# Patient Record
Sex: FEMALE | Race: BLACK OR AFRICAN AMERICAN | Employment: OTHER | URBAN - METROPOLITAN AREA
[De-identification: names, ages, dates, MRNs, and addresses within clinical notes are randomized per-mention and may not be internally consistent; named-entity substitution may affect disease eponyms.]

---

## 2018-09-05 ENCOUNTER — HOSPITAL ENCOUNTER (OUTPATIENT)
Dept: MAMMOGRAPHY | Age: 73
Discharge: HOME | End: 2018-09-05
Attending: FAMILY MEDICINE
Payer: MEDICARE

## 2018-09-05 DIAGNOSIS — Z12.31 VISIT FOR SCREENING MAMMOGRAM: ICD-10-CM

## 2018-09-05 PROCEDURE — 77063 BREAST TOMOSYNTHESIS BI: CPT

## 2019-09-12 ENCOUNTER — HOSPITAL ENCOUNTER (OUTPATIENT)
Dept: MAMMOGRAPHY | Age: 74
Discharge: HOME | End: 2019-09-12
Attending: NURSE PRACTITIONER
Payer: MEDICARE

## 2019-09-12 DIAGNOSIS — Z12.39 BREAST SCREENING: ICD-10-CM

## 2019-09-12 PROCEDURE — 77063 BREAST TOMOSYNTHESIS BI: CPT

## 2020-09-14 ENCOUNTER — HOSPITAL ENCOUNTER (OUTPATIENT)
Dept: MAMMOGRAPHY | Age: 75
Discharge: HOME | End: 2020-09-14
Attending: NURSE PRACTITIONER
Payer: MEDICARE

## 2020-09-14 DIAGNOSIS — Z12.31 VISIT FOR SCREENING MAMMOGRAM: ICD-10-CM

## 2020-09-14 PROCEDURE — 77063 BREAST TOMOSYNTHESIS BI: CPT

## 2021-07-02 ENCOUNTER — HOSPITAL ENCOUNTER (EMERGENCY)
Age: 76
Discharge: NURSING, REHAB OR LONG-TERM CARE | End: 2021-07-02
Attending: EMERGENCY MEDICINE
Payer: MEDICARE

## 2021-07-02 ENCOUNTER — APPOINTMENT (OUTPATIENT)
Dept: VASCULAR SURGERY | Age: 76
End: 2021-07-02
Attending: EMERGENCY MEDICINE
Payer: MEDICARE

## 2021-07-02 VITALS
OXYGEN SATURATION: 99 % | TEMPERATURE: 97 F | HEART RATE: 84 BPM | RESPIRATION RATE: 16 BRPM | DIASTOLIC BLOOD PRESSURE: 74 MMHG | SYSTOLIC BLOOD PRESSURE: 158 MMHG

## 2021-07-02 DIAGNOSIS — L03.116 CELLULITIS OF LEFT LOWER LEG: Primary | ICD-10-CM

## 2021-07-02 LAB
ALBUMIN SERPL-MCNC: 4 G/DL (ref 3.5–5)
ALBUMIN/GLOB SERPL: 1.1 {RATIO} (ref 1.1–2.2)
ALP SERPL-CCNC: 117 U/L (ref 45–117)
ALT SERPL-CCNC: 19 U/L (ref 12–78)
ANION GAP SERPL CALC-SCNC: 4 MMOL/L (ref 5–15)
AST SERPL-CCNC: 28 U/L (ref 15–37)
BASOPHILS # BLD: 2 K/UL (ref 0–0.1)
BASOPHILS NFR BLD: 7 % (ref 0–1)
BILIRUB SERPL-MCNC: 0.2 MG/DL (ref 0.2–1)
BNP SERPL-MCNC: 1027 PG/ML
BUN SERPL-MCNC: 24 MG/DL (ref 6–20)
BUN/CREAT SERPL: 17 (ref 12–20)
CALCIUM SERPL-MCNC: 9.5 MG/DL (ref 8.5–10.1)
CHLORIDE SERPL-SCNC: 109 MMOL/L (ref 97–108)
CO2 SERPL-SCNC: 27 MMOL/L (ref 21–32)
CREAT SERPL-MCNC: 1.39 MG/DL (ref 0.55–1.02)
DIFFERENTIAL METHOD BLD: ABNORMAL
EOSINOPHIL # BLD: 0.6 K/UL (ref 0–0.4)
EOSINOPHIL NFR BLD: 2 % (ref 0–7)
ERYTHROCYTE [DISTWIDTH] IN BLOOD BY AUTOMATED COUNT: 17 % (ref 11.5–14.5)
GLOBULIN SER CALC-MCNC: 3.6 G/DL (ref 2–4)
GLUCOSE SERPL-MCNC: 89 MG/DL (ref 65–100)
HCT VFR BLD AUTO: 29.8 % (ref 35–47)
HGB BLD-MCNC: 8.8 G/DL (ref 11.5–16)
IMM GRANULOCYTES # BLD AUTO: 0 K/UL
IMM GRANULOCYTES NFR BLD AUTO: 0 %
LYMPHOCYTES # BLD: 4.9 K/UL (ref 0.8–3.5)
LYMPHOCYTES NFR BLD: 17 % (ref 12–49)
MCH RBC QN AUTO: 27.2 PG (ref 26–34)
MCHC RBC AUTO-ENTMCNC: 29.5 G/DL (ref 30–36.5)
MCV RBC AUTO: 92 FL (ref 80–99)
METAMYELOCYTES NFR BLD MANUAL: 6 %
MONOCYTES # BLD: 2.9 K/UL (ref 0–1)
MONOCYTES NFR BLD: 10 % (ref 5–13)
MYELOCYTES NFR BLD MANUAL: 8 %
NEUTS BAND NFR BLD MANUAL: 18 % (ref 0–6)
NEUTS SEG # BLD: 14.6 K/UL (ref 1.8–8)
NEUTS SEG NFR BLD: 32 % (ref 32–75)
NRBC # BLD: 0.8 K/UL (ref 0–0.01)
NRBC BLD-RTO: 2.7 PER 100 WBC
OTHER CELLS NFR BLD MANUAL: 0 %
PLATELET # BLD AUTO: 492 K/UL (ref 150–400)
PMV BLD AUTO: 11 FL (ref 8.9–12.9)
POTASSIUM SERPL-SCNC: 4.6 MMOL/L (ref 3.5–5.1)
PROT SERPL-MCNC: 7.6 G/DL (ref 6.4–8.2)
RBC # BLD AUTO: 3.24 M/UL (ref 3.8–5.2)
RBC MORPH BLD: ABNORMAL
SODIUM SERPL-SCNC: 140 MMOL/L (ref 136–145)
WBC # BLD AUTO: 29.1 K/UL (ref 3.6–11)

## 2021-07-02 PROCEDURE — 83880 ASSAY OF NATRIURETIC PEPTIDE: CPT

## 2021-07-02 PROCEDURE — 36415 COLL VENOUS BLD VENIPUNCTURE: CPT

## 2021-07-02 PROCEDURE — 80053 COMPREHEN METABOLIC PANEL: CPT

## 2021-07-02 PROCEDURE — 93971 EXTREMITY STUDY: CPT

## 2021-07-02 PROCEDURE — 99283 EMERGENCY DEPT VISIT LOW MDM: CPT

## 2021-07-02 PROCEDURE — 85025 COMPLETE CBC W/AUTO DIFF WBC: CPT

## 2021-07-02 RX ORDER — DOXYCYCLINE HYCLATE 100 MG
100 TABLET ORAL 2 TIMES DAILY
Qty: 20 TABLET | Refills: 0 | Status: SHIPPED | OUTPATIENT
Start: 2021-07-02 | End: 2021-07-12

## 2021-07-02 NOTE — ED PROVIDER NOTES
35-year-old female with a history of CML presents to the emergency department by EMS for evaluation of left lower leg swelling and redness is been going on since May. She reportedly saw her primary care provider who told her that they would order an outpatient ultrasound of her leg but she never heard back from them and never got this study done. She describes the pain around the area as burning and constant. She denies any trauma to the area. She has some mild right lower leg swelling as well but states that is normal for her. She denies any history of prior CHF, or prior DVT. She is on no blood thinners. She denies any fever, chills, chest pain, shortness of breath, nausea, vomiting, or any other acute medical concerns. She also notes chronic low back pain but states this is no different than it always is. No past medical history on file. No past surgical history on file. Family History:   Problem Relation Age of Onset    Breast Cancer Sister         63's       Social History     Socioeconomic History    Marital status: SINGLE     Spouse name: Not on file    Number of children: Not on file    Years of education: Not on file    Highest education level: Not on file   Occupational History    Not on file   Tobacco Use    Smoking status: Not on file   Substance and Sexual Activity    Alcohol use: Not on file    Drug use: Not on file    Sexual activity: Not on file   Other Topics Concern    Not on file   Social History Narrative    Not on file     Social Determinants of Health     Financial Resource Strain:     Difficulty of Paying Living Expenses:    Food Insecurity:     Worried About Running Out of Food in the Last Year:     [address] in the Last Year:    Transportation Needs:     Lack of Transportation (Medical):      Lack of Transportation (Non-Medical):    Physical Activity:     Days of Exercise per Week:     Minutes of Exercise per Session:    Stress:     Feeling of Stress :    Social Connections:     Frequency of Communication with Friends and Family:     Frequency of Social Gatherings with Friends and Family:     Attends Restoration Services:     Active Member of Clubs or Organizations:     Attends Club or Organization Meetings:     Marital Status:    Intimate Partner Violence:     Fear of Current or Ex-Partner:     Emotionally Abused:     Physically Abused:     Sexually Abused: ALLERGIES: Patient has no known allergies. Review of Systems   Constitutional: Negative for activity change, appetite change, chills and fever. HENT: Negative for congestion, rhinorrhea, sinus pressure, sneezing and sore throat. Eyes: Negative for photophobia and visual disturbance. Respiratory: Negative for cough and shortness of breath. Cardiovascular: Positive for leg swelling. Negative for chest pain. Gastrointestinal: Negative for abdominal pain, blood in stool, constipation, diarrhea, nausea and vomiting. Genitourinary: Negative for difficulty urinating, dysuria, flank pain, frequency, hematuria, menstrual problem, urgency, vaginal bleeding and vaginal discharge. Musculoskeletal: Positive for back pain. Negative for arthralgias, myalgias and neck pain. Skin: Positive for rash. Negative for wound. Neurological: Negative for syncope, weakness, numbness and headaches. Psychiatric/Behavioral: Negative for self-injury and suicidal ideas. All other systems reviewed and are negative. Vitals:    07/02/21 1023   BP: (!) 158/74   Pulse: 84   Resp: 16   Temp: 97 °F (36.1 °C)   SpO2: 99%            Physical Exam  Vitals and nursing note reviewed. Constitutional:       General: She is not in acute distress. Appearance: Normal appearance. She is well-developed. She is not diaphoretic. Comments: Pleasant, no acute distress. HENT:      Head: Normocephalic and atraumatic.       Nose: Nose normal.   Eyes:      Extraocular Movements: Extraocular movements intact. Conjunctiva/sclera: Conjunctivae normal.      Pupils: Pupils are equal, round, and reactive to light. Cardiovascular:      Rate and Rhythm: Normal rate and regular rhythm. Heart sounds: Normal heart sounds. Pulmonary:      Effort: Pulmonary effort is normal.      Breath sounds: Normal breath sounds. Abdominal:      General: There is no distension. Palpations: Abdomen is soft. Tenderness: There is no abdominal tenderness. Musculoskeletal:         General: Swelling present. No tenderness or deformity. Cervical back: Neck supple. Right lower leg: No edema. Left lower leg: Edema present. Skin:     General: Skin is warm and dry. Findings: Erythema (Redness and warmth over the left lower extremity with associated swelling but nothing) present. Neurological:      General: No focal deficit present. Mental Status: She is alert and oriented to person, place, and time. Cranial Nerves: No cranial nerve deficit. Sensory: No sensory deficit. Motor: No weakness. Coordination: Coordination normal.          MDM   68-year-old female with a history of CML presents with left lower extremity swelling and tenderness. No trauma or bony tenderness to suggest acute fracture. She is afebrile with vital signs stable in no acute distress. Lower extremity ultrasound showed no evidence of acute DVT. Labs returned showing leukocytosis of 29.1, Hg 8.8, unfortunately we do not have prior lab values to compare to given known history of CML. Does also have slight elevation of BNP at 1027 but no shortness of breath or symptoms suggestive of CHF exacerbation. Given unilateral nature of her symptoms with associated pain suspect this is more cellulitis in nature. Will Rx p.o. ABX for further treatment and recommended elevation and close PCP follow-up for further evaluation. Return precautions were given for worsening or concerns.   This plan was discussed with the patient at bedside she stated both understanding and agreement.         Procedures

## 2021-07-02 NOTE — PROGRESS NOTES
Call received from [facility] informed patient request assistance getting home. No updates from [place] at this time . CM sent new request via roundtrip for 1425 pickup [employer]. Updates provided to Registrar. Faina Thomas [phone]  to arrive in 4 minutes. Updates provided to Registrar.

## 2021-07-02 NOTE — ED TRIAGE NOTES
DIAN giron manorBilateral ongoing leg edema redness and pain since may. Severe burning pain. Staff noticed swelling today. Lower back pain. Denies CP/SOB.  NSR.

## 2021-07-02 NOTE — ED NOTES
Pt walked to [facility] set up by care management, pt condition stable. Discharge paperwork discussed with pt by provider.

## 2021-07-02 NOTE — PROGRESS NOTES
SSED/CM consult received for transport back to facility. Patient is discharged. Call placed to [facility] [phone]  to confirm location. Spoke w/ Mary Lopez, Caretaker - informed of discharged asked if patient had been bitten by a spider? This writer unable to answer. Caretaker to receive. No steps leading into facility. Informed will contact medicaid for ride home - [employer] insurance may take \" a while. \"    1300 Call placed to [address].     [phone] Confirmation number [id number]  provided by Janny/ [employer] will attempt to locate next available ambulatory ride (outcome pending). Updates provided to [facility].

## 2021-08-18 ENCOUNTER — TRANSCRIBE ORDER (OUTPATIENT)
Dept: SCHEDULING | Age: 76
End: 2021-08-18

## 2021-08-18 DIAGNOSIS — Z12.31 SCREENING MAMMOGRAM FOR HIGH-RISK PATIENT: Primary | ICD-10-CM

## 2021-09-30 ENCOUNTER — HOSPITAL ENCOUNTER (OUTPATIENT)
Dept: MAMMOGRAPHY | Age: 76
Discharge: HOME | End: 2021-09-30
Attending: NURSE PRACTITIONER
Payer: MEDICARE

## 2021-09-30 DIAGNOSIS — Z12.31 SCREENING MAMMOGRAM FOR HIGH-RISK PATIENT: ICD-10-CM

## 2021-09-30 PROCEDURE — 77063 BREAST TOMOSYNTHESIS BI: CPT

## 2022-01-28 ENCOUNTER — HOSPITAL ENCOUNTER (INPATIENT)
Age: 77
LOS: 2 days | Discharge: HOME | DRG: 194 | End: 2022-01-30
Attending: EMERGENCY MEDICINE | Admitting: INTERNAL MEDICINE
Payer: MEDICARE

## 2022-01-28 ENCOUNTER — APPOINTMENT (OUTPATIENT)
Dept: GENERAL RADIOLOGY | Age: 77
DRG: 194 | End: 2022-01-28
Attending: EMERGENCY MEDICINE
Payer: MEDICARE

## 2022-01-28 ENCOUNTER — APPOINTMENT (OUTPATIENT)
Dept: CT IMAGING | Age: 77
DRG: 194 | End: 2022-01-28
Attending: EMERGENCY MEDICINE
Payer: MEDICARE

## 2022-01-28 ENCOUNTER — APPOINTMENT (OUTPATIENT)
Dept: NON INVASIVE DIAGNOSTICS | Age: 77
DRG: 194 | End: 2022-01-28
Attending: INTERNAL MEDICINE
Payer: MEDICARE

## 2022-01-28 DIAGNOSIS — I50.9 ACUTE CONGESTIVE HEART FAILURE, UNSPECIFIED HEART FAILURE TYPE (HCC): Primary | ICD-10-CM

## 2022-01-28 PROBLEM — J96.00 ACUTE RESPIRATORY FAILURE (HCC): Status: ACTIVE | Noted: 2022-01-28

## 2022-01-28 LAB
ALBUMIN SERPL-MCNC: 3.6 G/DL (ref 3.5–5)
ALBUMIN/GLOB SERPL: 1 {RATIO} (ref 1.1–2.2)
ALP SERPL-CCNC: 152 U/L (ref 45–117)
ALT SERPL-CCNC: 31 U/L (ref 12–78)
ANION GAP SERPL CALC-SCNC: 7 MMOL/L (ref 5–15)
AST SERPL-CCNC: 37 U/L (ref 15–37)
ATRIAL RATE: 101 BPM
B PERT DNA SPEC QL NAA+PROBE: NOT DETECTED
BASOPHILS # BLD: 3.2 K/UL (ref 0–0.1)
BASOPHILS NFR BLD: 6 % (ref 0–1)
BILIRUB SERPL-MCNC: 0.3 MG/DL (ref 0.2–1)
BNP SERPL-MCNC: 2381 PG/ML
BORDETELLA PARAPERTUSSIS PCR, BORPAR: NOT DETECTED
BUN SERPL-MCNC: 24 MG/DL (ref 6–20)
BUN/CREAT SERPL: 18 (ref 12–20)
C PNEUM DNA SPEC QL NAA+PROBE: NOT DETECTED
CALCIUM SERPL-MCNC: 9.2 MG/DL (ref 8.5–10.1)
CALCULATED P AXIS, ECG09: 87 DEGREES
CALCULATED R AXIS, ECG10: 44 DEGREES
CALCULATED T AXIS, ECG11: 90 DEGREES
CHLORIDE SERPL-SCNC: 109 MMOL/L (ref 97–108)
CO2 SERPL-SCNC: 23 MMOL/L (ref 21–32)
COMMENT, HOLDF: NORMAL
CREAT SERPL-MCNC: 1.36 MG/DL (ref 0.55–1.02)
D DIMER PPP FEU-MCNC: 3.02 MG/L FEU (ref 0–0.65)
DIAGNOSIS, 93000: NORMAL
DIFFERENTIAL METHOD BLD: ABNORMAL
ECHO AV AREA PEAK VELOCITY: 1.9 CM2
ECHO AV AREA PEAK VELOCITY: 1.9 CM2
ECHO AV PEAK GRADIENT: 12 MMHG
ECHO AV PEAK VELOCITY: 1.7 M/S
ECHO AV VELOCITY RATIO: 0.59
ECHO EST RA PRESSURE: 3 MMHG
ECHO LA VOL 4C: 72 ML (ref 22–52)
ECHO LV E' LATERAL VELOCITY: 6 CM/S
ECHO LV E' SEPTAL VELOCITY: 7 CM/S
ECHO LV FRACTIONAL SHORTENING: 13 % (ref 28–44)
ECHO LV GLOBAL LONGITUDINAL STRAIN (GLS): -17 %
ECHO LV INTERNAL DIMENSION DIASTOLIC: 4.6 CM (ref 3.9–5.3)
ECHO LV INTERNAL DIMENSION SYSTOLIC: 4 CM
ECHO LV IVSD: 0.9 CM (ref 0.6–0.9)
ECHO LV MASS 2D: 181.2 G (ref 67–162)
ECHO LV POSTERIOR WALL DIASTOLIC: 1.3 CM (ref 0.6–0.9)
ECHO LV RELATIVE WALL THICKNESS RATIO: 0.57
ECHO LVOT AREA: 3.1 CM2
ECHO LVOT DIAM: 2 CM
ECHO LVOT PEAK GRADIENT: 4 MMHG
ECHO LVOT PEAK VELOCITY: 1 M/S
ECHO MV A VELOCITY: 1.24 M/S
ECHO MV E VELOCITY: 1.01 M/S
ECHO MV E/A RATIO: 0.81
ECHO MV E/E' LATERAL: 16.83
ECHO MV E/E' RATIO (AVERAGED): 15.63
ECHO MV E/E' SEPTAL: 14.43
ECHO MV REGURGITANT VELOCITY PISA: 5.5 M/S
ECHO MV REGURGITANT VTIA: 187.3 CM
ECHO RIGHT VENTRICULAR SYSTOLIC PRESSURE (RVSP): 48 MMHG
ECHO RV FREE WALL PEAK S': 10 CM/S
ECHO RV INTERNAL DIMENSION: 4.9 CM
ECHO RV TAPSE: 3.7 CM (ref 1.5–2)
ECHO TV REGURGITANT MAX VELOCITY: 3.35 M/S
ECHO TV REGURGITANT PEAK GRADIENT: 45 MMHG
EOSINOPHIL # BLD: 1.1 K/UL (ref 0–0.4)
EOSINOPHIL NFR BLD: 2 % (ref 0–7)
ERYTHROCYTE [DISTWIDTH] IN BLOOD BY AUTOMATED COUNT: 17.4 % (ref 11.5–14.5)
FLUAV H1 2009 PAND RNA SPEC QL NAA+PROBE: NOT DETECTED
FLUAV H1 RNA SPEC QL NAA+PROBE: NOT DETECTED
FLUAV H3 RNA SPEC QL NAA+PROBE: NOT DETECTED
FLUAV SUBTYP SPEC NAA+PROBE: NOT DETECTED
FLUBV RNA SPEC QL NAA+PROBE: NOT DETECTED
GLOBULIN SER CALC-MCNC: 3.6 G/DL (ref 2–4)
GLUCOSE SERPL-MCNC: 91 MG/DL (ref 65–100)
HADV DNA SPEC QL NAA+PROBE: NOT DETECTED
HCOV 229E RNA SPEC QL NAA+PROBE: NOT DETECTED
HCOV HKU1 RNA SPEC QL NAA+PROBE: NOT DETECTED
HCOV NL63 RNA SPEC QL NAA+PROBE: NOT DETECTED
HCOV OC43 RNA SPEC QL NAA+PROBE: NOT DETECTED
HCT VFR BLD AUTO: 27.7 % (ref 35–47)
HGB BLD-MCNC: 7.9 G/DL (ref 11.5–16)
HMPV RNA SPEC QL NAA+PROBE: NOT DETECTED
HPIV1 RNA SPEC QL NAA+PROBE: NOT DETECTED
HPIV2 RNA SPEC QL NAA+PROBE: NOT DETECTED
HPIV3 RNA SPEC QL NAA+PROBE: NOT DETECTED
HPIV4 RNA SPEC QL NAA+PROBE: NOT DETECTED
IMM GRANULOCYTES # BLD AUTO: 0 K/UL
IMM GRANULOCYTES NFR BLD AUTO: 0 %
LYMPHOCYTES # BLD: 15.9 K/UL (ref 0.8–3.5)
LYMPHOCYTES NFR BLD: 30 % (ref 12–49)
M PNEUMO DNA SPEC QL NAA+PROBE: NOT DETECTED
MAGNESIUM SERPL-MCNC: 2.8 MG/DL (ref 1.6–2.4)
MCH RBC QN AUTO: 23.1 PG (ref 26–34)
MCHC RBC AUTO-ENTMCNC: 28.5 G/DL (ref 30–36.5)
MCV RBC AUTO: 81 FL (ref 80–99)
METAMYELOCYTES NFR BLD MANUAL: 3 %
MONOCYTES # BLD: 3.7 K/UL (ref 0–1)
MONOCYTES NFR BLD: 7 % (ref 5–13)
MYELOCYTES NFR BLD MANUAL: 5 %
NEUTS BAND NFR BLD MANUAL: 6 % (ref 0–6)
NEUTS SEG # BLD: 25 K/UL (ref 1.8–8)
NEUTS SEG NFR BLD: 41 % (ref 32–75)
NRBC # BLD: 1.75 K/UL (ref 0–0.01)
NRBC BLD-RTO: 3.3 PER 100 WBC
P-R INTERVAL, ECG05: 124 MS
PLATELET # BLD AUTO: 541 K/UL (ref 150–400)
PLATELET COMMENTS,PCOM: ABNORMAL
PMV BLD AUTO: 11.8 FL (ref 8.9–12.9)
POTASSIUM SERPL-SCNC: 4.2 MMOL/L (ref 3.5–5.1)
PROT SERPL-MCNC: 7.2 G/DL (ref 6.4–8.2)
Q-T INTERVAL, ECG07: 366 MS
QRS DURATION, ECG06: 72 MS
QTC CALCULATION (BEZET), ECG08: 474 MS
RBC # BLD AUTO: 3.42 M/UL (ref 3.8–5.2)
RBC MORPH BLD: ABNORMAL
RSV RNA SPEC QL NAA+PROBE: NOT DETECTED
RV+EV RNA SPEC QL NAA+PROBE: NOT DETECTED
SAMPLES BEING HELD,HOLD: NORMAL
SARS-COV-2 PCR, COVPCR: NOT DETECTED
SODIUM SERPL-SCNC: 139 MMOL/L (ref 136–145)
TROPONIN-HIGH SENSITIVITY: 11 NG/L (ref 0–51)
VENTRICULAR RATE, ECG03: 101 BPM
WBC # BLD AUTO: 53.1 K/UL (ref 3.6–11)
WBC MORPH BLD: ABNORMAL

## 2022-01-28 PROCEDURE — 80053 COMPREHEN METABOLIC PANEL: CPT

## 2022-01-28 PROCEDURE — 74011000636 HC RX REV CODE- 636: Performed by: RADIOLOGY

## 2022-01-28 PROCEDURE — 71046 X-RAY EXAM CHEST 2 VIEWS: CPT

## 2022-01-28 PROCEDURE — 71275 CT ANGIOGRAPHY CHEST: CPT

## 2022-01-28 PROCEDURE — APPSS45 APP SPLIT SHARED TIME 31-45 MINUTES: Performed by: NURSE PRACTITIONER

## 2022-01-28 PROCEDURE — 83880 ASSAY OF NATRIURETIC PEPTIDE: CPT

## 2022-01-28 PROCEDURE — 74011250636 HC RX REV CODE- 250/636: Performed by: EMERGENCY MEDICINE

## 2022-01-28 PROCEDURE — 99285 EMERGENCY DEPT VISIT HI MDM: CPT

## 2022-01-28 PROCEDURE — 74011000250 HC RX REV CODE- 250: Performed by: INTERNAL MEDICINE

## 2022-01-28 PROCEDURE — 96374 THER/PROPH/DIAG INJ IV PUSH: CPT

## 2022-01-28 PROCEDURE — 65660000001 HC RM ICU INTERMED STEPDOWN

## 2022-01-28 PROCEDURE — 36415 COLL VENOUS BLD VENIPUNCTURE: CPT

## 2022-01-28 PROCEDURE — 85379 FIBRIN DEGRADATION QUANT: CPT

## 2022-01-28 PROCEDURE — 84484 ASSAY OF TROPONIN QUANT: CPT

## 2022-01-28 PROCEDURE — 85025 COMPLETE CBC W/AUTO DIFF WBC: CPT

## 2022-01-28 PROCEDURE — 93306 TTE W/DOPPLER COMPLETE: CPT

## 2022-01-28 PROCEDURE — 0202U NFCT DS 22 TRGT SARS-COV-2: CPT

## 2022-01-28 PROCEDURE — 93005 ELECTROCARDIOGRAM TRACING: CPT

## 2022-01-28 PROCEDURE — 74011250637 HC RX REV CODE- 250/637: Performed by: INTERNAL MEDICINE

## 2022-01-28 PROCEDURE — 74011250636 HC RX REV CODE- 250/636: Performed by: INTERNAL MEDICINE

## 2022-01-28 PROCEDURE — 83735 ASSAY OF MAGNESIUM: CPT

## 2022-01-28 PROCEDURE — 96375 TX/PRO/DX INJ NEW DRUG ADDON: CPT

## 2022-01-28 RX ORDER — FUROSEMIDE 10 MG/ML
20 INJECTION INTRAMUSCULAR; INTRAVENOUS 2 TIMES DAILY
Status: DISCONTINUED | OUTPATIENT
Start: 2022-01-28 | End: 2022-01-29

## 2022-01-28 RX ORDER — POLYETHYLENE GLYCOL 3350 17 G/17G
17 POWDER, FOR SOLUTION ORAL DAILY PRN
Status: DISCONTINUED | OUTPATIENT
Start: 2022-01-28 | End: 2022-01-30 | Stop reason: HOSPADM

## 2022-01-28 RX ORDER — ACETAMINOPHEN 325 MG/1
650 TABLET ORAL
Status: DISCONTINUED | OUTPATIENT
Start: 2022-01-28 | End: 2022-01-30 | Stop reason: HOSPADM

## 2022-01-28 RX ORDER — ONDANSETRON 2 MG/ML
4 INJECTION INTRAMUSCULAR; INTRAVENOUS
Status: DISCONTINUED | OUTPATIENT
Start: 2022-01-28 | End: 2022-01-30 | Stop reason: HOSPADM

## 2022-01-28 RX ORDER — ENOXAPARIN SODIUM 100 MG/ML
40 INJECTION SUBCUTANEOUS DAILY
Status: DISCONTINUED | OUTPATIENT
Start: 2022-01-29 | End: 2022-01-30 | Stop reason: HOSPADM

## 2022-01-28 RX ORDER — ONDANSETRON 4 MG/1
4 TABLET, ORALLY DISINTEGRATING ORAL
Status: DISCONTINUED | OUTPATIENT
Start: 2022-01-28 | End: 2022-01-30 | Stop reason: HOSPADM

## 2022-01-28 RX ORDER — FUROSEMIDE 10 MG/ML
40 INJECTION INTRAMUSCULAR; INTRAVENOUS ONCE
Status: COMPLETED | OUTPATIENT
Start: 2022-01-28 | End: 2022-01-28

## 2022-01-28 RX ORDER — ACETAMINOPHEN 650 MG/1
650 SUPPOSITORY RECTAL
Status: DISCONTINUED | OUTPATIENT
Start: 2022-01-28 | End: 2022-01-30 | Stop reason: HOSPADM

## 2022-01-28 RX ORDER — SODIUM CHLORIDE 0.9 % (FLUSH) 0.9 %
5-40 SYRINGE (ML) INJECTION EVERY 8 HOURS
Status: DISCONTINUED | OUTPATIENT
Start: 2022-01-28 | End: 2022-01-30 | Stop reason: HOSPADM

## 2022-01-28 RX ORDER — SODIUM CHLORIDE 0.9 % (FLUSH) 0.9 %
5-40 SYRINGE (ML) INJECTION AS NEEDED
Status: DISCONTINUED | OUTPATIENT
Start: 2022-01-28 | End: 2022-01-30 | Stop reason: HOSPADM

## 2022-01-28 RX ADMIN — FUROSEMIDE 20 MG: 10 INJECTION, SOLUTION INTRAVENOUS at 16:31

## 2022-01-28 RX ADMIN — ACETAMINOPHEN 650 MG: 325 TABLET ORAL at 22:05

## 2022-01-28 RX ADMIN — IOPAMIDOL 80 ML: 755 INJECTION, SOLUTION INTRAVENOUS at 10:52

## 2022-01-28 RX ADMIN — CEFTRIAXONE SODIUM 1 G: 1 INJECTION, POWDER, FOR SOLUTION INTRAMUSCULAR; INTRAVENOUS at 16:31

## 2022-01-28 RX ADMIN — SODIUM CHLORIDE, PRESERVATIVE FREE 10 ML: 5 INJECTION INTRAVENOUS at 16:31

## 2022-01-28 RX ADMIN — FUROSEMIDE 40 MG: 10 INJECTION, SOLUTION INTRAVENOUS at 11:19

## 2022-01-28 NOTE — PROGRESS NOTES
[facility] in [place], [place]  Inpatient Progress Note      Patient name: Bimal Butler  Patient : 1945  Patient MRN: [id number]  Consulting MD: Leslie Wahl MD  Date of service: 22    REASON FOR REFERRAL:  Management of acute systolic heart failure    PLAN OF CARE:  68 y.o. female with no known cardiac history who presented for SOB x 1 week. PBNP elevated and TTE pending. Will complete initial evaluation. RECOMMENDATIONS:  Continue lasix BID for now, monitor I/O  Check initial HF labs   TTE pending  Trend PBNP  Strict I/O  Daily weights    Nutritionist consult  Heart failure education    All other care per primary team     IMPRESSION:  Shortness of breath  Possible acute systolic heart failure  H/o myeloproliferative disorder  H/o Thrombocytosis  H/o Cellulitis   HTN  HLD    LIFE GOALS:  Patient's personal goals include: TBD  Important upcoming milestones or family events: TBD  The patient identifies the following as important for living well: TBD    INTERVAL HISTORY:    HPI  Ms. Lonnie Wood is a 68 y.o. female with a pmhx of myeloproliferative disorder and thrombocytosis who presented to the ED for SOB for 1 week. She does note that she had some burning in her chest last week that resolved that day. She was given some diuretics in the ED and states she feels better. She does have an elevated PBNP and trace B effusions. [facility] was consulted for possible acute HFrEF. CARDIAC IMAGING:  Echo 22 pending   EKG 22- ST, rate 101  LHC-NA  NST- NA  ICD interrogation- NA    HEMODYNAMICS:  RHC not done-NA  CPEST not done- NA  6MW not done    OTHER IMAGING:  CXR Results  (Last 48 hours)                 22 0949  XR CHEST PA LAT Final result    Impression:  Cardiomegaly with trace bilateral pleural effusions.        Narrative:  INDICATION:   shortness of breath        EXAM:  PA and Lateral Chest Radiographs       COMPARISON: None FINDINGS:       PA and lateral views of the chest demonstrate an enlarged cardiac silhouette. Patient is rotated leftward. The lungs are adequately expanded. There are trace   effusions. There is no focal airspace disease. The osseous structures are   unremarkable. CT Results (most recent):  Results from Hospital Encounter encounter on 01/28/22    CTA CHEST W OR W WO CONT    Narrative  INDICATION:   sob, elevated dimer    COMPARISON: None. TECHNIQUE:  Precontrast  images were obtained to localize the volume for acquisition. Multislice helical CT arteriography was performed from the diaphragm to the  thoracic inlet during uneventful rapid bolus intravenous administration of 80 mL  of Isovue-370. Lung and soft tissue windows were generated. Post processing was  performed and coronal reformatted images were also generated. 3 D imaging was  performed. CT dose reduction was achieved through use of a standardized  protocol tailored for this examination and automatic exposure control for dose  modulation. FINDINGS: There is cardiomegaly. There is a small pericardial effusion. There is  an aberrant right subclavian artery. Main pulmonary artery slightly prominent and measures 3 cm. There is slight bibasilar atelectasis. There is a tiny right effusion. No definite pulmonary emboli identified. There is no mediastinal or hilar adenopathy or mass. The aorta enhances normally  without evidence of aneurysm or dissection. There is splenomegaly in the upper abdomen with the spleen measuring at least  15.1 cm. There is slight dextroconvex scoliosis. There is sclerosis of the visualized  bony structures and are multiple small lucencies in the thoracic spine. Impression  1. No definite pulmonary emboli identified. 2. There is slight bibasilar atelectasis. There is a tiny right pleural  effusion. 3. Splenomegaly. 4. Cardiomegaly. There is an aberrant right subclavian artery  5. There is increased density throughout the visualized bony structures may  represent changes of metabolic bone disease. However there are also several  small lucent areas scattered in the thoracic spine which are nonspecific and  could represent focal areas of osteoporosis however metastatic bone  disease/myeloma is not excluded and correlation with prior studies would be  helpful. Rajendra Alicea PHYSICAL EXAM:  Visit Vitals  /64   Pulse 93   Temp 98.3 °F (36.8 °C)   Resp 21   SpO2 99%     Physical Exam  Vitals and nursing note reviewed. Constitutional:       General: She is not in acute distress. Appearance: Normal appearance. She is normal weight. Cardiovascular:      Rate and Rhythm: Normal rate and regular rhythm. Pulses: Normal pulses. Heart sounds: Normal heart sounds. Pulmonary:      Effort: Pulmonary effort is normal. No respiratory distress. Breath sounds: Normal breath sounds. Abdominal:      General: Abdomen is flat. There is no distension. Musculoskeletal:         General: No swelling. Normal range of motion. Skin:     General: Skin is warm and dry. Neurological:      General: No focal deficit present. Mental Status: She is alert and oriented to person, place, and time. Psychiatric:         Mood and Affect: Mood normal.         REVIEW OF SYSTEMS:  Review of Systems   Constitutional: Positive for malaise/fatigue. Negative for chills and fever. Respiratory: Positive for cough and shortness of breath. Cardiovascular: Negative for chest pain, palpitations and leg swelling. Gastrointestinal: Negative for heartburn and nausea. Musculoskeletal: Negative for falls and myalgias. Neurological: Negative for dizziness, weakness and headaches. Psychiatric/Behavioral: Negative. PAST MEDICAL HISTORY:  Past Medical History:   Diagnosis Date    Menopause        PAST SURGICAL HISTORY:  No past surgical history on file.     FAMILY HISTORY:  Family History Problem Relation Age of Onset    Breast Cancer Sister         63's       SOCIAL HISTORY:  Social History     Socioeconomic History    Marital status: SINGLE       LABORATORY RESULTS:     Labs Latest Ref Rng & Units 1/28/2022   WBC 3.6 - 11.0 K/uL 53. 1(HH)   RBC 3.80 - 5.20 M/uL 3.42(L)   Hemoglobin 11.5 - 16.0 g/dL 7. 9(L)   Hematocrit 35.0 - 47.0 % 27. 7(L)   MCV 80.0 - 99.0 FL 81.0   Platelets 659 - 021 K/uL 541(H)   Lymphocytes 12 - 49 % 30   Monocytes 5 - 13 % 7   Eosinophils 0 - 7 % 2   Basophils 0 - 1 % 6(H)   Bands 0 - 6 % 6   Albumin 3.5 - 5.0 g/dL 3.6   Calcium 8.5 - 10.1 MG/DL 9.2   Glucose 65 - 100 mg/dL 91   BUN 6 - 20 MG/DL 24(H)   Creatinine 0.55 - 1.02 MG/DL 1.36(H)   Sodium 136 - 145 mmol/L 139   Potassium 3.5 - 5.1 mmol/L 4.2   Some recent data might be hidden     No results found for: TSH, TSH2, TSH3, TSHP, TSHELE, TSHEXT    ALLERGY:  No Known Allergies     CURRENT MEDICATIONS:    Current Facility-Administered Medications:     cefTRIAXone (ROCEPHIN) 1 g in sterile water (preservative free) 10 mL IV syringe, 1 g, IntraVENous, Q24H, Raymond Llanes MD    sodium chloride (NS) flush 5-40 mL, 5-40 mL, IntraVENous, Q8H, Raymond Llanes MD    sodium chloride (NS) flush 5-40 mL, 5-40 mL, IntraVENous, PRN, Raymond Llanes MD    acetaminophen (TYLENOL) tablet 650 mg, 650 mg, Oral, Q6H PRN **OR** acetaminophen (TYLENOL) suppository 650 mg, 650 mg, Rectal, Q6H PRN, Raymond Llanes MD    polyethylene glycol (MIRALAX) packet 17 g, 17 g, Oral, DAILY PRN, Raymond Llanes MD    ondansetron (ZOFRAN ODT) tablet 4 mg, 4 mg, Oral, Q8H PRN **OR** ondansetron (ZOFRAN) injection 4 mg, 4 mg, IntraVENous, Q6H PRN, Raymond Llanes MD    [START ON 1/29/2022] enoxaparin (LOVENOX) injection 40 mg, 40 mg, SubCUTAneous, DAILY, Raymond Llanes MD    furosemide (LASIX) injection 20 mg, 20 mg, IntraVENous, BID, Raymond Llanes MD  No current outpatient medications on file.     PATIENT CARE TEAM:  Patient Care Team:  Gunner Chacon NP as PCP - General (Nurse Practitioner)     Thank you for allowing me to participate in this patient's care. Jossue Penny, NP  [facility] [address], [address]  Phone: [phone]    [facility] ATTENDING ADDENDUM    Patient was seen and examined in person. Data and notes were reviewed. I have discussed and agree with the plan as noted in the NP note above without further additions.     Bea Porter MD PhD  Sudheer Bhatia

## 2022-01-28 NOTE — ED NOTES
9:24 AM    75-year-old female presents via EMS for exertional shortness of breath for 1 week. I do notice some labored breathing when talking during triage. Does have some left-sided calf swelling which she states has been present since she was treated for cellulitis several months ago. Had some chest \"burning\" several days ago which has resolved. Also notes she was sneezing with nasal congestion earlier in the week which is also resolved, she did have close Covid exposure, has been vaccinated for Covid. Hx of CML    I have evaluated the patient as the Provider in Triage. I have reviewed Her vital signs and the triage nurse assessment. I have talked with the patient and any available family and advised that I am the provider in triage and have ordered the appropriate study to initiate their work up based on the clinical presentation during my assessment. I have advised that the patient will be accommodated in the Main ED as soon as possible. I have also requested to contact the triage nurse or myself immediately if the patient experiences any changes in their condition during this brief waiting period.   LEANDER Joe

## 2022-01-28 NOTE — ED PROVIDER NOTES
51-year-old female with a history of a blood disorder presents with a chief complaint of shortness of breath. Patient reports being short of breath since last Thursday. Her shortness of breath is present with activity and at rest.  She has had a cough but denies fever. She was backseat against Covid and boosted. She states that on Wednesday she had some burning central chest pain which has resolved. She denies abdominal pain, GI or urinary symptoms. She is not a smoker. Past Medical History:   Diagnosis Date    Menopause        No past surgical history on file. Family History:   Problem Relation Age of Onset    Breast Cancer Sister         63's       Social History     Socioeconomic History    Marital status: SINGLE     Spouse name: Not on file    Number of children: Not on file    Years of education: Not on file    Highest education level: Not on file   Occupational History    Not on file   Tobacco Use    Smoking status: Not on file    Smokeless tobacco: Not on file   Substance and Sexual Activity    Alcohol use: Not on file    Drug use: Not on file    Sexual activity: Not on file   Other Topics Concern    Not on file   Social History Narrative    Not on file     Social Determinants of Health     Financial Resource Strain:     Difficulty of Paying Living Expenses: Not on file   Food Insecurity:     Worried About Running Out of Food in the Last Year: Not on file    [place] of Food in the Last Year: Not on file   Transportation Needs:     Lack of Transportation (Medical): Not on file    Lack of Transportation (Non-Medical):  Not on file   Physical Activity:     Days of Exercise per Week: Not on file    Minutes of Exercise per Session: Not on file   Stress:     Feeling of Stress : Not on file   Social Connections:     Frequency of Communication with Friends and Family: Not on file    Frequency of Social Gatherings with Friends and Family: Not on file    Attends Latter day Services: Not on file    Active Member of Clubs or Organizations: Not on file    Attends Club or Organization Meetings: Not on file    Marital Status: Not on file   Intimate Partner Violence:     Fear of Current or Ex-Partner: Not on file    Emotionally Abused: Not on file    Physically Abused: Not on file    Sexually Abused: Not on file   Housing Stability:     Unable to Pay for Housing in the Last Year: Not on file    Number of [place] in the Last Year: Not on file    Unstable Housing in the Last Year: Not on file         ALLERGIES: Patient has no known allergies. Review of Systems   Constitutional: Negative for fever. HENT: Negative for rhinorrhea. Respiratory: Positive for cough and shortness of breath. Cardiovascular: Positive for chest pain. Gastrointestinal: Negative for abdominal pain. Genitourinary: Negative for dysuria. Musculoskeletal: Negative for back pain. Skin: Negative for wound. Neurological: Negative for headaches. Psychiatric/Behavioral: Negative for confusion. Vitals:    01/28/22 0910   BP: 135/67   Pulse: 95   Resp: 16   Temp: 98.3 °F (36.8 °C)   SpO2: 100%            Physical Exam  Vitals and nursing note reviewed. Constitutional:       General: She is not in acute distress. Appearance: Normal appearance. She is well-developed. She is not ill-appearing, toxic-appearing or diaphoretic. HENT:      Head: Normocephalic and atraumatic. Eyes:      Extraocular Movements: Extraocular movements intact. Cardiovascular:      Rate and Rhythm: Normal rate. Pulses: Normal pulses. Pulmonary:      Effort: Pulmonary effort is normal. No respiratory distress. Breath sounds: Rales present. No decreased breath sounds, wheezing or rhonchi. Abdominal:      General: There is no distension. Palpations: Abdomen is soft. Tenderness: There is no abdominal tenderness. Musculoskeletal:         General: Normal range of motion.       Cervical back: Normal range of motion. Skin:     General: Skin is warm and dry. Neurological:      General: No focal deficit present. Mental Status: She is alert and oriented to person, place, and time. Psychiatric:         Mood and Affect: Mood normal.          MDM  Number of Diagnoses or Management Options  Acute congestive heart failure, unspecified heart failure type [facility])  Diagnosis management comments: 59-year-old female presents with shortness of breath. Work-up is consistent with acute CHF.   Patient was given Lasix here in the ED and will be admitted to hospital medicine for further management    Perfect [facility] Consult for Admission  10:48 AM    ED Room Number: ER06/06  Patient Name and age:  Uche Barrera 68 y.o.  female  Working Diagnosis: Acute congestive heart failure, unspecified heart failure type ([address]  (primary encounter diagnosis)    COVID-19 Suspicion:  no  Sepsis present:  no  Reassessment needed: no  Code Status:  Full Code  Readmission: no  Isolation Requirements:  no  Recommended Level of Care:  telemetry  Department:[facility] Adult ED - 21            Amount and/or Complexity of Data Reviewed  Decide to obtain previous medical records or to obtain history from someone other than the patient: yes           Procedures

## 2022-01-28 NOTE — ED TRIAGE NOTES
Triage: Pt arrives ambulatory from [facility] with CC of SOB since Wednesday. Pt denies hx of CHF or COPD. Denies known covid exposures. Breathing even and non labored in triage. Denies CP.

## 2022-01-28 NOTE — H&P
[facility] Adult  Hospitalist Group  History and Physical    Primary Care Provider: Cathie Reynolds NP  Date of Service:  1/28/2022    Chief Complaint: Shortness of breath    Subjective:     Silvestre Presley is a 68 y.o. female who is a resident of [facility] living with past medical history of myeloproliferative disorder, thrombocytosis, hypertension, hyperlipidemia presenting to ED complaining of worsening shortness of breath, nonproductive cough, burning chest pain. Patient stated that she has received all 3 doses of her Covid vaccination and has not been exposed to anybody with COVID-19, denies any recent hospitalization, stating that last week he had a narrowing at discharge and states that she has been progressively getting short of breath. Patient denies any fever, chills, nausea, vomiting, diarrhea, constipation or any urinary symptoms. Patient denies any orthopnea, paroxysmal nocturnal dyspnea, abdominal distention or lower extremity edema. In ER patient was noted to have significant leukocytosis, thrombocytosis, mildly elevated creatinine, elevated proBNP, CTA chest was negative for PE but negative for cardiomegaly and increased densities in the visualized bony structures likely representing metabolic bone disease. Hospitalist was consulted for admission. Patient denies any headache, blurry vision, sore throat, trouble swallowing, trouble with speech, hemoptysis, hematemesis, skin rash, fever, chills, shortness of breath, chest pain, dysphagia, odynophagia, nausea, vomiting, diarrhea, abdominal pain, urgency, hesitancy, frequency, dysuria, constipation, melena, hematochezia. Review of Systems:    A comprehensive review of systems was negative except for that written in the History of Present Illness. Past Medical History:   Diagnosis Date    Menopause       No past surgical history on file.   Prior to Admission medications    Not on File     No Known Allergies   Family History   Problem Relation Age of Onset    Breast Cancer Sister         63's        SOCIAL HISTORY:  Patient resides at Home. Patient ambulates with self. Smoking history: never  Alcohol history: 0 Beers        Objective:       Physical Exam:   Visit Vitals  /64   Pulse 93   Temp 98.3 °F (36.8 °C)   Resp 21   SpO2 99%     General appearance: alert, cooperative, no distress, appears stated age  Lungs: clear to auscultation bilaterally  Chest wall: no tenderness  Heart: regular rate and rhythm, S1, S2 normal, no murmur, click, rub or gallop  Abdomen: soft, non-tender. Bowel sounds normal. No masses,  no organomegaly  Extremities: extremities normal, atraumatic, no cyanosis or edema  Skin: Skin color, texture, turgor normal. No rashes or lesions  Neurologic: Grossly normal  Cap refill: Brisk, less than 3 seconds  Pulses: 2+, symmetric in all extremities  Skin: Warm, dry, without rashes or lesions    ECG:  normal EKG, normal sinus rhythm, unchanged from previous tracings     Data Review: All diagnostic labs and studies have been reviewed. Chest x-ray was negative for infiltrate, effusion, pneumothorax, or wide mediastinum. Assessment/Plan     · Acute respiratory failure  · Acute systolic heart failure  · Hx myeloproliferative disorder  · Hx thrombocytosis. · Hypertension. Romain Lozano is a 68 y.o. female who is a resident of [facility] living with past medical history of myeloproliferative disorder, thrombocytosis, hypertension, hyperlipidemia presenting to ED complaining of worsening shortness of breath, nonproductive cough, burning chest pain. Patient presenting with worsening shortness of breath in the setting of significantly elevated proBNP and negative history of CAD, patient has been vaccinated for COVID-19 however had jabbering at her [employer] and since then has been having worsening shortness of breath.     Patient could be developing acute systolic heart failure given elevated proBNP and cardiomegaly on CT or having COVID-19 infection or superimposed bacterial community-acquired pneumonia. We will admit patient to IMCU, start on cardiac diet and diuretics, will obtain stat echo and respiratory panel to rule out COVID-19, heart failure team will also be consulted. Patient also be started on antihypertensive medication and antihypertensive medication as needed.       FUNCTIONAL STATUS PRIOR TO HOSPITALIZATION Ambulatory with Use of Assistive Devices (including history of recent falls):0       Signed By: Elzbieta Pino MD     January 28, 2022

## 2022-01-29 LAB
25(OH)D3 SERPL-MCNC: 27.4 NG/ML (ref 30–100)
ALBUMIN SERPL-MCNC: 3.1 G/DL (ref 3.5–5)
ALBUMIN/GLOB SERPL: 0.9 {RATIO} (ref 1.1–2.2)
ALP SERPL-CCNC: 139 U/L (ref 45–117)
ALT SERPL-CCNC: 25 U/L (ref 12–78)
ANION GAP SERPL CALC-SCNC: 4 MMOL/L (ref 5–15)
AST SERPL-CCNC: 34 U/L (ref 15–37)
BASOPHILS # BLD: 2.4 K/UL (ref 0–0.1)
BASOPHILS NFR BLD: 5 % (ref 0–1)
BILIRUB SERPL-MCNC: 0.2 MG/DL (ref 0.2–1)
BNP SERPL-MCNC: 2978 PG/ML
BUN SERPL-MCNC: 23 MG/DL (ref 6–20)
BUN/CREAT SERPL: 16 (ref 12–20)
CALCIUM SERPL-MCNC: 8.9 MG/DL (ref 8.5–10.1)
CHLORIDE SERPL-SCNC: 110 MMOL/L (ref 97–108)
CO2 SERPL-SCNC: 26 MMOL/L (ref 21–32)
CREAT SERPL-MCNC: 1.4 MG/DL (ref 0.55–1.02)
DIFFERENTIAL METHOD BLD: ABNORMAL
EOSINOPHIL # BLD: 2.4 K/UL (ref 0–0.4)
EOSINOPHIL NFR BLD: 5 % (ref 0–7)
ERYTHROCYTE [DISTWIDTH] IN BLOOD BY AUTOMATED COUNT: 16.8 % (ref 11.5–14.5)
FERRITIN SERPL-MCNC: 195 NG/ML (ref 26–388)
GLOBULIN SER CALC-MCNC: 3.3 G/DL (ref 2–4)
GLUCOSE SERPL-MCNC: 92 MG/DL (ref 65–100)
HCT VFR BLD AUTO: 25.9 % (ref 35–47)
HGB BLD-MCNC: 7.5 G/DL (ref 11.5–16)
IMM GRANULOCYTES # BLD AUTO: 0 K/UL
IMM GRANULOCYTES NFR BLD AUTO: 0 %
IRON SATN MFR SERPL: 11 % (ref 20–50)
IRON SERPL-MCNC: 24 UG/DL (ref 35–150)
LYMPHOCYTES # BLD: 7.3 K/UL (ref 0.8–3.5)
LYMPHOCYTES NFR BLD: 15 % (ref 12–49)
MAGNESIUM SERPL-MCNC: 2.5 MG/DL (ref 1.6–2.4)
MCH RBC QN AUTO: 23.1 PG (ref 26–34)
MCHC RBC AUTO-ENTMCNC: 29 G/DL (ref 30–36.5)
MCV RBC AUTO: 79.7 FL (ref 80–99)
METAMYELOCYTES NFR BLD MANUAL: 11 %
MONOCYTES # BLD: 1.9 K/UL (ref 0–1)
MONOCYTES NFR BLD: 4 % (ref 5–13)
MYELOCYTES NFR BLD MANUAL: 6 %
NEUTS BAND NFR BLD MANUAL: 7 % (ref 0–6)
NEUTS SEG # BLD: 26.3 K/UL (ref 1.8–8)
NEUTS SEG NFR BLD: 47 % (ref 32–75)
NRBC # BLD: 1.59 K/UL (ref 0–0.01)
NRBC BLD-RTO: 3.3 PER 100 WBC
PLATELET # BLD AUTO: 497 K/UL (ref 150–400)
PLATELET COMMENTS,PCOM: ABNORMAL
PMV BLD AUTO: 11.5 FL (ref 8.9–12.9)
POTASSIUM SERPL-SCNC: 4.2 MMOL/L (ref 3.5–5.1)
PROT SERPL-MCNC: 6.4 G/DL (ref 6.4–8.2)
RBC # BLD AUTO: 3.25 M/UL (ref 3.8–5.2)
RBC MORPH BLD: ABNORMAL
SODIUM SERPL-SCNC: 140 MMOL/L (ref 136–145)
T4 FREE SERPL-MCNC: 1.4 NG/DL (ref 0.8–1.5)
TIBC SERPL-MCNC: 213 UG/DL (ref 250–450)
TSH SERPL DL<=0.05 MIU/L-ACNC: 2.67 UIU/ML (ref 0.36–3.74)
WBC # BLD AUTO: 48.7 K/UL (ref 3.6–11)

## 2022-01-29 PROCEDURE — 84439 ASSAY OF FREE THYROXINE: CPT

## 2022-01-29 PROCEDURE — 74011250636 HC RX REV CODE- 250/636: Performed by: INTERNAL MEDICINE

## 2022-01-29 PROCEDURE — 82306 VITAMIN D 25 HYDROXY: CPT

## 2022-01-29 PROCEDURE — 74011250637 HC RX REV CODE- 250/637: Performed by: NURSE PRACTITIONER

## 2022-01-29 PROCEDURE — 74011000250 HC RX REV CODE- 250: Performed by: INTERNAL MEDICINE

## 2022-01-29 PROCEDURE — 36415 COLL VENOUS BLD VENIPUNCTURE: CPT

## 2022-01-29 PROCEDURE — 85025 COMPLETE CBC W/AUTO DIFF WBC: CPT

## 2022-01-29 PROCEDURE — 83735 ASSAY OF MAGNESIUM: CPT

## 2022-01-29 PROCEDURE — 82728 ASSAY OF FERRITIN: CPT

## 2022-01-29 PROCEDURE — 65660000001 HC RM ICU INTERMED STEPDOWN

## 2022-01-29 PROCEDURE — 84443 ASSAY THYROID STIM HORMONE: CPT

## 2022-01-29 PROCEDURE — 83540 ASSAY OF IRON: CPT

## 2022-01-29 PROCEDURE — 82784 ASSAY IGA/IGD/IGG/IGM EACH: CPT

## 2022-01-29 PROCEDURE — 83880 ASSAY OF NATRIURETIC PEPTIDE: CPT

## 2022-01-29 PROCEDURE — 80053 COMPREHEN METABOLIC PANEL: CPT

## 2022-01-29 RX ORDER — ERGOCALCIFEROL 1.25 MG/1
50000 CAPSULE ORAL
Status: DISCONTINUED | OUTPATIENT
Start: 2022-01-29 | End: 2022-01-30 | Stop reason: HOSPADM

## 2022-01-29 RX ADMIN — SODIUM CHLORIDE, PRESERVATIVE FREE 10 ML: 5 INJECTION INTRAVENOUS at 13:47

## 2022-01-29 RX ADMIN — ENOXAPARIN SODIUM 40 MG: 100 INJECTION SUBCUTANEOUS at 08:28

## 2022-01-29 RX ADMIN — ERGOCALCIFEROL 50000 UNITS: 1.25 CAPSULE ORAL at 07:38

## 2022-01-29 RX ADMIN — SODIUM CHLORIDE, PRESERVATIVE FREE 10 ML: 5 INJECTION INTRAVENOUS at 22:00

## 2022-01-29 RX ADMIN — CEFTRIAXONE SODIUM 1 G: 1 INJECTION, POWDER, FOR SOLUTION INTRAMUSCULAR; INTRAVENOUS at 13:44

## 2022-01-29 NOTE — PROGRESS NOTES
[facility] Adult  Hospitalist Group                                                                                          Hospitalist Progress Note  Shady Araiza MD  Answering service: [phone] from in house phone        Date of Service:  2022  NAME:  Jocelin Rodrigues  :  1945  MRN:  [id number]      Admission Summary:   Jocelin Rodrigues is a 68 y.o. female who is a resident of assisted living with past medical history of myeloproliferative disorder, thrombocytosis, hypertension, hyperlipidemia presenting to ED complaining of worsening shortness of breath, nonproductive cough, burning chest pain.     Patient stated that she has received all 3 doses of her Covid vaccination and has not been exposed to anybody with COVID-19, denies any recent hospitalization, stating that last week he had a narrowing at discharge and states that she has been progressively getting short of breath.     Patient denies any fever, chills, nausea, vomiting, diarrhea, constipation or any urinary symptoms. Patient denies any orthopnea, paroxysmal nocturnal dyspnea, abdominal distention or lower extremity edema.     In ER patient was noted to have significant leukocytosis, thrombocytosis, mildly elevated creatinine, elevated proBNP, CTA chest was negative for PE but negative for cardiomegaly and increased densities in the visualized bony structures likely representing metabolic bone disease. Hospitalist was consulted for admission.     Patient denies any headache, blurry vision, sore throat, trouble swallowing, trouble with speech, hemoptysis, hematemesis, skin rash, fever, chills, shortness of breath, chest pain, dysphagia, odynophagia, nausea, vomiting, diarrhea, abdominal pain, urgency, hesitancy, frequency, dysuria, constipation, melena, hematochezia.       Interval history / Subjective:      2022. No acute events overnight.   Saw patient this morning, awake alert and oriented no apparent distress, denies any fever, chills, nausea, vomiting. Shortness of breath has improved moderately, denies any chest pain or palpitation. Assessment & Plan:     Acute respiratory failure  Moderate improvement. Likely community-acquired pneumonia. WBC trending down, afebrile, shortness of breath improving. Respiratory panel negative including COVID-19. Vaccinated for COVID-19. Pneumonia   As above. Acute systolic heart failure  Ruled out. Presented with dyspnea on exertion and elevated proBNP. 2D echo ejection fraction WNL. Ruled out as per cardiology note. Please refer to cardiology note. Hx myeloproliferative disorder  Outpatient PCP and hematology follow-up. Hx thrombocytosis. Outpatient PCP and hematology follow-up. Hypertension. Euvolemic. Normotensive. Continue current meds. Vitamin D deficiency. Vitamin D 27.4. We will continue high-dose vitamin D for 6 weeks. Code status: Full code  DVT prophylaxis: Lovenox    Care Plan discussed with: Patient/Family  Anticipated Disposition: Home w/Family  Anticipated Discharge: Less than 24 hours     Hospital Problems  Never Reviewed          Codes Class Noted POA    [facility] ICD-10-CM: J96.00  ICD-9-CM: 518.81  1/28/2022 Unknown                Review of Systems:   A comprehensive review of systems was negative except for that written in the HPI. Vital Signs:    Last 24hrs VS reviewed since prior progress note.  Most recent are:  Visit Vitals  /64   Pulse 97   Temp 98.3 °F (36.8 °C)   Resp 20   SpO2 100%         Intake/Output Summary (Last 24 hours) at 1/29/2022 1450  Last data filed at 1/29/2022 0000  Gross per 24 hour   Intake 240 ml   Output --   Net 240 ml        Physical Examination:     I had a face to face encounter with this patient and independently examined them on 1/29/2022 as outlined below:          Constitutional:  No acute distress, cooperative, pleasant    ENT:  Oral mucosa moist, oropharynx benign. Resp:  CTA bilaterally. No wheezing/rhonchi/rales. No accessory muscle use   CV:  Regular rhythm, normal rate, no murmurs, gallops, rubs    GI:  Soft, non distended, non tender. normoactive bowel sounds, no hepatosplenomegaly     Musculoskeletal:  No edema, warm, 2+ pulses throughout    Neurologic:  Moves all extremities. AAOx3, CN II-XII reviewed            Data Review:    Review and/or order of clinical lab test      Labs:     Recent Labs     01/29/22 0342 01/28/22  0935   WBC 48.7* 53.1*   HGB 7.5* 7.9*   HCT 25.9* 27.7*   * 541*     Recent Labs     01/29/22 0342 01/28/22  0935    139   K 4.2 4.2   * 109*   CO2 26 23   BUN 23* 24*   CREA 1.40* 1.36*   GLU 92 91   CA 8.9 9.2   MG 2.5* 2.8*     Recent Labs     01/29/22 0342 01/28/22  0935   ALT 25 31   * 152*   TBILI 0.2 0.3   TP 6.4 7.2   ALB 3.1* 3.6   GLOB 3.3 3.6     No results for input(s): INR, PTP, APTT, INREXT in the last 72 hours. Recent Labs     01/29/22 0342   TIBC 213*   PSAT 11*   FERR 195      No results found for: FOL, RBCF   No results for input(s): PH, PCO2, PO2 in the last 72 hours. No results for input(s): CPK, CKNDX, TROIQ in the last 72 hours.     No lab exists for component: CPKMB  No results found for: CHOL, CHOLX, CHLST, CHOLV, HDL, HDLP, LDL, LDLC, DLDLP, TGLX, TRIGL, TRIGP, CHHD, CHHDX  No results found for: GLUCPOC  No results found for: COLOR, APPRN, SPGRU, REFSG, DOMINGO, PROTU, GLUCU, KETU, BILU, UROU, SANDY, LEUKU, GLUKE, EPSU, BACTU, WBCU, RBCU, CASTS, UCRY      Medications Reviewed:     Current Facility-Administered Medications   Medication Dose Route Frequency    ergocalciferol capsule 50,000 Units  50,000 Units Oral Q7D    cefTRIAXone (ROCEPHIN) 1 g in sterile water (preservative free) 10 mL IV syringe  1 g IntraVENous Q24H    sodium chloride (NS) flush 5-40 mL  5-40 mL IntraVENous Q8H    sodium chloride (NS) flush 5-40 mL  5-40 mL IntraVENous PRN    acetaminophen (TYLENOL) tablet 650 mg  650 mg Oral Q6H PRN    Or    acetaminophen (TYLENOL) suppository 650 mg  650 mg Rectal Q6H PRN    polyethylene glycol (MIRALAX) packet 17 g  17 g Oral DAILY PRN    ondansetron (ZOFRAN ODT) tablet 4 mg  4 mg Oral Q8H PRN    Or    ondansetron (ZOFRAN) injection 4 mg  4 mg IntraVENous Q6H PRN    enoxaparin (LOVENOX) injection 40 mg  40 mg SubCUTAneous DAILY     No current outpatient medications on file.     ______________________________________________________________________  EXPECTED LENGTH OF STAY: - - -  ACTUAL LENGTH OF STAY:          1                 Shady Araiza MD

## 2022-01-29 NOTE — ROUTINE PROCESS
TRANSFER - OUT REPORT:    Verbal report given to [facility] RN on Rhina Starch  being transferred to [facility]) for routine progression of care       Report consisted of patients Situation, Background, Assessment and   Recommendations(SBAR). Information from the following report(s) SBAR, Kardex, ED Summary, [facility] and Recent Results was reviewed with the receiving nurse. Lines:   Peripheral IV 01/28/22 Left Antecubital (Active)   Site Assessment Clean, dry, & intact 01/28/22 0933   Phlebitis Assessment 0 01/28/22 0933   Infiltration Assessment 0 01/28/22 0933   Dressing Status Clean, dry, & intact 01/28/22 0933   Dressing Type Transparent 01/28/22 0933   Hub Color/Line Status Pink 01/28/22 0933   Action Taken Blood drawn 01/28/22 0933        Opportunity for questions and clarification was provided.       Patient transported with:   RN monitor

## 2022-01-29 NOTE — PROGRESS NOTES
Transition of Care Plan  1. RUR- 13%  2. DISPOSITION: TBD/subject to change pending recommendations; pending medical progression   -Anticipate for patient to return [facility] once medically stable for discharge. 3. F/U with PCP/Specialist    4. Transport: Family    CM will continue to follow and assist with HEAVEN needs as they arise. Reason for Admission:  Acute respiratory failure ([address]                     RUR Score:       13%              Plan for utilizing home health:  Not at this time. TBD/subject to change pending recommendations. PCP: YES First and Last name:  Krystle Charles NP     Name of Practice: Dr Burns/ Claudia Thompson NP Office   Are you a current patient: Yes/No: YES   Approximate date of last visit: Last Week   Can you participate in a virtual visit with your PCP: YES                    Current Advanced Directive/Advance Care Plan: Full Code      Healthcare Decision Maker:   Addison Duggan: Daughter, Pamela Mi [phone]    Patient also reports that she has guardians over financial matters: Michelle Estrella          68year old female, AOx3. Independent with ADL's and IADL's. No DME utilized. Resides at [facility]. Patient is a retired  and receives [employer] as source of income. Insurance verified: Medicare A&B and [address] Medications are administered to patient at the facility. Care Management Interventions  PCP Verified by CM: Yes  Last Visit to PCP: 01/24/22  Palliative Care Criteria Met (RRAT>21 & CHF Dx)?: No  Mode of Transport at Discharge:  Other (see comment) (Family)  Transition of Care Consult (CM Consult):  (No current CM consults)  Discharge Durable Medical Equipment: No  Physical Therapy Consult: No  Occupational Therapy Consult: No  Speech Therapy Consult: No  Support Systems: Child(saurabh),Other Family Member(s),Assisted Living  Confirm Follow Up Transport: Family  Discharge Location  Patient Expects to be Discharged to[de-identified] Assisted Living (TBD/subject to change pending recommendations)    JES José/ROBERT  Care Management  1:09 PM

## 2022-01-29 NOTE — PROGRESS NOTES
Thank you for this consult, at this time patient has normal ejection fraction at 55-60% with normal LV size, RV with normal systolic function. Would check PBNP, gammopathy pending and we will follow up on result. Hold diuretics for now due to up trending creatinine- could have PRN diuretic at home. Would add high dose Vit D for 12 weeks, patient may need iron supplementation as OP and have patient follow up with cardiology for repeat TTE in 3 months unless symptoms re occur. [facility] will sign off at this time but will be available for questions.

## 2022-01-30 VITALS
OXYGEN SATURATION: 97 % | HEART RATE: 98 BPM | SYSTOLIC BLOOD PRESSURE: 110 MMHG | RESPIRATION RATE: 20 BRPM | DIASTOLIC BLOOD PRESSURE: 65 MMHG | WEIGHT: 142.1 LBS | TEMPERATURE: 97.4 F

## 2022-01-30 LAB
BNP SERPL-MCNC: 1870 PG/ML
ERYTHROCYTE [DISTWIDTH] IN BLOOD BY AUTOMATED COUNT: 17 % (ref 11.5–14.5)
HCT VFR BLD AUTO: 27.5 % (ref 35–47)
HGB BLD-MCNC: 7.8 G/DL (ref 11.5–16)
MCH RBC QN AUTO: 22.8 PG (ref 26–34)
MCHC RBC AUTO-ENTMCNC: 28.4 G/DL (ref 30–36.5)
MCV RBC AUTO: 80.4 FL (ref 80–99)
NRBC # BLD: 1.59 K/UL (ref 0–0.01)
NRBC BLD-RTO: 2.9 PER 100 WBC
PLATELET # BLD AUTO: 556 K/UL (ref 150–400)
PMV BLD AUTO: 12.1 FL (ref 8.9–12.9)
RBC # BLD AUTO: 3.42 M/UL (ref 3.8–5.2)
WBC # BLD AUTO: 54.1 K/UL (ref 3.6–11)

## 2022-01-30 PROCEDURE — 83880 ASSAY OF NATRIURETIC PEPTIDE: CPT

## 2022-01-30 PROCEDURE — 74011250636 HC RX REV CODE- 250/636: Performed by: INTERNAL MEDICINE

## 2022-01-30 PROCEDURE — 85027 COMPLETE CBC AUTOMATED: CPT

## 2022-01-30 PROCEDURE — 36415 COLL VENOUS BLD VENIPUNCTURE: CPT

## 2022-01-30 RX ORDER — ERGOCALCIFEROL 1.25 MG/1
50000 CAPSULE ORAL
Qty: 10 CAPSULE | Refills: 0 | Status: ON HOLD | OUTPATIENT
Start: 2022-02-05 | End: 2022-02-21

## 2022-01-30 RX ORDER — LEVOFLOXACIN 750 MG/1
750 TABLET ORAL DAILY
Qty: 3 TABLET | Refills: 0 | Status: SHIPPED | OUTPATIENT
Start: 2022-01-30 | End: 2022-02-02

## 2022-01-30 RX ORDER — LEVOFLOXACIN 750 MG/1
750 TABLET ORAL DAILY
Qty: 3 TABLET | Refills: 0 | Status: SHIPPED | OUTPATIENT
Start: 2022-01-30 | End: 2022-01-30 | Stop reason: SDUPTHER

## 2022-01-30 RX ORDER — ERGOCALCIFEROL 1.25 MG/1
50000 CAPSULE ORAL
Qty: 4 CAPSULE | Refills: 2 | Status: SHIPPED | OUTPATIENT
Start: 2022-02-05 | End: 2022-01-30

## 2022-01-30 RX ORDER — ERGOCALCIFEROL 1.25 MG/1
50000 CAPSULE ORAL
Qty: 10 CAPSULE | Refills: 0 | Status: SHIPPED | OUTPATIENT
Start: 2022-02-05 | End: 2022-01-30

## 2022-01-30 RX ADMIN — ENOXAPARIN SODIUM 40 MG: 100 INJECTION SUBCUTANEOUS at 10:42

## 2022-01-30 NOTE — DISCHARGE SUMMARY
.     Discharge Summary       PATIENT ID: Marilu Raines  MRN: [id number]   YOB: 1945    DATE OF ADMISSION: 1/28/2022  9:25 AM    DATE OF DISCHARGE: 1/30/2022  PRIMARY CARE PROVIDER: Soha Jenkins NP     ATTENDING PHYSICIAN: Junior Gregg MD  DISCHARGING PROVIDER: Junior Gregg MD    To contact this individual call [phone] and ask the  to page. If unavailable ask to be transferred the Adult Hospitalist Department. CONSULTATIONS: IP CONSULT TO HOSPITALIST  IP CONSULT TO ADVANCED HEART FAILURE    PROCEDURES/SURGERIES: * No surgery found *    ADMITTING [facility] COURSE:   Anna Bring a 68 y. o. female who is a resident of [facility] living with past medical history of myeloproliferative disorder, thrombocytosis, hypertension, hyperlipidemia presenting to ED complaining of worsening shortness of breath, nonproductive cough, burning chest pain.     Patient stated that she has received all 3 doses of her Covid vaccination and has not been exposed to anybody with COVID-19, denies any recent hospitalization, stating that last week he had a narrowing at discharge and states that she has been progressively getting short of breath.     Patient denies any fever, chills, nausea, vomiting, diarrhea, constipation or any urinary symptoms.  Patient denies any orthopnea, paroxysmal nocturnal dyspnea, abdominal distention or lower extremity edema.     In ER patient was noted to have significant leukocytosis, thrombocytosis, mildly elevated creatinine, elevated proBNP, CTA chest was negative for PE but negative for cardiomegaly and increased densities in the visualized bony structures likely representing metabolic bone disease.  Hospitalist was consulted for admission.     Patient denies any headache, blurry vision, sore throat, trouble swallowing, trouble with speech, hemoptysis, hematemesis, skin rash, fever, chills, shortness of breath, chest pain, dysphagia, odynophagia, nausea, vomiting, diarrhea, abdominal pain, urgency, hesitancy, frequency, dysuria, constipation, melena, hematochezia.           DISCHARGE DIAGNOSES / PLAN:      Acute respiratory failure  Moderate improvement. Likely community-acquired pneumonia. WBC trending down, afebrile, shortness of breath improving. SPO2 WNL. Respiratory panel negative including COVID-19. Vaccinated for COVID-19.       Pneumonia   Acute community-acquired pneumonia. Plan as above.     Acute systolic heart failure  Ruled out. Presented with dyspnea on exertion and elevated proBNP. 2D echo ejection fraction WNL. Ruled out as per cardiology note. Please refer to cardiology note.     Hx myeloproliferative disorder  Outpatient PCP and hematology follow-up.       Hx thrombocytosis. Outpatient PCP and hematology follow-up.     Hypertension. Euvolemic. Normotensive. Continue current meds.     Vitamin D deficiency. Likely due to low p.o. intake. Vitamin D 27.4. Discharged on 1 weeks of high-dose ergocalciferol. PENDING TEST RESULTS:   At the time of discharge the following test results are still pending: None    FOLLOW UP APPOINTMENTS:    Follow-up Information     Follow up With Specialties Details Why Contact Info    Arya Adams NP Nurse Practitioner In 1 week  [address] [place] [address] [phone]      Jeff Chisholm, ALEN Nurse Practitioner   [address]  [phone]             ADDITIONAL CARE RECOMMENDATIONS: Potentially consider doing cardiology follow-up. DIET: Regular Diet  Oral Nutritional Supplements: Boost Glucose ControlOnce daily    ACTIVITY: Activity as tolerated        DISCHARGE MEDICATIONS:  Current Discharge Medication List      START taking these medications    Details   ergocalciferol (ERGOCALCIFEROL) 1,250 mcg (50,000 unit) capsule Take 1 Capsule by mouth every seven (7) days for 10 doses.   Qty: 10 Capsule, Refills: 0  Start date: 2/5/2022, End date: 4/10/2022      levoFLOXacin (LEVAQUIN) 750 mg tablet Take 1 Tablet by mouth daily for 3 days. Qty: 3 Tablet, Refills: 0  Start date: 1/30/2022, End date: 2/2/2022               NOTIFY YOUR PHYSICIAN FOR ANY OF THE FOLLOWING:   Fever over 101 degrees for 24 hours. Chest pain, shortness of breath, fever, chills, nausea, vomiting, diarrhea, change in mentation, falling, weakness, bleeding. Severe pain or pain not relieved by medications. Or, any other signs or symptoms that you may have questions about.     DISPOSITION:    Home With:   OT  PT  HH  RN       Long term SNF/Inpatient Rehab    Independent/assisted living    Hospice    Other:       PATIENT CONDITION AT DISCHARGE:     Functional status    Poor     Deconditioned     Independent      Cognition     Lucid     Forgetful     Dementia      Catheters/lines (plus indication)    Liao     PICC     PEG     None      Code status     Full code     DNR      PHYSICAL EXAMINATION AT DISCHARGE:   Refer to Progress Note while inpatient      CHRONIC MEDICAL DIAGNOSES:  Problem List as of 1/30/2022 Never Reviewed          Codes Class Noted - Resolved    [facility] ICD-10-CM: J96.00  ICD-9-CM: 518.81  1/28/2022 - Present              Greater than 35 minutes were spent with the patient on counseling and coordination of care    Signed:   Tiesha Fierro MD  1/30/2022  12:10 PM

## 2022-01-30 NOTE — PROGRESS NOTES
0730: Bedside shift change report given to Sara Wei (oncoming nurse) by Cat (offgoing nurse). Report included the following information SBAR, Kardex, ED Summary, Intake/Output, MAR and Recent Results. 1200: Pt off tele and IV removed for discharge. I have reviewed discharge instructions with the patient. The patient verbalized understanding. Discharge medications reviewed with patient and appropriate educational materials and side effects teaching were provided. Current Discharge Medication List      START taking these medications    Details   levoFLOXacin (LEVAQUIN) 750 mg tablet Take 1 Tablet by mouth daily for 3 days. Indications: bacterial pneumonia caused by Streptococcus pneumoniae  Qty: 3 Tablet, Refills: 0  Start date: 1/30/2022, End date: 2/2/2022      ergocalciferol (ERGOCALCIFEROL) 1,250 mcg (50,000 unit) capsule Take 1 Capsule by mouth every seven (7) days for 10 doses.  Indications: vitamin D deficiency (high dose therapy)  Qty: 10 Capsule, Refills: 0  Start date: 2/5/2022, End date: 4/10/2022

## 2022-01-31 ENCOUNTER — PATIENT OUTREACH (OUTPATIENT)
Dept: CASE MANAGEMENT | Age: 77
End: 2022-01-31

## 2022-01-31 ENCOUNTER — TELEPHONE (OUTPATIENT)
Dept: CARDIOLOGY CLINIC | Age: 77
End: 2022-01-31

## 2022-01-31 NOTE — PROGRESS NOTES
22 at 2:55pm:  Care Transitions Outreach Attempt    Call within 2 business days of discharge: Yes   Attempted to reach patient for transitions of care follow up. Unable to reach patient. Patient: True Else Patient : 1945 MRN: [id number]    Last Discharge [facility] Facility       Complaint Diagnosis Description Type Department Provider    22 Shortness of Breath Acute congestive heart failure, unspecified heart failure type [facility]) ED to Hosp-Admission (Discharged) (ADMIT) Rico Wood MD; Luis Felipe Bal MD        Was this an external facility discharge? No     Noted following upcoming appointments from discharge chart review:   [facility] follow up appointment(s):   Future Appointments   Date Time Provider Sara Wei   2022  1:00 PM Essie Rodrigues MD [facility]     Non-BS follow up appointment(s):  None noted at this time. 22 at 4:19pm:  Transitions of Care Call  Call within 2 business days of discharge: Yes     Patient: True Else Patient : 1945 MRN: [id number]    Last Discharge [address]       Complaint Diagnosis Description Type Department Provider    22 Shortness of Breath Acute congestive heart failure, unspecified heart failure type [facility]) ED to Hosp-Admission (Discharged) (ADMIT) Rico Wood MD; Luis Felipe Bal MD        Was this an external facility discharge? No     Challenges to be reviewed by the provider   Additional needs identified to be addressed with provider:  no  None - patient has no red flags to report at this time. Encounter was not routed to provider for escalation. Method of communication with provider:  None, patient has no red flags to report at this time and Tiffanie Tinoco, ALEN/PCP is not a [facility] provider. Discussed COVID-19 related testing which was available at this time. Test results were negative. Patient informed of results, if available?  yes. Current Symptoms:  None per patient. Reviewed New or Changed Meds:  Yes. Patient states she is not taking Levaquin and is not taking ergocalciferol as these medications are not available at her pharmacy at this time. Patient states once these two medications become available, [facility] staff will have the two medications picked up from her pharmacy, and she will begin taking these two medications as ordered. Do you have what you need at home?  Durable Medical Equipment ordered at discharge: None   Home Health/Outpatient orders at discharge: none    Pulse oximeter? no    Patient education provided: Reviewed appropriate site of care based on symptoms and resources available to patient including:  PCP, Specialist and When to call 911. Follow up appointment scheduled within 7 days of discharge: Unknown at this time, patient states Osmany Almonte NP/PCP will see her when he visits [facility]. Patient is unable to give a specific date of when Osmany Almonte NP/PCP will see her. If no appointment scheduled, scheduling offered:  Yes, patient states [facility] staff will schedule her [facility] appointment with Osmany Almonte NP/PCP. Future Appointments   Date Time Provider Sara Wei   2/16/2022  1:00 PM Ty Ayala MD [facility] BS AMB     Interventions: Obtained and reviewed discharge summary and/or continuity of care documents and Education of patient/family/caregiver/guardian to support self-management-Education provided regarding signs/symptoms of Covid-19 and pneumonia, patient verbalized an understanding. CTN reviewed discharge instructions, medical action plan and red flags with patient who verbalized understanding. Provided contact information for future needs. Plan for follow-up call in 10-14 days based on severity of symptoms and risk factors.   Plan for next call: self management-Review red flags of Covid-19 virus and pneumonia, and follow up appointment-Evalute if patient is attending follow-up appointments as scheduled, offer assistance with scheduling as needed.

## 2022-01-31 NOTE — TELEPHONE ENCOUNTER
Called patient to scheduled new patient appointment.  Scheduled for 02/16/22 @ 1pm  Instructed patient to bring ID, Insurance Card and to arrive 15min earlier to fill out any paperwork

## 2022-02-02 LAB
ALBUMIN SERPL ELPH-MCNC: 3.8 G/DL (ref 2.9–4.4)
ALBUMIN/GLOB SERPL: 1.5 {RATIO} (ref 0.7–1.7)
ALPHA1 GLOB SERPL ELPH-MCNC: 0.3 G/DL (ref 0–0.4)
ALPHA2 GLOB SERPL ELPH-MCNC: 0.7 G/DL (ref 0.4–1)
B-GLOBULIN SERPL ELPH-MCNC: 0.7 G/DL (ref 0.7–1.3)
GAMMA GLOB SERPL ELPH-MCNC: 0.9 G/DL (ref 0.4–1.8)
GLOBULIN SER-MCNC: 2.6 G/DL (ref 2.2–3.9)
IGA SERPL-MCNC: 94 MG/DL (ref 64–422)
IGG SERPL-MCNC: 813 MG/DL (ref 586–1602)
IGM SERPL-MCNC: 113 MG/DL (ref 26–217)
INTERPRETATION SERPL IEP-IMP: ABNORMAL
KAPPA LC FREE SER-MCNC: 20.2 MG/L (ref 3.3–19.4)
KAPPA LC FREE/LAMBDA FREE SER: 1.26 {RATIO} (ref 0.26–1.65)
LAMBDA LC FREE SERPL-MCNC: 16 MG/L (ref 5.7–26.3)
M PROTEIN SERPL ELPH-MCNC: 0.3 G/DL
PROT SERPL-MCNC: 6.4 G/DL (ref 6–8.5)

## 2022-02-03 NOTE — PROGRESS NOTES
Physician Progress Note      PATIENT:               Melissa Gonzalez  [facility] #:                  [id number]  :                       1945  ADMIT DATE:       2022 9:25 AM  DISCH DATE:        2022 1:00 PM  RESPONDING  PROVIDER #:        Adri Jimenez MD          QUERY TEXT:    Patient admitted with SOB. Noted documentation of acute respiratory failure in multiple notes throughout the patient's stay. Patient was never placed on supplemental O2. There is conflicting documentation in Hospitalists' notes, H&P says the patient presents with no dyspnea and later it is written in  note and discharge summary patient presented with dyspnea. In order to support the diagnosis of acute respiratory failure, please include additional clinical indicators in your documentation. Or please document if the diagnosis of acute respiratory failure has been ruled out after further study. The medical record reflects the following:  Risk Factors: 77yo with PNA    Clinical Indicators:  O2 sats on RA: 91% to 100%    ED  Respiratory: Positive for cough and shortness of breath. Pulmonary:  Effort: Pulmonary effort is normal. No respiratory distress. Breath sounds: Rales present. No decreased breath sounds, wheezing or rhonchi. H&P  Admission summary: Patient denies any orthopnea, paroxysmal nocturnal dyspnea, abdominal distention or lower extremity edema. General appearance: alert, cooperative, no distress, appears stated age  Lungs: clear to auscultation bilaterally     and Discharge summary  Admision summary: Patient denies any orthopnea, paroxysmal nocturnal dyspnea, abdominal distention or lower extremity edema. Assessment & Plan: Presented with dyspnea on exertion and elevated proBNP.     Nursing assessment on discharge: dyspnea with exertion    Treatment: hospital admission, VS per unit routine, PRN O2 ordered (never used)    Acute Respiratory Failure Clinical Indicators per [facility] Training Guide and Quick Reference Guide:  pO2 < 60 mmHg or SpO2 (pulse oximetry) < 91% breathing room air  pCO2 > 50 and pH < 7.35  P/F ratio (pO2 / FIO2) < 300  pO2 decrease or pCO2 increase by 10 mmHg from baseline (if known)  Supplemental oxygen of 40% or more  Presence of respiratory distress, tachypnea, dyspnea, shortness of breath, wheezing  Unable to speak in complete sentences  Use of accessory muscles to breathe  Extreme anxiety and feeling of impending doom  Tripod position  Confusion/altered mental status/obtunded    Thank you,  Shantelle Esqueda  [phone]  Options provided:  -- Acute Respiratory Failure as evidenced by, Please document evidence. -- Acute Respiratory Failure ruled out after study  -- Other - I will add my own diagnosis  -- Disagree - Not applicable / Not valid  -- Disagree - Clinically unable to determine / Unknown  -- Refer to Clinical Documentation Reviewer    PROVIDER RESPONSE TEXT:    Provider is clinically unable to determine a response to this query.     Query created by: Blanca Gonzalez on 2/3/2022 11:54 AM      Electronically signed by:  Amber Yadav MD 2/3/2022 6:23 PM

## 2022-02-14 ENCOUNTER — TELEPHONE (OUTPATIENT)
Dept: CARDIOLOGY CLINIC | Age: 77
End: 2022-02-14

## 2022-02-16 ENCOUNTER — APPOINTMENT (OUTPATIENT)
Dept: GENERAL RADIOLOGY | Age: 77
DRG: 291 | End: 2022-02-16
Attending: HOSPITALIST
Payer: MEDICARE

## 2022-02-16 ENCOUNTER — HOSPITAL ENCOUNTER (INPATIENT)
Age: 77
LOS: 3 days | Discharge: HOME | DRG: 291 | End: 2022-02-21
Attending: EMERGENCY MEDICINE | Admitting: HOSPITALIST
Payer: MEDICARE

## 2022-02-16 ENCOUNTER — APPOINTMENT (OUTPATIENT)
Dept: VASCULAR SURGERY | Age: 77
DRG: 291 | End: 2022-02-16
Attending: HOSPITALIST
Payer: MEDICARE

## 2022-02-16 ENCOUNTER — OFFICE VISIT (OUTPATIENT)
Dept: CARDIOLOGY CLINIC | Age: 77
End: 2022-02-16
Payer: MEDICARE

## 2022-02-16 VITALS
HEART RATE: 80 BPM | DIASTOLIC BLOOD PRESSURE: 82 MMHG | TEMPERATURE: 96.2 F | SYSTOLIC BLOOD PRESSURE: 150 MMHG | BODY MASS INDEX: 24.46 KG/M2 | WEIGHT: 146.8 LBS | HEIGHT: 65 IN | RESPIRATION RATE: 20 BRPM | OXYGEN SATURATION: 100 %

## 2022-02-16 DIAGNOSIS — R06.02 SHORTNESS OF BREATH: Primary | ICD-10-CM

## 2022-02-16 DIAGNOSIS — J90 PLEURAL EFFUSION: ICD-10-CM

## 2022-02-16 DIAGNOSIS — R06.02 SOB (SHORTNESS OF BREATH): Primary | ICD-10-CM

## 2022-02-16 LAB
ALBUMIN SERPL-MCNC: 3.2 G/DL (ref 3.5–5)
ALBUMIN/GLOB SERPL: 0.8 {RATIO} (ref 1.1–2.2)
ALP SERPL-CCNC: 127 U/L (ref 45–117)
ALT SERPL-CCNC: 21 U/L (ref 12–78)
ANION GAP SERPL CALC-SCNC: 8 MMOL/L (ref 5–15)
AST SERPL-CCNC: 40 U/L (ref 15–37)
BASOPHILS # BLD: 6 K/UL (ref 0–0.1)
BASOPHILS NFR BLD: 7 % (ref 0–1)
BILIRUB SERPL-MCNC: 0.3 MG/DL (ref 0.2–1)
BNP SERPL-MCNC: 3148 PG/ML
BUN SERPL-MCNC: 26 MG/DL (ref 6–20)
BUN/CREAT SERPL: 16 (ref 12–20)
CALCIUM SERPL-MCNC: 9.2 MG/DL (ref 8.5–10.1)
CHLORIDE SERPL-SCNC: 108 MMOL/L (ref 97–108)
CO2 SERPL-SCNC: 22 MMOL/L (ref 21–32)
COMMENT, HOLDF: NORMAL
CREAT SERPL-MCNC: 1.58 MG/DL (ref 0.55–1.02)
DIFFERENTIAL METHOD BLD: ABNORMAL
EOSINOPHIL # BLD: 1.7 K/UL (ref 0–0.4)
EOSINOPHIL NFR BLD: 2 % (ref 0–7)
ERYTHROCYTE [DISTWIDTH] IN BLOOD BY AUTOMATED COUNT: 17.2 % (ref 11.5–14.5)
GLOBULIN SER CALC-MCNC: 3.8 G/DL (ref 2–4)
GLUCOSE SERPL-MCNC: 99 MG/DL (ref 65–100)
HCT VFR BLD AUTO: 29.4 % (ref 35–47)
HGB BLD-MCNC: 8.3 G/DL (ref 11.5–16)
IMM GRANULOCYTES # BLD AUTO: 0 K/UL
IMM GRANULOCYTES NFR BLD AUTO: 0 %
LYMPHOCYTES # BLD: 11.1 K/UL (ref 0.8–3.5)
LYMPHOCYTES NFR BLD: 13 % (ref 12–49)
MCH RBC QN AUTO: 22.6 PG (ref 26–34)
MCHC RBC AUTO-ENTMCNC: 28.2 G/DL (ref 30–36.5)
MCV RBC AUTO: 79.9 FL (ref 80–99)
METAMYELOCYTES NFR BLD MANUAL: 5 %
MONOCYTES # BLD: 8.5 K/UL (ref 0–1)
MONOCYTES NFR BLD: 10 % (ref 5–13)
MYELOCYTES NFR BLD MANUAL: 4 %
NEUTS BAND NFR BLD MANUAL: 13 % (ref 0–6)
NEUTS SEG # BLD: 50.3 K/UL (ref 1.8–8)
NEUTS SEG NFR BLD: 46 % (ref 32–75)
NRBC # BLD: 8.42 K/UL (ref 0–0.01)
NRBC BLD-RTO: 9.9 PER 100 WBC
PLATELET # BLD AUTO: 666 K/UL (ref 150–400)
PLATELET COMMENTS,PCOM: ABNORMAL
PMV BLD AUTO: 11.8 FL (ref 8.9–12.9)
POTASSIUM SERPL-SCNC: 4.5 MMOL/L (ref 3.5–5.1)
PROT SERPL-MCNC: 7 G/DL (ref 6.4–8.2)
RBC # BLD AUTO: 3.68 M/UL (ref 3.8–5.2)
RBC MORPH BLD: ABNORMAL
SAMPLES BEING HELD,HOLD: NORMAL
SODIUM SERPL-SCNC: 138 MMOL/L (ref 136–145)
WBC # BLD AUTO: 85.3 K/UL (ref 3.6–11)

## 2022-02-16 PROCEDURE — 93005 ELECTROCARDIOGRAM TRACING: CPT

## 2022-02-16 PROCEDURE — G8427 DOCREV CUR MEDS BY ELIG CLIN: HCPCS | Performed by: INTERNAL MEDICINE

## 2022-02-16 PROCEDURE — G8510 SCR DEP NEG, NO PLAN REQD: HCPCS | Performed by: INTERNAL MEDICINE

## 2022-02-16 PROCEDURE — G0378 HOSPITAL OBSERVATION PER HR: HCPCS

## 2022-02-16 PROCEDURE — 1111F DSCHRG MED/CURRENT MED MERGE: CPT | Performed by: INTERNAL MEDICINE

## 2022-02-16 PROCEDURE — 71045 X-RAY EXAM CHEST 1 VIEW: CPT

## 2022-02-16 PROCEDURE — 99215 OFFICE O/P EST HI 40 MIN: CPT | Performed by: INTERNAL MEDICINE

## 2022-02-16 PROCEDURE — G8536 NO DOC ELDER MAL SCRN: HCPCS | Performed by: INTERNAL MEDICINE

## 2022-02-16 PROCEDURE — 1101F PT FALLS ASSESS-DOCD LE1/YR: CPT | Performed by: INTERNAL MEDICINE

## 2022-02-16 PROCEDURE — 1090F PRES/ABSN URINE INCON ASSESS: CPT | Performed by: INTERNAL MEDICINE

## 2022-02-16 PROCEDURE — 74011000250 HC RX REV CODE- 250: Performed by: HOSPITALIST

## 2022-02-16 PROCEDURE — G8400 PT W/DXA NO RESULTS DOC: HCPCS | Performed by: INTERNAL MEDICINE

## 2022-02-16 PROCEDURE — 85025 COMPLETE CBC W/AUTO DIFF WBC: CPT

## 2022-02-16 PROCEDURE — G8420 CALC BMI NORM PARAMETERS: HCPCS | Performed by: INTERNAL MEDICINE

## 2022-02-16 PROCEDURE — 80053 COMPREHEN METABOLIC PANEL: CPT

## 2022-02-16 PROCEDURE — 99284 EMERGENCY DEPT VISIT MOD MDM: CPT

## 2022-02-16 PROCEDURE — 93970 EXTREMITY STUDY: CPT

## 2022-02-16 PROCEDURE — 83880 ASSAY OF NATRIURETIC PEPTIDE: CPT

## 2022-02-16 RX ORDER — SODIUM CHLORIDE 0.9 % (FLUSH) 0.9 %
5-40 SYRINGE (ML) INJECTION AS NEEDED
Status: DISCONTINUED | OUTPATIENT
Start: 2022-02-16 | End: 2022-02-21 | Stop reason: HOSPADM

## 2022-02-16 RX ORDER — SODIUM CHLORIDE 0.9 % (FLUSH) 0.9 %
5-40 SYRINGE (ML) INJECTION EVERY 8 HOURS
Status: DISCONTINUED | OUTPATIENT
Start: 2022-02-16 | End: 2022-02-21 | Stop reason: HOSPADM

## 2022-02-16 RX ORDER — ARIPIPRAZOLE 30 MG/1
30 TABLET ORAL DAILY
Status: DISCONTINUED | OUTPATIENT
Start: 2022-02-17 | End: 2022-02-21 | Stop reason: HOSPADM

## 2022-02-16 RX ORDER — CHOLECALCIFEROL (VITAMIN D3) 125 MCG
2000 CAPSULE ORAL DAILY
COMMUNITY

## 2022-02-16 RX ORDER — ENOXAPARIN SODIUM 100 MG/ML
40 INJECTION SUBCUTANEOUS DAILY
Status: DISCONTINUED | OUTPATIENT
Start: 2022-02-17 | End: 2022-02-21 | Stop reason: HOSPADM

## 2022-02-16 RX ORDER — ARIPIPRAZOLE 30 MG/1
30 TABLET ORAL DAILY
COMMUNITY

## 2022-02-16 RX ORDER — LANOLIN ALCOHOL/MO/W.PET/CERES
400 CREAM (GRAM) TOPICAL DAILY
Status: DISCONTINUED | OUTPATIENT
Start: 2022-02-17 | End: 2022-02-21 | Stop reason: HOSPADM

## 2022-02-16 RX ORDER — ACETAMINOPHEN 650 MG/1
650 SUPPOSITORY RECTAL
Status: DISCONTINUED | OUTPATIENT
Start: 2022-02-16 | End: 2022-02-21 | Stop reason: HOSPADM

## 2022-02-16 RX ORDER — DOCUSATE SODIUM 100 MG/1
100 CAPSULE, LIQUID FILLED ORAL 2 TIMES DAILY
COMMUNITY

## 2022-02-16 RX ORDER — ONDANSETRON 2 MG/ML
4 INJECTION INTRAMUSCULAR; INTRAVENOUS
Status: DISCONTINUED | OUTPATIENT
Start: 2022-02-16 | End: 2022-02-21 | Stop reason: HOSPADM

## 2022-02-16 RX ORDER — FUROSEMIDE 20 MG/1
20 TABLET ORAL DAILY
COMMUNITY
End: 2022-03-25 | Stop reason: ALTCHOICE

## 2022-02-16 RX ORDER — PROMETHAZINE HYDROCHLORIDE 25 MG/1
12.5 TABLET ORAL
Status: DISCONTINUED | OUTPATIENT
Start: 2022-02-16 | End: 2022-02-21 | Stop reason: HOSPADM

## 2022-02-16 RX ORDER — LANOLIN ALCOHOL/MO/W.PET/CERES
1 CREAM (GRAM) TOPICAL
Status: DISCONTINUED | OUTPATIENT
Start: 2022-02-17 | End: 2022-02-21 | Stop reason: HOSPADM

## 2022-02-16 RX ORDER — LOSARTAN POTASSIUM 25 MG/1
25 TABLET ORAL DAILY
Status: DISCONTINUED | OUTPATIENT
Start: 2022-02-17 | End: 2022-02-21 | Stop reason: HOSPADM

## 2022-02-16 RX ORDER — GUAIFENESIN 100 MG/5ML
81 LIQUID (ML) ORAL DAILY
Status: DISCONTINUED | OUTPATIENT
Start: 2022-02-17 | End: 2022-02-21 | Stop reason: HOSPADM

## 2022-02-16 RX ORDER — LANOLIN ALCOHOL/MO/W.PET/CERES
CREAM (GRAM) TOPICAL
COMMUNITY

## 2022-02-16 RX ORDER — MULTIVIT-MIN/FA/LYCOPEN/LUTEIN .4-300-25
TABLET ORAL DAILY
COMMUNITY
End: 2022-09-13 | Stop reason: SDUPTHER

## 2022-02-16 RX ORDER — IPRATROPIUM BROMIDE 21 UG/1
1 SPRAY, METERED NASAL
COMMUNITY

## 2022-02-16 RX ORDER — LANOLIN ALCOHOL/MO/W.PET/CERES
400 CREAM (GRAM) TOPICAL
COMMUNITY

## 2022-02-16 RX ORDER — ERGOCALCIFEROL 1.25 MG/1
50000 CAPSULE ORAL
Status: DISCONTINUED | OUTPATIENT
Start: 2022-02-17 | End: 2022-02-17

## 2022-02-16 RX ORDER — DOCUSATE SODIUM 100 MG/1
100 CAPSULE, LIQUID FILLED ORAL 2 TIMES DAILY
Status: DISCONTINUED | OUTPATIENT
Start: 2022-02-16 | End: 2022-02-21 | Stop reason: HOSPADM

## 2022-02-16 RX ORDER — LOSARTAN POTASSIUM 25 MG/1
25 TABLET ORAL DAILY
COMMUNITY
End: 2022-02-21

## 2022-02-16 RX ORDER — MELATONIN
2000 DAILY
Status: DISCONTINUED | OUTPATIENT
Start: 2022-02-17 | End: 2022-02-21 | Stop reason: HOSPADM

## 2022-02-16 RX ORDER — ACETAMINOPHEN 325 MG/1
650 TABLET ORAL
Status: DISCONTINUED | OUTPATIENT
Start: 2022-02-16 | End: 2022-02-21 | Stop reason: HOSPADM

## 2022-02-16 RX ORDER — IPRATROPIUM BROMIDE 21 UG/1
2 SPRAY, METERED NASAL AS NEEDED
Status: DISCONTINUED | OUTPATIENT
Start: 2022-02-16 | End: 2022-02-21 | Stop reason: HOSPADM

## 2022-02-16 RX ORDER — POLYETHYLENE GLYCOL 3350 17 G/17G
17 POWDER, FOR SOLUTION ORAL DAILY PRN
Status: DISCONTINUED | OUTPATIENT
Start: 2022-02-16 | End: 2022-02-21 | Stop reason: HOSPADM

## 2022-02-16 RX ADMIN — Medication 10 ML: at 19:26

## 2022-02-16 NOTE — ED NOTES
Pt reports SOB x1 month. Pt was seen here on 1/28 for CP and SOB and was discharged on 1/30 and told to follow up with the HF clinic. Pt went to he HF clinic today and was sent back for a hematology consult. Denies CP.

## 2022-02-16 NOTE — ED PROVIDER NOTES
The history is provided by the patient. Shortness of Breath  This is a recurrent problem. The problem occurs continuously. The problem has been gradually worsening. Pertinent negatives include no fever, no headaches, no coryza, no rhinorrhea, no sore throat, no swollen glands, no ear pain, no neck pain, no cough, no sputum production, no hemoptysis, no wheezing, no PND, no orthopnea, no chest pain, no syncope, no vomiting, no abdominal pain, no rash, no leg pain, no leg swelling and no claudication. She has tried nothing for the symptoms. The treatment provided no relief. She has had prior hospitalizations. She has had prior ED visits. Associated medical issues do not include asthma, COPD, pneumonia, chronic lung disease, PE, CAD, heart failure, past MI, DVT or recent surgery. Past Medical History:   Diagnosis Date    Congestive heart failure ([facility] [address]     Hypertension     Menopause        History reviewed. No pertinent surgical history. Family History:   Problem Relation Age of Onset    Breast Cancer Sister         63's       Social History     Socioeconomic History    Marital status: SINGLE     Spouse name: Not on file    Number of children: Not on file    Years of education: Not on file    Highest education level: Not on file   Occupational History    Not on file   Tobacco Use    Smoking status: Never Smoker    Smokeless tobacco: Never Used   Substance and Sexual Activity    Alcohol use: Never    Drug use: Not Currently    Sexual activity: Not on file   Other Topics Concern    Not on file   Social History Narrative    Not on file     Social Determinants of Health     Financial Resource Strain:     Difficulty of Paying Living Expenses: Not on file   Food Insecurity:     Worried About Running Out of Food in the Last Year: Not on file    [place] of Food in the Last Year: Not on file   Transportation Needs:     Lack of Transportation (Medical):  Not on file    Lack of Transportation (Non-Medical): Not on file   Physical Activity:     Days of Exercise per Week: Not on file    Minutes of Exercise per Session: Not on file   Stress:     Feeling of Stress : Not on file   Social Connections:     Frequency of Communication with Friends and Family: Not on file    Frequency of Social Gatherings with Friends and Family: Not on file    Attends Ep[employer] Services: Not on file    Active Member of [address] [place] [address] or Organizations: Not on file    Attends Club or Organization Meetings: Not on file    Marital Status: Not on file   Intimate Partner Violence:     Fear of Current or Ex-Partner: Not on file    Emotionally Abused: Not on file    Physically Abused: Not on file    Sexually Abused: Not on file   Housing Stability:     Unable to Pay for Housing in the Last Year: Not on file    Number of [place] in the Last Year: Not on file    Unstable Housing in the Last Year: Not on file         ALLERGIES: Patient has no known allergies. Review of Systems   Constitutional: Negative for activity change, chills and fever. HENT: Negative for ear pain, nosebleeds, rhinorrhea, sore throat, trouble swallowing and voice change. Eyes: Negative for visual disturbance. Respiratory: Positive for shortness of breath. Negative for cough, hemoptysis, sputum production and wheezing. Cardiovascular: Negative for chest pain, palpitations, orthopnea, claudication, leg swelling, syncope and PND. Gastrointestinal: Negative for abdominal pain, constipation, diarrhea, nausea and vomiting. Genitourinary: Negative for difficulty urinating, dysuria, hematuria and urgency. Musculoskeletal: Negative for back pain, neck pain and neck stiffness. Skin: Negative for color change and rash. Allergic/Immunologic: Negative for immunocompromised state. Neurological: Negative for dizziness, seizures, syncope, weakness, light-headedness, numbness and headaches.    Psychiatric/Behavioral: Negative for behavioral problems, confusion, hallucinations, self-injury and suicidal ideas. Vitals:    02/16/22 1447   BP: (!) 153/79   Pulse: (!) 103   Resp: 18   Temp: 98 °F (36.7 °C)   SpO2: 97%            Physical Exam  Vitals and nursing note reviewed. Constitutional:       General: She is not in acute distress. Appearance: She is well-developed. She is not diaphoretic. HENT:      Head: Atraumatic. Neck:      Trachea: No tracheal deviation. Cardiovascular:      Comments: Warm and well perfused  Pulmonary:      Effort: Pulmonary effort is normal. Tachypnea present. No respiratory distress. Musculoskeletal:         General: Normal range of motion. Skin:     General: Skin is warm and dry. Neurological:      Mental Status: She is alert. Coordination: Coordination normal.   Psychiatric:         Behavior: Behavior normal.         Thought Content: Thought content normal.         Judgment: Judgment normal.          MDM     This is a 15-year-old female with past medical history, review of systems, physical exam as above, presenting with complaints of ongoing shortness of breath. Patient had an admission to the hospital approximately 2 weeks ago, underwent significant work-up for presumed congestive heart failure, noted to have elevated white cell count in the setting of a history of myeloproliferative disorder. She was ultimately discharged home. She followed up in clinic today with the heart failure service, and was noted to have worsening shortness of breath. Work-up on her previous admission, without findings of congestive heart failure, PE. She was referred back to the emergency department for admission by Dr. Chrystal Rodriguez, as she is suspicious the patient's elevated white cell count in the setting of myeloproliferative disorder represents increased viscosity, leading to her shortness of breath.   She states she spoke with the hospitalist service, who is planning for admission, hematology consult and likely restarting chemotherapeutics. Procedures    Perfect Serve Consult for Admission  3:41 PM    ED Room Number: ERWT/WT  Patient Name and age:  Nasim Brood 68 y.o.  female  Working Diagnosis:   1.  SOB (shortness of breath)        COVID-19 Suspicion:  no  Sepsis present:  no  Reassessment needed: no  Code Status:  Full Code  Readmission: yes  Isolation Requirements:  no  Recommended Level of Care:  telemetry  Department:[facility] Adult ED - 21   Other:  D/w Dr. Zhanna Rodriguez

## 2022-02-16 NOTE — PROGRESS NOTES
[facility] in [place], [facility] Note    Patient name: Nasim Waldron  Patient : 1945  Patient MRN: [id number]  Date of service: 22    Primary care physician: Zoë Benjamin NP  Primary general cardiologist:  Not established     Primary [facility] cardiologist: Saurabh Pereira MD    CHIEF COMPLAINT:  Shortness of breath    PLAN OF CARE:  · 69 y/o presenting to clinic with severe resting dyspnea with accessory muscle use; patient was recently discharged from hospital after she was treated for presumed pneumonia; though there was no evidence; her symptoms continued to progress despite antibiotics. · Patient cardiac and pulmonary evaluation for cause of dyspnea was negative  · Patient's symptoms coincide with stopping leukemia medications by her oncologist to keep WBC down; her leukocytosis is worsening now WBC 54 and worsening thrombocytosis in 500s; and dyspnea could be related to high blood viscosity +/- thrombotic process? · Recommended admission to hospitalist service with hematology consult; referred to ER  · F service will follow along, d/w Dr. Lowe Ran:  Start lasix 20mg IV twice daily  Continue remainder of medications     IMPRESSION:  Shortness of breath at rest  Stage D, NYHA class IV  Normal heart function  Myeloproliferative disorder  Severe leukocytosis; WBC 54  Thrombocytosis; PLT 500s  HTN  HLD     LIFE GOALS:  Patient's personal goals include: TBD  Important upcoming milestones or family events: TBD  The patient identifies the following as important for living well: TBD     CARDIAC IMAGING:  Echo (22)   · Left Ventricle: Left ventricle size is normal. Normal wall thickness. Normal wall motion. Normal left ventricular systolic function with a visually estimated EF of 55 - 60%. Global longitudinal strain is normal with a value of -17.0%.  Diastolic dysfunction present with normal LV EF.  · Mitral Valve: Mild transvalvular regurgitation. · Left Atrium: Left atrium is mildly dilated. · Right Atrium: Right atrium is mildly dilated. · Pericardium: Trivial localized pericardial effusion present around the posterior left ventricle. No indication of cardiac tamponade. EKG (1/28/22) ST, rate 101  LHC not done  NST not done     HEMODYNAMICS:  RHC not done  CPEST too ill  6MW too ill    HISTORY OF PRESENT ILLNESS:  I had the pleasure of seeing Deysi Jeffery in [facility] at [facility] in [facility]. Ms. Jaya Hartman is a 67 y/o female with h/o myeloproliferative disorder whose leukemia medication has been recently stopped with rise of WBC and PLT. She was admitted 2 weeks ago with dyspnea and initial cardiac and pulmonary workup was negative. She was supposed to see hematologist, but her visit is not scheduled until late March. INTERVAL HISTORY:  Today, patient presents for routine clinic visit accompanied by her daughter. Patient is doing very poorly. She had dyspnea at rest using accessory muscles, difficulty conversing. Patient cannot walk more than 10 meters without stopping. Patient has some lower extremity edema 2+ bilaterally. Patient denies abdominal bloating or change of appetite. Patient's weight remained stable. Patient denies orthopnea, PND or nocturia. Patient denies irregular heart rate or palpitations. No presyncope or syncope. Patient denies other cardiac symptoms such as chest pain or leg pain with walking. Patient is compliant with fluid restriction and taking medications as prescribed. Patient manages his own medications. REVIEW OF SYSTEMS:  General: Denies fever, night sweats.   Ear, nose and throat: Denies difficulty hearing, sinus problems, runny nose, post-nasal drip, ringing in ears, mouth sores, loose teeth, ear pain, nosebleeds, sore throate, facial pain or numbess  Cardiovascular: see above in the interval history  Respiratory: Denies cough, wheezing, sputum production, hemoptysis. Gastrointestinal: Denies heartburn, constipation, diarrhea, abdominal pain, nausea, vomiting, difficulty swallowing, blood in stool  Kidney and bladder: Denies painful urination, frequent urination, urgency  Musculoskeletal: Denies joint pain, muscle weakness  Skin and hair: Denies change in existing skin lesions, hair loss or increase, breast changes    PHYSICAL EXAM:  Visit Vitals  BP (!) 150/82 (BP 1 Location: Right arm, BP Patient Position: Sitting, BP Cuff Size: Adult)   Pulse 80   Temp (!) 96.2 °F (35.7 °C) (Oral)   Resp 20   Ht 5' 5\" (1.651 m)   Wt 146 lb 12.8 oz (66.6 kg)   SpO2 100%   BMI 24.43 kg/m²     General: Patient is well developed, well-nourished in no acute distress  HEENT: Normocephalic and atraumatic. No scleral icterus. Pupils are equal, round and reactive to light and accomodation. No conjunctival injection. Oropharynx is clear. Neck: Supple. No evidence of thyroid enlargements or lymphadenopathy. JVD: JVD 15 cm  Lungs: Breath sounds are equal and clear bilaterally. No wheezes, rhonchi, or rales. Heart: Regular rate and rhythm with normal S1 and S2. No murmurs, gallops or rubs. Abdomen: Soft, no mass or tenderness. No organomegaly or hernia. Bowel sounds present. Genitourinary and rectal: deferred  Extremities: No cyanosis, clubbing, or 2+ bilateral leg edema  Neurologic: No focal sensory or motor deficits are noted. Grossly intact. Psychiatric: Awake, alert an doriented x 3. Appropriate mood and affect. Skin: Warm, dry and well perfused. No lesions, nodules or rashes are noted. PAST MEDICAL HISTORY:  Past Medical History:   Diagnosis Date    Congestive heart failure ([address]     Hypertension     Menopause        PAST SURGICAL HISTORY:  No past surgical history on file.     FAMILY HISTORY:  Family History   Problem Relation Age of Onset    Breast Cancer Sister         63's       SOCIAL HISTORY:  Social History     Socioeconomic History    Marital status: SINGLE   Tobacco Use    Smoking status: Never Smoker    Smokeless tobacco: Never Used   Substance and Sexual Activity    Alcohol use: Never    Drug use: Not Currently       LABORATORY RESULTS:  Labs Latest Ref Rng & Units 1/30/2022 1/29/2022 1/28/2022   WBC 3.6 - 11.0 K/uL 54. 1(HH) 48. 7(H) 53. 1(HH)   RBC 3.80 - 5.20 M/uL 3.42(L) 3.25(L) 3.42(L)   Hemoglobin 11.5 - 16.0 g/dL 7. 8(L) 7. 5(L) 7. 9(L)   Hematocrit 35.0 - 47.0 % 27. 5(L) 25. 9(L) 27. 7(L)   MCV 80.0 - 99.0 FL 80.4 79. 7(L) 81.0   Platelets 432 - 132 K/uL 556(H) 497(H) 541(H)   Lymphocytes 12 - 49 % - 15 30   Monocytes 5 - 13 % - 4(L) 7   Eosinophils 0 - 7 % - 5 2   Basophils 0 - 1 % - 5(H) 6(H)   Bands 0 - 6 % - 7(H) 6   Albumin 3.5 - 5.0 g/dL - 3. 1(L) 3.6   Calcium 8.5 - 10.1 MG/DL - 8.9 9.2   Glucose 65 - 100 mg/dL - 92 91   BUN 6 - 20 MG/DL - 23(H) 24(H)   Creatinine 0.55 - 1.02 MG/DL - 1.40(H) 1.36(H)   Sodium 136 - 145 mmol/L - 140 139   Potassium 3.5 - 5.1 mmol/L - 4.2 4.2   TSH 0.36 - 3.74 uIU/mL - 2.67 -   Some recent data might be hidden       ALLERGY:  No Known Allergies     CURRENT MEDICATIONS:    Current Outpatient Medications:     ARIPiprazole (ABILIFY) 30 mg tablet, Take 30 mg by mouth daily. , Disp: , Rfl:     aspirin 81 mg cap, Take  by mouth daily. , Disp: , Rfl:     multivit-min-FA-lycopen-lutein (Centrum Silver) 0.4-300-250 mg-mcg-mcg tab, Take  by mouth daily. , Disp: , Rfl:     docusate sodium (COLACE) 100 mg capsule, Take 100 mg by mouth two (2) times a day., Disp: , Rfl:     ferrous sulfate 325 mg (65 mg iron) tablet, Take  by mouth Daily (before breakfast). , Disp: , Rfl:     furosemide (Lasix) 20 mg tablet, Take  by mouth daily. , Disp: , Rfl:     losartan (COZAAR) 25 mg tablet, Take 25 mg by mouth daily. , Disp: , Rfl:     magnesium oxide (MAG-OX) 400 mg tablet, Take 400 mg by mouth daily. , Disp: , Rfl:     cholecalciferol, vitamin D3, (Vitamin D3) 50 mcg (2,000 unit) tab, Take 2,000 Units by mouth daily. , Disp: , Rfl:     DM/pseudoephed/acetaminoph/cpm (GRACY-SELTZER PLUS COLD/COUGH PO), Take  by mouth as needed. , Disp: , Rfl:     ipratropium (ATROVENT) 21 mcg (0.03 %) nasal spray, 2 Sprays by Both Nostrils route as needed for Rhinitis., Disp: , Rfl:     ergocalciferol (ERGOCALCIFEROL) 1,250 mcg (50,000 unit) capsule, Take 1 Capsule by mouth every seven (7) days for 10 doses. Indications: vitamin D deficiency (high dose therapy) (Patient not taking: Reported on 1/31/2022), Disp: 10 Capsule, Rfl: 0    Thank you for your referral and allowing me to participate in this patient's care.     Wilbert Jones MD PhD  [address] [place] [address], [address]  Phone: [phone]  Fax: [phone]    PATIENT CARE TEAM:  Patient Care Team:  Mayo Dean NP as PCP - General (Nurse Practitioner)  Petra Bucio RN as Care Transitions Nurse     Total visit time: 40 minutes (> 50% spent face-to-face counseling)

## 2022-02-17 ENCOUNTER — APPOINTMENT (OUTPATIENT)
Dept: ULTRASOUND IMAGING | Age: 77
DRG: 291 | End: 2022-02-17
Attending: NURSE PRACTITIONER
Payer: MEDICARE

## 2022-02-17 LAB
ALBUMIN SERPL-MCNC: 2.6 G/DL (ref 3.5–5)
ALBUMIN/GLOB SERPL: 0.8 {RATIO} (ref 1.1–2.2)
ALP SERPL-CCNC: 112 U/L (ref 45–117)
ALT SERPL-CCNC: 17 U/L (ref 12–78)
ANION GAP SERPL CALC-SCNC: 6 MMOL/L (ref 5–15)
AST SERPL-CCNC: 32 U/L (ref 15–37)
BASOPHILS # BLD: 3.9 K/UL (ref 0–0.1)
BASOPHILS NFR BLD: 6 % (ref 0–1)
BILIRUB SERPL-MCNC: 0.3 MG/DL (ref 0.2–1)
BLASTS NFR BLD MANUAL: 3 %
BNP SERPL-MCNC: 3631 PG/ML
BUN SERPL-MCNC: 21 MG/DL (ref 6–20)
BUN/CREAT SERPL: 16 (ref 12–20)
CALCIUM SERPL-MCNC: 8.8 MG/DL (ref 8.5–10.1)
CHLORIDE SERPL-SCNC: 110 MMOL/L (ref 97–108)
CO2 SERPL-SCNC: 22 MMOL/L (ref 21–32)
COVID-19 RAPID TEST, COVR: ABNORMAL
COVID-19 RAPID TEST, COVR: NOT DETECTED
CREAT SERPL-MCNC: 1.33 MG/DL (ref 0.55–1.02)
DIFFERENTIAL METHOD BLD: ABNORMAL
EOSINOPHIL # BLD: 3.9 K/UL (ref 0–0.4)
EOSINOPHIL NFR BLD: 6 % (ref 0–7)
ERYTHROCYTE [DISTWIDTH] IN BLOOD BY AUTOMATED COUNT: 17.8 % (ref 11.5–14.5)
FOLATE SERPL-MCNC: 32.7 NG/ML (ref 5–21)
FOLATE SERPL-MCNC: 40.6 NG/ML (ref 5–21)
GLOBULIN SER CALC-MCNC: 3.4 G/DL (ref 2–4)
GLUCOSE SERPL-MCNC: 96 MG/DL (ref 65–100)
HCT VFR BLD AUTO: 25.8 % (ref 35–47)
HGB BLD-MCNC: 7.3 G/DL (ref 11.5–16)
IMM GRANULOCYTES # BLD AUTO: 0 K/UL
IMM GRANULOCYTES NFR BLD AUTO: 0 %
LACTATE SERPL-SCNC: 1 MMOL/L (ref 0.4–2)
LYMPHOCYTES # BLD: 11 K/UL (ref 0.8–3.5)
LYMPHOCYTES NFR BLD: 17 % (ref 12–49)
MCH RBC QN AUTO: 22.5 PG (ref 26–34)
MCHC RBC AUTO-ENTMCNC: 28.3 G/DL (ref 30–36.5)
MCV RBC AUTO: 79.4 FL (ref 80–99)
METAMYELOCYTES NFR BLD MANUAL: 4 %
MONOCYTES # BLD: 5.2 K/UL (ref 0–1)
MONOCYTES NFR BLD: 8 % (ref 5–13)
MYELOCYTES NFR BLD MANUAL: 3 %
NEUTS BAND NFR BLD MANUAL: 16 % (ref 0–6)
NEUTS SEG # BLD: 34.2 K/UL (ref 1.8–8)
NEUTS SEG NFR BLD: 37 % (ref 32–75)
NRBC # BLD: 4.74 K/UL (ref 0–0.01)
NRBC BLD-RTO: 7.3 PER 100 WBC
PLATELET # BLD AUTO: 644 K/UL (ref 150–400)
PLATELET COMMENTS,PCOM: ABNORMAL
POTASSIUM SERPL-SCNC: 4.7 MMOL/L (ref 3.5–5.1)
PROT SERPL-MCNC: 6 G/DL (ref 6.4–8.2)
RBC # BLD AUTO: 3.25 M/UL (ref 3.8–5.2)
RBC MORPH BLD: ABNORMAL
SODIUM SERPL-SCNC: 138 MMOL/L (ref 136–145)
SOURCE, COVRS: ABNORMAL
SOURCE, COVRS: NORMAL
VIT B12 SERPL-MCNC: 1232 PG/ML (ref 193–986)
VIT B12 SERPL-MCNC: 1281 PG/ML (ref 193–986)
WBC # BLD AUTO: 64.6 K/UL (ref 3.6–11)
WBC MORPH BLD: ABNORMAL

## 2022-02-17 PROCEDURE — 87635 SARS-COV-2 COVID-19 AMP PRB: CPT

## 2022-02-17 PROCEDURE — 97116 GAIT TRAINING THERAPY: CPT

## 2022-02-17 PROCEDURE — 97161 PT EVAL LOW COMPLEX 20 MIN: CPT

## 2022-02-17 PROCEDURE — 99233 SBSQ HOSP IP/OBS HIGH 50: CPT | Performed by: INTERNAL MEDICINE

## 2022-02-17 PROCEDURE — 85025 COMPLETE CBC W/AUTO DIFF WBC: CPT

## 2022-02-17 PROCEDURE — 82746 ASSAY OF FOLIC ACID SERUM: CPT

## 2022-02-17 PROCEDURE — 74011250637 HC RX REV CODE- 250/637: Performed by: HOSPITALIST

## 2022-02-17 PROCEDURE — 80053 COMPREHEN METABOLIC PANEL: CPT

## 2022-02-17 PROCEDURE — 82607 VITAMIN B-12: CPT

## 2022-02-17 PROCEDURE — 83880 ASSAY OF NATRIURETIC PEPTIDE: CPT

## 2022-02-17 PROCEDURE — 36415 COLL VENOUS BLD VENIPUNCTURE: CPT

## 2022-02-17 PROCEDURE — 97535 SELF CARE MNGMENT TRAINING: CPT

## 2022-02-17 PROCEDURE — 83540 ASSAY OF IRON: CPT

## 2022-02-17 PROCEDURE — 74011250636 HC RX REV CODE- 250/636: Performed by: HOSPITALIST

## 2022-02-17 PROCEDURE — 83605 ASSAY OF LACTIC ACID: CPT

## 2022-02-17 PROCEDURE — G0378 HOSPITAL OBSERVATION PER HR: HCPCS

## 2022-02-17 PROCEDURE — 97165 OT EVAL LOW COMPLEX 30 MIN: CPT

## 2022-02-17 PROCEDURE — APPSS45 APP SPLIT SHARED TIME 31-45 MINUTES: Performed by: NURSE PRACTITIONER

## 2022-02-17 PROCEDURE — 74011000250 HC RX REV CODE- 250: Performed by: HOSPITALIST

## 2022-02-17 PROCEDURE — 74011250636 HC RX REV CODE- 250/636: Performed by: INTERNAL MEDICINE

## 2022-02-17 PROCEDURE — 74011250636 HC RX REV CODE- 250/636: Performed by: NURSE PRACTITIONER

## 2022-02-17 RX ORDER — HYDROXYUREA 500 MG/1
500 CAPSULE ORAL DAILY
Status: DISCONTINUED | OUTPATIENT
Start: 2022-02-17 | End: 2022-02-21 | Stop reason: HOSPADM

## 2022-02-17 RX ORDER — PANTOPRAZOLE SODIUM 40 MG/1
40 TABLET, DELAYED RELEASE ORAL
Status: DISCONTINUED | OUTPATIENT
Start: 2022-02-18 | End: 2022-02-21 | Stop reason: HOSPADM

## 2022-02-17 RX ORDER — FUROSEMIDE 10 MG/ML
20 INJECTION INTRAMUSCULAR; INTRAVENOUS EVERY 12 HOURS
Status: DISCONTINUED | OUTPATIENT
Start: 2022-02-17 | End: 2022-02-19

## 2022-02-17 RX ORDER — ERGOCALCIFEROL 1.25 MG/1
50000 CAPSULE ORAL
Status: DISCONTINUED | OUTPATIENT
Start: 2022-02-22 | End: 2022-02-21 | Stop reason: HOSPADM

## 2022-02-17 RX ADMIN — FERROUS SULFATE TAB 325 MG (65 MG ELEMENTAL FE) 325 MG: 325 (65 FE) TAB at 07:30

## 2022-02-17 RX ADMIN — MULTIPLE VITAMINS W/ MINERALS TAB 1 TABLET: TAB at 10:29

## 2022-02-17 RX ADMIN — Medication 10 ML: at 04:30

## 2022-02-17 RX ADMIN — Medication 400 MG: at 10:29

## 2022-02-17 RX ADMIN — ACETAMINOPHEN 650 MG: 325 TABLET ORAL at 14:25

## 2022-02-17 RX ADMIN — DOCUSATE SODIUM 100 MG: 100 CAPSULE, LIQUID FILLED ORAL at 18:00

## 2022-02-17 RX ADMIN — Medication 10 ML: at 22:09

## 2022-02-17 RX ADMIN — LOSARTAN POTASSIUM 25 MG: 25 TABLET, FILM COATED ORAL at 10:30

## 2022-02-17 RX ADMIN — FUROSEMIDE 20 MG: 10 INJECTION, SOLUTION INTRAMUSCULAR; INTRAVENOUS at 12:00

## 2022-02-17 RX ADMIN — DOCUSATE SODIUM 100 MG: 100 CAPSULE, LIQUID FILLED ORAL at 10:29

## 2022-02-17 RX ADMIN — Medication 2000 UNITS: at 10:29

## 2022-02-17 RX ADMIN — ASPIRIN 81 MG CHEWABLE TABLET 81 MG: 81 TABLET CHEWABLE at 10:29

## 2022-02-17 RX ADMIN — ARIPIPRAZOLE 30 MG: 30 TABLET ORAL at 10:32

## 2022-02-17 RX ADMIN — HYDROXYUREA 500 MG: 500 CAPSULE ORAL at 14:26

## 2022-02-17 RX ADMIN — FUROSEMIDE 20 MG: 10 INJECTION, SOLUTION INTRAMUSCULAR; INTRAVENOUS at 22:09

## 2022-02-17 NOTE — PROGRESS NOTES
Problem: Self Care Deficits Care Plan (Adult)  Goal: *Acute Goals and Plan of Care (Insert Text)  Description:   FUNCTIONAL STATUS PRIOR TO ADMISSION: Patient was independent and active without use of DME.     HOME SUPPORT: The patient lived alone with [facility] staff to provide assistance if needed. Occupational Therapy Goals  Initiated 2/17/2022  1. Patient will perform grooming with supervision/set-up within 7 day(s). 2.  Patient will perform upper body dressing with supervision/set-up within 7 day(s). 3.  Patient will perform lower body dressing with supervision/set-up within 7 day(s). 4.  Patient will perform all aspects of toileting with supervision/set-up within 7 day(s). 5.  Patient will utilize energy conservation techniques during functional activities with verbal cues within 7 day(s). Outcome: Not Met   OCCUPATIONAL THERAPY EVALUATION  Patient: Juliana Gastelum (89 y.o. female)  Date: 2/17/2022  Primary Diagnosis: SOB (shortness of breath) [R06.02]        Precautions:  Fall    ASSESSMENT  Based on the objective data described below, the patient presents with impaired activity tolerance and endurance impacting ability to complete ADL at baseline. RN cleared patient for session, received reclined in bed with daughter present. Per patient, was able to walk up 3 flights of stairs before recent decline. Currently on 3L O2 via NC (baseline RA), SpO2 maintained >93% throughout session, Max  with activity. When transferred to EOB reported dizziness, /89 and symptoms resolved with rest break. Completed EOB grooming and dressing with up to Min A overall. Patient with thoracentesis planned today, will likely improve performance post procedure. Patient would likely benefit from [facility] upon discharge to maximize ADL independence and safety upon discharge. Current Level of Function Impacting Discharge (ADLs/self-care): below baseline,     Functional Outcome Measure:   The patient scored Total: 50/100 on the Barthel Index outcome measure which is indicative of 50% impaired ability to care for basic self needs/dependency on others; inferred 50% dependency on others for instrumental ADLs. Other factors to consider for discharge: below baseline, was very independent prior to admission     Patient will benefit from skilled therapy intervention to address the above noted impairments. PLAN :  Recommendations and Planned Interventions: self care training, functional mobility training, therapeutic exercise, balance training, therapeutic activities, endurance activities, patient education, home safety training, and family training/education    Frequency/Duration: Patient will be followed by occupational therapy 4 times a week to address goals. Recommendation for discharge: (in order for the patient to meet his/her long term goals)  To be determined: likely [facility]     This discharge recommendation:  Has not yet been discussed the attending provider and/or case management    IF patient discharges home will need the following DME: TBD pending progress       SUBJECTIVE:   Patient stated I was walking up 3 flights of stairs before this.     OBJECTIVE DATA SUMMARY:   HISTORY:   Past Medical History:   Diagnosis Date    Congestive heart failure ([address]     Hypertension     Menopause    History reviewed. No pertinent surgical history.     Expanded or extensive additional review of patient history:     Home Situation  Home Environment: [address] Name: [facility]  # Steps to Enter: 0  Wheelchair Ramp: Yes  One/Two Story Residence: One story  Living Alone: Yes  Support Systems: Child(saurabh),Other Family Member(s),Assisted Living  Current DME Used/Available at Home: Grab bars  Tub or Shower Type: Shower    Hand dominance: Right    EXAMINATION OF PERFORMANCE DEFICITS:  Cognitive/Behavioral Status:  Neurologic State: Alert  Orientation Level: Oriented X4  Cognition: Appropriate decision making; Follows commands  Perception: Appears intact  Perseveration: No perseveration noted  Safety/Judgement: Awareness of environment; Insight into deficits;Home safety    Skin: intact    Edema: none noted    Hearing: Auditory  Auditory Impairment: None    Vision/Perceptual:    Tracking: Able to track stimulus in all quadrants w/o difficulty                      Acuity: Within Defined Limits    Corrective Lenses: Reading glasses    Range of Motion:  AROM: Within functional limits                         Strength:  Strength: Generally decreased, functional                Coordination:  Coordination: Within functional limits  Fine Motor Skills-Upper: Left Intact; Right Intact    Gross Motor Skills-Upper: Left Intact; Right Intact    Tone & Sensation:  Tone: Normal  Sensation: Impaired (BLE neuropathy occasionally )                      Balance:  Sitting: Impaired; Without support  Sitting - Static: Good (unsupported)  Sitting - Dynamic: Fair (occasional)    Functional Mobility and Transfers for ADLs:  Bed Mobility:  Rolling: Supervision  Supine to Sit: Supervision  Sit to Supine: Minimum assistance  Scooting: Minimum assistance;Bed Modified    Transfers:       ADL Assessment:  Feeding: Supervision    Oral Facial Hygiene/Grooming: Minimum assistance    Bathing: Minimum assistance    Upper Body Dressing: Contact guard assistance    Lower Body Dressing: Minimum assistance    Toileting: Minimum assistance                ADL Intervention and task modifications:  Feeding  Drink to Mouth: Supervision    Grooming  Grooming Assistance: Minimum assistance  Position Performed: Seated edge of bed  Washing Face: Minimum assistance  Cues: Verbal cues provided              Upper Body [address]: Minimum  assistance    Lower Body Dressing Assistance  Socks: Minimum assistance  Leg Crossed Method Used: No  Position Performed: Seated edge of bed         Cognitive Retraining  Safety/Judgement: Awareness of environment; Insight into deficits;Home safety    Functional Measure:  Barthel Index:    Bathin  Bladder: 5  Bowels: 10  Groomin  Dressin  Feeding: 10  Mobility: 0  Stairs: 0  Toilet Use: 5  Transfer (Bed to Chair and Back): 10  Total: 50/100        The Barthel ADL Index: Guidelines  1. The index should be used as a record of what a patient does, not as a record of what a patient could do. 2. The main aim is to establish degree of independence from any help, physical or verbal, however minor and for whatever reason. 3. The need for supervision renders the patient not independent. 4. A patient's performance should be established using the best available evidence. Asking the patient, friends/relatives and nurses are the usual sources, but direct observation and common sense are also important. However direct testing is not needed. 5. Usually the patient's performance over the preceding 24-48 hours is important, but occasionally longer periods will be relevant. 6. Middle categories imply that the patient supplies over 50 per cent of the effort. 7. Use of aids to be independent is allowed. Teodora Perdomo., Barthel, D.W. (0603). Functional evaluation: the Barthel Index. [address] [facility] (14)2. DIVYA Rodriguez, Ernesto Espinoza., Brianda Smith., [facility], [address] [place] [address] (). Measuring the change indisability after inpatient rehabilitation; comparison of the responsiveness of the Barthel Index and Functional [place] Measure. Journal of Neurology, Neurosurgery, and Psychiatry, 66(4), 256-235. Breonna Dubose, [place].NOHEMI, SHELDON SantosJ.AJ, & Lionel Leonard MGLADYS. (2004.) Assessment of post-stroke quality of life in cost-effectiveness studies: The usefulness of the Barthel Index and the EuroQoL-5D.  Quality of Life Research, 15, 773-24         Occupational Therapy Evaluation Charge Determination   History Examination Decision-Making   LOW Complexity : Brief history review  LOW Complexity : 1-3 performance deficits relating to physical, cognitive , or psychosocial skils that result in activity limitations and / or participation restrictions  LOW Complexity : No comorbidities that affect functional and no verbal or physical assistance needed to complete eval tasks       Based on the above components, the patient evaluation is determined to be of the following complexity level: LOW   Pain Rating:  None reported    Activity Tolerance:   Poor, requires frequent rest breaks, and observed SOB with activity    After treatment patient left in no apparent distress:    Supine in bed, Heels elevated for pressure relief, Call bell within reach, Caregiver / family present, and Side rails x 3    COMMUNICATION/EDUCATION:   The patients plan of care was discussed with: Registered nurse. Home safety education was provided and the patient/caregiver indicated understanding., Patient/family have participated as able in goal setting and plan of care. , and Patient/family agree to work toward stated goals and plan of care. This patients plan of care is appropriate for delegation to [facility].     Thank you for this referral.  Rochelle Funez OT  Time Calculation: 43 mins

## 2022-02-17 NOTE — H&P
[address]  HISTORY AND PHYSICAL    Name:  Valeri Muñoz  MR#:  [id number]  :  1945  ACCOUNT #:  [de-identified]  ADMIT DATE:  2022      ADMITTING ATTENDING:  Mikey Smart MD    CHIEF COMPLAINT:  Shortness of breath. HISTORY OF PRESENT ILLNESS:  This is a 59-year-old female with a past medical history of hypertension, chronic diastolic CHF, recent admission with discharge on 2022 with discharge diagnosis of probable community-acquired pneumonia. The patient stated that when she was discharged, she was fine for about a week but the following week she started having shortness of breath which progressively got worse to the point that today when she had gone to see   in a clinic, she was noted to be quite short of breath and was recommended to go to the ER. The patient says that her shortness of breath is getting worse and she does not have any significant cough. No fever, nausea, or vomiting. No chest pain. She did have mild nasal congestion but that has resolved now. She has been activated against COVID. She also mentions that she has been having bilateral leg swelling which is also getting worse. She does have some pain in both the calves. She has not been on any blood thinners that she remembers. REVIEW OF SYSTEMS:  Pertinent positive as above. Rest of review of systems were done. There were all negative except for above. PAST MEDICAL HISTORY:  1. Chronic diastolic CHF. 2.  Hypertension. 3.  Recent admission with community-acquired pneumonia. PAST SURGICAL HISTORY:  None. FAMILY HISTORY:  Significant for breast cancer. Not significant for coronary artery disease. SOCIAL HISTORY:  Nonsmoker, nonalcoholic, non-IV drug abuser. HOME MEDICATIONS:  The patient is on following medications:  1. Furosemide 20 mg p.o. daily. 2.  Abilify 30 mg p.o. daily. 3.  Aspirin 81 mg p.o. daily. 4.  Multivitamin 1 p.o. daily. 5.  Colace 100 mg p.o. b.i.d.  6.  Iron sulfate 325 mg p.o. daily. 7.  Cozaar 25 mg p.o. daily. 8.  Magnesium oxide 400 mg p.o. daily. 9.  Atrovent 21 mcg nasal sprays both nostrils as needed. 10.  Ergocalciferol 50,000 units 1 capsule every 7 days. PHYSICAL EXAMINATION  VITAL SIGNS:  At the time the patient was seen, the patient's vitals were as follows:  Blood pressure 161/91, pulse 104, afebrile, respiratory rate 35, saturating 91% on 2 liters nasal cannula. GENERAL:  Not in acute distress, comfortably lying in bed. HEAD:  Normocephalic, atraumatic. EYES:  PERRLA. EOMI. EARS:  Bilateral hearing normal.  No growth. NOSE:  No polyp. No bleeding. MOUTH:  Moist mucous membrane. Decent oral hygiene. NECK:  Supple. No JVD. No thyromegaly. RESPIRATORY:  Bilateral diminished breath sounds. No adventitious breath sounds. CARDIOVASCULAR:  S1 and S2 normal.  No murmurs, rubs, or gallops. GASTROINTESTINAL:  Bowel sounds present. Soft, nontender, and nondistended. NEUROLOGICAL:  Cranial nerves II through XII intact. Motor 4/4 bilaterally in upper limbs and lower limbs. Sensory normal.  PSYCHIATRIC:  Mood and affect appropriate. Alert, awake, oriented x3. LABORATORY AND RADIOLOGICAL RESULTS:  WBC 5.3, hemoglobin 8.3, hematocrit 29, and platelet count 170. Sodium 138, potassium 4.5, chloride 100, bicarbonate 22, BUN 26, and creatinine 1.58. Albumin 3.2. ProBNP of 3148. X-ray of the chest was done, which showed an interval development with moderate-to-large right-sided pleural effusion. Lower extremity Doppler does not show any vein thrombosis. EKG shows normal sinus rhythm, no ST-T wave changes suggestive of ischemia. ASSESSMENT AND PLAN:  This is a 14-year-old female with a past medical history of chronic diastolic CHF, hypertension, myelofibrosis, thrombocytosis. She comes over here because of worsening shortness of breath with leukocytosis and pedal edema.     1.  Shortness of breath, likely multifactorial with elevated white cell count and a chest x-ray suggestive of pleural effusion, doubt that this is secondary to pneumonia. Etiology could be acute-on-chronic diastolic congestive heart failure, New York Heart Association class IV versus . I will admit the patient under observation. We will put the patient on IV diuresis. We will consult the patient's cardiologist.  We will restart her home medications and we will also consult the patient's oncologist.  Please note that recently the patient had seen her oncologist and she was taken off medication. The patient does not remember the name of the medication, but it seems like the patient was on hydroxyurea. We will anyway consult Oncology and seek further recommendations. 2.  Thrombocytosis, stable. 3.  Bilateral pedal edema. Dopplers negative for deep vein thrombosis. We will put the patient on sequential compression devices. 4.  Anemia. The patient is iron deficient. We would likely have to put the patient on iron infusion. We will leave it up to the hematologist.  5.  Hypertension. We will continue the patient's home medications. She is full code. I spent about 50 minutes in taking care of the patient.         Hattie Hernandez MD      [id number]  D:  02/16/2022 21:44  T:  02/16/2022 23:16  JOB #:  [id number]

## 2022-02-17 NOTE — PROGRESS NOTES
[address] [place] [address] in [facility]  Heart Failure Inpatient Progress Note    Patient name: Juarez Urena  Patient : 1945  Patient MRN: [id number]  Date of service: 22    Primary care physician: Yaakov Musa NP  Primary general cardiologist:  Not established     Primary AHF cardiologist: Germania Richmond MD    CHIEF COMPLAINT:  Shortness of breath    PLAN OF CARE:  · 69 y/o presented to [facility] with severe resting dyspnea with accessory muscle use; found to have large right pleural effusion, awaiting thoracenthesis  · Hematology consult appreciated for severe leukocytosis  · AHF service will follow along     RECOMMENDATIONS:  Start lasix 20mg IV twice daily  NPO for thoracentesis   Continue remainder of medications  Hematology consult today      Remainder of care per primary.       IMPRESSION:  Shortness of breath at rest  Stage D, NYHA class IV  Normal heart function  Myeloproliferative disorder  Severe leukocytosis; WBC 64.6k  Thrombocytosis; PLT 600s  HTN  HLD  Right pleural effusion      LIFE GOALS:  Patient's personal goals include: TBD  Important upcoming milestones or family events: TBD  The patient identifies the following as important for living well: TBD     CARDIAC IMAGING:  Echo (22)   · Left Ventricle: Left ventricle size is normal. Normal wall thickness. Normal wall motion. Normal left ventricular systolic function with a visually estimated EF of 55 - 60%. Global longitudinal strain is normal with a value of -17.0%. Diastolic dysfunction present with normal LV EF. · Mitral Valve: Mild transvalvular regurgitation. · Left Atrium: Left atrium is mildly dilated. · Right Atrium: Right atrium is mildly dilated. · Pericardium: Trivial localized pericardial effusion present around the posterior left ventricle. No indication of cardiac tamponade.   EKG (22) ST, rate 101  LHC not done  NST not done     HEMODYNAMICS:  RHC not done  CPEST too ill  6MW too ill    HISTORY OF PRESENT ILLNESS:  I had the pleasure of seeing Juarez Urena in [address] at [facility] in [place]. Ms. Marianela Martinez is a 69 y/o female with h/o myeloproliferative disorder whose leukemia medication has been recently stopped with rise of WBC and PLT. She was admitted 2 weeks ago with dyspnea and initial cardiac and pulmonary workup was negative. She was supposed to see hematologist, but her visit is not scheduled until late March. She was admitted for further evaluation. INTERVAL HISTORY:  Admitted to [facility]   CXR shows large right effusion   HR 90s-100s     REVIEW OF SYSTEMS:  General: Denies fever, night sweats. Ear, nose and throat: Denies difficulty hearing, sinus problems, runny nose, post-nasal drip, ringing in ears, mouth sores, loose teeth, ear pain, nosebleeds, sore throat, facial pain or numbess  Cardiovascular: see above in the interval history  Respiratory: Denies cough, wheezing, sputum production, hemoptysis. Gastrointestinal: Denies heartburn, constipation, diarrhea, abdominal pain, nausea, vomiting, difficulty swallowing, blood in stool  Kidney and bladder: Denies painful urination, frequent urination, urgency  Musculoskeletal: Denies joint pain, muscle weakness  Skin and hair: Denies change in existing skin lesions, hair loss or increase, breast changes    PHYSICAL EXAM:  Visit Vitals  /63 (BP 1 Location: Left upper arm, BP Patient Position: At rest)   Pulse 93   Temp 98.9 °F (37.2 °C)   Resp 29   Wt 151 lb (68.5 kg)   SpO2 94%   BMI 25.13 kg/m²     General: Patient is well developed, well-nourished in no acute distress  HEENT: Normocephalic and atraumatic. No scleral icterus. Pupils are equal, round and reactive to light and accomodation. No conjunctival injection. Oropharynx is clear. Neck: Supple. No evidence of thyroid enlargements or lymphadenopathy.   JVD: JVD 15 cm  Lungs: Breath sounds are equal and clear bilaterally. No wheezes, rhonchi, or rales. Heart: Regular rate and rhythm with normal S1 and S2. No murmurs, gallops or rubs. Abdomen: Soft, no mass or tenderness. No organomegaly or hernia. Bowel sounds present. Genitourinary and rectal: deferred  Extremities: No cyanosis, clubbing, or 2+ bilateral leg edema  Neurologic: No focal sensory or motor deficits are noted. Grossly intact. Psychiatric: Awake, alert an doriented x 3. Appropriate mood and affect. Skin: Warm, dry and well perfused. No lesions, nodules or rashes are noted. PAST MEDICAL HISTORY:  Past Medical History:   Diagnosis Date    Congestive heart failure ([address]     Hypertension     Menopause        PAST SURGICAL HISTORY:  History reviewed. No pertinent surgical history. FAMILY HISTORY:  Family History   Problem Relation Age of Onset    Breast Cancer Sister         63's       SOCIAL HISTORY:  Social History     Socioeconomic History    Marital status: SINGLE   Tobacco Use    Smoking status: Never Smoker    Smokeless tobacco: Never Used   Substance and Sexual Activity    Alcohol use: Never    Drug use: Not Currently       LABORATORY RESULTS:  Labs Latest Ref Rng & Units 2/17/2022 2/16/2022 1/30/2022 1/29/2022 1/28/2022   WBC 3.6 - 11.0 K/uL 64. 6(HH) 85. 3(HH) 54. 1(HH) 48. 7(H) 53. 1(HH)   RBC 3.80 - 5.20 M/uL 3.25(L) 3.68(L) 3.42(L) 3.25(L) 3.42(L)   Hemoglobin 11.5 - 16.0 g/dL 7. 3(L) 8. 3(L) 7. 8(L) 7. 5(L) 7. 9(L)   Hematocrit 35.0 - 47.0 % 25. 8(L) 29. 4(L) 27. 5(L) 25. 9(L) 27. 7(L)   MCV 80.0 - 99.0 FL 79. 4(L) 79. 9(L) 80.4 79. 7(L) 81.0   Platelets 851 - 330 K/uL 644(H) 666(H) 556(H) 497(H) 541(H)   Lymphocytes 12 - 49 % 17 13 - 15 30   Monocytes 5 - 13 % 8 10 - 4(L) 7   Eosinophils 0 - 7 % 6 2 - 5 2   Basophils 0 - 1 % 6(H) 7(H) - 5(H) 6(H)   Bands 0 - 6 % 16(H) 13(H) - 7(H) 6   Blasts 0 % 3(H) - - - -   Albumin 3.5 - 5.0 g/dL 2. 6(L) 3. 2(L) - 3. 1(L) 3.6   Calcium 8.5 - 10.1 MG/DL 8.8 9.2 - 8.9 9.2   Glucose 65 - 100 mg/dL 96 99 - 92 91   BUN 6 - 20 MG/DL 21(H) 26(H) - 23(H) 24(H)   Creatinine 0.55 - 1.02 MG/DL 1.33(H) 1.58(H) - 1.40(H) 1.36(H)   Sodium 136 - 145 mmol/L 138 138 - 140 139   Potassium 3.5 - 5.1 mmol/L 4.7 4.5 - 4.2 4.2   TSH 0.36 - 3.74 uIU/mL - - - 2.67 -   Some recent data might be hidden       ALLERGY:  No Known Allergies     CURRENT MEDICATIONS:    Current Facility-Administered Medications:     hydroxyurea (HYDREA) chemo cap 500 mg, 500 mg, Oral, DAILY, Reina Estevez MD  Rada Rook ON 2/18/2022] pantoprazole (PROTONIX) tablet 40 mg, 40 mg, Oral, ACB, Reina Khan MD  Rada Rook ON 2/22/2022] ergocalciferol capsule 50,000 Units, 50,000 Units, Oral, Q7D, Mekhi Vila MD    ARIPiprazole (ABILIFY) tablet 30 mg, 30 mg, Oral, DAILY, Shree Lawler MD, 30 mg at 02/17/22 1032    aspirin chewable tablet 81 mg, 81 mg, Oral, DAILY, Shree Lawler MD, 81 mg at 02/17/22 1029    multivitamin, tx-iron-ca-min (THERA-M w/ IRON) tablet 1 Tablet, 1 Tablet, Oral, DAILY, Mekhi Vila MD, 1 Tablet at 02/17/22 1029    docusate sodium (COLACE) capsule 100 mg, 100 mg, Oral, BID, Shree Lawler MD, 100 mg at 02/17/22 1029    ferrous sulfate tablet 325 mg, 1 Tablet, Oral, Christopher MALAGON Punit, MD, 325 mg at 02/17/22 0730    losartan (COZAAR) tablet 25 mg, 25 mg, Oral, DAILY, Shree White MD, 25 mg at 02/17/22 1030    magnesium oxide (MAG-OX) tablet 400 mg, 400 mg, Oral, DAILY, Shree Lawler MD, 400 mg at 02/17/22 1029    cholecalciferol (VITAMIN D3) (1000 Units /25 mcg) tablet 2,000 Units, 2,000 Units, Oral, DAILY, Shree White MD, 2,000 Units at 02/17/22 1029    ipratropium (ATROVENT) 21 mcg (0.03 %) nasal spray 2 Spray, 2 Spray, Both Nostrils, PRN, Shree Lawler MD    sodium chloride (NS) flush 5-40 mL, 5-40 mL, IntraVENous, Q8H, Shree White MD, 10 mL at 02/17/22 0430    sodium chloride (NS) flush 5-40 mL, 5-40 mL, IntraVENous, PRN, Mekhi Vila MD    acetaminophen (TYLENOL) tablet 650 mg, 650 mg, Oral, Q6H PRN **OR** acetaminophen (TYLENOL) suppository 650 mg, 650 mg, Rectal, Q6H PRN, Brii Farley MD    polyethylene glycol (MIRALAX) packet 17 g, 17 g, Oral, DAILY PRN, Brii Farley MD    promethazine (PHENERGAN) tablet 12.5 mg, 12.5 mg, Oral, Q6H PRN **OR** ondansetron (ZOFRAN) injection 4 mg, 4 mg, IntraVENous, Q6H PRN, Shree Romero MD    enoxaparin (LOVENOX) injection 40 mg, 40 mg, SubCUTAneous, DAILY, Shree Romero MD, 40 mg at 02/17/22 1030    Thank you for your referral and allowing me to participate in this patient's care. Rylee Berrios NP  [address] [place] [address], [address]  Phone: [phone]:  Patient Care Team:  Josefa Swain NP as PCP - General (Nurse Practitioner)  Melissa Fuentes RN as Care Transitions Nurse     [facility] ATTENDING ADDENDUM    Patient was seen and examined in person. Data and notes were reviewed. I have discussed and agree with the plan as noted in the NP note above without further additions.     Shaun Otero MD PhD  Roseann Kat

## 2022-02-17 NOTE — ED NOTES
Verbal shift change report given to BENITO SEGURA  (oncoming nurse) by Isabell Martinez  (offgoing nurse). Report included the following information SBAR, Kardex and ED Summary.

## 2022-02-17 NOTE — ED NOTES
Pt and family made aware of POC. Pt currently waiting for Inpt room assignment. Hospital bed ordered for pt. Pt family member sent to go get food from outside location as hospital cafeteria is closed. Pt resting comfortably. Pt clean and dry. Pt on external female catheter for ease as she gets too SOB with ambulation.

## 2022-02-17 NOTE — PROGRESS NOTES
Medicare Outpatient Observation Notice (MOON)/ [place] Outpatient Observation Notice ([place]) provided to patient/representative with verbal explanation of the notice. Time allotted for questions regarding the notice. Patient /representative provided a completed copy of the MOON/VOON notice. Copy placed on bedside chart.     Mayur Cheung RN/CRM  [phone]

## 2022-02-17 NOTE — PROGRESS NOTES
Transition of Care Plan   RUR- N/A    DISPOSITION: The disposition plan is home with family assistance.  Home with home health - HHPT/OT - [facility]  [phone] - patient accepted.  F/U with PCP/Specialist     Transport: [facility]/family     At 6:00pm - CM met with patient and patients daughter at bedside to introduce self and role. Patient has been recommended for HHPT/OT. Patient resides at [address] [place] [address] [phone]. Pending discharge patients daughter will provide transportation or [facility]. HH order was created and submitted for review. [facility]  [phone] - accepted patient. CM provided this update to patient and patients daughter at bedside. AVS has been updated.     Magdi Leo, MSW, CRM, LMHP-e

## 2022-02-17 NOTE — ED NOTES
Pt placed on hospital bed. Pt and family updated on delay of bed assignment and advised it is most likely she will get a bed more near day shift as we are currently \"full\" upstairs. Pt daughter concerned hospitalist will not see her in ED. Advised pt daughter that all admitted pts are rounded on regardless of location. Once admitted they are cared for in the same manner.

## 2022-02-17 NOTE — PROGRESS NOTES
Problem: Mobility Impaired (Adult and Pediatric)  Goal: *Acute Goals and Plan of Care (Insert Text)  Description: FUNCTIONAL STATUS PRIOR TO ADMISSION: Patient was independent and active without use of DME.    HOME SUPPORT PRIOR TO ADMISSION: The patient lived alone with [facility] to provide assistance with meals and meds. Physical Therapy Goals  Initiated 2/17/2022  1. Patient will move from supine to sit and sit to supine , scoot up and down, and roll side to side in bed with independence within 7 day(s). 2.  Patient will transfer from bed to chair and chair to bed with modified independence using the least restrictive device within 7 day(s). 3.  Patient will perform sit to stand with modified independence within 7 day(s). 4.  Patient will ambulate with modified independence for 150 feet with the least restrictive device within 7 day(s). Outcome: Progressing Towards Goal     PHYSICAL THERAPY EVALUATION  Patient: Silvestre Presley (55 y.o. female)  Date: 2/17/2022  Primary Diagnosis: SOB (shortness of breath) [R06.02]        Precautions:  Fall      ASSESSMENT  Based on the objective data described below, the patient presents with impaired balance, cardiovascular endurance, weakness and gait dysfunction/intolerance causing limited independence with mobility s/p recent admission with SOB. Pt PLOF: IND with ADLs, but required assist with meals and medicine. Patient lives in [address], one floor and 0 steps to enter, pt reports having no trouble with elevator. Pt received in supine on 3L O2, stand-by assist supine to sit EOB. Pt's SpO2 dropped to 88%, increased to 4L (95%), lowered back to 3L SpO2 remains stable at 95%. Pt performed sit to stand CGA, VSS, purewick leaked when voiding, therefore laid back down for brief change and clean up. Pt performed gait training x120ft CGA - SUP, pt's SpO2 dropped to 90% at retirement point, no c/o of SOB, fatigue or dizziness, increased to 4L for remainder of gait training.  Pt performed sit to supine, stand-by assist, modA for scoot toward HOB. Left in supine, 3L O2, with daughter present, nursing notified of desat with exertion. Reviewed how to take SpO2 and at what % it is important to sit and take deep breaths. Pt is not cleared for discharge from Physical Therapy standpoint at this time, recommend HHPT in long term. Will benefit from therapy in acute setting, anticipate will improve tolerance quickly with improved pain control. Current Level of Function Impacting Discharge (mobility/balance): CGA - SUP mobility, desat with exertion and required increased O2 (3L -> 4L)    Functional Outcome Measure: The patient scored Total: 60/100 on the Barthel Index outcome measure which is indicative of being minimally independent in basic self-care. Other factors to consider for discharge: Lives at [facility], but does everything herself except meals and meds. Patient will benefit from skilled therapy intervention to address the above noted impairments. PLAN :  Recommendations and Planned Interventions: bed mobility training, transfer training, gait training, therapeutic exercises, neuromuscular re-education, edema management/control, patient and family training/education, and therapeutic activities      Frequency/Duration: Patient will be followed by physical therapy:  5 times a week to address goals. Recommendation for discharge: (in order for the patient to meet his/her long term goals)  Physical therapy at least 2 days/week in the home     This discharge recommendation:  Has been made in collaboration with the attending provider and/or case management    IF patient discharges home will need the following DME: to be determined (TBD)         SUBJECTIVE:   Patient stated yes, I want to get up and walk.     OBJECTIVE DATA SUMMARY:   HISTORY:    Past Medical History:   Diagnosis Date    Congestive heart failure ([address]     Hypertension     Menopause    History reviewed.  No pertinent surgical history. Personal factors and/or comorbidities impacting plan of care: Chronic systolic CHF, BLE swelling, acute respiratory failure    Home Situation  Home Environment: [address] Name: [facility]  # Steps to Enter: 0  Wheelchair Ramp: Yes  One/Two Story Residence: One story  Living Alone: Yes  Support Systems: Child(saurabh),Other Family Member(s),Assisted Living  Current DME Used/Available at Home: Grab bars  Tub or Shower Type: Shower    EXAMINATION/PRESENTATION/DECISION MAKING:   Critical Behavior:  Neurologic State: Alert  Orientation Level: Oriented X4  Cognition: Appropriate decision making,Follows commands  Safety/Judgement: Awareness of environment,Insight into deficits,Home safety  Hearing: Auditory  Auditory Impairment: None  Skin:  intact  Edema: pitting edema noted in B ankles, R>L  Range Of Motion:  AROM: Within functional limits  Strength:    Strength: Generally decreased, functional  Tone & Sensation:   Tone: Normal  Sensation: Impaired (BLE neuropathy occasionally )  Coordination:  Coordination: Within functional limits  Vision:   Tracking: Able to track stimulus in all quadrants w/o difficulty  Acuity: Within Defined Limits  Corrective Lenses: Reading glasses  Functional Mobility:  Bed Mobility:  Rolling: Stand-by assistance  Supine to Sit: Stand-by assistance  Sit to Supine: Stand-by assistance  Scooting: Stand-by assistance  Transfers:  Sit to Stand: Contact guard assistance  Stand to Sit: Contact guard assistance  Balance:   Sitting: Impaired; Without support  Sitting - Static: Good (unsupported)  Sitting - Dynamic: Fair (occasional); Good (unsupported)  Standing: Impaired; With support  Standing - Static: Good;Constant support  Standing - Dynamic : Good;Constant support  Ambulation/Gait Training:  Distance (ft): 120 Feet (ft)  Assistive Device: Gait belt  Ambulation - Level of Assistance: Contact guard assistance;Stand-by assistance  Gait Abnormalities: Shuffling gait  Base of Support: Widened  Speed/Marielena: Shuffled; Slow  Step Length: Right shortened;Left shortened  Functional Measure:  Barthel Index:    Bathin  Bladder: 5  Bowels: 10  Groomin  Dressin  Feeding: 10  Mobility: 10  Stairs: 0  Toilet Use: 5  Transfer (Bed to Chair and Back): 10  Total: 60/100       The Barthel ADL Index: Guidelines  1. The index should be used as a record of what a patient does, not as a record of what a patient could do. 2. The main aim is to establish degree of independence from any help, physical or verbal, however minor and for whatever reason. 3. The need for supervision renders the patient not independent. 4. A patient's performance should be established using the best available evidence. Asking the patient, friends/relatives and nurses are the usual sources, but direct observation and common sense are also important. However direct testing is not needed. 5. Usually the patient's performance over the preceding 24-48 hours is important, but occasionally longer periods will be relevant. 6. Middle categories imply that the patient supplies over 50 per cent of the effort. 7. Use of aids to be independent is allowed. Score Interpretation (from [address] [address])    Independent   60-79 Minimally independent   40-59 Partially dependent   20-39 Very dependent   <20 Totally dependent     -Jasbir Lacey., Barthel, D.W. (1965). Functional evaluation: the Barthel Index. [address] [facility] ([address] (1997). The Barthel activities of daily living index: self-reporting versus actual performance in the old (> or = 75 years). Journal of [address] [place] [address] 45(7), [facility], MejiaMURIEL, Cristiano Stinson., [facility]. (). Measuring the change in disability after inpatient rehabilitation; comparison of the responsiveness of the Barthel Index and Functional [place] Measure.  Journal of Neurology, Neurosurgery, and Psychiatry, 66(2), 139-447. CT Dior, ELVIRA Santos, & Ambreen Macias M.A. (2004) Assessment of post-stroke quality of life in cost-effectiveness studies: The usefulness of the Barthel Index and the EuroQoL-5D. Quality of Life Research, 15, 271-30        Physical Therapy Evaluation Charge Determination   History Examination Presentation Decision-Making   HIGH Complexity :3+ comorbidities / personal factors will impact the outcome/ POC  MEDIUM Complexity : 3 Standardized tests and measures addressing body structure, function, activity limitation and / or participation in recreation  LOW Complexity : Stable, uncomplicated  Other outcome measures barthel  MEDIUM      Based on the above components, the patient evaluation is determined to be of the following complexity level: LOW     Pain Rating:  None     Activity Tolerance:   Good and desaturates with exertion and requires oxygen    After treatment patient left in no apparent distress:   Supine in bed, Call bell within reach, and Caregiver / family present    COMMUNICATION/EDUCATION:   The patients plan of care was discussed with: Registered nurse and Case management. Fall prevention education was provided and the patient/caregiver indicated understanding., Patient/family have participated as able in goal setting and plan of care. , and Patient/family agree to work toward stated goals and plan of care.     Thank you for this referral.  Pauly Ramires, SPT   Time Calculation: 38 mins

## 2022-02-17 NOTE — ED NOTES
Pt daughter inquired about breakfast. Charge nurse spoke with pt in regards to process for ordering food through the cafeteria.  Advised pt daughter food is delivered approx 8am based on the diet order placed by MD.

## 2022-02-17 NOTE — CONSULTS
[address]    Name:  Kolton Kwon  MR#:  [id number]  :  1945  ACCOUNT #:  [de-identified]  DATE OF SERVICE:  2022    HISTORY OF PRESENT ILLNESS:  The patient is a 72-year-old woman with myelofibrosis/MDS, who is seen after admission to [facility] on  with shortness of breath. This patient is a current patient of Dr. Sonido Diallo, who is off all therapy for her myeloproliferative disorder at present. Over the past month, she has been battling shortness of breath on and off and actually has been admitted to the hospital recently for a possible community-acquired pneumonia. This was complicated by congestive heart failure. She states that she has a nonproductive cough. No fever or chills. She has noted some increasing swelling in both lower extremities. PAST MEDICAL HISTORY:  Includes,  1. Myeloproliferative/myelodysplastic disorder originally treated by Dr. Day Thomas and then more recently with Dr. Lucia Magallanes. She has been treated following her diagnosis in  initially with iron, then a course of prednisone and a course of Agrylin and then a course of hydroxyurea between  and 10/2021 when it was discontinued. 2.  Depression. 3.  Congestive heart failure. 4.  Hypertension. 5.  Recent community-acquired pneumonia. ALLERGIES:  NO KNOWN DRUG ALLERGIES. SOCIAL HISTORY:  She is a nonsmoker, nondrinker, non IV drug abuser. FAMILY HISTORY:  Negative for hematologic dyscrasias. REVIEW OF SYSTEMS:  As noted above, otherwise noncontributory. PHYSICAL EXAMINATION:  GENERAL:  She is a pleasant elderly woman in no apparent distress. VITAL SIGNS:  Her temp is 98, pulse 101, /63, respirations 29, O2 sat 94% on 3 liters. HEENT:  EOMI. Nonicteric sclerae. NECK:  Supple. LUNGS:  Revealed crackles in the bases. CARDIAC:  Regular rate and rhythm, I/VI systolic murmur. ABDOMEN:  Soft, nontender.   No hepatomegaly. EXTREMITIES:  Trace pedal edema. NEUROPSYCHIATRIC:  Grossly intact. LABORATORY DATA:  Today white count 64.6, hemoglobin 7.3, platelet count 248 with a left shift and 3% blasts. Chemistries reveal a BUN of 21, creatinine of 1.33. Her BNP is 3600. She had duplex ultrasound of the lower extremities on 02/16 which showed no evidence of DVT. Chest x-ray 02/16 showed moderate to large right pleural effusion. IMPRESSION:  1. Myeloproliferative/myelodysplastic syndrome. The patient has significant leukocytosis secondary to her underlying hematologic condition plus or minus reactive component due to infection. She has been on hydroxyurea until 10/2021 when it was discontinued for anemia. It would be reasonable to resume that medication now at a low dose to try to control the white count. Patients with hyperleukocytosis due to chronic myeloproliferative disorders can experience shortness of breath due to leukoagglutination within the pulmonary vasculature. In addition to the above, this patient has multiple other causes for shortness of breath including chronic congestive heart failure and a pleural effusion identified on chest x-ray yesterday plus or minus infection. Her anemia may also be playing a part in the shortness of breath at least when she exerts herself. We will perform an evaluation and look at her iron stores, B12, folate and hemolysis studies. I will resume the hydroxyurea at this point at 500 mg daily and would recommend that she be transfused p.r.n. hemoglobin under 7. We will follow with you on behalf of Watkins Hire.       Joseline Mark MD      [id number]  D:  02/17/2022 9:52  T:  02/17/2022 12:15  JOB #:  [id number]

## 2022-02-17 NOTE — PROGRESS NOTES
HEAVEN:    RUR N/A    Disposition: Anticipate return to [facility] [phone]. CM spoke to Shelby(nurse) to confirm that patient can return. She requested to be called when patient ready for discharge. Transportation: Medicaid transport    Follow up: PCP/Specialist    Primary contact: Cielo Pope(Daughter) [phone]    Care Management Interventions  PCP Verified by CM: Yes  Palliative Care Criteria Met (RRAT>21 & CHF Dx)?: No  Mode of Transport at Discharge: Other (see comment) (Medicaid transport)  Transition of Care Consult (CM Consult): Assisted Living  MyChart Signup: No  Discharge Durable Medical Equipment: No  Physical Therapy Consult: Yes  Occupational Therapy Consult: Yes  Speech Therapy Consult: No  Support Systems: Child(saurabh),Assisted Living  Confirm Follow Up Transport: Other (see comment) (Medicaid transport)  The Patient and/or Patient Representative was Provided with a Choice of Provider and Agrees with the Discharge Plan?: Yes  Discharge Location  Patient Expects to be Discharged to[de-identified] Assisted Living    Reason for Admission:  Shortness of breath                     RUR Score: N/A                    Plan for utilizing home health: No orders         PCP: First and Last name:  Mayo Dean NP     [facility] of [facility]: [facility]   Are you a current patient: Yes/No: Yes   Approximate date of last visit:    Can you participate in a virtual visit with your PCP:N/A                     Current Advanced Directive/Advance Care Plan: Full Code      Healthcare Decision Maker:   Click here to complete [address] including selection of the Healthcare Decision Maker Relationship (ie \"Primary\")                             Transition of Care Plan:      CM met with patient to introduce CM role, verify demographics and begin discharge planning. Patient lives at [address] [facility] and can return there at discharge. She has used HH there in the past.    No history of rehab.     DME: None    Insurance: [place] Medicare A&B primary and [facility] Medicaid secondary.     Lincoln Weaver, RN/CRM  [phone]

## 2022-02-18 ENCOUNTER — APPOINTMENT (OUTPATIENT)
Dept: GENERAL RADIOLOGY | Age: 77
DRG: 291 | End: 2022-02-18
Attending: STUDENT IN AN ORGANIZED HEALTH CARE EDUCATION/TRAINING PROGRAM
Payer: MEDICARE

## 2022-02-18 ENCOUNTER — APPOINTMENT (OUTPATIENT)
Dept: ULTRASOUND IMAGING | Age: 77
DRG: 291 | End: 2022-02-18
Attending: NURSE PRACTITIONER
Payer: MEDICARE

## 2022-02-18 PROBLEM — J90 PLEURAL EFFUSION ON RIGHT: Status: ACTIVE | Noted: 2022-02-18

## 2022-02-18 LAB
ALBUMIN SERPL-MCNC: 2.7 G/DL (ref 3.5–5)
ALBUMIN/GLOB SERPL: 0.7 {RATIO} (ref 1.1–2.2)
ALP SERPL-CCNC: 111 U/L (ref 45–117)
ALT SERPL-CCNC: 17 U/L (ref 12–78)
ANION GAP SERPL CALC-SCNC: 5 MMOL/L (ref 5–15)
APPEARANCE FLD: ABNORMAL
AST SERPL-CCNC: 25 U/L (ref 15–37)
ATRIAL RATE: 99 BPM
BILIRUB SERPL-MCNC: 0.3 MG/DL (ref 0.2–1)
BNP SERPL-MCNC: 2466 PG/ML
BODY FLD TYPE: NORMAL
BUN SERPL-MCNC: 23 MG/DL (ref 6–20)
BUN/CREAT SERPL: 14 (ref 12–20)
CALCIUM SERPL-MCNC: 8.8 MG/DL (ref 8.5–10.1)
CALCULATED P AXIS, ECG09: 79 DEGREES
CALCULATED R AXIS, ECG10: 65 DEGREES
CALCULATED T AXIS, ECG11: 81 DEGREES
CHLORIDE SERPL-SCNC: 108 MMOL/L (ref 97–108)
CO2 SERPL-SCNC: 24 MMOL/L (ref 21–32)
COLOR FLD: ABNORMAL
CREAT SERPL-MCNC: 1.62 MG/DL (ref 0.55–1.02)
DIAGNOSIS, 93000: NORMAL
EOSINOPHIL NFR FLD MANUAL: 11 %
ERYTHROCYTE [DISTWIDTH] IN BLOOD BY AUTOMATED COUNT: 17.4 % (ref 11.5–14.5)
GLOBULIN SER CALC-MCNC: 3.7 G/DL (ref 2–4)
GLUCOSE FLD-MCNC: 94 MG/DL
GLUCOSE SERPL-MCNC: 100 MG/DL (ref 65–100)
HAPTOGLOB SERPL-MCNC: 122 MG/DL (ref 30–200)
HCT VFR BLD AUTO: 26.7 % (ref 35–47)
HGB BLD-MCNC: 7.6 G/DL (ref 11.5–16)
IRON SATN MFR SERPL: 6 % (ref 20–50)
IRON SERPL-MCNC: 13 UG/DL (ref 35–150)
LDH FLD L TO P-CCNC: 909 U/L
LYMPHOCYTES NFR FLD: 14 %
MCH RBC QN AUTO: 22.4 PG (ref 26–34)
MCHC RBC AUTO-ENTMCNC: 28.5 G/DL (ref 30–36.5)
MCV RBC AUTO: 78.8 FL (ref 80–99)
MONOS+MACROS NFR FLD: 29 %
NEUTROPHILS NFR FLD: 42 %
NRBC # BLD: 3.1 K/UL (ref 0–0.01)
NRBC BLD-RTO: 4.8 PER 100 WBC
NUC CELL # FLD: 2395 /CU MM
OTHER CELL,FOTHC: 4 %
P-R INTERVAL, ECG05: 128 MS
PH FLD: 7 [PH]
PLATELET # BLD AUTO: 596 K/UL (ref 150–400)
PMV BLD AUTO: 11.8 FL (ref 8.9–12.9)
POTASSIUM SERPL-SCNC: 4.9 MMOL/L (ref 3.5–5.1)
PROT FLD-MCNC: 4.3 G/DL
PROT SERPL-MCNC: 6.4 G/DL (ref 6.4–8.2)
Q-T INTERVAL, ECG07: 384 MS
QRS DURATION, ECG06: 72 MS
QTC CALCULATION (BEZET), ECG08: 492 MS
RBC # BLD AUTO: 3.39 M/UL (ref 3.8–5.2)
RBC # FLD: >100 /CU MM
SODIUM SERPL-SCNC: 137 MMOL/L (ref 136–145)
SPECIMEN SOURCE FLD: ABNORMAL
SPECIMEN SOURCE FLD: NORMAL
TIBC SERPL-MCNC: 206 UG/DL (ref 250–450)
VENTRICULAR RATE, ECG03: 99 BPM
WBC # BLD AUTO: 64.4 K/UL (ref 3.6–11)

## 2022-02-18 PROCEDURE — 82945 GLUCOSE OTHER FLUID: CPT

## 2022-02-18 PROCEDURE — 84157 ASSAY OF PROTEIN OTHER: CPT

## 2022-02-18 PROCEDURE — 80053 COMPREHEN METABOLIC PANEL: CPT

## 2022-02-18 PROCEDURE — 88305 TISSUE EXAM BY PATHOLOGIST: CPT

## 2022-02-18 PROCEDURE — 97535 SELF CARE MNGMENT TRAINING: CPT

## 2022-02-18 PROCEDURE — 74011000250 HC RX REV CODE- 250: Performed by: STUDENT IN AN ORGANIZED HEALTH CARE EDUCATION/TRAINING PROGRAM

## 2022-02-18 PROCEDURE — 74011000250 HC RX REV CODE- 250: Performed by: HOSPITALIST

## 2022-02-18 PROCEDURE — 74011250636 HC RX REV CODE- 250/636: Performed by: INTERNAL MEDICINE

## 2022-02-18 PROCEDURE — 83615 LACTATE (LD) (LDH) ENZYME: CPT

## 2022-02-18 PROCEDURE — G0378 HOSPITAL OBSERVATION PER HR: HCPCS

## 2022-02-18 PROCEDURE — 83986 ASSAY PH BODY FLUID NOS: CPT

## 2022-02-18 PROCEDURE — 74011250636 HC RX REV CODE- 250/636: Performed by: HOSPITALIST

## 2022-02-18 PROCEDURE — 0W993ZZ DRAINAGE OF RIGHT PLEURAL CAVITY, PERCUTANEOUS APPROACH: ICD-10-PCS | Performed by: STUDENT IN AN ORGANIZED HEALTH CARE EDUCATION/TRAINING PROGRAM

## 2022-02-18 PROCEDURE — 87205 SMEAR GRAM STAIN: CPT

## 2022-02-18 PROCEDURE — 96372 THER/PROPH/DIAG INJ SC/IM: CPT

## 2022-02-18 PROCEDURE — 65660000000 HC RM CCU STEPDOWN

## 2022-02-18 PROCEDURE — 89050 BODY FLUID CELL COUNT: CPT

## 2022-02-18 PROCEDURE — 88112 CYTOPATH CELL ENHANCE TECH: CPT

## 2022-02-18 PROCEDURE — 32555 ASPIRATE PLEURA W/ IMAGING: CPT

## 2022-02-18 PROCEDURE — 87015 SPECIMEN INFECT AGNT CONCNTJ: CPT

## 2022-02-18 PROCEDURE — 77030040212 HC SYS EVAC ASEPT DISP BBMI -A

## 2022-02-18 PROCEDURE — 83880 ASSAY OF NATRIURETIC PEPTIDE: CPT

## 2022-02-18 PROCEDURE — 2709999900 HC NON-CHARGEABLE SUPPLY

## 2022-02-18 PROCEDURE — 83010 ASSAY OF HAPTOGLOBIN QUANT: CPT

## 2022-02-18 PROCEDURE — 36415 COLL VENOUS BLD VENIPUNCTURE: CPT

## 2022-02-18 PROCEDURE — 85027 COMPLETE CBC AUTOMATED: CPT

## 2022-02-18 PROCEDURE — 71045 X-RAY EXAM CHEST 1 VIEW: CPT

## 2022-02-18 PROCEDURE — 74011250637 HC RX REV CODE- 250/637: Performed by: HOSPITALIST

## 2022-02-18 RX ORDER — LIDOCAINE HYDROCHLORIDE 10 MG/ML
10 INJECTION, SOLUTION EPIDURAL; INFILTRATION; INTRACAUDAL; PERINEURAL
Status: COMPLETED | OUTPATIENT
Start: 2022-02-18 | End: 2022-02-18

## 2022-02-18 RX ADMIN — ENOXAPARIN SODIUM 40 MG: 100 INJECTION SUBCUTANEOUS at 10:21

## 2022-02-18 RX ADMIN — DOCUSATE SODIUM 100 MG: 100 CAPSULE, LIQUID FILLED ORAL at 10:21

## 2022-02-18 RX ADMIN — LIDOCAINE HYDROCHLORIDE 5 ML: 10 INJECTION, SOLUTION EPIDURAL; INFILTRATION; INTRACAUDAL; PERINEURAL at 10:00

## 2022-02-18 RX ADMIN — MULTIPLE VITAMINS W/ MINERALS TAB 1 TABLET: TAB at 10:21

## 2022-02-18 RX ADMIN — Medication 10 ML: at 17:54

## 2022-02-18 RX ADMIN — DOCUSATE SODIUM 100 MG: 100 CAPSULE, LIQUID FILLED ORAL at 17:54

## 2022-02-18 RX ADMIN — LOSARTAN POTASSIUM 25 MG: 25 TABLET, FILM COATED ORAL at 10:21

## 2022-02-18 RX ADMIN — Medication 10 ML: at 06:12

## 2022-02-18 RX ADMIN — Medication 2000 UNITS: at 10:21

## 2022-02-18 RX ADMIN — ARIPIPRAZOLE 30 MG: 30 TABLET ORAL at 10:26

## 2022-02-18 RX ADMIN — Medication 10 ML: at 22:53

## 2022-02-18 RX ADMIN — ASPIRIN 81 MG CHEWABLE TABLET 81 MG: 81 TABLET CHEWABLE at 10:21

## 2022-02-18 RX ADMIN — HYDROXYUREA 500 MG: 500 CAPSULE ORAL at 10:26

## 2022-02-18 RX ADMIN — Medication 400 MG: at 10:21

## 2022-02-18 NOTE — PROGRESS NOTES
Problem: Self Care Deficits Care Plan (Adult)  Goal: *Acute Goals and Plan of Care (Insert Text)  Description:   FUNCTIONAL STATUS PRIOR TO ADMISSION: Patient was independent and active without use of DME.     HOME SUPPORT: The patient lived alone with [facility] staff to provide assistance if needed. Occupational Therapy Goals  Initiated 2/17/2022  1. Patient will perform grooming with supervision/set-up within 7 day(s). 2.  Patient will perform upper body dressing with supervision/set-up within 7 day(s). 3.  Patient will perform lower body dressing with supervision/set-up within 7 day(s). 4.  Patient will perform all aspects of toileting with supervision/set-up within 7 day(s). 5.  Patient will utilize energy conservation techniques during functional activities with verbal cues within 7 day(s). Outcome: Progressing Towards Goal   OCCUPATIONAL THERAPY TREATMENT  Patient: Gerry Pool (9 y.o. female)  Date: 2/18/2022  Diagnosis: SOB (shortness of breath) [R06.02]  Pleural effusion on right [J90] <principal problem not specified>       Precautions: Fall  Chart, occupational therapy assessment, plan of care, and goals were reviewed. ASSESSMENT  Patient continues with skilled OT services and is progressing towards goals. Patient seen s/p thoracentesis and received on 3L via nasal cannula-  activity tolerance for ADL and functional mobility assessed on room air. SPO2 90% or greater during seated ADL requiring bending forward, and during ADL related mobility. Patient required hand held assistance for sit to stand and tolerated in room ambulation only- moving slowly and cautiously, and fatiguing quickly. Patient offered RW for support and then chose to ambulate into jackson >57' with general supervision and cues for safe positioning within walker. Patient's SPO2 90% .  Feel patient will benefit from increased support of walker during ADL and ambulation in order to improve activity tolerance and ambulation distance for recovery toward PLOF, with guidance of [facility] therapy services. Current Level of Function Impacting Discharge (ADLs): supervision to minimal assistance with no assistive device    Other factors to consider for discharge: PLOF- active and independent with no assistive devices         PLAN :  Patient continues to benefit from skilled intervention to address the above impairments. Continue treatment per established plan of care to address goals. Recommend with staff: ambulate to bathroom, ambulate in jackson if able - RW level     Recommend next OT session: progress POC- standing ADL, standing balance for ADL without UE support     Recommendation for discharge: (in order for the patient to meet his/her long term goals)  Occupational therapy at least 2 days/week in the home     This discharge recommendation:  Has been made in collaboration with the attending provider and/or case management    IF patient discharges home will need the following DME: walker: rolling       SUBJECTIVE:   Patient pleasant and cooperative     OBJECTIVE DATA SUMMARY:   Cognitive/Behavioral Status:  Neurologic State: Alert  Orientation Level: Oriented X4  Cognition: Appropriate decision making; Follows commands             Functional Mobility and Transfers for ADLs:  Bed Mobility:  Supine to Sit: Supervision  Sit to Supine: Supervision    Transfers:  Sit to Stand: Contact guard assistance  Functional Transfers  Bathroom Mobility: Contact guard assistance  Bed to Chair: Minimum assistance    Balance:       ADL Intervention:   ADL related mobility:                         Lower Body Dressing Assistance  Socks: Supervision  Position Performed: Bending forward method;Seated edge of bed            Pain:  Mild chest/lung pain when breathing more heavily with ambulatory activity     Activity Tolerance:   Fair and SpO2 stable on RA    After treatment patient left in no apparent distress:   Call bell within reach, Caregiver / family present, and sitting upright in bed    COMMUNICATION/COLLABORATION:   The patients plan of care was discussed with: Registered nurse.      Terrie Sanchez OT  Time Calculation: 29 mins

## 2022-02-18 NOTE — PROGRESS NOTES
Physical Therapy  2/18/2022    New PT order received. Patient seen and POC established on 2/17/22. Recommendation made for HHPT at [facility]. Thank you.     Bia Dodd, PT, DPT

## 2022-02-18 NOTE — PROGRESS NOTES
Transition of Care Plan  · RUR- N/A   · DISPOSITION: The disposition plan is home with family assistance. · Home with home health - HHPT/OT - [facility] [phone] - patient accepted. · F/U with PCP/Specialist    · Transport: [facility]family      At 10:14am - CM spoke with attending who reports patient is awaiting a procedure and may likely discharge home tomorrow with hhpt/ot. At 12:30pm - CM received call from patients place of residence [facility] [phone] who reports that they are unable to accept patient over the weekend, although patient lives at this facility. CM provided this information to attending who reports, pending progress patient likely to transition back home tomorrow. CM provided this update to patient and patients daughter at bedside who requested to speak with own of facility to retrieve confirmed update. CM awaiting update regarding patient being able to return back to facility tomorrow or over the weekend. If patient to discharge over the weekend, daughter to provide transportation. At 1:30pm - CM spoke with patients daughter who spoke with owner of facility, reports that facility does not accept patients over the weekend. Per earlier conversation with attending, CM provided this information to her via phone call.     JES Roca, CRM, LMHP-e

## 2022-02-18 NOTE — ROUTINE PROCESS
Pt arrives via stretcher to angio department accompanied by transport for US guided thoracentesis procedure. All assessments completed and consent was reviewed. Education given was regarding procedure, no sedation, post-procedure care and  management/follow-up. Opportunity for questions was provided and all questions and concerns were addressed.

## 2022-02-18 NOTE — PROGRESS NOTES
Problem: Self Care Deficits Care Plan (Adult)  Goal: *Acute Goals and Plan of Care (Insert Text)  Description:   FUNCTIONAL STATUS PRIOR TO ADMISSION: Patient was independent and active without use of DME.     HOME SUPPORT: The patient lived alone with [facility] staff to provide assistance if needed. Occupational Therapy Goals  Initiated 2/17/2022  1. Patient will perform grooming with supervision/set-up within 7 day(s). 2.  Patient will perform upper body dressing with supervision/set-up within 7 day(s). 3.  Patient will perform lower body dressing with supervision/set-up within 7 day(s). 4.  Patient will perform all aspects of toileting with supervision/set-up within 7 day(s). 5.  Patient will utilize energy conservation techniques during functional activities with verbal cues within 7 day(s). Outcome: Progressing Towards Goal   OCCUPATIONAL THERAPY TREATMENT  Patient: Michelle Hewitt (89 y.o. female)  Date: 2/18/2022  Diagnosis: SOB (shortness of breath) [R06.02]  Pleural effusion on right [J90] <principal problem not specified>      Precautions: Fall  Chart, occupational therapy assessment, plan of care, and goals were reviewed. ASSESSMENT  Patient continues with skilled OT services and is progressing towards goals. Patient seen s/p thoracentesis and received on 3L via nasal cannula-  activity tolerance for ADL and functional mobility assessed on room air. SPO2 90% or greater during seated ADL requiring bending forward, and during ADL related mobility. Patient required hand held assistance for sit to stand and tolerated in room ambulation only- moving slowly and cautiously, and fatiguing quickly. Patient offered RW for support and then chose to ambulate into jackson >57' with general supervision and cues for safe positioning within walker. Patient's SPO2 90% .  Feel patient will benefit from increased support of walker during ADL and ambulation in order to improve activity tolerance and ambulation distance for recovery toward PLOF, with guidance of [facility] therapy services. Current Level of Function Impacting Discharge (ADLs): supervision to minimal assistance with no assistive device    Other factors to consider for discharge: PLOF- active and independent with no assistive devices         PLAN :  Patient continues to benefit from skilled intervention to address the above impairments. Continue treatment per established plan of care to address goals. Recommend with staff: ambulate to bathroom, ambulate in jackson if able - RW level     Recommend next OT session: progress POC- standing ADL, standing balance for ADL without UE support     Recommendation for discharge: (in order for the patient to meet his/her long term goals)  Occupational therapy at least 2 days/week in the home     This discharge recommendation:  Has been made in collaboration with the attending provider and/or case management    IF patient discharges home will need the following DME: walker: rolling       SUBJECTIVE:   Patient pleasant and cooperative     OBJECTIVE DATA SUMMARY:   Cognitive/Behavioral Status:  Neurologic State: Alert  Orientation Level: Oriented X4  Cognition: Appropriate decision making; Follows commands             Functional Mobility and Transfers for ADLs:  Bed Mobility:  Supine to Sit: Supervision  Sit to Supine: Supervision    Transfers:  Sit to Stand: Contact guard assistance  Functional Transfers  Bathroom Mobility: Contact guard assistance  Bed to Chair: Minimum assistance    Balance:  Standing: Impaired; Without support  Standing - Static: Fair;Constant support  Standing - Dynamic : Fair;Constant support    ADL Intervention:   ADL related mobility: bed mobility completed with HOB elevated- supervision level. Sit to stand and short distance in room ambulation without assistive device- CGA/hand held assistance, cues for safety. Improved performance once provided RW for support, increased ambulation distance noted. Lower Body Dressing Assistance  Socks: Supervision  Position Performed: Bending forward method;Seated edge of bed            Pain:  Mild chest/lung pain when breathing more heavily with ambulatory activity     Activity Tolerance:   Fair and SpO2 stable on RA    After treatment patient left in no apparent distress:   Call bell within reach, Caregiver / family present, and sitting upright in bed    COMMUNICATION/COLLABORATION:   The patients plan of care was discussed with: Registered nurse.      Chepe Luciano OT  Time Calculation: 29 mins

## 2022-02-18 NOTE — PROGRESS NOTES
[facility] Adult  Hospitalist Group                                                                                          Hospitalist Progress Note  Sondra Allison MD  Answering service: [phone] from in house phone        Date of Service:  2022  NAME:  Bimal Pate:  1945  MRN:  [id number]      Admission Summary:   77-year-old female with a past medical history of hypertension, chronic diastolic CHF, recent admission with discharge on 2022 with discharge diagnosis of probable community-acquired pneumonia. Interval history / Subjective:   Patient seen and examined. She reported shortness of breath. Assessment & Plan:      #Right pleural effusion:   -planned for thoracentesis today    #Shortness of breath -multifactorial pleural effusion,anemia,diastolic heart failure,elevated white count. #Acute on chronic diastolic heart failure:  -On IV lasix BID      # HTN:  -losartan    # Myeloproliferative disorder/myelodysplastic syndrome:  #Chronic anemia,thrombocytosis  -hem/onc following-hydroxyurea started. Code status: full  Prophylaxis: heparin  Care Plan discussed with: patient,family  Anticipated Disposition: home     Hospital Problems  Never Reviewed          Codes Class Noted POA    SOB (shortness of breath) ICD-10-CM: R06.02  ICD-9-CM: 786.05  2022 Unknown                Review of Systems:   As per HPI      Vital Signs:    Last 24hrs VS reviewed since prior progress note.  Most recent are:  Visit Vitals  /63 (BP 1 Location: Right upper arm, BP Patient Position: At rest)   Pulse (!) 105   Temp 99 °F (37.2 °C)   Resp 24   Wt 68.5 kg (151 lb)   SpO2 94%   BMI 25.13 kg/m²         Intake/Output Summary (Last 24 hours) at 2022  Last data filed at 2022 1833  Gross per 24 hour   Intake 640 ml   Output 700 ml   Net -60 ml        Physical Examination:     I had a face to face encounter with this patient and independently examined them on 2/17/2022 as outlined below:          Constitutional:  No acute distress, cooperative, pleasant    ENT:  Oral mucosa moist, oropharynx benign. Resp:  decreased breath sounds at bases,no accessory muscle use. CV:  Regular rhythm, normal rate, S1,S2 wnl    GI:  Soft, non distended, non tender, normoactive bowel sounds. Musculoskeletal:  No LE edema    Neurologic:  Moves all extremities. AAOx3            Data Review:    Review and/or order of clinical lab test  Review and/or order of tests in the radiology section of CPT  Review and/or order of tests in the medicine section of [facility]      Labs:     Recent Labs     02/17/22 0445 02/16/22  1738   WBC 64.6* 85.3*   HGB 7.3* 8.3*   HCT 25.8* 29.4*   * 666*     Recent Labs     02/17/22 0445 02/16/22  1738    138   K 4.7 4.5   * 108   CO2 22 22   BUN 21* 26*   CREA 1.33* 1.58*   GLU 96 99   CA 8.8 9.2     Recent Labs     02/17/22 0445 02/16/22  1738   ALT 17 21    127*   TBILI 0.3 0.3   TP 6.0* 7.0   ALB 2.6* 3.2*   GLOB 3.4 3.8     No results for input(s): INR, PTP, APTT, INREXT in the last 72 hours. No results for input(s): FE, TIBC, PSAT, FERR in the last 72 hours. Lab Results   Component Value Date/Time    Folate 32.7 (H) 02/17/2022 10:18 AM      No results for input(s): PH, PCO2, PO2 in the last 72 hours. No results for input(s): CPK, CKNDX, TROIQ in the last 72 hours.     No lab exists for component: CPKMB  No results found for: CHOL, CHOLX, CHLST, CHOLV, HDL, HDLP, LDL, LDLC, DLDLP, TGLX, TRIGL, TRIGP, CHHD, CHHDX  No results found for: GLUCPOC  No results found for: COLOR, APPRN, SPGRU, REFSG, DOMINGO, PROTU, GLUCU, KETU, BILU, UROU, SANDY, LEUKU, GLUKE, EPSU, BACTU, WBCU, RBCU, CASTS, UCRY      Medications Reviewed:     Current Facility-Administered Medications   Medication Dose Route Frequency    hydroxyurea (HYDREA) chemo cap 500 mg  500 mg Oral DAILY    [START ON 2/18/2022] pantoprazole (PROTONIX) tablet 40 mg 40 mg Oral ACB    [START ON 2/22/2022] ergocalciferol capsule 50,000 Units  50,000 Units Oral Q7D    furosemide (LASIX) injection 20 mg  20 mg IntraVENous Q12H    ARIPiprazole (ABILIFY) tablet 30 mg  30 mg Oral DAILY    aspirin chewable tablet 81 mg  81 mg Oral DAILY    multivitamin, tx-iron-ca-min (THERA-M w/ IRON) tablet 1 Tablet  1 Tablet Oral DAILY    docusate sodium (COLACE) capsule 100 mg  100 mg Oral BID    ferrous sulfate tablet 325 mg  1 Tablet Oral ACB    losartan (COZAAR) tablet 25 mg  25 mg Oral DAILY    magnesium oxide (MAG-OX) tablet 400 mg  400 mg Oral DAILY    cholecalciferol (VITAMIN D3) (1000 Units /25 mcg) tablet 2,000 Units  2,000 Units Oral DAILY    ipratropium (ATROVENT) 21 mcg (0.03 %) nasal spray 2 Spray  2 Spray Both Nostrils PRN    sodium chloride (NS) flush 5-40 mL  5-40 mL IntraVENous Q8H    sodium chloride (NS) flush 5-40 mL  5-40 mL IntraVENous PRN    acetaminophen (TYLENOL) tablet 650 mg  650 mg Oral Q6H PRN    Or    acetaminophen (TYLENOL) suppository 650 mg  650 mg Rectal Q6H PRN    polyethylene glycol (MIRALAX) packet 17 g  17 g Oral DAILY PRN    promethazine (PHENERGAN) tablet 12.5 mg  12.5 mg Oral Q6H PRN    Or    ondansetron (ZOFRAN) injection 4 mg  4 mg IntraVENous Q6H PRN    enoxaparin (LOVENOX) injection 40 mg  40 mg SubCUTAneous DAILY     ______________________________________________________________________  EXPECTED LENGTH OF STAY: - - -  ACTUAL LENGTH OF STAY:          0                 Belén Kulkarni MD

## 2022-02-18 NOTE — PROGRESS NOTES
[facility] in [place], [place]  Heart Failure Inpatient Progress Note    Patient name: Silvestre Presley  Patient : 1945  Patient MRN: [id number]  Date of service: 22    Primary care physician: Cathie Reynolds NP  Primary general cardiologist:  Not established     Primary AHF cardiologist: Homa Velazquez MD    CHIEF COMPLAINT:  Shortness of breath    PLAN OF CARE:  · 67 y/o with diastolic dysfunction and normal LVEF, presented to [facility] with severe resting dyspnea with accessory muscle use; found to have large right pleural effusion, awaiting thoracenthesis  · Hematology following for MDS      RECOMMENDATIONS:  Pt off unit this morning for thoracentesis  Continue lasix 20mg IV twice daily  Remainder of care per primary  Please page/call over the weekend if any concerns or issues     IMPRESSION:  Shortness of breath at rest  diastolic dysfunction with normal LVEF  Myeloproliferative disorder  Severe leukocytosis; WBC 64.6k  Thrombocytosis; PLT 600s  HTN  HLD  Right pleural effusion         CARDIAC IMAGING:  Echo (22)   · Left Ventricle: Left ventricle size is normal. Normal wall thickness. Normal wall motion. Normal left ventricular systolic function with a visually estimated EF of 55 - 60%. Global longitudinal strain is normal with a value of -17.0%. Diastolic dysfunction present with normal LV EF. · Mitral Valve: Mild transvalvular regurgitation. · Left Atrium: Left atrium is mildly dilated. · Right Atrium: Right atrium is mildly dilated. · Pericardium: Trivial localized pericardial effusion present around the posterior left ventricle. No indication of cardiac tamponade. EKG (22) ST, rate 101  LHC not done  NST not done         HISTORY OF PRESENT ILLNESS:  Ms. Eleanor Javier is a 67 y/o female with h/o myeloproliferative disorder whose leukemia medication has been recently stopped d/t drop in Hgb, now with rise of WBC and PLT.  She was admitted 2 weeks ago with dyspnea and initial cardiac and pulmonary workup was negative. She was supposed to see hematologist, but her visit is not scheduled until late March. She was admitted for further evaluation. INTERVAL HISTORY:  No acute events overnight  Off unit for thoracentesis      REVIEW OF SYSTEMS:        PHYSICAL EXAM:  Visit Vitals  BP (!) 104/53 (BP 1 Location: Left upper arm, BP Patient Position: At rest)   Pulse 98   Temp 98.9 °F (37.2 °C)   Resp 29   Wt 149 lb 14.4 oz (68 kg)   SpO2 100%   BMI 24.94 kg/m²             PAST MEDICAL HISTORY:  Past Medical History:   Diagnosis Date    Congestive heart failure (HCC)     Hypertension     Menopause        PAST SURGICAL HISTORY:  History reviewed. No pertinent surgical history. FAMILY HISTORY:  Family History   Problem Relation Age of Onset    Breast Cancer Sister         63's       SOCIAL HISTORY:  Social History     Socioeconomic History    Marital status: SINGLE   Tobacco Use    Smoking status: Never Smoker    Smokeless tobacco: Never Used   Substance and Sexual Activity    Alcohol use: Never    Drug use: Not Currently       LABORATORY RESULTS:  Labs Latest Ref Rng & Units 2/18/2022 2/17/2022 2/16/2022 1/30/2022 1/29/2022 1/28/2022   WBC 3.6 - 11.0 K/uL 64. 4(HH) 64. 6(HH) 85. 3(HH) 54. 1(HH) 48. 7(H) 53. 1(HH)   RBC 3.80 - 5.20 M/uL 3.39(L) 3.25(L) 3.68(L) 3.42(L) 3.25(L) 3.42(L)   Hemoglobin 11.5 - 16.0 g/dL 7. 6(L) 7. 3(L) 8. 3(L) 7. 8(L) 7. 5(L) 7. 9(L)   Hematocrit 35.0 - 47.0 % 26. 7(L) 25. 8(L) 29. 4(L) 27. 5(L) 25. 9(L) 27. 7(L)   MCV 80.0 - 99.0 FL 78. 8(L) 79. 4(L) 79. 9(L) 80.4 79. 7(L) 81.0   Platelets 904 - 690 K/uL 596(H) 644(H) 666(H) 556(H) 497(H) 541(H)   Lymphocytes 12 - 49 % - 17 13 - 15 30   Monocytes 5 - 13 % - 8 10 - 4(L) 7   Eosinophils 0 - 7 % - 6 2 - 5 2   Basophils 0 - 1 % - 6(H) 7(H) - 5(H) 6(H)   Bands 0 - 6 % - 16(H) 13(H) - 7(H) 6   Blasts 0 % - 3(H) - - - -   Albumin 3.5 - 5.0 g/dL 2. 7(L) 2. 6(L) 3. 2(L) - 3. 1(L) 3.6   Calcium 8.5 - 10.1 MG/DL 8.8 8.8 9.2 - 8.9 9.2   Glucose 65 - 100 mg/dL 100 96 99 - 92 91   BUN 6 - 20 MG/DL 23(H) 21(H) 26(H) - 23(H) 24(H)   Creatinine 0.55 - 1.02 MG/DL 1.62(H) 1.33(H) 1.58(H) - 1.40(H) 1.36(H)   Sodium 136 - 145 mmol/L 137 138 138 - 140 139   Potassium 3.5 - 5.1 mmol/L 4.9 4.7 4.5 - 4.2 4.2   TSH 0.36 - 3.74 uIU/mL - - - - 2.67 -   Some recent data might be hidden       ALLERGY:  No Known Allergies     CURRENT MEDICATIONS:    Current Facility-Administered Medications:     hydroxyurea (HYDREA) chemo cap 500 mg, 500 mg, Oral, DAILY, Michael Luna MD, 500 mg at 02/17/22 1426    pantoprazole (PROTONIX) tablet 40 mg, 40 mg, Oral, ACB, Sharonda Khan MD    [START ON 2/22/2022] ergocalciferol capsule 50,000 Units, 50,000 Units, Oral, Q7D, Chica Botello MD    [Held by provider] furosemide (LASIX) injection 20 mg, 20 mg, IntraVENous, Q12H, Kurtis Bruce, ALEN, 20 mg at 02/17/22 2209    ARIPiprazole (ABILIFY) tablet 30 mg, 30 mg, Oral, DAILY, Shree White MD, 30 mg at 02/17/22 1032    aspirin chewable tablet 81 mg, 81 mg, Oral, DAILY, Shree White MD, 81 mg at 02/17/22 1029    multivitamin, tx-iron-ca-min (THERA-M w/ IRON) tablet 1 Tablet, 1 Tablet, Oral, DAILY, Shree White MD, 1 Tablet at 02/17/22 1029    docusate sodium (COLACE) capsule 100 mg, 100 mg, Oral, BID, Shree Ayers MD, 100 mg at 02/17/22 1800    ferrous sulfate tablet 325 mg, 1 Tablet, Oral, Christopher MALAGON Punit, MD, 325 mg at 02/17/22 0730    losartan (COZAAR) tablet 25 mg, 25 mg, Oral, DAILY, Shree White MD, 25 mg at 02/17/22 1030    magnesium oxide (MAG-OX) tablet 400 mg, 400 mg, Oral, DAILY, Shree White MD, 400 mg at 02/17/22 1029    cholecalciferol (VITAMIN D3) (1000 Units /25 mcg) tablet 2,000 Units, 2,000 Units, Oral, DAILY, Shree White MD, 2,000 Units at 02/17/22 1029    ipratropium (ATROVENT) 21 mcg (0.03 %) nasal spray 2 Spray, 2 Spray, Both Nostrils, PRN, Shree Ayers MD    sodium chloride (NS) flush 5-40 mL, 5-40 mL, IntraVENous, Q8H, Jt Duenas MD, 10 mL at 02/18/22 0612    sodium chloride (NS) flush 5-40 mL, 5-40 mL, IntraVENous, PRN, Jt Duenas MD    acetaminophen (TYLENOL) tablet 650 mg, 650 mg, Oral, Q6H PRN, 650 mg at 02/17/22 1425 **OR** acetaminophen (TYLENOL) suppository 650 mg, 650 mg, Rectal, Q6H PRN, Jt Duenas MD    polyethylene glycol (MIRALAX) packet 17 g, 17 g, Oral, DAILY PRN, Jt Duenas MD    promethazine (PHENERGAN) tablet 12.5 mg, 12.5 mg, Oral, Q6H PRN **OR** ondansetron (ZOFRAN) injection 4 mg, 4 mg, IntraVENous, Q6H PRN, Shree Fisher MD    enoxaparin (LOVENOX) injection 40 mg, 40 mg, SubCUTAneous, DAILY, Jt Duenas MD      PATIENT CARE TEAM:  Patient Care Team:  Chris Tiwari NP as PCP - General (Nurse Practitioner)  Trace Mercedes RN as Care Transitions Nurse         Don Osullivan NP  [facility]  [address] [facility], [address]  [address]  Office [phone]  Fax [phone]

## 2022-02-18 NOTE — PROGRESS NOTES
[facility] Adult  Hospitalist Group                                                                                          Hospitalist Progress Note  Marieta Goodpasture, MD  Answering service: [phone] OR [phone] from in house phone        Date of Service:  2022  NAME:  Severiano Olmstead  :  1945  MRN:  [id number]      Admission Summary:   27-year-old female with a past medical history of hypertension, chronic diastolic CHF, recent admission with discharge on 2022 with discharge diagnosis of probable community-acquired pneumonia. Interval history / Subjective:   Patient seen and examined. She states that she is feeling better after thoracentesis     Assessment & Plan:      #Right pleural effusion:   -s/p thoracentesis-1900 ml fluid drained,wean off oxygen    #Shortness of breath -multifactorial pleural effusion,anemia,diastolic heart failure,elevated white count. #Acute on chronic diastolic heart failure:  -creatinine slightly elevated 1.6- hold evening dose lasix  -daily labs     # HTN:  -losartan    # Myeloproliferative disorder/myelodysplastic syndrome:  #Chronic anemia,thrombocytosis  -hem/onc following-hydroxyurea started. Code status: full  Prophylaxis: heparin  Care Plan discussed with: patient,family  Anticipated Disposition: home     Hospital Problems  Never Reviewed          Codes Class Noted POA    Pleural effusion on right ICD-10-CM: J90  ICD-9-CM: 511.9  2022 Unknown        SOB (shortness of breath) ICD-10-CM: R06.02  ICD-9-CM: 786.05  2022 Unknown                Review of Systems:   As per HPI      Vital Signs:    Last 24hrs VS reviewed since prior progress note.  Most recent are:  Visit Vitals  BP (!) 103/57 (BP 1 Location: Right upper arm, BP Patient Position: At rest)   Pulse 100   Temp 98 °F (36.7 °C)   Resp 24   Wt 68 kg (149 lb 14.4 oz)   SpO2 94%   BMI 24.94 kg/m²         Intake/Output Summary (Last 24 hours) at 2022  Last data filed at 2/18/2022 1049  Gross per 24 hour   Intake 660 ml   Output 2100 ml   Net -1440 ml        Physical Examination:     I had a face to face encounter with this patient and independently examined them on 2/18/2022 as outlined below:          Constitutional:  No acute distress, cooperative, pleasant    ENT:  Oral mucosa moist, oropharynx benign. Resp:  decreased breath sounds at bases,no accessory muscle use. CV:  Regular rhythm, normal rate, S1,S2 wnl    GI:  Soft, non distended, non tender, normoactive bowel sounds. Musculoskeletal:  No LE edema    Neurologic:  Moves all extremities. AAOx3            Data Review:    Review and/or order of clinical lab test  Review and/or order of tests in the radiology section of CPT  Review and/or order of tests in the medicine section of CPT      Labs:     Recent Labs     02/18/22 0621 02/17/22 0445   WBC 64.4* 64.6*   HGB 7.6* 7.3*   HCT 26.7* 25.8*   * 644*     Recent Labs     02/18/22  0621 02/17/22 0445 02/16/22  1738    138 138   K 4.9 4.7 4.5    110* 108   CO2 24 22 22   BUN 23* 21* 26*   CREA 1.62* 1.33* 1.58*    96 99   CA 8.8 8.8 9.2     Recent Labs     02/18/22  0621 02/17/22 0445 02/16/22  1738   ALT 17 17 21    112 127*   TBILI 0.3 0.3 0.3   TP 6.4 6.0* 7.0   ALB 2.7* 2.6* 3.2*   GLOB 3.7 3.4 3.8     No results for input(s): INR, PTP, APTT, INREXT, INREXT in the last 72 hours. Recent Labs     02/17/22  1018   TIBC 206*   PSAT 6*      Lab Results   Component Value Date/Time    Folate 32.7 (H) 02/17/2022 10:18 AM      No results for input(s): PH, PCO2, PO2 in the last 72 hours. No results for input(s): CPK, CKNDX, TROIQ in the last 72 hours.     No lab exists for component: CPKMB  No results found for: CHOL, CHOLX, CHLST, CHOLV, HDL, HDLP, LDL, LDLC, DLDLP, TGLX, TRIGL, TRIGP, CHHD, CHHDX  No results found for: GLUCPOC  No results found for: COLOR, APPRN, SPGRU, REFSG, DOMINGO, PROTU, GLUCU, KETU, BILU, UROU, SANDY, LEUKU, GLUKE, EPSU, BACTU, WBCU, RBCU, CASTS, UCRY      Medications Reviewed:     Current Facility-Administered Medications   Medication Dose Route Frequency    hydroxyurea (HYDREA) chemo cap 500 mg  500 mg Oral DAILY    pantoprazole (PROTONIX) tablet 40 mg  40 mg Oral ACB    [START ON 2/22/2022] ergocalciferol capsule 50,000 Units  50,000 Units Oral Q7D    [Held by provider] furosemide (LASIX) injection 20 mg  20 mg IntraVENous Q12H    ARIPiprazole (ABILIFY) tablet 30 mg  30 mg Oral DAILY    aspirin chewable tablet 81 mg  81 mg Oral DAILY    multivitamin, tx-iron-ca-min (THERA-M w/ IRON) tablet 1 Tablet  1 Tablet Oral DAILY    docusate sodium (COLACE) capsule 100 mg  100 mg Oral BID    ferrous sulfate tablet 325 mg  1 Tablet Oral ACB    [Held by provider] losartan (COZAAR) tablet 25 mg  25 mg Oral DAILY    magnesium oxide (MAG-OX) tablet 400 mg  400 mg Oral DAILY    cholecalciferol (VITAMIN D3) (1000 Units /25 mcg) tablet 2,000 Units  2,000 Units Oral DAILY    ipratropium (ATROVENT) 21 mcg (0.03 %) nasal spray 2 Spray  2 Spray Both Nostrils PRN    sodium chloride (NS) flush 5-40 mL  5-40 mL IntraVENous Q8H    sodium chloride (NS) flush 5-40 mL  5-40 mL IntraVENous PRN    acetaminophen (TYLENOL) tablet 650 mg  650 mg Oral Q6H PRN    Or    acetaminophen (TYLENOL) suppository 650 mg  650 mg Rectal Q6H PRN    polyethylene glycol (MIRALAX) packet 17 g  17 g Oral DAILY PRN    promethazine (PHENERGAN) tablet 12.5 mg  12.5 mg Oral Q6H PRN    Or    ondansetron (ZOFRAN) injection 4 mg  4 mg IntraVENous Q6H PRN    enoxaparin (LOVENOX) injection 40 mg  40 mg SubCUTAneous DAILY     ______________________________________________________________________  EXPECTED LENGTH OF STAY: - - -  ACTUAL LENGTH OF STAY:          0                 Montserrat Wolf MD

## 2022-02-18 NOTE — PROGRESS NOTES
1900mL removed via thoracentesis. Pt tolerated procedure well and VSS. Report called to [facility] RN on 2N.

## 2022-02-19 LAB
ALBUMIN SERPL-MCNC: 2.8 G/DL (ref 3.5–5)
ANION GAP SERPL CALC-SCNC: 4 MMOL/L (ref 5–15)
BUN SERPL-MCNC: 27 MG/DL (ref 6–20)
BUN/CREAT SERPL: 19 (ref 12–20)
CALCIUM SERPL-MCNC: 9 MG/DL (ref 8.5–10.1)
CHLORIDE SERPL-SCNC: 108 MMOL/L (ref 97–108)
CO2 SERPL-SCNC: 25 MMOL/L (ref 21–32)
CREAT SERPL-MCNC: 1.45 MG/DL (ref 0.55–1.02)
GLUCOSE SERPL-MCNC: 108 MG/DL (ref 65–100)
PHOSPHATE SERPL-MCNC: 3.7 MG/DL (ref 2.6–4.7)
POTASSIUM SERPL-SCNC: 4.8 MMOL/L (ref 3.5–5.1)
SODIUM SERPL-SCNC: 137 MMOL/L (ref 136–145)

## 2022-02-19 PROCEDURE — 74011250636 HC RX REV CODE- 250/636: Performed by: INTERNAL MEDICINE

## 2022-02-19 PROCEDURE — 65270000032 HC RM SEMIPRIVATE

## 2022-02-19 PROCEDURE — 74011250637 HC RX REV CODE- 250/637: Performed by: HOSPITALIST

## 2022-02-19 PROCEDURE — 74011000250 HC RX REV CODE- 250: Performed by: HOSPITALIST

## 2022-02-19 PROCEDURE — 36415 COLL VENOUS BLD VENIPUNCTURE: CPT

## 2022-02-19 PROCEDURE — 80069 RENAL FUNCTION PANEL: CPT

## 2022-02-19 PROCEDURE — 74011250636 HC RX REV CODE- 250/636: Performed by: HOSPITALIST

## 2022-02-19 PROCEDURE — 74011250637 HC RX REV CODE- 250/637: Performed by: INTERNAL MEDICINE

## 2022-02-19 RX ORDER — FUROSEMIDE 40 MG/1
20 TABLET ORAL DAILY
Status: DISCONTINUED | OUTPATIENT
Start: 2022-02-19 | End: 2022-02-21 | Stop reason: HOSPADM

## 2022-02-19 RX ADMIN — MULTIPLE VITAMINS W/ MINERALS TAB 1 TABLET: TAB at 09:43

## 2022-02-19 RX ADMIN — PANTOPRAZOLE SODIUM 40 MG: 40 TABLET, DELAYED RELEASE ORAL at 07:28

## 2022-02-19 RX ADMIN — Medication 2000 UNITS: at 09:43

## 2022-02-19 RX ADMIN — DOCUSATE SODIUM 100 MG: 100 CAPSULE, LIQUID FILLED ORAL at 09:43

## 2022-02-19 RX ADMIN — DOCUSATE SODIUM 100 MG: 100 CAPSULE, LIQUID FILLED ORAL at 17:34

## 2022-02-19 RX ADMIN — ENOXAPARIN SODIUM 40 MG: 100 INJECTION SUBCUTANEOUS at 09:44

## 2022-02-19 RX ADMIN — ARIPIPRAZOLE 30 MG: 30 TABLET ORAL at 09:43

## 2022-02-19 RX ADMIN — HYDROXYUREA 500 MG: 500 CAPSULE ORAL at 09:44

## 2022-02-19 RX ADMIN — Medication 400 MG: at 09:43

## 2022-02-19 RX ADMIN — Medication 10 ML: at 07:28

## 2022-02-19 RX ADMIN — ASPIRIN 81 MG CHEWABLE TABLET 81 MG: 81 TABLET CHEWABLE at 09:43

## 2022-02-19 RX ADMIN — FUROSEMIDE 20 MG: 40 TABLET ORAL at 17:34

## 2022-02-19 RX ADMIN — FERROUS SULFATE TAB 325 MG (65 MG ELEMENTAL FE) 325 MG: 325 (65 FE) TAB at 07:28

## 2022-02-19 NOTE — PROGRESS NOTES
Bedside and Verbal shift change report given to Emilie (oncoming nurse) by Louis Najera (offgoing nurse). Report included the following information SBAR, Kardex, MAR and Recent Results.

## 2022-02-19 NOTE — PROGRESS NOTES
Transition of Care Plan  · RUR- N/A   · DISPOSITION: The disposition plan is to transition to residence at the [address] [place] [address] [phone] - patient accepted. · Home with home health - [facility] -[facility] [phone] - patient accepted. · F/U with PCP/Specialist    · Transport: Family - Daughter      Patient to transition back to [facility] on Monday 2/21/22, daughter to provide transport. Per facility, they are not able to admit over the weekend due to limited staffing. Per owner.     Ron Ellison, MSW, CRM, LMHP-e

## 2022-02-19 NOTE — PROGRESS NOTES
Patient resting in bed, facility unable to take patient until Monday. Family at bedside. Problem: Patient Education: Go to Patient Education Activity  Goal: Patient/Family Education  Outcome: Progressing Towards Goal     Problem: Patient Education: Go to Patient Education Activity  Goal: Patient/Family Education  Outcome: Progressing Towards Goal     Problem: Falls - Risk of  Goal: *Absence of Falls  Description: Document [place] Fall Risk and appropriate interventions in the flowsheet.   Outcome: Progressing Towards Goal  Note: Fall Risk Interventions:            Medication Interventions: Evaluate medications/consider consulting pharmacy,Patient to call before getting OOB,Teach patient to arise slowly                   Problem: Patient Education: Go to Patient Education Activity  Goal: Patient/Family Education  Outcome: Progressing Towards Goal

## 2022-02-19 NOTE — PROGRESS NOTES
[facility]ist [facility]                                                                                          Hospitalist Progress Note  Roel Ruano MD  Answering service: [phone] from in house phone        Date of Service:  2022  NAME:  Trip Vidal  :  1945  MRN:  [id number]      Admission Summary:   59-year-old female with a past medical history of hypertension, chronic diastolic CHF, recent admission with discharge on 2022 with discharge diagnosis of probable community-acquired pneumonia. Interval history / Subjective:   Patient seen and examined. She denied any SOB today     Assessment & Plan:      #Right pleural effusion:   -s/p thoracentesis-1900 ml fluid drained ,on room air now  Culture negative so far    #Shortness of breath -multifactorial pleural effusion,anemia,diastolic heart failure,elevated white count. #Acute on chronic diastolic heart failure:  Resume oral lasix 20 mg today       # HTN:  - BP wnl, today. Hold losartan    # Myeloproliferative disorder/myelodysplastic syndrome:  #Chronic anemia,thrombocytosis  -hem/onc following-hydroxyurea started. Code status: full  Prophylaxis: heparin  Care Plan discussed with: patient,family  Anticipated Disposition: per facility,they cannot accept her on week end     Hospital Problems  Never Reviewed          Codes Class Noted POA    Pleural effusion on right ICD-10-CM: J90  ICD-9-CM: 511.9  2022 Unknown        SOB (shortness of breath) ICD-10-CM: R06.02  ICD-9-CM: 786.05  2022 Unknown                Review of Systems:   As per HPI      Vital Signs:    Last 24hrs VS reviewed since prior progress note.  Most recent are:  Visit Vitals  /63 (BP 1 Location: Left upper arm, BP Patient Position: At rest)   Pulse (!) 106   Temp 99.2 °F (37.3 °C)   Resp 16   Wt 68 kg (149 lb 14.4 oz)   SpO2 92%   BMI 24.94 kg/m²         Intake/Output Summary (Last 24 hours) at 2/19/2022 1532  Last data filed at 2/19/2022 0948  Gross per 24 hour   Intake 120 ml   Output --   Net 120 ml        Physical Examination:     I had a face to face encounter with this patient and independently examined them on 2/19/2022 as outlined below:          Constitutional:  No acute distress, cooperative, pleasant    ENT:  Oral mucosa moist, oropharynx benign. Resp:  decreased breath sounds at bases,no accessory muscle use. CV:  Regular rhythm, normal rate, S1,S2 wnl    GI:  Soft, non distended, non tender, normoactive bowel sounds. Musculoskeletal:  No LE edema    Neurologic:  Moves all extremities. AAOx3            Data Review:    Review and/or order of clinical lab test  Review and/or order of tests in the radiology section of CPT  Review and/or order of tests in the medicine section of CPT      Labs:     Recent Labs     02/18/22 0621 02/17/22 0445   WBC 64.4* 64.6*   HGB 7.6* 7.3*   HCT 26.7* 25.8*   * 644*     Recent Labs     02/19/22 0135 02/18/22 0621 02/17/22 0445    137 138   K 4.8 4.9 4.7    108 110*   CO2 25 24 22   BUN 27* 23* 21*   CREA 1.45* 1.62* 1.33*   * 100 96   CA 9.0 8.8 8.8   PHOS 3.7  --   --      Recent Labs     02/19/22 0135 02/18/22 0621 02/17/22 0445 02/16/22 1738 02/16/22  1738   ALT  --  17 17  --  21   AP  --  111 112  --  127*   TBILI  --  0.3 0.3  --  0.3   TP  --  6.4 6.0*  --  7.0   ALB 2.8* 2.7* 2.6*   < > 3.2*   GLOB  --  3.7 3.4  --  3.8    < > = values in this interval not displayed. No results for input(s): INR, PTP, APTT, INREXT, INREXT in the last 72 hours. Recent Labs     02/17/22  1018   TIBC 206*   PSAT 6*      Lab Results   Component Value Date/Time    Folate 32.7 (H) 02/17/2022 10:18 AM      No results for input(s): PH, PCO2, PO2 in the last 72 hours. No results for input(s): CPK, CKNDX, TROIQ in the last 72 hours.     No lab exists for component: CPKMB  No results found for: CHOL, CHOLX, CHLST, CHOLV, HDL, HDLP, LDL, LDLC, DLDLP, TGLX, TRIGL, TRIGP, CHHD, CHHDX  No results found for: GLUCPOC  No results found for: COLOR, APPRN, SPGRU, REFSG, DOMINGO, PROTU, GLUCU, KETU, BILU, UROU, SANDY, LEUKU, GLUKE, EPSU, BACTU, WBCU, RBCU, CASTS, UCRY      Medications Reviewed:     Current Facility-Administered Medications   Medication Dose Route Frequency    furosemide (LASIX) tablet 20 mg  20 mg Oral DAILY    hydroxyurea (HYDREA) chemo cap 500 mg  500 mg Oral DAILY    pantoprazole (PROTONIX) tablet 40 mg  40 mg Oral ACB    [START ON 2/22/2022] ergocalciferol capsule 50,000 Units  50,000 Units Oral Q7D    ARIPiprazole (ABILIFY) tablet 30 mg  30 mg Oral DAILY    aspirin chewable tablet 81 mg  81 mg Oral DAILY    multivitamin, tx-iron-ca-min (THERA-M w/ IRON) tablet 1 Tablet  1 Tablet Oral DAILY    docusate sodium (COLACE) capsule 100 mg  100 mg Oral BID    ferrous sulfate tablet 325 mg  1 Tablet Oral ACB    [Held by provider] losartan (COZAAR) tablet 25 mg  25 mg Oral DAILY    magnesium oxide (MAG-OX) tablet 400 mg  400 mg Oral DAILY    cholecalciferol (VITAMIN D3) (1000 Units /25 mcg) tablet 2,000 Units  2,000 Units Oral DAILY    ipratropium (ATROVENT) 21 mcg (0.03 %) nasal spray 2 Spray  2 Spray Both Nostrils PRN    sodium chloride (NS) flush 5-40 mL  5-40 mL IntraVENous Q8H    sodium chloride (NS) flush 5-40 mL  5-40 mL IntraVENous PRN    acetaminophen (TYLENOL) tablet 650 mg  650 mg Oral Q6H PRN    Or    acetaminophen (TYLENOL) suppository 650 mg  650 mg Rectal Q6H PRN    polyethylene glycol (MIRALAX) packet 17 g  17 g Oral DAILY PRN    promethazine (PHENERGAN) tablet 12.5 mg  12.5 mg Oral Q6H PRN    Or    ondansetron (ZOFRAN) injection 4 mg  4 mg IntraVENous Q6H PRN    enoxaparin (LOVENOX) injection 40 mg  40 mg SubCUTAneous DAILY     ______________________________________________________________________  EXPECTED LENGTH OF STAY: - - -  ACTUAL LENGTH OF STAY:          1                 Rajan Rivera MD

## 2022-02-19 NOTE — PROGRESS NOTES
Hematology-Oncology Progress Note    Jocelin Rodrigues  1945  [id number]  2/19/2022    Follow-up for: myelofibrosis     [x]        Chart notes since last visit reviewed   [x]        Medications reviewed for allergies and interactions       Case discussed with the following:         []                            []        Nursing Staff                                                                         []        Pathologist                                                                        []        FAMILY      Subjective:     Spoke with patient who complains of: pt is comfortable, breathing is better    Objective:   Patient Vitals for the past 24 hrs:   BP Temp Pulse Resp SpO2   02/19/22 1200 -- -- 99 -- --   02/19/22 1000 -- -- (!) 106 -- --   02/19/22 0831 (!) 122/53 98.2 °F (36.8 °C) (!) 102 18 94 %   02/19/22 0600 -- -- 99 -- --   02/19/22 0400 -- -- 97 -- --   02/19/22 0200 -- -- 89 -- --   02/18/22 2348 122/72 100.1 °F (37.8 °C) 98 20 91 %   02/18/22 2200 -- -- 97 -- --   02/18/22 2028 (!) 100/57 99 °F (37.2 °C) 87 20 92 %   02/18/22 2000 -- -- (!) 104 -- --   02/18/22 1800 -- -- 100 -- --   02/18/22 1503 (!) 103/57 98 °F (36.7 °C) 93 24 94 %   02/18/22 1400 -- -- (!) 108 -- --   02/18/22 1328 -- -- (!) 112 -- 93 %   02/18/22 1327 -- -- (!) 117 -- 90 %       REVIEW OF SYSTEMS:    Constitutional: negative fever, negative chills, negative weight loss  Eyes:   negative visual changes  ENT:   negative sore throat, tongue or lip swelling  Respiratory:  negative cough, negative dyspnea  Cards:  negative for chest pain, palpitations, lower extremity edema  GI:   negative for nausea, vomiting, diarrhea, and abdominal pain  Neuro:  negative for headaches, dizziness, vertigo  []                        Full ROS o/w normal/non contributor    Constitutional:  Patient looks  []        Sick  []        Frail  [x]        Better                                                 []        Depressed    HEENT: [x]   NC                         []   AT               []    ALOPECIA           Eyes: [x]   Normal               []    Icteric  Oropharynx: []    Normal                  []  Thrush               []   Dry  Mucositis: []    None                 Grade: []        I  []        II  []        III  []        IV  Neck:   [x]   Supple                  []  Rigid               JVD:    []   ABSENT       []   PRESENT  Lymphadenopathy:   []   None Noted            []   PRESENT    Chest:  aerting well    CV:             []   Regular              []  Irregular               []   Tachy                []   Murmur  Abdominal:   [x]    Soft              []   NON-tender               []   Tender      BS:    []   ABSENT                   []   PRESENT  Liver:     []  NON-palp                  []   EDGE- palp  Spleen: []   NON-palp                   []  EDGE - palp  Mass:   [x]   ABSENT                          []  PRESENT  Extr:    [x]  Lymphedema             []   Cyanosis      []  Clubbing  Edema:     []   NONE       [x]   PRESENT  Skin:  Intact []           Purpura []        Rash: []   ABSENT       []  PRESENT  Neuro:  [x]        Normal  []        Confused      Available labs reviewed:  Labs:    Recent Results (from the past 24 hour(s))   RENAL FUNCTION PANEL    Collection Time: 02/19/22  1:35 AM   Result Value Ref Range    Sodium 137 136 - 145 mmol/L    Potassium 4.8 3.5 - 5.1 mmol/L    Chloride 108 97 - 108 mmol/L    CO2 25 21 - 32 mmol/L    Anion gap 4 (L) 5 - 15 mmol/L    Glucose 108 (H) 65 - 100 mg/dL    BUN 27 (H) 6 - 20 MG/DL    Creatinine 1.45 (H) 0.55 - 1.02 MG/DL    BUN/Creatinine ratio 19 12 - 20      GFR est AA 43 (L) >60 ml/min/1.73m2    GFR est non-AA 35 (L) >60 ml/min/1.73m2    Calcium 9.0 8.5 - 10.1 MG/DL    Phosphorus 3.7 2.6 - 4.7 MG/DL    Albumin 2.8 (L) 3.5 - 5.0 g/dL       Available Xrays reviewed:    Chemotherapy monitored and toxicities assessed:    Assessment and Plan   1. Myelofibrosis. Eben Guidry  pt had been on hydrea for several years until oct/21 when it was stopped for worsening anemia. Jennie Annita Her wbc was 85k upon admission here and I resumed hydrea at 500mg/d . Jennie Ariza she is tolerating it well , we will check daily cbc's  2. CHF. Jennie Annita on lasix bid  3. Pleural effusion . . s/p thoracentesis on Thursday      Dimitry Bhatia MD

## 2022-02-19 NOTE — PROGRESS NOTES
Bedside shift change report given to [address]  (oncoming nurse) by Emilie RN  (offgoing nurse). Report included the following information SBAR, Kardex, MAR and Recent Results.

## 2022-02-20 ENCOUNTER — APPOINTMENT (OUTPATIENT)
Dept: GENERAL RADIOLOGY | Age: 77
DRG: 291 | End: 2022-02-20
Attending: HOSPITALIST
Payer: MEDICARE

## 2022-02-20 LAB
ALBUMIN SERPL-MCNC: 2.6 G/DL (ref 3.5–5)
ANION GAP SERPL CALC-SCNC: 3 MMOL/L (ref 5–15)
BUN SERPL-MCNC: 25 MG/DL (ref 6–20)
BUN/CREAT SERPL: 17 (ref 12–20)
CALCIUM SERPL-MCNC: 8.6 MG/DL (ref 8.5–10.1)
CHLORIDE SERPL-SCNC: 108 MMOL/L (ref 97–108)
CO2 SERPL-SCNC: 26 MMOL/L (ref 21–32)
CREAT SERPL-MCNC: 1.51 MG/DL (ref 0.55–1.02)
ERYTHROCYTE [DISTWIDTH] IN BLOOD BY AUTOMATED COUNT: 17.2 % (ref 11.5–14.5)
GLUCOSE SERPL-MCNC: 104 MG/DL (ref 65–100)
HCT VFR BLD AUTO: 25.4 % (ref 35–47)
HGB BLD-MCNC: 7.3 G/DL (ref 11.5–16)
LDH SERPL L TO P-CCNC: 1313 U/L (ref 81–246)
MCH RBC QN AUTO: 22.9 PG (ref 26–34)
MCHC RBC AUTO-ENTMCNC: 28.7 G/DL (ref 30–36.5)
MCV RBC AUTO: 79.6 FL (ref 80–99)
NRBC # BLD: 2.86 K/UL (ref 0–0.01)
NRBC BLD-RTO: 5.1 PER 100 WBC
PHOSPHATE SERPL-MCNC: 3.9 MG/DL (ref 2.6–4.7)
PLATELET # BLD AUTO: 471 K/UL (ref 150–400)
PMV BLD AUTO: 12 FL (ref 8.9–12.9)
POTASSIUM SERPL-SCNC: 4.9 MMOL/L (ref 3.5–5.1)
RBC # BLD AUTO: 3.19 M/UL (ref 3.8–5.2)
SODIUM SERPL-SCNC: 137 MMOL/L (ref 136–145)
WBC # BLD AUTO: 56.3 K/UL (ref 3.6–11)

## 2022-02-20 PROCEDURE — 74011250636 HC RX REV CODE- 250/636: Performed by: HOSPITALIST

## 2022-02-20 PROCEDURE — 65410000002 HC RM PRIVATE OB

## 2022-02-20 PROCEDURE — 85027 COMPLETE CBC AUTOMATED: CPT

## 2022-02-20 PROCEDURE — 74011250637 HC RX REV CODE- 250/637: Performed by: HOSPITALIST

## 2022-02-20 PROCEDURE — 83615 LACTATE (LD) (LDH) ENZYME: CPT

## 2022-02-20 PROCEDURE — 71045 X-RAY EXAM CHEST 1 VIEW: CPT

## 2022-02-20 PROCEDURE — 74011250636 HC RX REV CODE- 250/636: Performed by: INTERNAL MEDICINE

## 2022-02-20 PROCEDURE — 36415 COLL VENOUS BLD VENIPUNCTURE: CPT

## 2022-02-20 PROCEDURE — 80069 RENAL FUNCTION PANEL: CPT

## 2022-02-20 PROCEDURE — 74011250637 HC RX REV CODE- 250/637: Performed by: INTERNAL MEDICINE

## 2022-02-20 PROCEDURE — 74011000250 HC RX REV CODE- 250: Performed by: HOSPITALIST

## 2022-02-20 RX ADMIN — MULTIPLE VITAMINS W/ MINERALS TAB 1 TABLET: TAB at 09:00

## 2022-02-20 RX ADMIN — ARIPIPRAZOLE 30 MG: 30 TABLET ORAL at 09:00

## 2022-02-20 RX ADMIN — PANTOPRAZOLE SODIUM 40 MG: 40 TABLET, DELAYED RELEASE ORAL at 07:10

## 2022-02-20 RX ADMIN — ASPIRIN 81 MG CHEWABLE TABLET 81 MG: 81 TABLET CHEWABLE at 09:04

## 2022-02-20 RX ADMIN — DOCUSATE SODIUM 100 MG: 100 CAPSULE, LIQUID FILLED ORAL at 18:17

## 2022-02-20 RX ADMIN — Medication 10 ML: at 21:05

## 2022-02-20 RX ADMIN — Medication 10 ML: at 14:00

## 2022-02-20 RX ADMIN — Medication 400 MG: at 09:03

## 2022-02-20 RX ADMIN — FERROUS SULFATE TAB 325 MG (65 MG ELEMENTAL FE) 325 MG: 325 (65 FE) TAB at 07:10

## 2022-02-20 RX ADMIN — FUROSEMIDE 20 MG: 40 TABLET ORAL at 09:02

## 2022-02-20 RX ADMIN — Medication 2000 UNITS: at 09:04

## 2022-02-20 RX ADMIN — DOCUSATE SODIUM 100 MG: 100 CAPSULE, LIQUID FILLED ORAL at 09:02

## 2022-02-20 RX ADMIN — HYDROXYUREA 500 MG: 500 CAPSULE ORAL at 09:00

## 2022-02-20 RX ADMIN — ENOXAPARIN SODIUM 40 MG: 100 INJECTION SUBCUTANEOUS at 09:04

## 2022-02-20 NOTE — PROGRESS NOTES
TRANSFER - IN REPORT:    Verbal report received from Jarek(name) on Ronelle Screen  being received from [facility](unit) for routine progression of care      Report consisted of patients Situation, Background, Assessment and   Recommendations(SBAR). Information from the following report(s) SBAR, Kardex, ED Summary, OR Summary, Procedure Summary, Intake/Output, MAR, Accordion, Recent Results and Med Rec Status was reviewed with the receiving nurse. Opportunity for questions and clarification was provided. Assessment completed upon patients arrival to unit and care assumed.

## 2022-02-20 NOTE — PROGRESS NOTES
TRANSFER - OUT REPORT:    Verbal report given to [facility]E (name) on Kristin Camacho  being transferred to [facility] (unit) for routine progression of care       Report consisted of patients Situation, Background, Assessment and   Recommendations(SBAR). Information from the following report(s) SBAR, Kardex, Intake/Output, MAR and Recent Results was reviewed with the receiving nurse. Lines:   Peripheral IV 02/16/22 Left Antecubital (Active)   Site Assessment Clean, dry, & intact 02/19/22 1956   Phlebitis Assessment 0 02/19/22 1956   Infiltration Assessment 0 02/19/22 1956   Dressing Status Clean, dry, & intact 02/19/22 1956   Dressing Type Transparent;Tape 02/19/22 1956   Hub Color/Line Status Capped 02/19/22 1956   Action Taken Open ports on tubing capped 02/19/22 1956   Alcohol Cap Used Yes 02/19/22 1956        Opportunity for questions and clarification was provided.

## 2022-02-20 NOTE — PROGRESS NOTES
Verbal shift change report given to Jason (oncoming nurse) by Zollie Mohs (offgoing nurse). Report included the following information SBAR, Kardex, MAR and Recent Results.

## 2022-02-20 NOTE — PROGRESS NOTES
Hematology-Oncology Progress Note    Karli Burns  1945  [id number]  2/20/2022    Follow-up for: myelofibrosis     [x]        Chart notes since last visit reviewed   [x]        Medications reviewed for allergies and interactions       Case discussed with the following:         []                            []        Nursing Staff                                                                         []        Pathologist                                                                        [x]        FAMILY      Subjective:     Spoke with patient who complains of: pt is comfortable, breathing is better no new/co    Objective:     Patient Vitals for the past 24 hrs:   BP Temp Pulse Resp SpO2 Weight   02/20/22 0815 (!) 112/50 98.7 °F (37.1 °C) 96 16 95 % --   02/20/22 0357 118/70 99.1 °F (37.3 °C) 94 16 94 % --   02/20/22 0200 -- -- 91 -- -- --   02/19/22 2351 114/69 99.7 °F (37.6 °C) 95 15 91 % --   02/19/22 2204 -- -- (!) 101 -- -- --   02/19/22 2008 -- -- (!) 107 -- -- --   02/19/22 1959 110/64 98.9 °F (37.2 °C) (!) 103 15 91 % --   02/19/22 1901 -- -- -- -- -- 68.6 kg (151 lb 3.2 oz)   02/19/22 1800 -- -- (!) 103 -- -- --   02/19/22 1718 (!) 104/56 97.3 °F (36.3 °C) 97 18 94 % --   02/19/22 1400 -- -- (!) 106 -- -- --   02/19/22 1221 116/63 99.2 °F (37.3 °C) 91 16 92 % --       REVIEW OF SYSTEMS:    Constitutional: negative fever, negative chills, negative weight loss  Eyes:   negative visual changes  ENT:   negative sore throat, tongue or lip swelling  Respiratory:  negative cough, negative dyspnea  Cards:  negative for chest pain, palpitations, lower extremity edema  GI:   negative for nausea, vomiting, diarrhea, and abdominal pain  Neuro:  negative for headaches, dizziness, vertigo  []                        Full ROS o/w normal/non contributor    Constitutional:  Patient looks  []        Sick  []        Frail  [x]        Better                                                 [] Depressed    HEENT:  [x]   NC                         []   AT               []    ALOPECIA           Eyes: [x]   Normal               []    Icteric  Oropharynx: []    Normal                  []  Thrush               []   Dry  Mucositis: []    None                 Grade: []        I  []        II  []        III  []        IV  Neck:   [x]   Supple                  []  Rigid               JVD:    []   ABSENT       []   PRESENT  Lymphadenopathy:   []   None Noted            []   PRESENT    Chest:  aerting well    CV:             []   Regular              []  Irregular               []   Tachy                []   Murmur  Abdominal:   [x]    Soft              []   NON-tender               []   Tender      BS:    []   ABSENT                   []   PRESENT  Liver:     []  NON-palp                  []   EDGE- palp  Spleen: []   NON-palp                   []  EDGE - palp  Mass:   [x]   ABSENT                          []  PRESENT  Extr:    [x]  Lymphedema             []   Cyanosis      []  Clubbing  Edema:     []   NONE       [x]   PRESENT  Skin:  Intact []           Purpura []        Rash: []   ABSENT       []  PRESENT  Neuro:  [x]        Normal  []        Confused      Available labs reviewed:  Labs:    Recent Results (from the past 24 hour(s))   RENAL FUNCTION PANEL    Collection Time: 02/20/22  3:54 AM   Result Value Ref Range    Sodium 137 136 - 145 mmol/L    Potassium 4.9 3.5 - 5.1 mmol/L    Chloride 108 97 - 108 mmol/L    CO2 26 21 - 32 mmol/L    Anion gap 3 (L) 5 - 15 mmol/L    Glucose 104 (H) 65 - 100 mg/dL    BUN 25 (H) 6 - 20 MG/DL    Creatinine 1.51 (H) 0.55 - 1.02 MG/DL    BUN/Creatinine ratio 17 12 - 20      GFR est AA 41 (L) >60 ml/min/1.73m2    GFR est non-AA 34 (L) >60 ml/min/1.73m2    Calcium 8.6 8.5 - 10.1 MG/DL    Phosphorus 3.9 2.6 - 4.7 MG/DL    Albumin 2.6 (L) 3.5 - 5.0 g/dL   CBC W/O DIFF    Collection Time: 02/20/22  3:54 AM   Result Value Ref Range    WBC 56.3 (HH) 3.6 - 11.0 K/uL    RBC 3.19 (L) 3.80 - 5.20 M/uL    HGB 7.3 (L) 11.5 - 16.0 g/dL    HCT 25.4 (L) 35.0 - 47.0 %    MCV 79.6 (L) 80.0 - 99.0 FL    MCH 22.9 (L) 26.0 - 34.0 PG    MCHC 28.7 (L) 30.0 - 36.5 g/dL    RDW 17.2 (H) 11.5 - 14.5 %    PLATELET 267 (H) 269 - 400 K/uL    MPV 12.0 8.9 - 12.9 FL    NRBC 5.1 (H) 0  WBC    ABSOLUTE NRBC 2.86 (H) 0.00 - 0.01 K/uL       Available Xrays reviewed:    Chemotherapy monitored and toxicities assessed:    Assessment and Plan   1. Myelofibrosis. Marielle Nair pt had been on hydrea for several years until oct/21 when it was stopped for worsening anemia. Marielle Nair Her wbc was 85k upon admission here and I resumed hydrea at 500mg/d . Marielle Nair she is tolerating it well , hgb lower at 7.3 today, wbc lower at 56k today  2. CHF. Marielle Nair on lasix bid  3. Pleural effusion . . s/p thoracentesis on Thursday      Moo Figueroa MD

## 2022-02-20 NOTE — PROGRESS NOTES
Problem: Falls - Risk of  Goal: *Absence of Falls  Description: Document Rush Salgado Fall Risk and appropriate interventions in the flowsheet. 2/20/2022 1110 by Pastor Conroy RN  Outcome: Progressing Towards Goal  Note: Fall Risk Interventions:            Medication Interventions: Teach patient to arise slowly                2/20/2022 1109 by Pastor Conroy RN  Note: Fall Risk Interventions:            Medication Interventions: Teach patient to arise slowly                   Problem: Patient Education: Go to Patient Education Activity  Goal: Patient/Family Education  Outcome: Progressing Towards Goal     Problem: Pressure Injury - Risk of  Goal: *Prevention of pressure injury  Description: Document Thierry Scale and appropriate interventions in the flowsheet. Outcome: Progressing Towards Goal  Note: Pressure Injury Interventions:             Activity Interventions: Increase time out of bed    Mobility Interventions: Float heels,HOB 30 degrees or less,Pressure redistribution bed/mattress (bed type)    Nutrition Interventions: Document food/fluid/supplement intake                     Problem: Patient Education: Go to Patient Education Activity  Goal: Patient/Family Education  Outcome: Progressing Towards Goal     Problem: Discharge Planning  Goal: *Discharge to safe environment  Outcome: Progressing Towards Goal  Goal: *Knowledge of medication management  Outcome: Progressing Towards Goal  Goal: *Knowledge of discharge instructions  Outcome: Progressing Towards Goal     Problem: Patient Education: Go to Patient Education Activity  Goal: Patient/Family Education  Outcome: Progressing Towards Goal

## 2022-02-20 NOTE — PROGRESS NOTES
[facility] Adult  Hospitalist Group                                                                                          Hospitalist Progress Note  Belén Kulkarni MD  Answering service: [phone] from in house phone        Date of Service:  2022  NAME:  Rhina Joshi  :  1945  MRN:  [id number]      Admission Summary:   77-year-old female with a past medical history of hypertension, chronic diastolic CHF, recent admission with discharge on 2022 with discharge diagnosis of probable community-acquired pneumonia. Interval history / Subjective:   Patient seen and examined. She was resting in bed, denied any chest pain, shortness of breath. Daughter at bedside-I have answered questions     Assessment & Plan:      #Right pleural effusion: Resolved   -s/p thoracentesis-1900 ml fluid drained ,on room air now  Culture negative so far,cytology pending  CXR today    #Shortness of breath -resolved  -multifactorial pleural effusion,anemia,diastolic heart failure,elevated white count. #Acute on chronic diastolic heart failure:  on oral lasix 20 mg today    # CKD 3 -renal function stable       # HTN:  - BP wnl, today. Hold losartan    # Myeloproliferative disorder/myelodysplastic syndrome:  #Chronic anemia,thrombocytosis  -hem/onc following-on hydroxyurea      Code status: full  Prophylaxis: heparin  Care Plan discussed with: patient,family  Anticipated Disposition: per facility,they cannot accept her on week end     Hospital Problems  Never Reviewed          Codes Class Noted POA    Pleural effusion on right ICD-10-CM: J90  ICD-9-CM: 511.9  2022 Unknown        SOB (shortness of breath) ICD-10-CM: R06.02  ICD-9-CM: 786.05  2022 Unknown                Review of Systems:   As per HPI      Vital Signs:    Last 24hrs VS reviewed since prior progress note. Most recent are:  Visit Vitals  BP (!) 112/50 (BP 1 Location: Right upper arm, BP Patient Position:  At rest)   Pulse 96   Temp 98.7 °F (37.1 °C)   Resp 16   Wt 68.6 kg (151 lb 3.2 oz)   SpO2 95%   BMI 25.16 kg/m²         Intake/Output Summary (Last 24 hours) at 2/20/2022 1114  Last data filed at 2/20/2022 8634  Gross per 24 hour   Intake --   Output 1450 ml   Net -1450 ml        Physical Examination:     I had a face to face encounter with this patient and independently examined them on 2/20/2022 as outlined below:          Constitutional:  No acute distress, cooperative, pleasant    ENT:  Oral mucosa moist,EOMI,anicteric sclera   Resp:  decreased breath sounds at bases,no accessory muscle use. CV:  Regular rhythm, normal rate, S1,S2 wnl    GI:  Soft, non distended, non tender, normoactive bowel sounds. Musculoskeletal:  No LE edema,chronic skin chnages    Neurologic:  Moves all extremities. AAOx3            Data Review:    Review and/or order of clinical lab test  Review and/or order of tests in the medicine section of [facility]      Labs:     Recent Labs     02/20/22 0354 02/18/22 0621   WBC 56.3* 64.4*   HGB 7.3* 7.6*   HCT 25.4* 26.7*   * 596*     Recent Labs     02/20/22 0354 02/19/22 0135 02/18/22  0621    137 137   K 4.9 4.8 4.9    108 108   CO2 26 25 24   BUN 25* 27* 23*   CREA 1.51* 1.45* 1.62*   * 108* 100   CA 8.6 9.0 8.8   PHOS 3.9 3.7  --      Recent Labs     02/20/22 0354 02/19/22 0135 02/18/22  0621   ALT  --   --  17   AP  --   --  111   TBILI  --   --  0.3   TP  --   --  6.4   ALB 2.6* 2.8* 2.7*   GLOB  --   --  3.7     No results for input(s): INR, PTP, APTT, INREXT, INREXT in the last 72 hours. No results for input(s): FE, TIBC, PSAT, FERR in the last 72 hours. Lab Results   Component Value Date/Time    Folate 32.7 (H) 02/17/2022 10:18 AM      No results for input(s): PH, PCO2, PO2 in the last 72 hours. No results for input(s): CPK, CKNDX, TROIQ in the last 72 hours.     No lab exists for component: CPKMB  No results found for: CHOL, CHOLX, CHLST, CHOLV, HDL, HDLP, LDL, LDLC, DLDLP, TGLX, TRIGL, TRIGP, CHHD, CHHDX  No results found for: GLUCPOC  No results found for: COLOR, APPRN, SPGRU, REFSG, DOMINGO, PROTU, GLUCU, KETU, BILU, UROU, SANDY, LEUKU, GLUKE, EPSU, BACTU, WBCU, RBCU, CASTS, UCRY      Medications Reviewed:     Current Facility-Administered Medications   Medication Dose Route Frequency    furosemide (LASIX) tablet 20 mg  20 mg Oral DAILY    hydroxyurea (HYDREA) chemo cap 500 mg  500 mg Oral DAILY    pantoprazole (PROTONIX) tablet 40 mg  40 mg Oral ACB    [START ON 2/22/2022] ergocalciferol capsule 50,000 Units  50,000 Units Oral Q7D    ARIPiprazole (ABILIFY) tablet 30 mg  30 mg Oral DAILY    aspirin chewable tablet 81 mg  81 mg Oral DAILY    multivitamin, tx-iron-ca-min (THERA-M w/ IRON) tablet 1 Tablet  1 Tablet Oral DAILY    docusate sodium (COLACE) capsule 100 mg  100 mg Oral BID    ferrous sulfate tablet 325 mg  1 Tablet Oral ACB    [Held by provider] losartan (COZAAR) tablet 25 mg  25 mg Oral DAILY    magnesium oxide (MAG-OX) tablet 400 mg  400 mg Oral DAILY    cholecalciferol (VITAMIN D3) (1000 Units /25 mcg) tablet 2,000 Units  2,000 Units Oral DAILY    ipratropium (ATROVENT) 21 mcg (0.03 %) nasal spray 2 Spray  2 Spray Both Nostrils PRN    sodium chloride (NS) flush 5-40 mL  5-40 mL IntraVENous Q8H    sodium chloride (NS) flush 5-40 mL  5-40 mL IntraVENous PRN    acetaminophen (TYLENOL) tablet 650 mg  650 mg Oral Q6H PRN    Or    acetaminophen (TYLENOL) suppository 650 mg  650 mg Rectal Q6H PRN    polyethylene glycol (MIRALAX) packet 17 g  17 g Oral DAILY PRN    promethazine (PHENERGAN) tablet 12.5 mg  12.5 mg Oral Q6H PRN    Or    ondansetron (ZOFRAN) injection 4 mg  4 mg IntraVENous Q6H PRN    enoxaparin (LOVENOX) injection 40 mg  40 mg SubCUTAneous DAILY     ______________________________________________________________________  EXPECTED LENGTH OF STAY: - - -  ACTUAL LENGTH OF STAY:          2                 Ez Wilkerson MD

## 2022-02-21 VITALS
RESPIRATION RATE: 16 BRPM | WEIGHT: 134.4 LBS | SYSTOLIC BLOOD PRESSURE: 115 MMHG | BODY MASS INDEX: 22.37 KG/M2 | OXYGEN SATURATION: 94 % | DIASTOLIC BLOOD PRESSURE: 65 MMHG | HEART RATE: 100 BPM | TEMPERATURE: 98.5 F

## 2022-02-21 LAB
ALBUMIN SERPL-MCNC: 2.5 G/DL (ref 3.5–5)
ANION GAP SERPL CALC-SCNC: 4 MMOL/L (ref 5–15)
BNP SERPL-MCNC: 1393 PG/ML
BUN SERPL-MCNC: 22 MG/DL (ref 6–20)
BUN/CREAT SERPL: 15 (ref 12–20)
CALCIUM SERPL-MCNC: 8.5 MG/DL (ref 8.5–10.1)
CHLORIDE SERPL-SCNC: 108 MMOL/L (ref 97–108)
CO2 SERPL-SCNC: 24 MMOL/L (ref 21–32)
CREAT SERPL-MCNC: 1.47 MG/DL (ref 0.55–1.02)
ERYTHROCYTE [DISTWIDTH] IN BLOOD BY AUTOMATED COUNT: 17.2 % (ref 11.5–14.5)
GLUCOSE SERPL-MCNC: 98 MG/DL (ref 65–100)
HCT VFR BLD AUTO: 26.2 % (ref 35–47)
HGB BLD-MCNC: 7.4 G/DL (ref 11.5–16)
MCH RBC QN AUTO: 22.8 PG (ref 26–34)
MCHC RBC AUTO-ENTMCNC: 28.2 G/DL (ref 30–36.5)
MCV RBC AUTO: 80.6 FL (ref 80–99)
NRBC # BLD: 3.2 K/UL (ref 0–0.01)
NRBC BLD-RTO: 5.7 PER 100 WBC
PHOSPHATE SERPL-MCNC: 3.5 MG/DL (ref 2.6–4.7)
PLATELET # BLD AUTO: 419 K/UL (ref 150–400)
PMV BLD AUTO: 11.8 FL (ref 8.9–12.9)
POTASSIUM SERPL-SCNC: 4.9 MMOL/L (ref 3.5–5.1)
RBC # BLD AUTO: 3.25 M/UL (ref 3.8–5.2)
SODIUM SERPL-SCNC: 136 MMOL/L (ref 136–145)
WBC # BLD AUTO: 56.3 K/UL (ref 3.6–11)

## 2022-02-21 PROCEDURE — 74011250636 HC RX REV CODE- 250/636: Performed by: INTERNAL MEDICINE

## 2022-02-21 PROCEDURE — 85027 COMPLETE CBC AUTOMATED: CPT

## 2022-02-21 PROCEDURE — 74011250636 HC RX REV CODE- 250/636: Performed by: HOSPITALIST

## 2022-02-21 PROCEDURE — 74011250637 HC RX REV CODE- 250/637: Performed by: INTERNAL MEDICINE

## 2022-02-21 PROCEDURE — 80069 RENAL FUNCTION PANEL: CPT

## 2022-02-21 PROCEDURE — 83880 ASSAY OF NATRIURETIC PEPTIDE: CPT

## 2022-02-21 PROCEDURE — 74011000250 HC RX REV CODE- 250: Performed by: HOSPITALIST

## 2022-02-21 PROCEDURE — 36415 COLL VENOUS BLD VENIPUNCTURE: CPT

## 2022-02-21 PROCEDURE — 74011250637 HC RX REV CODE- 250/637: Performed by: HOSPITALIST

## 2022-02-21 RX ADMIN — Medication 2000 UNITS: at 09:41

## 2022-02-21 RX ADMIN — HYDROXYUREA 500 MG: 500 CAPSULE ORAL at 09:50

## 2022-02-21 RX ADMIN — DOCUSATE SODIUM 100 MG: 100 CAPSULE, LIQUID FILLED ORAL at 09:41

## 2022-02-21 RX ADMIN — Medication 10 ML: at 06:21

## 2022-02-21 RX ADMIN — FUROSEMIDE 20 MG: 40 TABLET ORAL at 09:41

## 2022-02-21 RX ADMIN — ENOXAPARIN SODIUM 40 MG: 100 INJECTION SUBCUTANEOUS at 09:51

## 2022-02-21 RX ADMIN — MULTIPLE VITAMINS W/ MINERALS TAB 1 TABLET: TAB at 09:41

## 2022-02-21 RX ADMIN — ARIPIPRAZOLE 30 MG: 30 TABLET ORAL at 09:41

## 2022-02-21 RX ADMIN — PANTOPRAZOLE SODIUM 40 MG: 40 TABLET, DELAYED RELEASE ORAL at 06:23

## 2022-02-21 RX ADMIN — FERROUS SULFATE TAB 325 MG (65 MG ELEMENTAL FE) 325 MG: 325 (65 FE) TAB at 06:23

## 2022-02-21 RX ADMIN — ASPIRIN 81 MG CHEWABLE TABLET 81 MG: 81 TABLET CHEWABLE at 09:41

## 2022-02-21 RX ADMIN — Medication 400 MG: at 09:42

## 2022-02-21 NOTE — PROGRESS NOTES
I have reviewed discharge instructions and medication with the patient and daughter. The patient and daughter verbalized understanding. Pt sent with d/c instructions, mar, and kardex for [address] [employer]. Daughter is transporting pt to residence.

## 2022-02-21 NOTE — PROGRESS NOTES
Hematology-Oncology Progress Note    Juarez Urena  1945  [id number]  2/21/2022    Follow-up for: myelofibrosis     [x]        Chart notes since last visit reviewed   [x]        Medications reviewed for allergies and interactions       Case discussed with the following:         []                            []        Nursing Staff                                                                         []        Pathologist                                                                        [x]        FAMILY      Subjective:     Spoke with patient who complains of: pt is comfortable, breathing is better no new/co,, wants to go home    Objective:     Patient Vitals for the past 24 hrs:   BP Temp Pulse Resp SpO2 Weight   02/21/22 0901 115/65 -- 100 16 94 % --   02/21/22 0620 -- -- -- -- -- 61 kg (134 lb 6.4 oz)   02/21/22 0027 (!) 103/57 98.9 °F (37.2 °C) 94 16 94 % --   02/20/22 1951 (!) 104/56 97.5 °F (36.4 °C) (!) 101 16 93 % --   02/20/22 1546 (!) 101/54 98.4 °F (36.9 °C) (!) 102 18 96 % --   02/20/22 1347 -- -- -- -- -- 63.4 kg (139 lb 12.4 oz)       REVIEW OF SYSTEMS:    Constitutional: negative fever, negative chills, negative weight loss  Eyes:   negative visual changes  ENT:   negative sore throat, tongue or lip swelling  Respiratory:  negative cough, negative dyspnea  Cards:  negative for chest pain, palpitations, lower extremity edema  GI:   negative for nausea, vomiting, diarrhea, and abdominal pain  Neuro:  negative for headaches, dizziness, vertigo  []                        Full ROS o/w normal/non contributor    Constitutional:  Patient looks  []        Sick  []        Frail  [x]        Better                                                 []        Depressed    HEENT:  [x]   NC                         []   AT               []    ALOPECIA           Eyes: [x]   Normal               []    Icteric  Oropharynx: []    Normal                  []  Thrush               []   Dry  Mucositis: []    None                 Grade: []        I  []        II  []        III  []        IV  Neck:   [x]   Supple                  []  Rigid               JVD:    []   ABSENT       []   PRESENT  Lymphadenopathy:   []   None Noted            []   PRESENT    Chest:  aerting well    CV:             []   Regular              []  Irregular               []   Tachy                []   Murmur  Abdominal:   [x]    Soft              []   NON-tender               []   Tender      BS:    []   ABSENT                   []   PRESENT  Liver:     []  NON-palp                  []   EDGE- palp  Spleen: []   NON-palp                   []  EDGE - palp  Mass:   [x]   ABSENT                          []  PRESENT  Extr:    [x]  Lymphedema             []   Cyanosis      []  Clubbing  Edema:     []   NONE       [x]   PRESENT  Skin:  Intact []           Purpura []        Rash: []   ABSENT       []  PRESENT  Neuro:  [x]        Normal  []        Confused      Available labs reviewed:  Labs:    Recent Results (from the past 24 hour(s))   RENAL FUNCTION PANEL    Collection Time: 02/21/22 12:37 AM   Result Value Ref Range    Sodium 136 136 - 145 mmol/L    Potassium 4.9 3.5 - 5.1 mmol/L    Chloride 108 97 - 108 mmol/L    CO2 24 21 - 32 mmol/L    Anion gap 4 (L) 5 - 15 mmol/L    Glucose 98 65 - 100 mg/dL    BUN 22 (H) 6 - 20 MG/DL    Creatinine 1.47 (H) 0.55 - 1.02 MG/DL    BUN/Creatinine ratio 15 12 - 20      GFR est AA 42 (L) >60 ml/min/1.73m2    GFR est non-AA 35 (L) >60 ml/min/1.73m2    Calcium 8.5 8.5 - 10.1 MG/DL    Phosphorus 3.5 2.6 - 4.7 MG/DL    Albumin 2.5 (L) 3.5 - 5.0 g/dL   CBC W/O DIFF    Collection Time: 02/21/22 12:37 AM   Result Value Ref Range    WBC 56.3 (HH) 3.6 - 11.0 K/uL    RBC 3.25 (L) 3.80 - 5.20 M/uL    HGB 7.4 (L) 11.5 - 16.0 g/dL    HCT 26.2 (L) 35.0 - 47.0 %    MCV 80.6 80.0 - 99.0 FL    MCH 22.8 (L) 26.0 - 34.0 PG    MCHC 28.2 (L) 30.0 - 36.5 g/dL    RDW 17.2 (H) 11.5 - 14.5 %    PLATELET 276 (H) 056 - 400 K/uL    MPV 11.8 8.9 - 12.9 FL    NRBC 5.7 (H) 0  WBC    ABSOLUTE NRBC 3.20 (H) 0.00 - 0.01 K/uL   NT-PRO BNP    Collection Time: 02/21/22 12:37 AM   Result Value Ref Range    NT pro-BNP 1,393 (H) <450 PG/ML       Available Xrays reviewed:    Chemotherapy monitored and toxicities assessed:    Assessment and Plan   1. Myelofibrosis. Vearl Pippins pt had been on hydrea for several years until oct/21 when it was stopped for worsening anemia. Vearl Pippins Her wbc was 85k upon admission here and I resumed hydrea at 500mg/d . Vearl Pippins she is tolerating it well , hgb stable at 7.4 today, wbc stable at 56k today  2. CHF. Vearl Pippins on lasix bid  3. Pleural effusion . . s/p thoracentesis on Thursday  4. D/C plans. . I spoke with Dr. Jeimy Mcintosh and he will see the pt on Wednesday, pt does not need script for hydrea. . he will decide whether to continue this .     Mary Alice Ogden MD

## 2022-02-21 NOTE — PROGRESS NOTES
[facility] [place] to give report about pt coming back to the residence talked with Anonymous You . No report needed just to tell them patient was on her way and to send any paper work that had not been faxed.

## 2022-02-21 NOTE — PROGRESS NOTES
T.O.C Plan  ·  DISPOSITION: The disposition plan is to transition to residence at the [address] [place] [address] [phone] - patient accepted. · [facility] health [phone] patient [facility]. · F/U with PCP/Specialist    · Transport: Family - Daughter      Pt daughter is in room as transport to [address] [place] [address]. CM spoke to family, advised pt is cleared from a CM standpoint, awaiting final d/c paperwork. CM spoke with bedside RN, reviewed paperwork to send with pt; if needed CM can fax to [address] [place] [address] facility. Pt stated she need hard copy scripts to take to the [place] pharmacy.       Filemon Leiva, RN

## 2022-02-22 ENCOUNTER — PATIENT OUTREACH (OUTPATIENT)
Dept: CASE MANAGEMENT | Age: 77
End: 2022-02-22

## 2022-02-22 LAB
BACTERIA SPEC CULT: NORMAL
GRAM STN SPEC: NORMAL
GRAM STN SPEC: NORMAL
SERVICE CMNT-IMP: NORMAL

## 2022-02-23 ENCOUNTER — TELEPHONE (OUTPATIENT)
Dept: CARDIOLOGY CLINIC | Age: 77
End: 2022-02-23

## 2022-02-23 NOTE — PROGRESS NOTES
Care Transitions Initial Call    Call within 2 business days of discharge: Yes     Patient: Corie Cantu Patient : 1945 MRN: [id number]    Last Discharge [address]       Complaint Diagnosis Description Type Department Provider    22 Shortness of Breath SOB (shortness of breath) . .. ED to Hosp-Admission (Discharged) (ADMIT) [id number] Arabella Marcano MD; Brandie Brown.. Was this an external facility discharge? No     Challenges to be reviewed by the provider   Additional needs identified to be addressed with provider:  no  none       Method of communication with provider:  none, patient's daughter has no red flags to report at this time and patient's PCP, Cisco Snow NP, is a non-[facility] provider.      Component      Latest Ref Rng & Units 2022          12:37 AM  3:54 AM  6:21 AM   WBC      3.6 - 11.0 K/uL 56.3 (HH) 56.3 (HH) 64.4 (HH)   RBC      3.80 - 5.20 M/uL 3.25 (L) 3.19 (L) 3.39 (L)   HGB      11.5 - 16.0 g/dL 7.4 (L) 7.3 (L) 7.6 (L)   HCT      35.0 - 47.0 % 26.2 (L) 25.4 (L) 26.7 (L)     Component      Latest Ref Rng & Units 2022          12:37 AM  3:54 AM  6:21 AM   MCH      26.0 - 34.0 PG 22.8 (L) 22.9 (L) 22.4 (L)   MCHC      30.0 - 36.5 g/dL 28.2 (L) 28.7 (L) 28.5 (L)   RDW      11.5 - 14.5 % 17.2 (H) 17.2 (H) 17.4 (H)   PLATELET      138 - 306 K/uL 419 (H) 471 (H) 596 (H)     Component      Latest Ref Rng & Units 2022          12:37 AM  3:54 AM  6:21 AM   NRBC      0  WBC 5.7 (H) 5.1 (H) 4.8 (H)   ABSOLUTE NRBC      0.00 - 0.01 K/uL 3.20 (H) 2.86 (H) 3.10 (H)     Component      Latest Ref Rng & Units 2022          12:37 AM  6:21 AM 10:16 AM  4:45 AM   NT pro-BNP      <450 PG/ML 1,393 (H) 2,466 (H)  3,631 (H)     Component      Latest Ref Rng & Units 2022           5:38 PM   NT pro-BNP      <450 PG/ML 3,148 (H)     Component      Latest Ref Rng & Units 2/21/2022 2/20/2022 2/19/2022 2/18/2022          12:37 AM  3:54 AM  1:35 AM  6:21 AM   Anion gap      5 - 15 mmol/L 4 (L) 3 (L) 4 (L) 5     Component      Latest Ref Rng & Units 2/17/2022           4:45 AM   Anion gap      5 - 15 mmol/L 6     Component      Latest Ref Rng & Units 2/21/2022 2/20/2022 2/19/2022 2/18/2022          12:37 AM  3:54 AM  1:35 AM  6:21 AM   BUN      6 - 20 MG/DL 22 (H) 25 (H) 27 (H) 23 (H)   Creatinine      0.55 - 1.02 MG/DL 1.47 (H) 1.51 (H) 1.45 (H) 1.62 (H)   BUN/Creatinine ratio      12 - 20   15 17 19 14   GFR est AA      >60 ml/min/1.73m2 42 (L) 41 (L) 43 (L) 37 (L)   GFR est non-AA      >60 ml/min/1.73m2 35 (L) 34 (L) 35 (L) 31 (L)     Component      Latest Ref Rng & Units 2/17/2022           4:45 AM   BUN      6 - 20 MG/DL 21 (H)   Creatinine      0.55 - 1.02 MG/DL 1.33 (H)   BUN/Creatinine ratio      12 - 20   16   GFR est AA      >60 ml/min/1.73m2 47 (L)   GFR est non-AA      >60 ml/min/1.73m2 39 (L)     Component      Latest Ref Rng & Units 2/21/2022 2/20/2022 2/19/2022 2/18/2022          12:37 AM  3:54 AM  1:35 AM  6:21 AM   Albumin      3.5 - 5.0 g/dL 2.5 (L) 2.6 (L) 2.8 (L) 2.7 (L)     Component      Latest Ref Rng & Units 2/17/2022           4:45 AM   Albumin      3.5 - 5.0 g/dL 2.6 (L)     Component      Latest Ref Rng & Units 2/20/2022           3:54 AM   LD      81 - 246 U/L 1,313 (H)     Component      Latest Ref Rng & Units 2/17/2022 2/17/2022 2/17/2022 2/17/2022          10:18 AM 10:18 AM 10:18 AM  4:45 AM   Iron      35 - 150 ug/dL 13 (L)      TIBC      250 - 450 ug/dL 206 (L)      Iron % saturation      20 - 50 % 6 (L)      Folate      5.0 - 21.0 ng/mL  32.7 (H)  40.6 (H)   Vitamin B12      193 - 986 pg/mL   1,281 (H)      Component      Latest Ref Rng & Units 2/18/2022           9:40 AM   FLUID RBC CT.      0 /cu mm >100 (H)     Component      Latest Ref Rng & Units 2/18/2022           9:40 AM   FLD NEUTROPHILS      NRRE % 42 (A)   FLD LYMPHS      NRRE % 14 (A)   FLD MONO/MACROPHAGES      NRRE % 29 (A)   FLD EOSINS      NRRE % 11 (A)   FLD OTHER CELLS      0 % 4 (H)     Discussed COVID-19 related testing which was available at this time. Test results were negative result on 22 and indeterminate result on 22. Patient informed of results, if available? N/A, telephonic assessment completed with patient's daughter, Juan Carpenter (on [address] dated 2022). Advance Care Planning:   Does patient have an Advance Directive:  currently not on file; education provided to patient's daughter. Inpatient Readmission Risk score: Unplanned Readmit Risk Score: 17.8 ( )    Was this a readmission? yes   Patient stated reason for the admission: N/A, telephonic assessment completed with patient's daughter. Patients top risk factors for readmission: Medical condition - Recent pleural effusion on right, recent shortness of breath, and recent acute respiratory failure per chart. Interventions to address risk factors: Obtained and reviewed discharge summary and/or continuity of care documents and Education of patient/family/caregiver/guardian to support self-management-Education provided regarding signs/symptoms of pleural effusion, patient's daughter verbalized an understanding. Care Transition Nurse (CTN) contacted the patient's daughter, Juan Carpenter (on [address] dated 2022), by telephone to perform post hospital discharge assessment. Verified name and  with patient's daughter as identifiers. Provided introduction to self, and explanation of the CTN role. CTN reviewed discharge instructions, medical action plan and red flags with patient's daughter who verbalized understanding. Were discharge instructions available to patient? yes. Reviewed appropriate site of care based on symptoms and resources available to patient including: PCP, Specialist, [address] [place], [place] [address] and When to call 911.  The daughter was given an opportunity to ask questions and does not have any further questions or concerns at this time. The daughter agrees to contact the PCP office for questions related to patient's healthcare. Medication reconciliation was performed with patient's daughter, who verbalizes understanding of administration of home medications. Advised obtaining a 90-day supply of all daily and as-needed medications. Referral to Pharm D needed: no     Home Health/Outpatient orders at discharge: PT and [address]: [facility]  Date of initial visit:  Daughter states home health visit is not scheduled at this time. Durable Medical Equipment ordered at discharge: None  [address]: n/a  Durable Medical Equipment received: n/a    Covid Risk Education    Educated patient's daughter  about risk for severe COVID-19 due to risk factors according to CDC guidelines. CTN reviewed discharge instructions, medical action plan and red flag symptoms with the daughter who verbalized understanding. Discussed COVID vaccination status: yes. Education provided on COVID-19 vaccination as appropriate. Discussed exposure protocols and quarantine with CDC Guidelines. Daughter was given an opportunity to verbalize any questions and concerns and agrees to contact CTN or health care provider for questions related to patient's healthcare. Was patient discharged with a pulse oximeter? no.    Discussed follow-up appointments. If no appointment was previously scheduled, appointment scheduling offered: N/A. Is follow up appointment scheduled within 7 days of discharge? Yes, per patient's daughter, patient is scheduled to see Gemma Steiner/PCP on 2-23-22 for MONIQUE HAMMOND appointment. [facility] follow up appointment(s):   Future Appointments   Date Time Provider Sara Wei   3/4/2022 11:00 AM Galindo Richter MD [facility]     Non-[facility] follow up appointment(s): [facility] appointment with Usha Gonzalez NP/PCP on 2-23-22 per patient's daughter.  Daughter states patient has heme/oncology follow-up appointment scheduled with Dr. Mary Sands on 2-23-22. Plan for follow-up call in 10-14 days based on severity of symptoms and risk factors. Plan for next call: symptom management-Review red flags of pleural effusion, and follow up appointment-Evaluate if patient is attending follow-up appointments as scheduled, offer assistance with scheduling as needed. CTN provided contact information for future needs. Goals Addressed                 This Visit's Progress     Understands red flags post discharge. 2-23-22: Red flags of pleural effusion and shortness of breath reviewed with patient's daughter, Nicole Stover (on [address] [place] [address] dated 2-), and daughter verbalized an understanding. Daughter states patient has not had any falls in the last 12 months, continues to utilize a regular diet at home, appetite is good. Daughter has no red flags to report at this time and states, \"She seems to be doing ok. \"  Care Transitions Nurse will review red flags again on next phone conversation with patient/daughter.  Krystal Tan

## 2022-02-23 NOTE — TELEPHONE ENCOUNTER
Call received from patients daughter Radha Schneider. Radha Schneider states that patient was recently seen inpatient by Dr. Yossi Llamas. and was told to make a follow up appointment in office.      Appointment is scheduled on Friday March 4th at 11am w/Dr. Yossi Llamas

## 2022-02-24 NOTE — ACP (ADVANCE CARE PLANNING)
Patient's daughter, Danny Ledesma (on [facility] dated 2-), states patient does not have an ACP document, education provided to patient's daughter.

## 2022-02-25 NOTE — DISCHARGE INSTRUCTIONS
Discharge Instructions       PATIENT ID: Jose Ayala  MRN: [id number]   YOB: 1945    DATE OF ADMISSION: 2/16/2022  2:53 PM    DATE OF DISCHARGE: 2/21/2022    PRIMARY CARE PROVIDER: Maxi Layton NP     ATTENDING PHYSICIAN: Javier Linn MD  DISCHARGING PROVIDER: Sammie Duncan MD    To contact this individual call [phone] and ask the  to page. If unavailable ask to be transferred the Adult Hospitalist Department. DISCHARGE DIAGNOSES -Right  Pleural effusion,Myeloproliferative disorder,diastolic heart failure  CONSULTATIONS: IP CONSULT TO HOSPITALIST  IP CONSULT TO CARDIOLOGY  IP CONSULT TO HEMATOLOGY    PROCEDURES/SURGERIES: * No surgery found *    PENDING TEST RESULTS:   At the time of discharge the following test results are still pending: Cytology of pleural fluid    FOLLOW UP APPOINTMENTS:   Follow-up Information     Follow up With Specialties Details Why Contact Info    [facility]  HHPT/OT [address]  [phone]    Maxi Layton NP Nurse Practitioner On 2/23/2022 Your PCP will see you at the assisted living facility on Wednesday, 2/23/22. The time will be confirmed with you on that morning. [address] [place] [address] [address]      [place], [address] [facility] Vascular Surgery, Cardiology In 1 week  [address] [facility]  [address]      Niyah Parker MD Hematology and Oncology, Hematology, Oncology   [facility] [address]  [address] [address]  [phone]             ADDITIONAL CARE RECOMMENDATIONS:  -FOllow up with PCP,cardiologist and oncologist  -CBC,BMP in 5 days      DIET: Regular Diet      ACTIVITY: Activity as tolerated    WOUND CARE: na    EQUIPMENT needed: na      Radiology      DISCHARGE MEDICATIONS:   See Medication Reconciliation Form    · It is important that you take the medication exactly as they are prescribed.    · Keep your medication in the bottles provided by the pharmacist and keep a list of the medication names, dosages, and times to be taken in your wallet. · Do not take other medications without consulting your doctor. NOTIFY YOUR PHYSICIAN FOR ANY OF THE FOLLOWING:   Fever over 101 degrees for 24 hours. Chest pain, shortness of breath, fever, chills, nausea, vomiting, diarrhea, change in mentation, falling, weakness, bleeding. Severe pain or pain not relieved by medications. Or, any other signs or symptoms that you may have questions about.       DISPOSITION:   x Home With:   OT  PT  [place]  RN       SNF/Inpatient Rehab/LTAC    Independent/assisted living    Hospice    Other:         Signed:   Neil Gibson MD  2/21/2022  9:15 AM

## 2022-02-25 NOTE — DISCHARGE SUMMARY
Discharge Summary       PATIENT ID: Ratna Miranda  MRN: [id number]   YOB: 1945    DATE OF ADMISSION: 2/16/2022  2:53 PM    DATE OF DISCHARGE: 2/21/2022   PRIMARY CARE PROVIDER: Josefa Swain NP     ATTENDING PHYSICIAN: Louis Tolbert MD    DISCHARGING PROVIDER: Louis Tolbert MD    To contact this individual call [phone] and ask the  to page. If unavailable ask to be transferred the Adult Hospitalist Department. CONSULTATIONS: None    PROCEDURES/SURGERIES: * No surgery found *    DISCHARGE DIAGNOSES:   [address] [facility],[address] COURSE:     DISCHARGE DIAGNOSES / PLAN:    66-year-old female with a past medical history of hypertension, chronic diastolic CHF, recent admission with discharge on 01/30/2022 with discharge diagnosis of probable community-acquired pneumonia. #Right pleural effusion: Resolved   -s/p thoracentesis-1900 ml fluid drained 2/18,on room air now  Culture negative so far,cytology Few reactive-appearing mesothelial cells with background peripheral  blood elements     #Shortness of breath -resolved  -multifactorial pleural effusion,anemia,diastolic heart failure,elevated white count.     #Acute on chronic diastolic heart failure: NYHA class 4 at admission and 3 at discharge  on oral lasix 20 mg today     # CKD 3 -renal function stable        # HTN:  - BP wnl, today. Hold losartan     # Myeloproliferative disorder/myelodysplastic syndrome:  #Chronic anemia,thrombocytosis  -hem/onc following-received  hydroxyurea. Discussed with ,OP follow up with Wednesday with  for further treatment,discussed with daughter        XR CHEST PA LAT    Result Date: 1/28/2022  INDICATION:   shortness of breath EXAM:  PA and Lateral Chest Radiographs COMPARISON: None FINDINGS: PA and lateral views of the chest demonstrate an enlarged cardiac silhouette. Patient is rotated leftward. The lungs are adequately expanded. There are trace effusions.  There is no focal airspace disease. The osseous structures are unremarkable. Cardiomegaly with trace bilateral pleural effusions. CTA CHEST W OR W WO CONT    Result Date: 1/28/2022  INDICATION:   sob, elevated dimer COMPARISON: None. TECHNIQUE: Precontrast  images were obtained to localize the volume for acquisition. Multislice helical CT arteriography was performed from the diaphragm to the thoracic inlet during uneventful rapid bolus intravenous administration of 80 mL of Isovue-370. Lung and soft tissue windows were generated. Post processing was performed and coronal reformatted images were also generated. 3 D imaging was performed. CT dose reduction was achieved through use of a standardized protocol tailored for this examination and automatic exposure control for dose modulation. FINDINGS: There is cardiomegaly. There is a small pericardial effusion. There is an aberrant right subclavian artery. Main pulmonary artery slightly prominent and measures 3 cm. There is slight bibasilar atelectasis. There is a tiny right effusion. No definite pulmonary emboli identified. There is no mediastinal or hilar adenopathy or mass. The aorta enhances normally without evidence of aneurysm or dissection. There is splenomegaly in the upper abdomen with the spleen measuring at least 15.1 cm. There is slight dextroconvex scoliosis. There is sclerosis of the visualized bony structures and are multiple small lucencies in the thoracic spine. 1. No definite pulmonary emboli identified. 2. There is slight bibasilar atelectasis. There is a tiny right pleural effusion. 3. Splenomegaly. 4. Cardiomegaly. There is an aberrant right subclavian artery 5. There is increased density throughout the visualized bony structures may represent changes of metabolic bone disease.  However there are also several small lucent areas scattered in the thoracic spine which are nonspecific and could represent focal areas of osteoporosis however metastatic bone disease/myeloma is not excluded and correlation with prior studies would be helpful. .     XR CHEST PORT    Result Date: 2/20/2022  Clinical indication: CHF. Portable AP upright view of the chest obtained, comparison February 18. Interstitial prominence and pleural thickening and scarring of the right lung base stable. No change. XR CHEST PORT    Result Date: 2/18/2022  EXAM:  XR CHEST PORT INDICATION:  Post right thoracentesis COMPARISON: 2/16/2022 TECHNIQUE: AP portable upright chest view FINDINGS: Status post right thoracentesis. Curvilinear opacities at the right lung base likely reflect atelectatic lung. No appreciable pneumothorax. Left lung is clear. Heart size is enlarged but stable. No appreciable pneumothorax after right thoracentesis. Right basilar atelectasis. XR CHEST PORT    Result Date: 2/16/2022  INDICATION:  SOB EXAM: Portable chest 1741 hours. Comparison 1/28/2022 FINDINGS: Cardiac silhouette is not enlarged. Pulmonary vasculature is normal. Interval development of a moderate to large right pleural effusion with no pneumothorax. Collapse of the right base     1. Interval development of moderate to large right pleural effusion     US THORACENTESIS CATH W IMAGE    Result Date: 2/18/2022  PROCEDURE: Thoracentesis INDICATION: Pleural effusion OPERATING PHYSICIAN: Americo Elder M.D. ESTIMATED BLOOD LOSS: None SPECIMENS REMOVED: 1900 cc of pleural fluid FLOURO TIME: None COMPLICATIONS: None immediate. Procedure and findings: The risks and benefits of the procedure were discussed with the patient. Written consent was obtained. Preliminary ultrasound imaging of the right chest demonstrated a large pleural effusion. An appropriate site for thoracentesis was marked on the skin. The patient was prepped and draped in sterile fashion. 1% lidocaine was injected locally. A dermatotomy was made. A thoracentesis catheter was then inserted into the pleural space using trocar technique. Approximately 1900 ml of red fluid was obtained. The catheter was then removed. The patient tolerated the procedure well. There were no immediate complications. Successful ultrasound guided right thoracentesis yielding approximately 1900 ml of fluid. ECHO ADULT COMPLETE    Result Date: 1/28/2022    Left Ventricle: Left ventricle size is normal. Normal wall thickness. Normal wall motion. Normal left ventricular systolic function with a visually estimated EF of 55 - 60%. Global longitudinal strain is normal with a value of -17.0%. Diastolic dysfunction present with normal LV EF.   Mitral Valve: Mild transvalvular regurgitation.   Left Atrium: Left atrium is mildly dilated.   Right Atrium: Right atrium is mildly dilated.   Pericardium: Trivial localized pericardial effusion present around the posterior left ventricle. No indication of cardiac tamponade. DUPLEX LOWER EXT VENOUS BILAT    Result Date: 2/17/2022  · No evidence of right or left lower extremity vein thrombosis. NOTIFY YOUR PHYSICIAN FOR ANY OF THE FOLLOWING:   Fever over 101 degrees for 24 hours. Chest pain, shortness of breath, fever, chills, nausea, vomiting, diarrhea, change in mentation, falling, weakness, bleeding. Severe pain or pain not relieved by medications, as well as any other signs or symptoms that you may have questions about. FOLLOW UP APPOINTMENTS:    Follow-up Information     Follow up With Specialties Details Why Contact Info    [facility]  HHPT/OT [address]  [phone]    Angelica Ramon NP Nurse Practitioner On 2/23/2022 Your PCP will see you at the assisted living facility on Wednesday, 2/23/22. The time will be confirmed with you on that morning.  [address] [facility] [address] [place] [address], [address] [place] [address] Vascular Surgery, Cardiology In 1 week  [facility]  [address] [place] [address]  [phone] Lennox Tellez MD Hematology and Oncology, Hematology, Oncology   [phone] [facility] [address]            ADDITIONAL CARE RECOMMENDATIONS:  -FOllow up with PCP,cardiologist and oncologist  -CBC,BMP in 5 days      DIET: Regular Diet      ACTIVITY: Activity as tolerated    WOUND CARE: na    EQUIPMENT needed: na      DISCHARGE MEDICATIONS:  Discharge Medication List as of 2/21/2022  9:58 AM      CONTINUE these medications which have NOT CHANGED    Details   ARIPiprazole (ABILIFY) 30 mg tablet Take 30 mg by mouth daily. , Historical Med      aspirin 81 mg cap Take  by mouth daily. , Historical Med      multivit-min-FA-lycopen-lutein (Centrum Silver) 0.4-300-250 mg-mcg-mcg tab Take  by mouth daily. , Historical Med      docusate sodium (COLACE) 100 mg capsule Take 100 mg by mouth two (2) times a day., Historical Med      ferrous sulfate 325 mg (65 mg iron) tablet Take  by mouth Daily (before breakfast). , Historical Med      furosemide (Lasix) 20 mg tablet Take  by mouth daily. , Historical Med      magnesium oxide (MAG-OX) 400 mg tablet Take 400 mg by mouth daily. , Historical Med      cholecalciferol, vitamin D3, (Vitamin D3) 50 mcg (2,000 unit) tab Take 2,000 Units by mouth daily. , Historical Med      DM/pseudoephed/acetaminoph/cpm (GRACY-SELTZER PLUS COLD/COUGH PO) Take  by mouth as needed., Historical Med      ipratropium (ATROVENT) 21 mcg (0.03 %) nasal spray 2 Sprays by Both Nostrils route as needed for Rhinitis., Historical Med         STOP taking these medications       losartan (COZAAR) 25 mg tablet Comments:   Reason for Stopping:         ergocalciferol (ERGOCALCIFEROL) 1,250 mcg (50,000 unit) capsule Comments:   Reason for Stopping:               DISPOSITION:  x  Home With:   OT  PT  HH  RN       Long term SNF/Inpatient Rehab    Independent/assisted living    Hospice    Other:       PATIENT CONDITION AT DISCHARGE:     Functional status    Poor     Deconditioned    x Independent Cognition    x Lucid     Forgetful     Dementia      Catheters/lines (plus indication)    Liao     PICC     PEG    x None      Code status   x  Full code     DNR      PHYSICAL EXAMINATION AT DISCHARGE:    Constitutional:  No acute distress, cooperative, pleasant    ENT:  Oral mucosa moist,EOMI,anicteric sclera   Resp:  decreased breath sounds at bases,no accessory muscle use. CV:  Regular rhythm, normal rate, S1,S2 wnl    GI:  Soft, non distended, non tender, normoactive bowel sounds. Musculoskeletal:  No LE edema,chronic skin chnages    Neurologic:  Moves all extremities.   AAOx3         CHRONIC MEDICAL DIAGNOSES:  Problem List as of 2/21/2022 Never Reviewed          Codes Class Noted - Resolved    SOB (shortness of breath) ICD-10-CM: R06.02  ICD-9-CM: 786.05  2/16/2022 - Present        Acute respiratory failure ([facility] [address] ICD-10-CM: J96.00  ICD-9-CM: 518.81  1/28/2022 - Present        Pleural effusion on right ICD-10-CM: J90  ICD-9-CM: 511.9  2/18/2022 - Present              45 minutes were spent with the patient on counseling and coordination of care    Signed:   Bradley Acosta MD  2/25/2022  1:06 PM    Cc:Aanbell Steiner NP

## 2022-03-03 ENCOUNTER — TELEPHONE (OUTPATIENT)
Dept: CARDIOLOGY CLINIC | Age: 77
End: 2022-03-03

## 2022-03-03 NOTE — TELEPHONE ENCOUNTER
Telephone Call RE:  Appointment reminder     Outcome:     [x] Patient confirmed appointment   [] Patient rescheduled appointment for    [] Unable to reach  [] Left message             [] Other:     ---------------------             [x] Screened for [facility]

## 2022-03-04 ENCOUNTER — OFFICE VISIT (OUTPATIENT)
Dept: CARDIOLOGY CLINIC | Age: 77
End: 2022-03-04
Payer: MEDICARE

## 2022-03-04 VITALS
WEIGHT: 148.6 LBS | RESPIRATION RATE: 18 BRPM | BODY MASS INDEX: 24.76 KG/M2 | SYSTOLIC BLOOD PRESSURE: 138 MMHG | HEIGHT: 65 IN | TEMPERATURE: 98.2 F | HEART RATE: 96 BPM | DIASTOLIC BLOOD PRESSURE: 54 MMHG

## 2022-03-04 DIAGNOSIS — I50.22 CHRONIC SYSTOLIC HEART FAILURE (HCC): ICD-10-CM

## 2022-03-04 DIAGNOSIS — E55.9 VITAMIN D DEFICIENCY: ICD-10-CM

## 2022-03-04 DIAGNOSIS — R06.02 SHORTNESS OF BREATH: Primary | ICD-10-CM

## 2022-03-04 DIAGNOSIS — E61.1 IRON DEFICIENCY: ICD-10-CM

## 2022-03-04 PROCEDURE — G8420 CALC BMI NORM PARAMETERS: HCPCS | Performed by: INTERNAL MEDICINE

## 2022-03-04 PROCEDURE — 1090F PRES/ABSN URINE INCON ASSESS: CPT | Performed by: INTERNAL MEDICINE

## 2022-03-04 PROCEDURE — 1101F PT FALLS ASSESS-DOCD LE1/YR: CPT | Performed by: INTERNAL MEDICINE

## 2022-03-04 PROCEDURE — 1111F DSCHRG MED/CURRENT MED MERGE: CPT | Performed by: INTERNAL MEDICINE

## 2022-03-04 PROCEDURE — G8400 PT W/DXA NO RESULTS DOC: HCPCS | Performed by: INTERNAL MEDICINE

## 2022-03-04 PROCEDURE — G8510 SCR DEP NEG, NO PLAN REQD: HCPCS | Performed by: INTERNAL MEDICINE

## 2022-03-04 PROCEDURE — G8536 NO DOC ELDER MAL SCRN: HCPCS | Performed by: INTERNAL MEDICINE

## 2022-03-04 PROCEDURE — 99215 OFFICE O/P EST HI 40 MIN: CPT | Performed by: INTERNAL MEDICINE

## 2022-03-04 PROCEDURE — G8427 DOCREV CUR MEDS BY ELIG CLIN: HCPCS | Performed by: INTERNAL MEDICINE

## 2022-03-04 RX ORDER — HYDROXYUREA 500 MG/1
500 CAPSULE ORAL DAILY
COMMUNITY
Start: 2022-02-28 | End: 2022-09-13

## 2022-03-04 RX ORDER — BUMETANIDE 1 MG/1
1 TABLET ORAL 2 TIMES DAILY
Qty: 60 TABLET | Refills: 0 | Status: SHIPPED | OUTPATIENT
Start: 2022-03-04 | End: 2022-03-17 | Stop reason: SDUPTHER

## 2022-03-04 NOTE — PATIENT INSTRUCTIONS
Medication changes:    Start Bumex 1 mg 1 tablet two times a day    Please take this to your pharmacy to notify them of the change in medications. Testing Ordered:    Labs have been drawn today in clinic. We will call you with any abnormal results that require a change in medication regimen. Other Recommendations:      Weight loss goal is 1 pound a day. Which is 6-8 lbs by follow up in 7-10 days    Weigh yourself daily and write it down. Bring you log with you to clinic at your follow up. Call [facility] on Monday or Tuesday with your weights. BP cuff and scale given in clinic. Ensure your drinking an adequate amount of water with a goal of 6-8 eight ounce glasses (1.5-2 liters) of fluid daily. Your urine should be clear and light yellow straw colored. If your blood pressure begins to consistently run below 90/60 and/or you begin to experience dizziness or lightheadedness, please contact the [facility] at [phone]. Follow up 7-10 days w/ NP with [facility]      Please monitor your weights daily upon waking and after using the bathroom. Keep a written records of your weights and bring to your next appointment. If you have a weight gain of 3 or more pounds overnight OR 5 or more pounds in one week please contact our office. Thank you for allowing us the privilege of being a part of your healthcare team! Please do not hesitate to contact our office at [phone] with any questions or concerns. Virtual Heart Failure [address] invites you to learn more about heart failure and to share your questions, ideas, and experiences with others. Each month, the Heart Failure Support Group features a new educational topic and a guest speaker, followed by an interactive discussion. Our Heart Failure Nurse Navigator will moderate each session. You will be able to participate by phone, tablet or computer through [employer].  This support group takes place on the 3rd Thursday of each month from 6:00-7:30PM. All individuals living with heart failure and their caregivers are welcome to join. If you are interested in participating, please contact us at [email or web address] and you will be sent the link to join the [employer].

## 2022-03-05 LAB
25(OH)D3 SERPL-MCNC: 66.6 NG/ML (ref 30–100)
ALBUMIN SERPL-MCNC: 3 G/DL (ref 3.5–5)
ALBUMIN/GLOB SERPL: 1 {RATIO} (ref 1.1–2.2)
ALP SERPL-CCNC: 119 U/L (ref 45–117)
ALT SERPL-CCNC: 17 U/L (ref 12–78)
ANION GAP SERPL CALC-SCNC: 6 MMOL/L (ref 5–15)
AST SERPL-CCNC: 37 U/L (ref 15–37)
BILIRUB SERPL-MCNC: 0.2 MG/DL (ref 0.2–1)
BNP SERPL-MCNC: 3840 PG/ML
BUN SERPL-MCNC: 21 MG/DL (ref 6–20)
BUN/CREAT SERPL: 16 (ref 12–20)
CALCIUM SERPL-MCNC: 8.7 MG/DL (ref 8.5–10.1)
CHLORIDE SERPL-SCNC: 107 MMOL/L (ref 97–108)
CO2 SERPL-SCNC: 24 MMOL/L (ref 21–32)
CREAT SERPL-MCNC: 1.33 MG/DL (ref 0.55–1.02)
ERYTHROCYTE [DISTWIDTH] IN BLOOD BY AUTOMATED COUNT: 19.3 % (ref 11.5–14.5)
FERRITIN SERPL-MCNC: 743 NG/ML (ref 8–252)
GLOBULIN SER CALC-MCNC: 3 G/DL (ref 2–4)
GLUCOSE SERPL-MCNC: 91 MG/DL (ref 65–100)
HCT VFR BLD AUTO: 26.6 % (ref 35–47)
HGB BLD-MCNC: 7 G/DL (ref 11.5–16)
IRON SATN MFR SERPL: 37 % (ref 20–50)
IRON SERPL-MCNC: 102 UG/DL (ref 35–150)
MAGNESIUM SERPL-MCNC: 2.5 MG/DL (ref 1.6–2.4)
MCH RBC QN AUTO: 23 PG (ref 26–34)
MCHC RBC AUTO-ENTMCNC: 26.3 G/DL (ref 30–36.5)
MCV RBC AUTO: 87.2 FL (ref 80–99)
NRBC # BLD: 4.34 K/UL (ref 0–0.01)
NRBC BLD-RTO: 6.1 PER 100 WBC
PLATELET # BLD AUTO: 614 K/UL (ref 150–400)
PMV BLD AUTO: 12.5 FL (ref 8.9–12.9)
POTASSIUM SERPL-SCNC: 5.4 MMOL/L (ref 3.5–5.1)
PROT SERPL-MCNC: 6 G/DL (ref 6.4–8.2)
RBC # BLD AUTO: 3.05 M/UL (ref 3.8–5.2)
SODIUM SERPL-SCNC: 137 MMOL/L (ref 136–145)
TIBC SERPL-MCNC: 279 UG/DL (ref 250–450)
WBC # BLD AUTO: 71.1 K/UL (ref 3.6–11)

## 2022-03-07 NOTE — PROGRESS NOTES
[facility] in [place], 105 [facility] Note    Patient name: Ratna Miranda  Patient : 1945  Patient MRN: [id number]  Date of service: 22    Primary care physician: Josefa Swain NP  Primary general cardiologist:  Not established     Primary [facility] cardiologist: Shaun Otero MD     CHIEF COMPLAINT:  Shortness of breath     PLAN OF CARE:  · 69 y/o with leukemia (WBC71) and normal heart function presents with worsening dyspnea and 15lbs weight gain since hospital discharge on 22. RECOMMENDATIONS:  Start bumex 1mg twice daily; goal weight loss 6-8lbs by next week  Call [facility] on Monday with weights/progress  Consider CXR if SOB persists after diuresed next week to r/o effusion  Transfuse for hgb < 7  Consider NST when anemia corrected and euvolemic  Follow-up with PCP  Follow-up with hematology-oncology  Return to [facility] with NP in 7-10 days      IMPRESSION:  Shortness of breath at rest  Stage D, NYHA class IV symptoms  Normal heart function, possible HFpEF  Myeloproliferative disorder  Severe leukocytosis; WBC 71K  Thrombocytosis; PLT 600s  Anemia, hgb 7  HTN  HLD  Right pleural effusion s/p thoracenthesis     LIFE GOALS:  Patient's personal goals include: TBD  Important upcoming milestones or family events: TBD  The patient identifies the following as important for living well: TBD     CARDIAC IMAGING:  Echo (22)   · Left Ventricle: Left ventricle size is normal. Normal wall thickness. Normal wall motion. Normal left ventricular systolic function with a visually estimated EF of 55 - 60%. Global longitudinal strain is normal with a value of -17.0%. Diastolic dysfunction present with normal LV EF. · Mitral Valve: Mild transvalvular regurgitation. · Left Atrium: Left atrium is mildly dilated. · Right Atrium: Right atrium is mildly dilated.   · Pericardium: Trivial localized pericardial effusion present around the posterior left ventricle. No indication of cardiac tamponade. EKG (1/28/22) ST, rate 101  LHC not done  NST not done      HEMODYNAMICS:  RHC not done  CPEST too ill  6MW too ill     HISTORY OF PRESENT ILLNESS:  I had the pleasure of seeing José Massey in [address] at [address] in [place].     Ms. Nona Coleman is a 78 y/o female with h/o myeloproliferative disorder whose leukemia medication has been recently stopped with rise of WBC and PLT. She was admitted 2 weeks ago with dyspnea and initial cardiac and pulmonary workup was negative. She was supposed to see hematologist, but her visit is not scheduled until late March. Patient was readmitted with severe dyspnea in 2/2022 and found to have large right sided effusion. She underwent thoracenthesis, diuresis and hematology was consulted and resumed leukemia meds.       INTERVAL HISTORY:  Today, patient presents for post-discharge clinic visit accompanied by her daughter. Patient is doing \"okay\". Patient walked to our clinic from parking garage but had to stop. Patient can walk more half a block without symptoms of fatigue or shortness of breath. Patient can walk 4 steps of stairs without symptoms of fatigue or shortness of breath. Patient can perform home activities without problem. Patient reports re-accumulation of fluid and 2+ BLE. Patient reports abdominal bloating and somewhat worse appetite. Patient's weight is up at least 15 lbs. Patient reports one pillow orthopnea, but no PND or nocturia. Patient denies irregular heart rate or palpitations. No presyncope or syncope. Patient denies other cardiac symptoms such as chest pain or leg pain with walking. Patient is compliant with fluid restriction and taking medications as prescribed. Patient manages his own medications. REVIEW OF SYSTEMS:  General: Denies fever, night sweats.   Ear, nose and throat: Denies difficulty hearing, sinus problems, runny nose, post-nasal drip, ringing in ears, mouth sores, loose teeth, ear pain, nosebleeds, sore throate, facial pain or numbess  Cardiovascular: see above in the interval history  Respiratory: Denies cough, wheezing, sputum production, hemoptysis. Gastrointestinal: Denies heartburn, constipation, diarrhea, abdominal pain, nausea, vomiting, difficulty swallowing, blood in stool  Kidney and bladder: Denies painful urination, frequent urination, urgency  Musculoskeletal: Denies joint pain, muscle weakness  Skin and hair: Denies change in existing skin lesions, hair loss or increase, breast changes    PHYSICAL EXAM:  Visit Vitals  BP (!) 138/54 (BP 1 Location: Right arm, BP Patient Position: Sitting, BP Cuff Size: Adult)   Pulse 96 Comment: left radial pulse   Temp 98.2 °F (36.8 °C) (Oral)   Resp 18   Ht 5' 5\" (1.651 m)   Wt 148 lb 9.6 oz (67.4 kg)   BMI 24.73 kg/m²     General: Patient is well developed, well-nourished in no acute distress  HEENT: Normocephalic and atraumatic. No scleral icterus. Pupils are equal, round and reactive to light and accomodation. No conjunctival injection. Oropharynx is clear. Neck: Supple. No evidence of thyroid enlargements or lymphadenopathy. JVD: Cannot be appreciated   Lungs: Breath sounds are equal and clear bilaterally. No wheezes, rhonchi, or rales. Heart: Regular rate and rhythm with normal S1 and S2. No murmurs, gallops or rubs. Abdomen: Soft, no mass or tenderness. No organomegaly or hernia. Bowel sounds present. Genitourinary and rectal: deferred  Extremities: No cyanosis, clubbing, or edema. Neurologic: No focal sensory or motor deficits are noted. Grossly intact. Psychiatric: Awake, alert an doriented x 3. Appropriate mood and affect. Skin: Warm, dry and well perfused. No lesions, nodules or rashes are noted.     PAST MEDICAL HISTORY:  Past Medical History:   Diagnosis Date    Congestive heart failure ([facility] [address]     Hypertension     Menopause        PAST SURGICAL HISTORY:  History reviewed. No pertinent surgical history. FAMILY HISTORY:  Family History   Problem Relation Age of Onset    Breast Cancer Sister         63's       SOCIAL HISTORY:  Social History     Socioeconomic History    Marital status: SINGLE   Tobacco Use    Smoking status: Never Smoker    Smokeless tobacco: Never Used   Substance and Sexual Activity    Alcohol use: Never    Drug use: Not Currently       LABORATORY RESULTS:  Labs Latest Ref Rng & Units 3/4/2022 2/21/2022 2/20/2022 2/19/2022 2/18/2022 2/17/2022 2/16/2022   WBC 3.6 - 11.0 K/uL 71. 1(HH) 56. 3(HH) 56. 3(HH) - 64. 4(HH) 64. 6(HH) 85. 3(HH)   RBC 3.80 - 5.20 M/uL 3.05(L) 3.25(L) 3.19(L) - 3.39(L) 3.25(L) 3.68(L)   Hemoglobin 11.5 - 16.0 g/dL 7. 0(L) 7. 4(L) 7. 3(L) - 7. 6(L) 7. 3(L) 8. 3(L)   Hematocrit 35.0 - 47.0 % 26. 6(L) 26. 2(L) 25. 4(L) - 26. 7(L) 25. 8(L) 29. 4(L)   MCV 80.0 - 99.0 FL 87.2 80.6 79. 6(L) - 78. 8(L) 79. 4(L) 79. 9(L)   Platelets 051 - 640 K/uL 614(H) 419(H) 471(H) - 596(H) 644(H) 666(H)   Lymphocytes 12 - 49 % - - - - - 17 13   Monocytes 5 - 13 % - - - - - 8 10   Eosinophils 0 - 7 % - - - - - 6 2   Basophils 0 - 1 % - - - - - 6(H) 7(H)   Bands 0 - 6 % - - - - - 16(H) 13(H)   Blasts 0 % - - - - - 3(H) -   Albumin 3.5 - 5.0 g/dL 3. 0(L) 2. 5(L) 2. 6(L) 2. 8(L) 2. 7(L) 2. 6(L) 3. 2(L)   Calcium 8.5 - 10.1 MG/DL 8.7 8.5 8.6 9.0 8.8 8.8 9.2   Glucose 65 - 100 mg/dL 91 98 104(H) 108(H) 100 96 99   BUN 6 - 20 MG/DL 21(H) 22(H) 25(H) 27(H) 23(H) 21(H) 26(H)   Creatinine 0.55 - 1.02 MG/DL 1.33(H) 1.47(H) 1.51(H) 1.45(H) 1.62(H) 1.33(H) 1.58(H)   Sodium 136 - 145 mmol/L 137 136 137 137 137 138 138   Potassium 3.5 - 5.1 mmol/L 5.4(H) 4.9 4.9 4.8 4.9 4.7 4.5   TSH 0.36 - 3.74 uIU/mL - - - - - - -   LDH 81 - 246 U/L - - 1,313(H) - - - -   Some recent data might be hidden       ALLERGY:  No Known Allergies     CURRENT MEDICATIONS:    Current Outpatient Medications:     bumetanide (BUMEX) 1 mg tablet, Take 1 Tablet by mouth two (2) times a day., Disp: 60 Tablet, Rfl: 0    ARIPiprazole (ABILIFY) 30 mg tablet, Take 30 mg by mouth daily. , Disp: , Rfl:     aspirin 81 mg cap, Take  by mouth daily. , Disp: , Rfl:     multivit-min-FA-lycopen-lutein (Centrum Silver) 0.4-300-250 mg-mcg-mcg tab, Take  by mouth daily. , Disp: , Rfl:     docusate sodium (COLACE) 100 mg capsule, Take 100 mg by mouth two (2) times a day., Disp: , Rfl:     ferrous sulfate 325 mg (65 mg iron) tablet, Take  by mouth Daily (before breakfast). , Disp: , Rfl:     furosemide (Lasix) 20 mg tablet, Take 20 mg by mouth daily. , Disp: , Rfl:     magnesium oxide (MAG-OX) 400 mg tablet, Take 400 mg by mouth daily. , Disp: , Rfl:     cholecalciferol, vitamin D3, (Vitamin D3) 50 mcg (2,000 unit) tab, Take 2,000 Units by mouth daily. , Disp: , Rfl:     DM/pseudoephed/acetaminoph/cpm (GRACY-SELTZER PLUS COLD/COUGH PO), Take  by mouth as needed. , Disp: , Rfl:     ipratropium (ATROVENT) 21 mcg (0.03 %) nasal spray, 2 Sprays by Both Nostrils route as needed for Rhinitis., Disp: , Rfl:     hydroxyurea (HYDREA) 500 mg capsule, Take 500 mg by mouth daily. , Disp: , Rfl:     Thank you for your referral and allowing me to participate in this patient's care.     Faustino Thibodeaux MD PhD  [address] [place] [address], [address]  Phone: [phone]  Fax: [phone]    PATIENT CARE TEAM:  Patient Care Team:  Jesus Alberto Romano NP as PCP - General (Nurse Practitioner)  Ari Zhong RN as Care Transitions Nurse  Frank Mendes MD (Hematology and Oncology)     Total visit time: 40 minutes (> 50% spent face-to-face counseling)

## 2022-03-08 ENCOUNTER — DOCUMENTATION ONLY (OUTPATIENT)
Dept: CARDIOLOGY CLINIC | Age: 77
End: 2022-03-08

## 2022-03-08 NOTE — PROGRESS NOTES
At patient's request, ALBARO scheduled transportation through patient's insurance, [place] [employer] with [employer] . Patient is scheduled for  time between 1:43 and 2:15 pm on 3/17/22 for 2:30 pm appointment in the [facility] for physician services. Patient's reservation # [id number]. Patient notified, on this date of all previously documented, questions answered and patient verbalized understanding.

## 2022-03-16 ENCOUNTER — TELEPHONE (OUTPATIENT)
Dept: CARDIOLOGY CLINIC | Age: 77
End: 2022-03-16

## 2022-03-16 NOTE — TELEPHONE ENCOUNTER
Telephone Call RE:  Appointment reminder     Outcome:     [x] Patient confirmed appointment   [] Patient rescheduled appointment for    [] Unable to reach  [] Left message             [] Other:     ---------------------             [x] Screened for [facility]

## 2022-03-17 ENCOUNTER — OFFICE VISIT (OUTPATIENT)
Dept: CARDIOLOGY CLINIC | Age: 77
End: 2022-03-17
Payer: MEDICARE

## 2022-03-17 ENCOUNTER — DOCUMENTATION ONLY (OUTPATIENT)
Dept: CARDIOLOGY CLINIC | Age: 77
End: 2022-03-17

## 2022-03-17 ENCOUNTER — HOSPITAL ENCOUNTER (OUTPATIENT)
Dept: GENERAL RADIOLOGY | Age: 77
Discharge: HOME | End: 2022-03-17
Payer: MEDICARE

## 2022-03-17 VITALS
DIASTOLIC BLOOD PRESSURE: 62 MMHG | HEART RATE: 94 BPM | TEMPERATURE: 99 F | WEIGHT: 147 LBS | OXYGEN SATURATION: 95 % | RESPIRATION RATE: 20 BRPM | SYSTOLIC BLOOD PRESSURE: 124 MMHG | HEIGHT: 65 IN | BODY MASS INDEX: 24.49 KG/M2

## 2022-03-17 DIAGNOSIS — E61.1 IRON DEFICIENCY: ICD-10-CM

## 2022-03-17 DIAGNOSIS — R06.02 SHORTNESS OF BREATH: ICD-10-CM

## 2022-03-17 DIAGNOSIS — I50.22 CHRONIC SYSTOLIC HEART FAILURE (HCC): ICD-10-CM

## 2022-03-17 DIAGNOSIS — R06.02 SHORTNESS OF BREATH: Primary | ICD-10-CM

## 2022-03-17 PROCEDURE — G8536 NO DOC ELDER MAL SCRN: HCPCS | Performed by: INTERNAL MEDICINE

## 2022-03-17 PROCEDURE — 1111F DSCHRG MED/CURRENT MED MERGE: CPT | Performed by: INTERNAL MEDICINE

## 2022-03-17 PROCEDURE — G8400 PT W/DXA NO RESULTS DOC: HCPCS | Performed by: INTERNAL MEDICINE

## 2022-03-17 PROCEDURE — G8510 SCR DEP NEG, NO PLAN REQD: HCPCS | Performed by: INTERNAL MEDICINE

## 2022-03-17 PROCEDURE — 1101F PT FALLS ASSESS-DOCD LE1/YR: CPT | Performed by: INTERNAL MEDICINE

## 2022-03-17 PROCEDURE — 71046 X-RAY EXAM CHEST 2 VIEWS: CPT

## 2022-03-17 PROCEDURE — G8427 DOCREV CUR MEDS BY ELIG CLIN: HCPCS | Performed by: INTERNAL MEDICINE

## 2022-03-17 PROCEDURE — G8420 CALC BMI NORM PARAMETERS: HCPCS | Performed by: INTERNAL MEDICINE

## 2022-03-17 PROCEDURE — 99214 OFFICE O/P EST MOD 30 MIN: CPT | Performed by: INTERNAL MEDICINE

## 2022-03-17 PROCEDURE — 1090F PRES/ABSN URINE INCON ASSESS: CPT | Performed by: INTERNAL MEDICINE

## 2022-03-17 RX ORDER — BUMETANIDE 2 MG/1
2 TABLET ORAL 2 TIMES DAILY
Qty: 60 TABLET | Refills: 2 | Status: SHIPPED | OUTPATIENT
Start: 2022-03-17 | End: 2022-03-25

## 2022-03-17 NOTE — PROGRESS NOTES
3/16/22 - SW received a message from patient requesting confirmation for scheduled transportation to [facility] 3/17/22.    1:23 pm - SW contacted Medicaid transportation services- "[employer] ([phone]), patient's transportation reservation was confirmed active via automated service. Patient's reservation # [id number] scheduled for 3/17/22 appointment in [facility] at 2:30 for patient  time between 1:43 and 2:15 pm patient to call when ready for  from [facility]. SW returned call to patient confirming scheduled transportation, she verbalized understanding.

## 2022-03-17 NOTE — PROGRESS NOTES
[address] [employer] in [place], 105 [facility] Note    Patient name: Miguel Angel Metz  Patient : 1945  Patient MRN: [id number]  Date of service: 22    Primary care physician: Kt Steiner NP  Primary general cardiologist: Daniel Gonzalez established     Primary F Jennie Laureano MD     CHIEF COMPLAINT:  Shortness of breath     PLAN OF CARE:  · 69 y/o with leukemia (WBC71) and normal heart function presents with worsening dyspnea and 15lbs weight gain since hospital discharge on 22.     RECOMMENDATIONS:  Increase bumex to 2mg twice daily; goal weight loss 6-8lbs by next week  Call [facility] on Monday with weights/progress  Check CXR AP/LAT  Check labs, may need transfusion for hgb < 7  Consider NST when anemia corrected and euvolemic  Follow-up with PCP  Follow-up with hematology-oncology  Return to [facility] with NP in one week to check on diuresis      IMPRESSION:  Shortness of breath at rest  Stage D, NYHA class IV symptoms  Normal heart function, possible HFpEF  Myeloproliferative disorder  Severe leukocytosis; YZE 73B  Thrombocytosis; PLT 600s  Anemia, hgb 7  HTN  HLD  Right pleural effusion s/p thoracenthesis     LIFE GOALS:  Patient's personal goals include: TBD  Important upcoming milestones or family events: TBD  The patient identifies the following as important for living well: TBD     CARDIAC IMAGING:  Echo (22)   · Left Ventricle: Left ventricle size is normal. Normal wall thickness. Normal wall motion. Normal left ventricular systolic function with a visually estimated EF of 55 - 60%. Global longitudinal strain is normal with a value of -17.0%. Diastolic dysfunction present with normal LV EF. · Mitral Valve: Mild transvalvular regurgitation. · Left Atrium: Left atrium is mildly dilated. · Right Atrium: Right atrium is mildly dilated.   · Pericardium: Trivial localized pericardial effusion present around the posterior left ventricle. No indication of cardiac tamponade. EKG (1/28/22) ST, rate 101  LHC not done  NST not done      HEMODYNAMICS:  RHC not done  CPEST too ill  6MW too ill     HISTORY OF PRESENT ILLNESS:  I had the pleasure of seeing Miguel Angel Metz in [address] at [facility] in [place].     Ms. Sirena Al is a 78 y/o female with h/o myeloproliferative disorder whose leukemia medication has been recently stopped with rise of WBC and PLT. She was admitted 2 weeks ago with dyspnea and initial cardiac and pulmonary workup was negative. She was supposed to see hematologist, but her visit is not scheduled until late March.     Patient was readmitted with severe dyspnea in 2/2022 and found to have large right sided effusion. She underwent thoracenthesis, diuresis and hematology was consulted and resumed leukemia meds.       INTERVAL HISTORY:  Today, patient presents for post-discharge clinic visit.     Patient is doing \"okay\".     Patient walked to our clinic from parking garage but had to stop. Patient can walk more half a block without symptoms of fatigue or shortness of breath. Patient can walk 4 steps of stairs without symptoms of fatigue or shortness of breath. Patient can perform home activities without problem.      Patient reports re-accumulation of fluid and 2+ BLE. Patient reports abdominal bloating and somewhat worse appetite. Patient's weight is up at least 15 lbs.     Patient reports one pillow orthopnea, but no PND or nocturia.     Patient denies irregular heart rate or palpitations. No presyncope or syncope.     Patient denies other cardiac symptoms such as chest pain or leg pain with walking.      Patient is compliant with fluid restriction and taking medications as prescribed. Patient manages his own medications. REVIEW OF SYSTEMS:  General: Denies fever, night sweats.   Ear, nose and throat: Denies difficulty hearing, sinus problems, runny nose, post-nasal drip, ringing in ears, mouth sores, loose teeth, ear pain, nosebleeds, sore throate, facial pain or numbess  Cardiovascular: see above in the interval history  Respiratory: Denies cough, wheezing, sputum production, hemoptysis. Gastrointestinal: Denies heartburn, constipation, diarrhea, abdominal pain, nausea, vomiting, difficulty swallowing, blood in stool  Kidney and bladder: Denies painful urination, frequent urination, urgency  Musculoskeletal: Denies joint pain, muscle weakness  Skin and hair: Denies change in existing skin lesions, hair loss or increase, breast changes    PHYSICAL EXAM:  Visit Vitals  /62 (BP 1 Location: Left arm, BP Patient Position: Sitting, BP Cuff Size: Adult)   Pulse 94   Temp 99 °F (37.2 °C) (Oral)   Resp 20   Ht 5' 5\" (1.651 m)   Wt 147 lb (66.7 kg)   SpO2 95%   BMI 24.46 kg/m²     General: Patient is well developed, well-nourished in no acute distress  HEENT: Normocephalic and atraumatic. No scleral icterus. Pupils are equal, round and reactive to light and accomodation. No conjunctival injection. Oropharynx is clear. Neck: Supple. No evidence of thyroid enlargements or lymphadenopathy. JVD: Cannot be appreciated   Lungs: Breath sounds are equal and clear bilaterally. No wheezes, rhonchi, or rales. Heart: Regular rate and rhythm with normal S1 and S2. No murmurs, gallops or rubs. Abdomen: Soft, no mass or tenderness. No organomegaly or hernia. Bowel sounds present. Genitourinary and rectal: deferred  Extremities: No cyanosis, clubbing, or edema. Neurologic: No focal sensory or motor deficits are noted. Grossly intact. Psychiatric: Awake, alert an doriented x 3. Appropriate mood and affect. Skin: Warm, dry and well perfused. No lesions, nodules or rashes are noted. PAST MEDICAL HISTORY:  Past Medical History:   Diagnosis Date    Congestive heart failure ([address]     Hypertension     Menopause        PAST SURGICAL HISTORY:  History reviewed.  No pertinent surgical history. FAMILY HISTORY:  Family History   Problem Relation Age of Onset    Breast Cancer Sister         63's       SOCIAL HISTORY:  Social History     Socioeconomic History    Marital status: SINGLE   Tobacco Use    Smoking status: Never Smoker    Smokeless tobacco: Never Used   Substance and Sexual Activity    Alcohol use: Never    Drug use: Not Currently       LABORATORY RESULTS:  Labs Latest Ref Rng & Units 3/17/2022 3/4/2022 2/21/2022 2/20/2022 2/19/2022 2/18/2022 2/17/2022   WBC 3.6 - 11.0 K/uL 69. 3(HH) 71. 1(HH) 56. 3(HH) 56. 3(HH) - 64. 4(HH) 64. 6(HH)   RBC 3.80 - 5.20 M/uL 2.94(L) 3.05(L) 3.25(L) 3.19(L) - 3.39(L) 3.25(L)   Hemoglobin 11.5 - 16.0 g/dL 7. 1(L) 7. 0(L) 7. 4(L) 7. 3(L) - 7. 6(L) 7. 3(L)   Hematocrit 35.0 - 47.0 % 25. 1(L) 26. 6(L) 26. 2(L) 25. 4(L) - 26. 7(L) 25. 8(L)   MCV 80.0 - 99.0 FL 85.4 87.2 80.6 79. 6(L) - 78. 8(L) 79. 4(L)   Platelets 596 - 851 K/uL 384 614(H) 419(H) 471(H) - 596(H) 644(H)   Lymphocytes 12 - 49 % - - - - - - 17   Monocytes 5 - 13 % - - - - - - 8   Eosinophils 0 - 7 % - - - - - - 6   Basophils 0 - 1 % - - - - - - 6(H)   Bands 0 - 6 % - - - - - - 16(H)   Blasts 0 % - - - - - - 3(H)   Albumin 3.5 - 5.0 g/dL 3. 3(L) 3.0(L) 2. 5(L) 2. 6(L) 2. 8(L) 2. 7(L) 2. 6(L)   Calcium 8.5 - 10.1 MG/DL 8.7 8.7 8.5 8.6 9.0 8.8 8.8   Glucose 65 - 100 mg/dL 106(H) 91 98 104(H) 108(H) 100 96   BUN 6 - 20 MG/DL 26(H) 21(H) 22(H) 25(H) 27(H) 23(H) 21(H)   Creatinine 0.55 - 1.02 MG/DL 1.46(H) 1.33(H) 1.47(H) 1.51(H) 1.45(H) 1.62(H) 1.33(H)   Sodium 136 - 145 mmol/L 136 137 136 137 137 137 138   Potassium 3.5 - 5.1 mmol/L 4.7 5.4(H) 4.9 4.9 4.8 4.9 4.7   TSH 0.36 - 3.74 uIU/mL - - - - - - -   LDH 81 - 246 U/L - - - 1,313(H) - - -   Some recent data might be hidden       ALLERGY:  No Known Allergies     CURRENT MEDICATIONS:    Current Outpatient Medications:     bumetanide (BUMEX) 2 mg tablet, Take 1 Tablet by mouth two (2) times a day., Disp: 60 Tablet, Rfl: 2    hydroxyurea (HYDREA) 500 mg capsule, Take 500 mg by mouth daily. , Disp: , Rfl:     ARIPiprazole (ABILIFY) 30 mg tablet, Take 30 mg by mouth daily. , Disp: , Rfl:     aspirin 81 mg cap, Take  by mouth daily. , Disp: , Rfl:     multivit-min-FA-lycopen-lutein (Centrum Silver) 0.4-300-250 mg-mcg-mcg tab, Take  by mouth daily. , Disp: , Rfl:     docusate sodium (COLACE) 100 mg capsule, Take 100 mg by mouth two (2) times a day., Disp: , Rfl:     ferrous sulfate 325 mg (65 mg iron) tablet, Take  by mouth Daily (before breakfast). , Disp: , Rfl:     furosemide (Lasix) 20 mg tablet, Take 20 mg by mouth daily. , Disp: , Rfl:     magnesium oxide (MAG-OX) 400 mg tablet, Take 400 mg by mouth daily. , Disp: , Rfl:     cholecalciferol, vitamin D3, (Vitamin D3) 50 mcg (2,000 unit) tab, Take 2,000 Units by mouth daily. , Disp: , Rfl:     DM/pseudoephed/acetaminoph/cpm (GRACY-SELTZER PLUS COLD/COUGH PO), Take  by mouth as needed. , Disp: , Rfl:     ipratropium (ATROVENT) 21 mcg (0.03 %) nasal spray, 2 Sprays by Both Nostrils route as needed for Rhinitis., Disp: , Rfl:     Thank you for your referral and allowing me to participate in this patient's care.     Homa Velazquez MD PhD  [address] [place] [address], [address]  Phone: [phone]  Fax: [phone]    PATIENT CARE TEAM:  Patient Care Team:  Cathie Reynolds NP as PCP - General (Nurse Practitioner)  Michael Boyd RN as Care Transitions Nurse  Alexis Washburn MD (Hematology and Oncology)     Total visit time: 25 minutes (> 50% spent face-to-face counseling)

## 2022-03-17 NOTE — PATIENT INSTRUCTIONS
Medication changes:    Increase bumex to 2 mg 2 times a day    Please take this to your pharmacy to notify them of the change in medications. Testing Ordered:    A chest xray has been ordered to be done today. Present to outpatient registration. No appointment is require. Labs have been drawn today in clinic. We will call you with any abnormal results that require a change in medication regimen. Other Recommendations:      Restrict fluid intake to 48-64 ounces daily. Keep a log of your blood pressure, heart rates and weights daily. We will call you on Monday to check on your weights. Ensure your drinking an adequate amount of water with a goal of 6-8 eight ounce glasses (1.5-2 liters) of fluid daily. Your urine should be clear and light yellow straw colored. If your blood pressure begins to consistently run below 90/60 and/or you begin to experience dizziness or lightheadedness, please contact the [facility] Amy Forman at [phone]. Follow up 1 week w/ NP with [facility]      Please monitor your weights daily upon waking and after using the bathroom. Keep a written records of your weights and bring to your next appointment. If you have a weight gain of 3 or more pounds overnight OR 5 or more pounds in one week please contact our office. Thank you for allowing us the privilege of being a part of your healthcare team! Please do not hesitate to contact our office at [phone] with any questions or concerns. Virtual Heart Failure [address] invites you to learn more about heart failure and to share your questions, ideas, and experiences with others. Each month, the Heart Failure Support Group features a new educational topic and a guest speaker, followed by an interactive discussion. Our Heart Failure Nurse Navigator will moderate each session. You will be able to participate by phone, tablet or computer through [employer].  This support group takes place on the 3rd Thursday of each month from 6:00-7:30PM. All individuals living with heart failure and their caregivers are welcome to join. If you are interested in participating, please contact us at [email or web address] and you will be sent the link to join the [employer].

## 2022-03-18 PROBLEM — R06.02 SOB (SHORTNESS OF BREATH): Status: ACTIVE | Noted: 2022-02-16

## 2022-03-18 LAB
ALBUMIN SERPL-MCNC: 3.3 G/DL (ref 3.5–5)
ALBUMIN/GLOB SERPL: 1.1 {RATIO} (ref 1.1–2.2)
ALP SERPL-CCNC: 132 U/L (ref 45–117)
ALT SERPL-CCNC: 23 U/L (ref 12–78)
ANION GAP SERPL CALC-SCNC: 5 MMOL/L (ref 5–15)
AST SERPL-CCNC: 34 U/L (ref 15–37)
BILIRUB SERPL-MCNC: 0.3 MG/DL (ref 0.2–1)
BNP SERPL-MCNC: 2823 PG/ML
BUN SERPL-MCNC: 26 MG/DL (ref 6–20)
BUN/CREAT SERPL: 18 (ref 12–20)
CALCIUM SERPL-MCNC: 8.7 MG/DL (ref 8.5–10.1)
CHLORIDE SERPL-SCNC: 106 MMOL/L (ref 97–108)
CO2 SERPL-SCNC: 25 MMOL/L (ref 21–32)
CREAT SERPL-MCNC: 1.46 MG/DL (ref 0.55–1.02)
ERYTHROCYTE [DISTWIDTH] IN BLOOD BY AUTOMATED COUNT: 21.9 % (ref 11.5–14.5)
FERRITIN SERPL-MCNC: 689 NG/ML (ref 26–388)
GLOBULIN SER CALC-MCNC: 3 G/DL (ref 2–4)
GLUCOSE SERPL-MCNC: 106 MG/DL (ref 65–100)
HCT VFR BLD AUTO: 25.1 % (ref 35–47)
HGB BLD-MCNC: 7.1 G/DL (ref 11.5–16)
IRON SATN MFR SERPL: 8 % (ref 20–50)
IRON SERPL-MCNC: 18 UG/DL (ref 35–150)
MAGNESIUM SERPL-MCNC: 2.5 MG/DL (ref 1.6–2.4)
MCH RBC QN AUTO: 24.1 PG (ref 26–34)
MCHC RBC AUTO-ENTMCNC: 28.3 G/DL (ref 30–36.5)
MCV RBC AUTO: 85.4 FL (ref 80–99)
NRBC # BLD: 4.69 K/UL (ref 0–0.01)
NRBC BLD-RTO: 6.8 PER 100 WBC
PLATELET # BLD AUTO: 384 K/UL (ref 150–400)
PMV BLD AUTO: 12.3 FL (ref 8.9–12.9)
POTASSIUM SERPL-SCNC: 4.7 MMOL/L (ref 3.5–5.1)
PROT SERPL-MCNC: 6.3 G/DL (ref 6.4–8.2)
RBC # BLD AUTO: 2.94 M/UL (ref 3.8–5.2)
SODIUM SERPL-SCNC: 136 MMOL/L (ref 136–145)
TIBC SERPL-MCNC: 221 UG/DL (ref 250–450)
WBC # BLD AUTO: 69.3 K/UL (ref 3.6–11)

## 2022-03-19 PROBLEM — J96.00 ACUTE RESPIRATORY FAILURE (HCC): Status: ACTIVE | Noted: 2022-01-28

## 2022-03-20 PROBLEM — J90 PLEURAL EFFUSION ON RIGHT: Status: ACTIVE | Noted: 2022-02-18

## 2022-03-21 ENCOUNTER — DOCUMENTATION ONLY (OUTPATIENT)
Dept: CARDIOLOGY CLINIC | Age: 77
End: 2022-03-21

## 2022-03-21 ENCOUNTER — TELEPHONE (OUTPATIENT)
Dept: CARDIOLOGY CLINIC | Age: 77
End: 2022-03-21

## 2022-03-21 NOTE — TELEPHONE ENCOUNTER
Pt increased Bumex to 2 mg BID on 3/17/22.    1319 - called pt to check on weights, no answer, left voicemail requesting return call. Will await return call. Pt returned call and left voicemail requesting return call. 1356 - returned pt call using two pt identifiers. Pt reports her weight/BP as follows    3/18 - 141.6 lbs - did not take BP this day  3/19 - 139.6 lbs  After meds /60      3/20 - 138.6 lbs - did not take BP this day  3/21 - 138.6 lbs  After meds /67    HR 99  Confirmed dose of Bumex 2 mg BID, denies SOB, heart palpitations, chest pain, lightheaded/dizzines, but reports slight swelling in legs that is the same that pt has had for a year. Advised pt I would let provider know of her weights and BP/HR and call back with any recommendations. Pt states understanding and has no further questions or concerns at this time. Ajay Mckay NP  You 5 minutes ago (2:37 PM)     LP    Continue increased dosing and reassess at next visit. Message text      1450 - called pt re: CINDY Bruce NP above message. No answer, left voicemail requesting return call. Will await return call. 1505 - called pt using two pt identifiers. Advised pt of CINDY Bruce NP above message. Pt states understanding and has no further questions or concerns at this time.     Gerardo Hanna, RN

## 2022-03-21 NOTE — PROGRESS NOTES
ALBARO left message for patient to return call regarding transportation on 3/25/22 to [facility] appointment     Patient returned call, she has already set up transportation for her [facility], 3/25/22 appt.  Thankful for follow up

## 2022-03-24 ENCOUNTER — TELEPHONE (OUTPATIENT)
Dept: CARDIOLOGY CLINIC | Age: 77
End: 2022-03-24

## 2022-03-24 NOTE — TELEPHONE ENCOUNTER
Telephone Call RE:  Appointment reminder     Outcome:     [] Patient confirmed appointment   [] Patient rescheduled appointment for    [] Unable to reach  [x] Left message             [] Other:     ---------------------             [] Screened for [facility]

## 2022-03-24 NOTE — TELEPHONE ENCOUNTER
Telephone Call RE:  Appointment reminder     Outcome:     [x] Patient confirmed appointment   [] Patient rescheduled appointment for    [] Unable to reach  [] Left message             [] Other:     ---------------------             [x] Screened for [facility]

## 2022-03-25 ENCOUNTER — CLINICAL SUPPORT (OUTPATIENT)
Dept: CARDIOLOGY CLINIC | Age: 77
End: 2022-03-25

## 2022-03-25 VITALS
TEMPERATURE: 98 F | DIASTOLIC BLOOD PRESSURE: 60 MMHG | WEIGHT: 141.2 LBS | HEART RATE: 97 BPM | BODY MASS INDEX: 23.5 KG/M2 | OXYGEN SATURATION: 95 % | SYSTOLIC BLOOD PRESSURE: 132 MMHG

## 2022-03-25 DIAGNOSIS — I50.22 CHRONIC SYSTOLIC HEART FAILURE (HCC): Primary | ICD-10-CM

## 2022-03-25 RX ORDER — BUMETANIDE 2 MG/1
TABLET ORAL
Qty: 60 TABLET | Refills: 2
Start: 2022-03-25 | End: 2022-09-13

## 2022-03-25 NOTE — PATIENT INSTRUCTIONS
Medication changes:    STOP LASIX    Take bumex 2mg twice a day. May take one additional tablet daily as needed for increased shortness of breath, noticeable swelling, weight gain 3 lbs or more in one night or  5lbs or more in one week. Please take this to your pharmacy to notify them of the change in medications. Testing Ordered: Other Recommendations:     Continue to keep a daily weight log. Call in one week to report weight log [phone] option 2     Ensure your drinking an adequate amount of water with a goal of 6-8 eight ounce glasses (1.5-2 liters) of fluid daily. Your urine should be clear and light yellow straw colored. If your blood pressure begins to consistently run below 90/60 and/or you begin to experience dizziness or lightheadedness, please contact the [facility] at [phone]. Follow up 3 months with Dr. Marcelina Russo  with [facility]      Please monitor your weights daily upon waking and after using the bathroom. Keep a written records of your weights and bring to your next appointment. If you have a weight gain of 3 or more pounds overnight OR 5 or more pounds in one week please contact our office. Thank you for allowing us the privilege of being a part of your healthcare team! Please do not hesitate to contact our office at [phone] with any questions or concerns. Virtual Heart Failure [address] invites you to learn more about heart failure and to share your questions, ideas, and experiences with others. Each month, the Heart Failure Support Group features a new educational topic and a guest speaker, followed by an interactive discussion. Our Heart Failure Nurse Navigator will moderate each session. You will be able to participate by phone, tablet or computer through [employer].  This support group takes place on the 3rd Thursday of each month from 6:00-7:30PM. All individuals living with heart failure and their caregivers are welcome to join. If you are interested in participating, please contact us at [email or web address] and you will be sent the link to join the [employer].

## 2022-04-01 NOTE — PROGRESS NOTES
Saw patient in clinic this date to evaluate patient weights for diuresis. Weight log provided by patient and reviewed by NP. Assessed patents weight and vitals. Patient reported that she was still taking lasix and bumex during appt. Provided instructions per CINDY Bruce NP discontinue lasix, Take bumex 2mg twice a day May take one additional tablet daily as needed for increased shortness of breath, noticeable swelling, weight gain 3 lbs or more in one night or  5lbs or more in one week, continue weight log, call in one week with weights, and follow up 3 months with Dr. Anibal Forrester. Reviewed after visit summary with patient. Patient stated understanding of instructions and had no questions.  Melody Brady

## 2022-04-11 ENCOUNTER — TELEPHONE (OUTPATIENT)
Dept: CARDIOLOGY CLINIC | Age: 77
End: 2022-04-11

## 2022-04-11 NOTE — TELEPHONE ENCOUNTER
Patient called with weights:   4/11/22: 136.2 lb, she was 138.4 on 4/1/22. She is taking bumex 2 mg BID. She denies shortness of breath or dizziness, does report some swelling in right leg. BP today is 128/67, . Please advise if any changes.

## 2022-04-15 ENCOUNTER — TELEPHONE (OUTPATIENT)
Dept: CARDIOLOGY CLINIC | Age: 77
End: 2022-04-15

## 2022-04-18 ENCOUNTER — OFFICE VISIT (OUTPATIENT)
Dept: SURGERY | Age: 77
End: 2022-04-18
Payer: MEDICARE

## 2022-04-18 VITALS
WEIGHT: 138.9 LBS | SYSTOLIC BLOOD PRESSURE: 117 MMHG | HEIGHT: 65 IN | OXYGEN SATURATION: 95 % | RESPIRATION RATE: 18 BRPM | BODY MASS INDEX: 23.14 KG/M2 | DIASTOLIC BLOOD PRESSURE: 72 MMHG | TEMPERATURE: 98.3 F | HEART RATE: 97 BPM

## 2022-04-18 DIAGNOSIS — K43.9 VENTRAL HERNIA WITHOUT OBSTRUCTION OR GANGRENE: Primary | ICD-10-CM

## 2022-04-18 PROCEDURE — G8536 NO DOC ELDER MAL SCRN: HCPCS | Performed by: SURGERY

## 2022-04-18 PROCEDURE — G8400 PT W/DXA NO RESULTS DOC: HCPCS | Performed by: SURGERY

## 2022-04-18 PROCEDURE — 1090F PRES/ABSN URINE INCON ASSESS: CPT | Performed by: SURGERY

## 2022-04-18 PROCEDURE — G8420 CALC BMI NORM PARAMETERS: HCPCS | Performed by: SURGERY

## 2022-04-18 PROCEDURE — G8510 SCR DEP NEG, NO PLAN REQD: HCPCS | Performed by: SURGERY

## 2022-04-18 PROCEDURE — 1101F PT FALLS ASSESS-DOCD LE1/YR: CPT | Performed by: SURGERY

## 2022-04-18 PROCEDURE — 99202 OFFICE O/P NEW SF 15 MIN: CPT | Performed by: SURGERY

## 2022-04-18 PROCEDURE — G8427 DOCREV CUR MEDS BY ELIG CLIN: HCPCS | Performed by: SURGERY

## 2022-04-18 NOTE — PROGRESS NOTES
Rajan Whitmore is a 68 y.o. female who is referred by Socorro Izquierdo NP for further evaluation of a ventral hernia. Ms. Miranda Patel tells me that she has had an abdominal wall bulge, in the midline above the umbilicus, for several years now. The bulge has become progressively larger and somewhat more bothersome to her. No associated nausea or vomiting. No chronic cough or constipation. Found to have a ventral hernia. She has otherwise been in her usual state of health. Of note, Ms. Miranda Patel has a h/o myelofibrosis and is followed by the [facility] and recently underwent bone marrow biopsy. Furthermore, Ms. Miranda Patel is followed by Dr. Julito Collins at the [facility]. Past Medical History:   Diagnosis Date    Anemia     Congestive heart failure (HCC)     Hypertension     Menopause     Ventral hernia without obstruction or gangrene 4/18/2022     Past Surgical History:   Procedure Laterality Date    HX OTHER SURGICAL  04/13/2022     bone marrow       Family History   Problem Relation Age of Onset    Breast Cancer Sister         63's    Cancer Sister     Breast Problems Sister     Hypertension Mother      Social History     Socioeconomic History    Marital status: SINGLE   Tobacco Use    Smoking status: Never Smoker    Smokeless tobacco: Never Used   Substance and Sexual Activity    Alcohol use: Never    Drug use: Not Currently     Review of systems negative except as noted. Review of Systems   Respiratory: Negative for cough. Gastrointestinal: Negative for abdominal pain, constipation, nausea and vomiting. Physical Exam  Vitals reviewed. Constitutional:       General: She is not in acute distress. Appearance: Normal appearance. She is normal weight. HENT:      Head: Normocephalic and atraumatic. Eyes:      General: No scleral icterus. Cardiovascular:      Rate and Rhythm: Normal rate and regular rhythm.    Pulmonary:      Effort: Pulmonary effort is normal.      Breath sounds: Normal breath sounds. Abdominal:      General: There is no distension. Palpations: Abdomen is soft. Tenderness: There is no abdominal tenderness. There is no guarding or rebound. Hernia: A hernia is present. Hernia is present in the ventral area (Above umbilicus in midline. ). Musculoskeletal:         General: Normal range of motion. Cervical back: Neck supple. Lymphadenopathy:      Cervical: No cervical adenopathy. Neurological:      General: No focal deficit present. Mental Status: She is alert. ASSESSMENT and PLAN  Ms. Charmayne Martens is a 69 yo female with a ventral hernia. In view of the findings on H and P, do not believe that there is an urgent indication for ventral hernia repair as there is no evidence of intestinal obstruction or intestinal ischemia. Activity as tolerated for now. Follow up with Dr. Alexi Cage as scheduled. Will see Ms. Charmayne Martens after Hematology/Oncology follow up. Follow up with Mr. Steiner as scheduled. Discussed plan with Ms. Charmayne Martens and she is agreeable.        CC: Karla Garcia NP

## 2022-04-18 NOTE — PROGRESS NOTES
1. Have you been to the ER, urgent care clinic since your last visit? Hospitalized since your last visit? no    2. Have you seen or consulted any other health care providers outside of the [address] [place] [address] since your last visit? Include any pap smears or colon screening. 3 weeks ago PCP Dr. Luke Padgett.

## 2022-04-18 NOTE — PROGRESS NOTES
1. Have you been to the ER, urgent care clinic since your last visit? Hospitalized since your last visit? No    2. Have you seen or consulted any other health care providers outside of the [address] [place] [address] since your last visit? Include any pap smears or colon screening.  No

## 2022-04-26 ENCOUNTER — TELEPHONE (OUTPATIENT)
Dept: CARDIOLOGY CLINIC | Age: 77
End: 2022-04-26

## 2022-04-26 NOTE — TELEPHONE ENCOUNTER
Patient left voicemail regarding her weight and questions she has. I returned her call and left voicemail to return my call. Patient returned call, she states she weighed herself until 4/21/22, and then scale stopped working. .  BP cuff also not working-she will bring both to next patient appt. She denies any complaints, weight on 4/16: 136.8 lb, 4/17: 136.8 lb, 4/18: 135.4 lb, 4/19: 136 lb, 4/20: 134 lb, 4/21: 134 lb.

## 2022-05-09 ENCOUNTER — OFFICE VISIT (OUTPATIENT)
Dept: SURGERY | Age: 77
End: 2022-05-09
Payer: MEDICARE

## 2022-05-09 VITALS
BODY MASS INDEX: 22.57 KG/M2 | HEIGHT: 65 IN | SYSTOLIC BLOOD PRESSURE: 117 MMHG | TEMPERATURE: 98.1 F | HEART RATE: 99 BPM | RESPIRATION RATE: 20 BRPM | OXYGEN SATURATION: 95 % | DIASTOLIC BLOOD PRESSURE: 69 MMHG | WEIGHT: 135.5 LBS

## 2022-05-09 DIAGNOSIS — N18.31 STAGE 3A CHRONIC KIDNEY DISEASE (HCC): ICD-10-CM

## 2022-05-09 DIAGNOSIS — K43.9 VENTRAL HERNIA WITHOUT OBSTRUCTION OR GANGRENE: Primary | ICD-10-CM

## 2022-05-09 PROBLEM — N18.30 CHRONIC RENAL DISEASE, STAGE III (HCC): Status: ACTIVE | Noted: 2022-05-09

## 2022-05-09 PROCEDURE — G8400 PT W/DXA NO RESULTS DOC: HCPCS | Performed by: SURGERY

## 2022-05-09 PROCEDURE — 1101F PT FALLS ASSESS-DOCD LE1/YR: CPT | Performed by: SURGERY

## 2022-05-09 PROCEDURE — 1090F PRES/ABSN URINE INCON ASSESS: CPT | Performed by: SURGERY

## 2022-05-09 PROCEDURE — G8420 CALC BMI NORM PARAMETERS: HCPCS | Performed by: SURGERY

## 2022-05-09 PROCEDURE — G8427 DOCREV CUR MEDS BY ELIG CLIN: HCPCS | Performed by: SURGERY

## 2022-05-09 PROCEDURE — 99212 OFFICE O/P EST SF 10 MIN: CPT | Performed by: SURGERY

## 2022-05-09 PROCEDURE — G8536 NO DOC ELDER MAL SCRN: HCPCS | Performed by: SURGERY

## 2022-05-09 PROCEDURE — G8510 SCR DEP NEG, NO PLAN REQD: HCPCS | Performed by: SURGERY

## 2022-05-09 NOTE — PROGRESS NOTES
1. Have you been to the ER, urgent care clinic since your last visit? Hospitalized since your last visit? No    2. Have you seen or consulted any other health care providers outside of the [address] [place] [address] since your last visit? Include any pap smears or colon screening.  No

## 2022-05-09 NOTE — PROGRESS NOTES
Stan Mcnair is a 68 y.o. female who returns for follow up of a ventral hernia. Ms. Neftali Cheung was last seen on April 18, 2022 for evaluation of a ventral hernia. Doing fairly well since then. No significant change in ventral hernia or associated discomfort. No nausea or vomiting. No constipation. She recently saw Dr. Luisa Adamson for follow up of her myeloproliferative disorder and is scheduled to see Dr. Haleigh Yo in June, 2022. She has otherwise been in her usual state of health. Past Medical History:   Diagnosis Date    Anemia     Congestive heart failure (HCC)     Hypertension     Menopause     Ventral hernia without obstruction or gangrene 4/18/2022     Past Surgical History:   Procedure Laterality Date    HX OTHER SURGICAL  04/13/2022     bone marrow     Family History   Problem Relation Age of Onset    Breast Cancer Sister         63's    Cancer Sister     Breast Problems Sister     Hypertension Mother      Social History     Socioeconomic History    Marital status: SINGLE   Tobacco Use    Smoking status: Never Smoker    Smokeless tobacco: Never Used   Substance and Sexual Activity    Alcohol use: Never    Drug use: Not Currently     Review of systems negative except as noted. Review of Systems   Constitutional: Negative for chills and fever. Gastrointestinal: Positive for abdominal pain. Negative for nausea and vomiting. Physical Exam  Vitals reviewed. Constitutional:       General: She is not in acute distress. Appearance: Normal appearance. She is normal weight. HENT:      Head: Normocephalic and atraumatic. Cardiovascular:      Rate and Rhythm: Normal rate and regular rhythm. Pulmonary:      Effort: Pulmonary effort is normal.      Breath sounds: Normal breath sounds. Abdominal:      General: There is no distension. Palpations: Abdomen is soft. Tenderness: There is no abdominal tenderness. There is no guarding or rebound. Hernia: A hernia is present. Hernia is present in the ventral area (Midline above umbilicus. Unchanged. ). Musculoskeletal:         General: Normal range of motion. Right lower leg: Edema present. Neurological:      Mental Status: She is alert. ASSESSMENT and PLAN  Ms. Estevan Ly is a 69 yo female with a ventral hernia. In view of the findings on H and P, she should benefit from repair since the hernia is symptomatic. Discussed ventral hernia repair, possibly with mesh, with her including risks of infection, bleeding, hernia recurrence. She understands and wishes to proceed. I have tentatively scheduled Ms. Estevan Ly for surgery on June 28, 2022 at [facility] after she has seen Dr. Chintan Contreras. Will see her back in the office postoperatively. Activity as tolerated for now. Follow up with Dr. Mary Dennison and Mr. Steiner as scheduled. Discussed plan with Ms. Estevan Ly and she is agreeable. CC: ALEN Saucedo MD R. Ruthie Han, MD

## 2022-05-11 ENCOUNTER — TELEPHONE (OUTPATIENT)
Dept: SURGERY | Age: 77
End: 2022-05-11

## 2022-05-11 NOTE — TELEPHONE ENCOUNTER
PAT called patient to set up appointment before 06/28 surgery. Patient stated she needed to cancel surgery due to not seeing the cardiologist until 06/23. She will then give us a call back to reschedule her surgery because she is not sure if she will be cleared or not. Told patient I would cancel surgery and she would need to call back when she is ready to schedule and to send us the clearance.

## 2022-06-13 ENCOUNTER — HOSPITAL ENCOUNTER (OUTPATIENT)
Dept: INFUSION THERAPY | Age: 77
Discharge: HOME | End: 2022-06-13
Payer: MEDICARE

## 2022-06-13 PROCEDURE — 86920 COMPATIBILITY TEST SPIN: CPT

## 2022-06-13 PROCEDURE — 86900 BLOOD TYPING SEROLOGIC ABO: CPT

## 2022-06-14 RX ORDER — DIPHENHYDRAMINE HCL 25 MG
25 CAPSULE ORAL ONCE
Status: COMPLETED | OUTPATIENT
Start: 2022-06-16 | End: 2022-06-16

## 2022-06-14 RX ORDER — FUROSEMIDE 10 MG/ML
20 INJECTION INTRAMUSCULAR; INTRAVENOUS ONCE
Status: COMPLETED | OUTPATIENT
Start: 2022-06-16 | End: 2022-06-16

## 2022-06-14 RX ORDER — ACETAMINOPHEN 325 MG/1
650 TABLET ORAL ONCE
Status: COMPLETED | OUTPATIENT
Start: 2022-06-16 | End: 2022-06-16

## 2022-06-14 RX ORDER — SODIUM CHLORIDE 9 MG/ML
25 INJECTION, SOLUTION INTRAVENOUS CONTINUOUS
Status: DISPENSED | OUTPATIENT
Start: 2022-06-16 | End: 2022-06-16

## 2022-06-16 ENCOUNTER — HOSPITAL ENCOUNTER (OUTPATIENT)
Dept: INFUSION THERAPY | Age: 77
Discharge: HOME | End: 2022-06-16
Payer: MEDICARE

## 2022-06-16 VITALS
RESPIRATION RATE: 17 BRPM | TEMPERATURE: 97.8 F | HEART RATE: 96 BPM | SYSTOLIC BLOOD PRESSURE: 113 MMHG | DIASTOLIC BLOOD PRESSURE: 62 MMHG

## 2022-06-16 LAB
HGB BLD-MCNC: 6.8 G/DL (ref 11.5–16)
HISTORY CHECKED?,CKHIST: NORMAL

## 2022-06-16 PROCEDURE — 74011250637 HC RX REV CODE- 250/637: Performed by: INTERNAL MEDICINE

## 2022-06-16 PROCEDURE — 96374 THER/PROPH/DIAG INJ IV PUSH: CPT

## 2022-06-16 PROCEDURE — 77030013169 SET IV BLD ICUM -A

## 2022-06-16 PROCEDURE — 36430 TRANSFUSION BLD/BLD COMPNT: CPT

## 2022-06-16 PROCEDURE — 85018 HEMOGLOBIN: CPT

## 2022-06-16 PROCEDURE — 74011250636 HC RX REV CODE- 250/636: Performed by: INTERNAL MEDICINE

## 2022-06-16 PROCEDURE — P9016 RBC LEUKOCYTES REDUCED: HCPCS

## 2022-06-16 RX ORDER — SODIUM CHLORIDE 9 MG/ML
250 INJECTION, SOLUTION INTRAVENOUS AS NEEDED
Status: DISCONTINUED | OUTPATIENT
Start: 2022-06-16 | End: 2022-06-17 | Stop reason: HOSPADM

## 2022-06-16 RX ADMIN — DIPHENHYDRAMINE HYDROCHLORIDE 25 MG: 25 CAPSULE ORAL at 08:35

## 2022-06-16 RX ADMIN — SODIUM CHLORIDE 25 ML/HR: 9 INJECTION, SOLUTION INTRAVENOUS at 08:42

## 2022-06-16 RX ADMIN — SODIUM CHLORIDE 25 ML/HR: 9 INJECTION, SOLUTION INTRAVENOUS at 12:01

## 2022-06-16 RX ADMIN — ACETAMINOPHEN 650 MG: 325 TABLET ORAL at 08:35

## 2022-06-16 RX ADMIN — FUROSEMIDE 20 MG: 10 INJECTION, SOLUTION INTRAVENOUS at 12:00

## 2022-06-16 NOTE — PROGRESS NOTES
[facility]IC Progress Note    Date: 2022    Name: Lula Solo    MRN: [id number]         : 1945      0800Pt arrived at [facility] and in no distress for transfusion of 2 units PRBCs. Assessment completed, no new complaints voiced. IV established in left FA without difficulty. Signs/symptoms of adverse blood reaction discussed with pt, voiced understanding. Medications received:  NS @ KVO  Tylenol 650 mg po  Benadryl 25 mg po  Lasix 20mg in between units  0920:  1st unit PRBCs started and infusing without difficulty, observed x 15 minutes  1156:  1st unit completed without adverse reaction noted, NS flushing line. 1230:  2nd unit PRBCs started and infusing without difficulty, observed x 15 minutes  1442:  2nd unit completed without adverse reaction noted, NS flushing line. Patient Vitals for the past 12 hrs:   Temp Pulse Resp BP   22 1442 97.8 °F (36.6 °C) 96 17 113/62   22 1244 97.6 °F (36.4 °C) 83 18 121/65   22 1226 97.8 °F (36.6 °C) 93 18 118/64   22 1156 97.2 °F (36.2 °C) 91 17 115/62   22 0935 97.1 °F (36.2 °C) 91 18 (!) 107/57   22 0907 97.2 °F (36.2 °C) 95 17 (!) 108/54   22 0806 97.4 °F (36.3 °C) (!) 106 18 123/68       1450 Tolerated transfusion  well, no adverse reaction noted. Piv removed per protocol Patient declined 1 hour post transfusion observation. D/Cd from [facility] in no distress. Patient aware of next [facility] appointment.     Future Appointments   Date Time Provider Sara Wei   2022 10:30 AM Miguel Moreno MD [facility] BS AMB   2022 10:00 AM Batool Howard NP [facility] BS AMB         Rosa Elena Stearns RN  2022

## 2022-06-17 LAB
ABO + RH BLD: NORMAL
BLD PROD TYP BPU: NORMAL
BLD PROD TYP BPU: NORMAL
BLOOD GROUP ANTIBODIES SERPL: NORMAL
BPU ID: NORMAL
BPU ID: NORMAL
CROSSMATCH RESULT,%XM: NORMAL
CROSSMATCH RESULT,%XM: NORMAL
SPECIMEN EXP DATE BLD: NORMAL
STATUS OF UNIT,%ST: NORMAL
STATUS OF UNIT,%ST: NORMAL
UNIT DIVISION, %UDIV: 0
UNIT DIVISION, %UDIV: 0

## 2022-06-23 ENCOUNTER — TELEPHONE (OUTPATIENT)
Dept: CARDIOLOGY CLINIC | Age: 77
End: 2022-06-23

## 2022-07-08 ENCOUNTER — TELEPHONE (OUTPATIENT)
Dept: CARDIOLOGY CLINIC | Age: 77
End: 2022-07-08

## 2022-07-08 ENCOUNTER — OFFICE VISIT (OUTPATIENT)
Dept: CARDIOLOGY CLINIC | Age: 77
End: 2022-07-08
Payer: MEDICARE

## 2022-07-08 VITALS
DIASTOLIC BLOOD PRESSURE: 60 MMHG | OXYGEN SATURATION: 97 % | TEMPERATURE: 98.3 F | HEART RATE: 89 BPM | WEIGHT: 139.6 LBS | SYSTOLIC BLOOD PRESSURE: 110 MMHG | BODY MASS INDEX: 23.26 KG/M2 | HEIGHT: 65 IN | RESPIRATION RATE: 18 BRPM

## 2022-07-08 DIAGNOSIS — I50.22 CHRONIC SYSTOLIC HEART FAILURE (HCC): Primary | ICD-10-CM

## 2022-07-08 DIAGNOSIS — R06.02 SOB (SHORTNESS OF BREATH): ICD-10-CM

## 2022-07-08 PROCEDURE — G8536 NO DOC ELDER MAL SCRN: HCPCS | Performed by: INTERNAL MEDICINE

## 2022-07-08 PROCEDURE — 93000 ELECTROCARDIOGRAM COMPLETE: CPT | Performed by: INTERNAL MEDICINE

## 2022-07-08 PROCEDURE — G8420 CALC BMI NORM PARAMETERS: HCPCS | Performed by: INTERNAL MEDICINE

## 2022-07-08 PROCEDURE — 1090F PRES/ABSN URINE INCON ASSESS: CPT | Performed by: INTERNAL MEDICINE

## 2022-07-08 PROCEDURE — 1123F ACP DISCUSS/DSCN MKR DOCD: CPT | Performed by: INTERNAL MEDICINE

## 2022-07-08 PROCEDURE — G8400 PT W/DXA NO RESULTS DOC: HCPCS | Performed by: INTERNAL MEDICINE

## 2022-07-08 PROCEDURE — G8427 DOCREV CUR MEDS BY ELIG CLIN: HCPCS | Performed by: INTERNAL MEDICINE

## 2022-07-08 PROCEDURE — 1101F PT FALLS ASSESS-DOCD LE1/YR: CPT | Performed by: INTERNAL MEDICINE

## 2022-07-08 PROCEDURE — 99215 OFFICE O/P EST HI 40 MIN: CPT | Performed by: INTERNAL MEDICINE

## 2022-07-08 PROCEDURE — G8510 SCR DEP NEG, NO PLAN REQD: HCPCS | Performed by: INTERNAL MEDICINE

## 2022-07-08 RX ORDER — METOLAZONE 2.5 MG/1
2.5 TABLET ORAL EVERY OTHER DAY
Qty: 5 TABLET | Refills: 0 | Status: SHIPPED | OUTPATIENT
Start: 2022-07-08 | End: 2022-09-13

## 2022-07-08 NOTE — PROGRESS NOTES
[facility], [facility] Note    Patient name: Nehemias Clark  Patient : 1945  Patient MRN: [id number]  Date of service: 22    Primary care physician: Dinora Steiner NP  Primary general cardiologist: Yoselyn Zamora established     Primary F Isauro Mcnair MD     CHIEF COMPLAINT:  Shortness of breath     PLAN OF CARE:  · 67 y/o with leukemia (WBC71) and normal heart function by echo, but possible acute on chronic diastolic heart failure, HFpEF; stage C, NYHA class IIIB, undergoing diuresis; may need RHC +/- LHC when near euvolemia and if NST abnormal    RECOMMENDATIONS:  Continue bumex to 2mg twice daily; add metolazone 2.5mg every other day; potassium not added due to high normal K; goal weight loss 6-8lbs by next week  Check baseline labs today and at next visit  Schedule NST to r/o CAD, echocardiogram and EKG  Try lidocaine patch for back pain OTC  Follow-up with PCP  Follow-up with hematology-oncology on treatment of leukemia, Dr. Himanshu Rodriguez  [facility] 7[facility]10 days to follow on diuresis with NP      IMPRESSION:  Shortness of breath at rest  Acute on chronic diastolic heart failure  HFpEF  Stage C, NYHA class IIIIB symptoms  Normal heart function, possible HFpEF  Myeloproliferative disorder   Severe leukocytosis; XDL 51L  Thrombocytosis; PLT 600s  Anemia, hgb 7  HTN  HLD  Right pleural effusion s/p thoracenthesis  Ventral hernia     LIFE GOALS:  Patient's personal goals include: TBD  Important upcoming milestones or family events: TBD  The patient identifies the following as important for living well: TBD     CARDIAC IMAGING:  Echo (22)   · Left Ventricle: Left ventricle size is normal. Normal wall thickness. Normal wall motion. Normal left ventricular systolic function with a visually estimated EF of 55 - 60%. Global longitudinal strain is normal with a value of -17.0%.  Diastolic dysfunction present with normal LV EF. · Mitral Valve: Mild transvalvular regurgitation. · Left Atrium: Left atrium is mildly dilated. · Right Atrium: Right atrium is mildly dilated. · Pericardium: Trivial localized pericardial effusion present around the posterior left ventricle. No indication of cardiac tamponade. EKG (1/28/22) ST, rate 101  LHC not done  NST not done      HEMODYNAMICS:  RHC not done  CPEST too ill  6MW too ill    OTHER IMAGING:  Chest CT (1/28/22)  1. No definite pulmonary emboli identified. 2. There is slight bibasilar atelectasis. There is a tiny right pleural  effusion. 3. Splenomegaly. 4. Cardiomegaly. There is an aberrant right subclavian artery  5. There is increased density throughout the visualized bony structures may  represent changes of metabolic bone disease. However there are also several  small lucent areas scattered in the thoracic spine which are nonspecific and  could represent focal areas of osteoporosis however metastatic bone  disease/myeloma is not excluded and correlation with prior studies would be  helpful. .     HISTORY OF PRESENT ILLNESS:  I had the pleasure of seeing Bre Titus in [facility] at [facility] in [place].     Ms. Ayesha Shipman is a 78 y/o female with h/o myeloproliferative disorder whose leukemia medication has been recently stopped with rise of WBC and PLT. She was admitted 2 weeks ago with dyspnea and initial cardiac and pulmonary workup was negative. She was supposed to see hematologist, but her visit is not scheduled until late March.     Patient was readmitted with severe dyspnea in 2/2022 and found to have large right sided effusion.  She underwent thoracenthesis, diuresis and hematology was consulted and resumed leukemia meds.      Patient was last seen in March - she was started on diuretics was was supposed to see us week later.     INTERVAL HISTORY:  Today, patient presents for routine clinic visit accompanied by her daughter.     Patient is doing \"okay\".     Patient walked to our clinic from parking garage but had to stop. Patient can walk more half a block without symptoms of fatigue or shortness of breath. Patient can walk 4 steps of stairs without symptoms of fatigue or shortness of breath. Patient can perform home activities without problem.      Patient reports 2+ BLE. Patient reports some abdominal bloating. Patient weight is 139lbs which is 7 lbs less than when we saw her in clinic in March 2022.     Patient reports one pillow orthopnea, but no PND or nocturia.     Patient denies irregular heart rate or palpitations. No presyncope or syncope.     Patient denies other cardiac symptoms such as chest pain or leg pain with walking.      Patient is compliant with fluid restriction and taking medications as prescribed. Patient manages his own medications. REVIEW OF SYSTEMS:  General: Denies fever, night sweats. Ear, nose and throat: Denies difficulty hearing, sinus problems, runny nose, post-nasal drip, ringing in ears, mouth sores, loose teeth, ear pain, nosebleeds, sore throate, facial pain or numbess  Cardiovascular: see above in the interval history  Respiratory: Denies cough, wheezing, sputum production, hemoptysis.   Gastrointestinal: Denies heartburn, constipation, diarrhea, abdominal pain, nausea, vomiting, difficulty swallowing, blood in stool  Kidney and bladder: Denies painful urination, frequent urination, urgency  Musculoskeletal: Denies joint pain, muscle weakness  Skin and hair: Denies change in existing skin lesions, hair loss or increase, breast changes    PHYSICAL EXAM:  Visit Vitals  /60 (BP 1 Location: Left arm, BP Patient Position: Sitting, BP Cuff Size: Adult)   Pulse 89   Temp 98.3 °F (36.8 °C) (Oral)   Resp 18   Ht 5' 5\" (1.651 m)   Wt 139 lb 9.6 oz (63.3 kg)   SpO2 97%   BMI 23.23 kg/m²     General: Patient is well developed, well-nourished in no acute distress  HEENT: Normocephalic and atraumatic. No scleral icterus. Pupils are equal, round and reactive to light and accomodation. No conjunctival injection. Oropharynx is clear. Neck: Supple. No evidence of thyroid enlargements or lymphadenopathy. JVD: JVD 22cm  Lungs: Breath sounds are equal and clear bilaterally. No wheezes, rhonchi, or rales. Heart: Regular rate and rhythm with normal S1 and S2. No murmurs, gallops or rubs. Abdomen: Soft, no mass or tenderness. No organomegaly or hernia. Bowel sounds present. Ventral hernia  Genitourinary and rectal: deferred  Extremities: No cyanosis, clubbing, or edema. Neurologic: No focal sensory or motor deficits are noted. Grossly intact. Psychiatric: Awake, alert an doriented x 3. Appropriate mood and affect. Skin: Warm, dry and well perfused. No lesions, nodules or rashes are noted. PAST MEDICAL HISTORY:  Past Medical History:   Diagnosis Date    Anemia     Congestive heart failure (HCC)     Hypertension     Menopause     Ventral hernia without obstruction or gangrene 4/18/2022       PAST SURGICAL HISTORY:  Past Surgical History:   Procedure Laterality Date    HX OTHER SURGICAL  04/13/2022     bone marrow       FAMILY HISTORY:  Family History   Problem Relation Age of Onset    Breast Cancer Sister         63's    Cancer Sister     Breast Problems Sister     Hypertension Mother        SOCIAL HISTORY:  Social History     Socioeconomic History    Marital status: SINGLE   Tobacco Use    Smoking status: Never Smoker    Smokeless tobacco: Never Used   Substance and Sexual Activity    Alcohol use: Never    Drug use: Not Currently       LABORATORY RESULTS:  No flowsheet data found. ALLERGY:  No Known Allergies     CURRENT MEDICATIONS:    Current Outpatient Medications:     bumetanide (BUMEX) 2 mg tablet, Take one tablet by mouth twice daily.  May take one additional tablet by mouth daily as needed for increased shortness of breath, noticeable swelling, weight gain of 3 lbs or more overnight or 5 lbs or more in one week., Disp: 60 Tablet, Rfl: 2    ARIPiprazole (ABILIFY) 30 mg tablet, Take 30 mg by mouth daily. , Disp: , Rfl:     aspirin 81 mg cap, Take  by mouth daily. , Disp: , Rfl:     multivit-min-FA-lycopen-lutein (Centrum Silver) 0.4-300-250 mg-mcg-mcg tab, Take  by mouth daily. , Disp: , Rfl:     docusate sodium (COLACE) 100 mg capsule, Take 100 mg by mouth two (2) times a day., Disp: , Rfl:     ferrous sulfate 325 mg (65 mg iron) tablet, Take  by mouth Daily (before breakfast). , Disp: , Rfl:     magnesium oxide (MAG-OX) 400 mg tablet, Take 400 mg by mouth daily. , Disp: , Rfl:     cholecalciferol, vitamin D3, (Vitamin D3) 50 mcg (2,000 unit) tab, Take 2,000 Units by mouth daily. , Disp: , Rfl:     DM/pseudoephed/acetaminoph/cpm (GRACY-SELTZER PLUS COLD/COUGH PO), Take  by mouth as needed. , Disp: , Rfl:     hydroxyurea (HYDREA) 500 mg capsule, Take 500 mg by mouth daily. (Patient not taking: Reported on 7/8/2022), Disp: , Rfl:     ipratropium (ATROVENT) 21 mcg (0.03 %) nasal spray, 2 Sprays by Both Nostrils route as needed for Rhinitis. (Patient not taking: Reported on 7/8/2022), Disp: , Rfl:     Thank you for your referral and allowing me to participate in this patient's care.     Faizan oCndon MD PhD  [address] [place] [address], [address]  Phone: [phone]  Fax: [phone]    PATIENT CARE TEAM:  Patient Care Team:  Phong Vanessa NP as PCP - General (Nurse Practitioner)  Skylar Baker MD (Hematology and Oncology)     Total visit time: 45 minutes  (> 50% spent face-to-face counseling)

## 2022-07-08 NOTE — TELEPHONE ENCOUNTER
Called [facility] and spoke to [address] [place] [address]. She states that she will add patient to be weighted daily even during the weekend. She also notes that all [facility] and that they should receive delivery of medication tonight. Miryam Bradford RN       [facility] and [facility] to [place]. She states that they have received new script for metolazone and will send it out to ananya navarro today.  Miryam Bradford RN

## 2022-07-08 NOTE — PATIENT INSTRUCTIONS
Medication changes:    START metolazone 2.5mg- take one tablet every other day 30 minutes prior to taking bumex     Goal is to loose 6 pounds in the next week if you begin to loose more weight then 6 lbs please call [phone] option 2         Please take this to your pharmacy to notify them of the change in medications. Testing Ordered:    Labs drawn in clinic today you will be notified of any abnormal results that require a change in medication regimen. Other Recommendations:     Please weight yourself daily and keep a log    An order for echo and ekg has been placed to be done 1 week. You will be receiving an automated call from Coordination of Care to schedule this test. If you are unavailable to receive the call or would like to contact coordination of care yourself you may contact [phone] to schedule. You will need to contact coordination of care yourself if you miss their calls as they will only make 3 attempts to reach you. Someone will contact you in regards to stress testing. Ensure your drinking an adequate amount of water with a goal of 6-8 eight ounce glasses (1.5-2 liters) of fluid daily. Your urine should be clear and light yellow straw colored. If your blood pressure begins to consistently run below 90/60 and/or you begin to experience dizziness or lightheadedness, please contact the [facility] at [phone]. Follow up  6 to 10 days with [facility]      Please monitor your weights daily upon waking and after using the bathroom. Keep a written records of your weights and bring to your next appointment. If you have a weight gain of 3 or more pounds overnight OR 5 or more pounds in one week please contact our office. Thank you for allowing us the privilege of being a part of your healthcare team! Please do not hesitate to contact our office at [phone] with any questions or concerns.        Virtual Heart Failure Support [address] invites you to learn more about heart failure and to share your questions, ideas, and experiences with others. Each month, the Heart Failure Support Group features a new educational topic and a guest speaker, followed by an interactive discussion. Our Heart Failure Nurse Navigator will moderate each session. You will be able to participate by phone, tablet or computer through [employer]. This support group takes place on the 3rd Thursday of each month from 6:00-7:30PM. All individuals living with heart failure and their caregivers are welcome to join. If you are interested in participating, please contact us at [email or web address] and you will be sent the link to join the [employer].

## 2022-07-09 LAB
ALBUMIN SERPL-MCNC: 3.4 G/DL (ref 3.5–5)
ALBUMIN SERPL-MCNC: 3.4 G/DL (ref 3.5–5)
ALBUMIN/GLOB SERPL: 1.1 {RATIO} (ref 1.1–2.2)
ALP SERPL-CCNC: 153 U/L (ref 45–117)
ALT SERPL-CCNC: 18 U/L (ref 12–78)
ANION GAP SERPL CALC-SCNC: 8 MMOL/L (ref 5–15)
AST SERPL-CCNC: 47 U/L (ref 15–37)
BILIRUB SERPL-MCNC: 0.3 MG/DL (ref 0.2–1)
BNP SERPL-MCNC: 4176 PG/ML
BUN SERPL-MCNC: 44 MG/DL (ref 6–20)
BUN/CREAT SERPL: 25 (ref 12–20)
CALCIUM SERPL-MCNC: 8.7 MG/DL (ref 8.5–10.1)
CHLORIDE SERPL-SCNC: 101 MMOL/L (ref 97–108)
CO2 SERPL-SCNC: 27 MMOL/L (ref 21–32)
CREAT SERPL-MCNC: 1.73 MG/DL (ref 0.55–1.02)
ERYTHROCYTE [DISTWIDTH] IN BLOOD BY AUTOMATED COUNT: 17.2 % (ref 11.5–14.5)
GLOBULIN SER CALC-MCNC: 3.1 G/DL (ref 2–4)
GLUCOSE SERPL-MCNC: 67 MG/DL (ref 65–100)
HCT VFR BLD AUTO: 30.1 % (ref 35–47)
HGB BLD-MCNC: 8.2 G/DL (ref 11.5–16)
MAGNESIUM SERPL-MCNC: 2.7 MG/DL (ref 1.6–2.4)
MCH RBC QN AUTO: 26.8 PG (ref 26–34)
MCHC RBC AUTO-ENTMCNC: 27.2 G/DL (ref 30–36.5)
MCV RBC AUTO: 98.4 FL (ref 80–99)
NRBC # BLD: 1.93 K/UL (ref 0–0.01)
NRBC BLD-RTO: 1.5 PER 100 WBC
PLATELET # BLD AUTO: 389 K/UL (ref 150–400)
PMV BLD AUTO: 12.8 FL (ref 8.9–12.9)
POTASSIUM SERPL-SCNC: 4.8 MMOL/L (ref 3.5–5.1)
PROT SERPL-MCNC: 6.5 G/DL (ref 6.4–8.2)
RBC # BLD AUTO: 3.06 M/UL (ref 3.8–5.2)
SODIUM SERPL-SCNC: 136 MMOL/L (ref 136–145)
WBC # BLD AUTO: 126.7 K/UL (ref 3.6–11)

## 2022-07-11 ENCOUNTER — TELEPHONE (OUTPATIENT)
Dept: CARDIOLOGY CLINIC | Age: 77
End: 2022-07-11

## 2022-07-11 NOTE — TELEPHONE ENCOUNTER
I called Dr. Goyo Goldstein office due to WBC count being 126,000 per request of Dr. Lona Jj. Per Dr. Goyo Goldstein nurse, this is down from 144,000, so patient is \"doing better\". Will fax lab results to Dr. Goyo Goldstein office for review. She states understanding.

## 2022-07-14 ENCOUNTER — TELEPHONE (OUTPATIENT)
Dept: CARDIOLOGY CLINIC | Age: 77
End: 2022-07-14

## 2022-08-03 ENCOUNTER — TELEPHONE (OUTPATIENT)
Dept: CARDIOLOGY CLINIC | Age: 77
End: 2022-08-03

## 2022-08-03 ENCOUNTER — HOSPITAL ENCOUNTER (OUTPATIENT)
Dept: NON INVASIVE DIAGNOSTICS | Age: 77
Discharge: HOME | End: 2022-08-03
Attending: INTERNAL MEDICINE
Payer: MEDICARE

## 2022-08-03 ENCOUNTER — OFFICE VISIT (OUTPATIENT)
Dept: CARDIOLOGY CLINIC | Age: 77
End: 2022-08-03
Payer: MEDICARE

## 2022-08-03 VITALS
DIASTOLIC BLOOD PRESSURE: 60 MMHG | BODY MASS INDEX: 23.14 KG/M2 | HEIGHT: 65 IN | WEIGHT: 138.89 LBS | SYSTOLIC BLOOD PRESSURE: 110 MMHG

## 2022-08-03 VITALS
WEIGHT: 138.2 LBS | TEMPERATURE: 98 F | SYSTOLIC BLOOD PRESSURE: 104 MMHG | HEIGHT: 63 IN | DIASTOLIC BLOOD PRESSURE: 58 MMHG | OXYGEN SATURATION: 93 % | HEART RATE: 112 BPM | RESPIRATION RATE: 16 BRPM | BODY MASS INDEX: 24.49 KG/M2

## 2022-08-03 DIAGNOSIS — I50.22 CHRONIC SYSTOLIC HEART FAILURE (HCC): Primary | ICD-10-CM

## 2022-08-03 DIAGNOSIS — I50.22 CHRONIC SYSTOLIC HEART FAILURE (HCC): ICD-10-CM

## 2022-08-03 DIAGNOSIS — R06.02 SOB (SHORTNESS OF BREATH): ICD-10-CM

## 2022-08-03 DIAGNOSIS — N18.32 STAGE 3B CHRONIC KIDNEY DISEASE (HCC): ICD-10-CM

## 2022-08-03 PROCEDURE — 99214 OFFICE O/P EST MOD 30 MIN: CPT | Performed by: NURSE PRACTITIONER

## 2022-08-03 PROCEDURE — G8536 NO DOC ELDER MAL SCRN: HCPCS | Performed by: NURSE PRACTITIONER

## 2022-08-03 PROCEDURE — 1090F PRES/ABSN URINE INCON ASSESS: CPT | Performed by: NURSE PRACTITIONER

## 2022-08-03 PROCEDURE — 1123F ACP DISCUSS/DSCN MKR DOCD: CPT | Performed by: NURSE PRACTITIONER

## 2022-08-03 PROCEDURE — G8400 PT W/DXA NO RESULTS DOC: HCPCS | Performed by: NURSE PRACTITIONER

## 2022-08-03 PROCEDURE — 93306 TTE W/DOPPLER COMPLETE: CPT

## 2022-08-03 PROCEDURE — 1101F PT FALLS ASSESS-DOCD LE1/YR: CPT | Performed by: NURSE PRACTITIONER

## 2022-08-03 PROCEDURE — G8432 DEP SCR NOT DOC, RNG: HCPCS | Performed by: NURSE PRACTITIONER

## 2022-08-03 PROCEDURE — G8427 DOCREV CUR MEDS BY ELIG CLIN: HCPCS | Performed by: NURSE PRACTITIONER

## 2022-08-03 PROCEDURE — G8420 CALC BMI NORM PARAMETERS: HCPCS | Performed by: NURSE PRACTITIONER

## 2022-08-03 NOTE — TELEPHONE ENCOUNTER
Called Dr. Galo Strickland office and spoke with Yesenia Peraza. Notified her that I would like last office visit note and last set of labs faxed to [facility]. Provided [facility] fax number. She states she will fax documents now.  Prem Carbajal RN

## 2022-08-03 NOTE — PROGRESS NOTES
[facility] in [place], [address] [place] [address] Note    Patient name: Latanya Alicea  Patient : 1945  Patient MRN: [id number]  Date of service: 22    Primary care physician: Luis Freeman NP  Primary general cardiologist:  Not established     Primary F cardiologist: Melba Cortez MD     CHIEF COMPLAINT:  Shortness of breath     PLAN OF CARE:  67 y/o with leukemia (WBC71) and normal heart function by echo, but possible acute on chronic diastolic heart failure, HFpEF; stage C, NYHA class IIIB, undergoing diuresis; may need RHC +/- LHC when near euvolemia and if NST abnormal  Pt reports significant UOP increase with metolazone, however weight unchanged today and pt clinically appears volume overloaded  Will request records from heme/onc to get details on her chemo regimen and fluids administered with this regimen. Suspect we may need to alter diuretics the week of or week following chemo d/t increased fluids given with treatment. RECOMMENDATIONS:  Continue bumex 2mg twice daily  Can consider additional metolazone 2.5mg pending lab results  potassium not added due to high normal K  Labs today in clinic  Schedule NST to r/o CAD  Echo done today- mild LV dysfunction with LVEF 40-45%  Try lidocaine patch for back pain OTC  Follow-up with PCP  Follow-up with hematology-oncology on treatment of leukemia, Dr. Ragena Goodpasture  Return to [facility] in 1 month with NP      IMPRESSION:  Shortness of breath at rest  Acute on chronic diastolic heart failure  HFpEF  Stage C, NYHA class IIIIB symptoms  Normal heart function, possible HFpEF  Myeloproliferative disorder   Severe leukocytosis; WBC 71K  Thrombocytosis; PLT 600s  Anemia, hgb 7  HTN  HLD  Right pleural effusion s/p thoracenthesis  Ventral hernia        CARDIAC IMAGING:  Echo (8/3/22)    Left Ventricle: Reduced left ventricular systolic function with a visually estimated EF of 40 - 45%.  Left ventricle is mildly dilated. Normal wall thickness. Mild global hypokinesis present. Normal diastolic function. Mitral Valve: Mild regurgitation. Left Atrium: Left atrium is mildly dilated. Pericardium: Small (<1 cm) circumferential pericardial effusion present. No indication of cardiac tamponade. Echo (1/28/22)   Left Ventricle: Left ventricle size is normal. Normal wall thickness. Normal wall motion. Normal left ventricular systolic function with a visually estimated EF of 55 - 60%. Global longitudinal strain is normal with a value of -17.0%. Diastolic dysfunction present with normal LV EF. Mitral Valve: Mild transvalvular regurgitation. Left Atrium: Left atrium is mildly dilated. Right Atrium: Right atrium is mildly dilated. Pericardium: Trivial localized pericardial effusion present around the posterior left ventricle. No indication of cardiac tamponade. EKG (1/28/22) ST, rate 101  LHC not done  NST not done        OTHER IMAGING:  Chest CT (1/28/22)  1. No definite pulmonary emboli identified. 2. There is slight bibasilar atelectasis. There is a tiny right pleural  effusion. 3. Splenomegaly. 4. Cardiomegaly. There is an aberrant right subclavian artery  5. There is increased density throughout the visualized bony structures may  represent changes of metabolic bone disease. However there are also several  small lucent areas scattered in the thoracic spine which are nonspecific and  could represent focal areas of osteoporosis however metastatic bone  disease/myeloma is not excluded and correlation with prior studies would be  helpful. Valdemar Méndez HISTORY OF PRESENT ILLNESS:  I had the pleasure of seeing Paul Milner in [facility] at [facility] in [place]. Ms. Brittany Stout is a 67 y/o female with h/o myeloproliferative disorder whose leukemia medication has been recently stopped with rise of WBC and PLT.  She was admitted 2 weeks ago with dyspnea and initial cardiac and pulmonary workup was negative. She was supposed to see hematologist.     Patient was readmitted with severe dyspnea in 2/2022 and found to have large right sided effusion. She underwent thoracenthesis, diuresis and hematology was consulted and resumed leukemia meds. Patient was last seen in March - she was started on diuretics was was supposed to see us week later. INTERVAL HISTORY:  Today, patient presents for routine clinic visit. she reports doing ok since last visit. she walked to our clinic from front entrance slowly, but without having to stop. Patient can perform home activities with some limitation. She is having issues with balance, but denies lightheadedness or dizziness. she denies other cardiac symptoms such as chest pain or leg pain with walking. Ongoing chronic LE edema, unchanged. Saw Dr. Daina Hall, getting week long chemo protocol every 21 days. she is compliant with fluid restriction and taking medications as prescribed. Patient manages his own medications. REVIEW OF SYSTEMS:  Review of Systems   Constitutional:  Positive for malaise/fatigue. Negative for chills, fever and weight loss. Respiratory:  Positive for cough and shortness of breath. Cardiovascular:  Positive for chest pain and leg swelling. Negative for palpitations and orthopnea. Gastrointestinal:  Negative for abdominal pain, heartburn, nausea and vomiting. Neurological:  Positive for weakness. Negative for dizziness. \"Off balance\"          PHYSICAL EXAM:  Visit Vitals  BP (!) 104/58 (BP 1 Location: Right arm, BP Patient Position: Sitting, BP Cuff Size: Adult)   Pulse (!) 112   Temp 98 °F (36.7 °C) (Oral)   Resp 16   Ht 5' 3\" (1.6 m)   Wt 138 lb 3.2 oz (62.7 kg)   SpO2 93%   BMI 24.48 kg/m²     Physical Exam  Vitals and nursing note reviewed. Constitutional:       General: She is not in acute distress. Appearance: Normal appearance.    Neck:      Vascular: Hepatojugular reflux and JVD present. Cardiovascular:      Rate and Rhythm: Regular rhythm. Tachycardia present. Pulses: Normal pulses. Heart sounds: No murmur heard. No friction rub. No gallop. Pulmonary:      Effort: Pulmonary effort is normal. No respiratory distress. Breath sounds: Examination of the right-lower field reveals decreased breath sounds. Examination of the left-lower field reveals decreased breath sounds. Decreased breath sounds present. Abdominal:      General: Bowel sounds are normal. There is no distension. Palpations: Abdomen is soft. Tenderness: There is no abdominal tenderness. Musculoskeletal:      Right lower le+ Edema present. Left lower le+ Edema present. Skin:     General: Skin is warm and dry. Capillary Refill: Capillary refill takes less than 2 seconds. Neurological:      General: No focal deficit present. Mental Status: She is alert and oriented to person, place, and time. Psychiatric:         Mood and Affect: Mood normal.         Behavior: Behavior normal.         Thought Content:  Thought content normal.         Judgment: Judgment normal.          PAST MEDICAL HISTORY:  Past Medical History:   Diagnosis Date    Anemia     Congestive heart failure (HCC)     Hypertension     Menopause     Ventral hernia without obstruction or gangrene 2022       PAST SURGICAL HISTORY:  Past Surgical History:   Procedure Laterality Date    HX OTHER SURGICAL  2022     bone marrow       FAMILY HISTORY:  Family History   Problem Relation Age of Onset    Breast Cancer Sister         63's    Cancer Sister     Breast Problems Sister     Hypertension Mother        SOCIAL HISTORY:  Social History     Socioeconomic History    Marital status: SINGLE   Tobacco Use    Smoking status: Never    Smokeless tobacco: Never   Substance and Sexual Activity    Alcohol use: Never    Drug use: Not Currently       LABORATORY RESULTS:  Labs Latest Ref Rng & Units 7/8/2022 7/8/2022   WBC 3.6 - 11.0 K/uL - 126. 7(HH)   RBC 3.80 - 5.20 M/uL - 3.06(L)   Hemoglobin 11.5 - 16.0 g/dL - 8. 2(L)   Hematocrit 35.0 - 47.0 % - 30. 1(L)   MCV 80.0 - 99.0 FL - 98.4   Platelets 506 - 583 K/uL - 389   Albumin 3.5 - 5.0 g/dL 3.4(L) 3.4(L)   Calcium 8.5 - 10.1 MG/DL - 8.7   Glucose 65 - 100 mg/dL - 67   BUN 6 - 20 MG/DL - 44(H)   Creatinine 0.55 - 1.02 MG/DL - 1.73(H)   Sodium 136 - 145 mmol/L - 136   Potassium 3.5 - 5.1 mmol/L - 4.8   Some recent data might be hidden       ALLERGY:  No Known Allergies     CURRENT MEDICATIONS:    Current Outpatient Medications:     metOLazone (ZAROXOLYN) 2.5 mg tablet, Take 1 Tablet by mouth every other day., Disp: 5 Tablet, Rfl: 0    bumetanide (BUMEX) 2 mg tablet, Take one tablet by mouth twice daily. May take one additional tablet by mouth daily as needed for increased shortness of breath, noticeable swelling, weight gain of 3 lbs or more overnight or 5 lbs or more in one week., Disp: 60 Tablet, Rfl: 2    ARIPiprazole (ABILIFY) 30 mg tablet, Take 30 mg by mouth daily. , Disp: , Rfl:     aspirin 81 mg cap, Take  by mouth daily. , Disp: , Rfl:     docusate sodium (COLACE) 100 mg capsule, Take 100 mg by mouth two (2) times a day., Disp: , Rfl:     ferrous sulfate 325 mg (65 mg iron) tablet, Take  by mouth Daily (before breakfast). , Disp: , Rfl:     magnesium oxide (MAG-OX) 400 mg tablet, Take 400 mg by mouth daily. , Disp: , Rfl:     cholecalciferol, vitamin D3, 50 mcg (2,000 unit) tab, Take 2,000 Units by mouth daily. , Disp: , Rfl:     hydroxyurea (HYDREA) 500 mg capsule, Take 500 mg by mouth daily. (Patient not taking: No sig reported), Disp: , Rfl:     multivit-min-FA-lycopen-lutein (Centrum Silver) 0.4-300-250 mg-mcg-mcg tab, Take  by mouth daily. (Patient not taking: Reported on 8/3/2022), Disp: , Rfl:     DM/pseudoephed/acetaminoph/cpm (GRACY-SELTZER PLUS COLD/COUGH PO), Take  by mouth as needed.  (Patient not taking: Reported on 8/3/2022), Disp: , Rfl: ipratropium (ATROVENT) 21 mcg (0.03 %) nasal spray, 2 Sprays by Both Nostrils route as needed for Rhinitis.  (Patient not taking: No sig reported), Disp: , Rfl:         Tiki Davis NP  [address] [place] [address], [address] [place] [address]  Office [phone]  Fax [phone]      PATIENT CARE TEAM:  Patient Care Team:  Jaspal Roman NP as PCP - General (Nurse Practitioner)  Wolfgang Domínguez MD (Hematology and Oncology)

## 2022-08-03 NOTE — PATIENT INSTRUCTIONS
Medication changes:    No medication changes    Continue bumex 2 mg twice a day     Please take this to your pharmacy to notify them of the change in medications. Testing Ordered:    Labs drawn today. You will be notified of any abnormal results that require a change in medication regimen    Other Recommendations: An order for nuclear cardiac stress test  has been placed to be done next couple of weeks. You will be receiving an automated call from [facility] to schedule this test. If you are unavailable to receive the call or would like to contact coordination of care yourself you may contact [phone] to schedule. You will need to contact coordination of care yourself if you miss their calls as they will only make 3 attempts to reach you. Ensure your drinking an adequate amount of water with a goal of 6-8 eight ounce glasses (1.5-2 liters) of fluid daily. Your urine should be clear and light yellow straw colored. If your blood pressure begins to consistently run below 90/60 and/or you begin to experience dizziness or lightheadedness, please contact the [facility] at [phone]. Follow up 1 month  with [facility]      Please monitor your weights daily upon waking and after using the bathroom. Keep a written records of your weights and bring to your next appointment. If you have a weight gain of 3 or more pounds overnight OR 5 or more pounds in one week please contact our office. Thank you for allowing us the privilege of being a part of your healthcare team! Please do not hesitate to contact our office at [phone] with any questions or concerns. Virtual Heart Failure [address] invites you to learn more about heart failure and to share your questions, ideas, and experiences with others.  Each month, the Heart Failure Support Group features a new educational topic and a guest speaker, followed by an interactive discussion. Our Heart Failure Nurse Navigator will moderate each session. You will be able to participate by phone, tablet or computer through [address] [place] [address]. This support group takes place on the 3rd Thursday of each month from 6:00-7:30PM. All individuals living with heart failure and their caregivers are welcome to join. If you are interested in participating, please contact us at [email or web address] and you will be sent the link to join the [employer].

## 2022-08-04 PROBLEM — N18.4 CKD (CHRONIC KIDNEY DISEASE) STAGE 4, GFR 15-29 ML/MIN (HCC): Status: ACTIVE | Noted: 2022-08-04

## 2022-08-04 LAB
ALBUMIN SERPL-MCNC: 3 G/DL (ref 3.5–5)
ALBUMIN/GLOB SERPL: 1 {RATIO} (ref 1.1–2.2)
ALP SERPL-CCNC: 209 U/L (ref 45–117)
ALT SERPL-CCNC: 19 U/L (ref 12–78)
ANION GAP SERPL CALC-SCNC: 8 MMOL/L (ref 5–15)
AST SERPL-CCNC: 36 U/L (ref 15–37)
BILIRUB SERPL-MCNC: 0.3 MG/DL (ref 0.2–1)
BNP SERPL-MCNC: 8825 PG/ML
BUN SERPL-MCNC: 39 MG/DL (ref 6–20)
BUN/CREAT SERPL: 22 (ref 12–20)
CALCIUM SERPL-MCNC: 8.6 MG/DL (ref 8.5–10.1)
CHLORIDE SERPL-SCNC: 104 MMOL/L (ref 97–108)
CO2 SERPL-SCNC: 26 MMOL/L (ref 21–32)
COMMENT, HOLDF: NORMAL
CREAT SERPL-MCNC: 1.77 MG/DL (ref 0.55–1.02)
GLOBULIN SER CALC-MCNC: 3.1 G/DL (ref 2–4)
GLUCOSE SERPL-MCNC: 87 MG/DL (ref 65–100)
MAGNESIUM SERPL-MCNC: 2.9 MG/DL (ref 1.6–2.4)
POTASSIUM SERPL-SCNC: 4.8 MMOL/L (ref 3.5–5.1)
PROT SERPL-MCNC: 6.1 G/DL (ref 6.4–8.2)
SAMPLES BEING HELD,HOLD: NORMAL
SODIUM SERPL-SCNC: 138 MMOL/L (ref 136–145)

## 2022-08-15 ENCOUNTER — HOSPITAL ENCOUNTER (OUTPATIENT)
Dept: NON INVASIVE DIAGNOSTICS | Age: 77
Discharge: HOME | End: 2022-08-15
Attending: NURSE PRACTITIONER
Payer: MEDICARE

## 2022-08-15 ENCOUNTER — HOSPITAL ENCOUNTER (OUTPATIENT)
Dept: NUCLEAR MEDICINE | Age: 77
Discharge: HOME | End: 2022-08-15
Attending: NURSE PRACTITIONER
Payer: MEDICARE

## 2022-08-15 VITALS
HEIGHT: 65 IN | SYSTOLIC BLOOD PRESSURE: 119 MMHG | DIASTOLIC BLOOD PRESSURE: 68 MMHG | WEIGHT: 138 LBS | BODY MASS INDEX: 22.99 KG/M2

## 2022-08-15 DIAGNOSIS — I50.22 CHRONIC SYSTOLIC HEART FAILURE (HCC): ICD-10-CM

## 2022-08-15 LAB
STRESS BASELINE DIAS BP: 68 MMHG
STRESS BASELINE HR: 93 BPM
STRESS BASELINE ST DEPRESSION: 0 MM
STRESS BASELINE SYS BP: 119 MMHG
STRESS ESTIMATED WORKLOAD: 1 METS
STRESS EXERCISE DUR MIN: 3 MIN
STRESS EXERCISE DUR SEC: 0 SEC
STRESS PEAK DIAS BP: 68 MMHG
STRESS PEAK SYS BP: 119 MMHG
STRESS PERCENT HR ACHIEVED: 72 %
STRESS POST PEAK HR: 103 BPM
STRESS RATE PRESSURE PRODUCT: NORMAL BPM*MMHG
STRESS STAGE 1 BP: NORMAL MMHG
STRESS STAGE 1 DURATION: 1 MIN:SEC
STRESS STAGE 1 HR: 93 BPM
STRESS STAGE 2 DURATION: 1 MIN:SEC
STRESS STAGE 2 HR: 98 BPM
STRESS STAGE 3 BP: NORMAL MMHG
STRESS STAGE 3 DURATION: 1 MIN:SEC
STRESS STAGE 3 HR: 100 BPM
STRESS STAGE RECOVERY 1 BP: NORMAL MMHG
STRESS STAGE RECOVERY 1 DURATION: 1 MIN:SEC
STRESS STAGE RECOVERY 1 HR: 100 BPM
STRESS STAGE RECOVERY 2 DURATION: 1 MIN:SEC
STRESS STAGE RECOVERY 2 HR: 102 BPM
STRESS STAGE RECOVERY 3 DURATION: 1 MIN:SEC
STRESS STAGE RECOVERY 3 HR: 101 BPM
STRESS TARGET HR: 143 BPM

## 2022-08-15 PROCEDURE — 78452 HT MUSCLE IMAGE SPECT MULT: CPT

## 2022-08-15 PROCEDURE — 74011000250 HC RX REV CODE- 250: Performed by: NURSE PRACTITIONER

## 2022-08-15 PROCEDURE — 74011250636 HC RX REV CODE- 250/636: Performed by: NURSE PRACTITIONER

## 2022-08-15 PROCEDURE — 93017 CV STRESS TEST TRACING ONLY: CPT

## 2022-08-15 RX ORDER — SODIUM CHLORIDE 0.9 % (FLUSH) 0.9 %
20 SYRINGE (ML) INJECTION
Status: COMPLETED | OUTPATIENT
Start: 2022-08-15 | End: 2022-08-15

## 2022-08-15 RX ORDER — TETRAKIS(2-METHOXYISOBUTYLISOCYANIDE)COPPER(I) TETRAFLUOROBORATE 1 MG/ML
10.6 INJECTION, POWDER, LYOPHILIZED, FOR SOLUTION INTRAVENOUS
Status: COMPLETED | OUTPATIENT
Start: 2022-08-15 | End: 2022-08-15

## 2022-08-15 RX ORDER — TETRAKIS(2-METHOXYISOBUTYLISOCYANIDE)COPPER(I) TETRAFLUOROBORATE 1 MG/ML
31.9 INJECTION, POWDER, LYOPHILIZED, FOR SOLUTION INTRAVENOUS
Status: COMPLETED | OUTPATIENT
Start: 2022-08-15 | End: 2022-08-15

## 2022-08-15 RX ADMIN — TETRAKIS(2-METHOXYISOBUTYLISOCYANIDE)COPPER(I) TETRAFLUOROBORATE 31.9 MILLICURIE: 1 INJECTION, POWDER, LYOPHILIZED, FOR SOLUTION INTRAVENOUS at 10:00

## 2022-08-15 RX ADMIN — SODIUM CHLORIDE, PRESERVATIVE FREE 20 ML: 5 INJECTION INTRAVENOUS at 10:22

## 2022-08-15 RX ADMIN — TETRAKIS(2-METHOXYISOBUTYLISOCYANIDE)COPPER(I) TETRAFLUOROBORATE 10.6 MILLICURIE: 1 INJECTION, POWDER, LYOPHILIZED, FOR SOLUTION INTRAVENOUS at 08:40

## 2022-08-15 RX ADMIN — REGADENOSON 0.4 MG: 0.08 INJECTION, SOLUTION INTRAVENOUS at 10:21

## 2022-09-07 ENCOUNTER — TRANSCRIBE ORDER (OUTPATIENT)
Dept: SCHEDULING | Age: 77
End: 2022-09-07

## 2022-09-07 ENCOUNTER — TELEPHONE (OUTPATIENT)
Dept: CARDIOLOGY CLINIC | Age: 77
End: 2022-09-07

## 2022-09-07 DIAGNOSIS — Z12.31 VISIT FOR SCREENING MAMMOGRAM: Primary | ICD-10-CM

## 2022-09-07 NOTE — TELEPHONE ENCOUNTER
Telephone Call RE:  Appointment reminder     Outcome:     [x] Patient confirmed appointment   [] Patient rescheduled appointment for    [] Unable to reach  [] Left message             [] Other:     ---------------------             [x] Screened for [facility]

## 2022-09-08 ENCOUNTER — HOSPITAL ENCOUNTER (OUTPATIENT)
Dept: GENERAL RADIOLOGY | Age: 77
Discharge: HOME | End: 2022-09-08
Payer: MEDICARE

## 2022-09-08 ENCOUNTER — OFFICE VISIT (OUTPATIENT)
Dept: CARDIOLOGY CLINIC | Age: 77
End: 2022-09-08
Payer: MEDICARE

## 2022-09-08 ENCOUNTER — TELEPHONE (OUTPATIENT)
Dept: CARDIOLOGY CLINIC | Age: 77
End: 2022-09-08

## 2022-09-08 VITALS
SYSTOLIC BLOOD PRESSURE: 102 MMHG | BODY MASS INDEX: 22.96 KG/M2 | HEIGHT: 65 IN | OXYGEN SATURATION: 96 % | DIASTOLIC BLOOD PRESSURE: 54 MMHG | WEIGHT: 137.8 LBS | HEART RATE: 100 BPM | RESPIRATION RATE: 20 BRPM | TEMPERATURE: 97.9 F

## 2022-09-08 DIAGNOSIS — R06.02 SHORTNESS OF BREATH: ICD-10-CM

## 2022-09-08 DIAGNOSIS — I50.22 CHRONIC SYSTOLIC HEART FAILURE (HCC): ICD-10-CM

## 2022-09-08 DIAGNOSIS — R06.02 SHORTNESS OF BREATH: Primary | ICD-10-CM

## 2022-09-08 PROCEDURE — 1090F PRES/ABSN URINE INCON ASSESS: CPT | Performed by: INTERNAL MEDICINE

## 2022-09-08 PROCEDURE — 1123F ACP DISCUSS/DSCN MKR DOCD: CPT | Performed by: INTERNAL MEDICINE

## 2022-09-08 PROCEDURE — 1101F PT FALLS ASSESS-DOCD LE1/YR: CPT | Performed by: INTERNAL MEDICINE

## 2022-09-08 PROCEDURE — G8400 PT W/DXA NO RESULTS DOC: HCPCS | Performed by: INTERNAL MEDICINE

## 2022-09-08 PROCEDURE — G8510 SCR DEP NEG, NO PLAN REQD: HCPCS | Performed by: INTERNAL MEDICINE

## 2022-09-08 PROCEDURE — G8427 DOCREV CUR MEDS BY ELIG CLIN: HCPCS | Performed by: INTERNAL MEDICINE

## 2022-09-08 PROCEDURE — G8420 CALC BMI NORM PARAMETERS: HCPCS | Performed by: INTERNAL MEDICINE

## 2022-09-08 PROCEDURE — G8536 NO DOC ELDER MAL SCRN: HCPCS | Performed by: INTERNAL MEDICINE

## 2022-09-08 PROCEDURE — 99215 OFFICE O/P EST HI 40 MIN: CPT | Performed by: INTERNAL MEDICINE

## 2022-09-08 PROCEDURE — 71046 X-RAY EXAM CHEST 2 VIEWS: CPT

## 2022-09-08 NOTE — TELEPHONE ENCOUNTER
I returned call to [facility] in [place], had Dr. Richelle Gutierrez review xray, she states patient has small bilateral effusions, may go home, pending Right Heart Cath.

## 2022-09-08 NOTE — PROGRESS NOTES
[facility] in [facility], [facility] Note    Patient name: Sailaja Goldstein  Patient : 1945  Patient MRN: [id number]  Date of service: 22    Primary care physician: Kai Mccoy NP  Primary general cardiologist:  Not established     Primary AHF cardiologist: Toño Robledo MD     CHIEF COMPLAINT:  Shortness of breath     PLAN OF CARE:  67 y/o with leukemia ([id number]) and normal heart function by echo, but possible acute on chronic diastolic heart failure, HFpEF; stage C, NYHA class IIIB, refractory to outpatient diuresis  Plan on RHC +/- likely hospital admission for inotrope-supported diuresis     RECOMMENDATIONS:  Schedule RHC Monday,  after the week of chemotherapy  Continue bumex to 2mg twice daily and metolazone 2.5mg every other day  Potassium not added due to high normal K; goal weight loss 6-8lbs by next week  Patient will need to start SGLT2i and beta-blockers once euvolemic; would gordon first  Check CXR today (small bilateral effusions)  Establish care with general cardiologist  Follow-up with PCP  Follow-up with hematology-oncology on treatment of leukemia, Dr. Mckeon Hem  Return to [facility] after RHC with NP/MD      IMPRESSION:  Shortness of breath at rest  Acute on chronic diastolic heart failure  HFpEF  Stage C, NYHA class IIIIB symptoms  Mildly reduced LVEF 40-45% (by echo 2022) from 60% (by echo 2022)  Myeloproliferative disorder   Severe leukocytosis; WBC 126K  Thrombocytosis, improved  Anemia, improved  HTN  HLD  Right pleural effusion s/p thoracenthesis  Ventral hernia     LIFE GOALS:  Patient's personal goals include: TBD  Important upcoming milestones or family events: TBD  The patient identifies the following as important for living well: TBD     CARDIAC IMAGING:  Echo (8/3/22)    Left Ventricle: Reduced left ventricular systolic function with a visually estimated EF of 40 - 45%.  Left ventricle is mildly dilated. Normal wall thickness. Mild global hypokinesis present. Normal diastolic function. Mitral Valve: Mild regurgitation. Left Atrium: Left atrium is mildly dilated. Pericardium: Small (<1 cm) circumferential pericardial effusion present. No indication of cardiac tamponade. Echo (1/28/22)   Left Ventricle: Left ventricle size is normal. Normal wall thickness. Normal wall motion. Normal left ventricular systolic function with a visually estimated EF of 55 - 60%. Global longitudinal strain is normal with a value of -17.0%. Diastolic dysfunction present with normal LV EF. Mitral Valve: Mild transvalvular regurgitation. Left Atrium: Left atrium is mildly dilated. Right Atrium: Right atrium is mildly dilated. Pericardium: Trivial localized pericardial effusion present around the posterior left ventricle. No indication of cardiac tamponade. EKG (1/28/22) ST, rate 101  LHC not done  NST (8/15/22) no scar or ischemia     HEMODYNAMICS:  RHC not done  CPEST too ill  6MW too ill     OTHER IMAGING:  CXR (9/8/22) small bilateral effusions    Chest CT (1/28/22)  1. No definite pulmonary emboli identified. 2. There is slight bibasilar atelectasis. There is a tiny right pleural  effusion. 3. Splenomegaly. 4. Cardiomegaly. There is an aberrant right subclavian artery  5. There is increased density throughout the visualized bony structures may  represent changes of metabolic bone disease. However there are also several  small lucent areas scattered in the thoracic spine which are nonspecific and  could represent focal areas of osteoporosis however metastatic bone  disease/myeloma is not excluded and correlation with prior studies would be  helpful. HISTORY OF PRESENT ILLNESS:  I had the pleasure of seeing Álvaro Freitas in [address] [place] [address] [facility] [place].      Ms. Asim Pendleton is a 67 y/o female with h/o myeloproliferative disorder whose leukemia medication has been recently stopped with rise of WBC and PLT. She was admitted 2 weeks ago with dyspnea and initial cardiac and pulmonary workup was negative. She was supposed to see hematologist, but her visit is not scheduled until late March. Patient was readmitted with severe dyspnea in 2/2022 and found to have large right sided effusion. She underwent thoracenthesis, diuresis and hematology was consulted and resumed chemotherapy for leukemia. Patient had no scar or ischemia on NST. INTERVAL HISTORY:  Today, patient presents for routine clinic visit accompanied by her daughter. Patient is doing \"okay\". Patient walked to our clinic from parking garage but had to stop. Patient can walk more half a block without symptoms of fatigue or shortness of breath. Patient can walk 4 steps of stairs without symptoms of fatigue or shortness of breath. Patient can perform home activities without problem. Patient reports 1+ BLE. Patient reports some abdominal bloating. Patient weight is 137lbs which is 1 lbs less than when we saw her in clinic last month and 8lbs since March 2022. Patient reports one pillow orthopnea, but no PND or nocturia. Patient denies irregular heart rate or palpitations. No presyncope or syncope. Patient denies other cardiac symptoms such as chest pain or leg pain with walking. Patient is compliant with fluid restriction and taking medications as prescribed. Patient manages his own medications. REVIEW OF SYSTEMS:  General: Denies fever, night sweats. Ear, nose and throat: Denies difficulty hearing, sinus problems, runny nose, post-nasal drip, ringing in ears, mouth sores, loose teeth, ear pain, nosebleeds, sore throate, facial pain or numbess  Cardiovascular: see above in the interval history  Respiratory: Denies cough, wheezing, sputum production, hemoptysis.   Gastrointestinal: Denies heartburn, constipation, diarrhea, abdominal pain, nausea, vomiting, difficulty swallowing, blood in stool  Kidney and bladder: Denies painful urination, frequent urination, urgency  Musculoskeletal: Denies joint pain, muscle weakness  Skin and hair: Denies change in existing skin lesions, hair loss or increase, breast changes    PHYSICAL EXAM:  Visit Vitals  BP (!) 102/54 (BP 1 Location: Left upper arm, BP Patient Position: Sitting, BP Cuff Size: Adult)   Pulse 100   Temp 97.9 °F (36.6 °C) (Oral)   Resp 20   Ht 5' 5\" (1.651 m)   Wt 137 lb 12.8 oz (62.5 kg)   SpO2 96%   BMI 22.93 kg/m²     General: Patient is well developed, well-nourished in no acute distress  HEENT: Normocephalic and atraumatic. No scleral icterus. Pupils are equal, round and reactive to light and accomodation. No conjunctival injection. Oropharynx is clear. Neck: Supple. No evidence of thyroid enlargements or lymphadenopathy. JVD: 24cm  Lungs: Breath sounds are equal and clear bilaterally. No wheezes, rhonchi, or rales. Heart: Regular rate and rhythm with normal S1 and S2. No murmurs, gallops or rubs. Abdomen: Soft, no mass or tenderness. No organomegaly or hernia. Bowel sounds present. Genitourinary and rectal: deferred  Extremities: No cyanosis, clubbing, 1+ bilateral lower extremity edema. Neurologic: No focal sensory or motor deficits are noted. Grossly intact. Psychiatric: Awake, alert an doriented x 3. Appropriate mood and affect. Skin: Warm, dry and well perfused. No lesions, nodules or rashes are noted. Poor capillary refill.     PAST MEDICAL HISTORY:  Past Medical History:   Diagnosis Date    Anemia     Congestive heart failure (HCC)     Hypertension     Menopause     Ventral hernia without obstruction or gangrene 4/18/2022       PAST SURGICAL HISTORY:  Past Surgical History:   Procedure Laterality Date    HX OTHER SURGICAL  04/13/2022     bone marrow       FAMILY HISTORY:  Family History   Problem Relation Age of Onset    Breast Cancer Sister         63's    Cancer Sister     Breast Problems Sister     Hypertension Mother        SOCIAL HISTORY:  Social History     Socioeconomic History    Marital status: SINGLE   Tobacco Use    Smoking status: Never    Smokeless tobacco: Never   Vaping Use    Vaping Use: Never used   Substance and Sexual Activity    Alcohol use: Never    Drug use: Not Currently       LABORATORY RESULTS:  Labs Latest Ref Rng & Units 8/3/2022   Albumin 3.5 - 5.0 g/dL 3. 0(L)   Calcium 8.5 - 10.1 MG/DL 8.6   Glucose 65 - 100 mg/dL 87   BUN 6 - 20 MG/DL 39(H)   Creatinine 0.55 - 1.02 MG/DL 1.77(H)   Sodium 136 - 145 mmol/L 138   Potassium 3.5 - 5.1 mmol/L 4.8   Some recent data might be hidden       ALLERGY:  No Known Allergies     CURRENT MEDICATIONS:    Current Outpatient Medications:     metOLazone (ZAROXOLYN) 2.5 mg tablet, Take 1 Tablet by mouth every other day., Disp: 5 Tablet, Rfl: 0    bumetanide (BUMEX) 2 mg tablet, Take one tablet by mouth twice daily. May take one additional tablet by mouth daily as needed for increased shortness of breath, noticeable swelling, weight gain of 3 lbs or more overnight or 5 lbs or more in one week., Disp: 60 Tablet, Rfl: 2    ARIPiprazole (ABILIFY) 30 mg tablet, Take 30 mg by mouth daily. , Disp: , Rfl:     aspirin 81 mg cap, Take  by mouth daily. , Disp: , Rfl:     multivit-min-FA-lycopen-lutein (Centrum Silver) 0.4-300-250 mg-mcg-mcg tab, Take  by mouth daily. , Disp: , Rfl:     docusate sodium (COLACE) 100 mg capsule, Take 100 mg by mouth two (2) times a day., Disp: , Rfl:     ferrous sulfate 325 mg (65 mg iron) tablet, Take  by mouth Daily (before breakfast). , Disp: , Rfl:     magnesium oxide (MAG-OX) 400 mg tablet, Take 400 mg by mouth daily. , Disp: , Rfl:     cholecalciferol, vitamin D3, 50 mcg (2,000 unit) tab, Take 2,000 Units by mouth daily. , Disp: , Rfl:     hydroxyurea (HYDREA) 500 mg capsule, Take 500 mg by mouth daily.  (Patient not taking: No sig reported), Disp: , Rfl:     DM/pseudoephed/acetaminoph/cpm (GRACY-SELTZER PLUS COLD/COUGH PO), Take  by mouth as needed. (Patient not taking: No sig reported), Disp: , Rfl:     ipratropium (ATROVENT) 21 mcg (0.03 %) nasal spray, 2 Sprays by Both Nostrils route as needed for Rhinitis. (Patient not taking: No sig reported), Disp: , Rfl:     Thank you for your referral and allowing me to participate in this patient's care.     Alisa Garsia MD PhD  [address] [place] [address], [address]  Phone: [phone]  Fax: [phone]    PATIENT CARE TEAM:  Patient Care Team:  Magen Lowry NP as PCP - General (Nurse Practitioner)  Selam Causey MD (Hematology and Oncology)     Total visit time: 45 minutes  (> 50% spent face-to-face counseling)

## 2022-09-08 NOTE — PATIENT INSTRUCTIONS
Medication changes:    none    Please take this to your pharmacy to notify them of the change in medications. Testing Ordered:    Right heart cath to be scheduled-you will be called    Please present to outpatient registration today to have chest xray    Other Recommendations:      Ensure your drinking an adequate amount of water with a goal of 6-8 eight ounce glasses (1.5-2 liters) of fluid daily. Your urine should be clear and light yellow straw colored. If your blood pressure begins to consistently run below 90/60 and/or you begin to experience dizziness or lightheadedness, please contact the [facility] at [phone]. Follow up one week after right heart cath-call for appt one week after this is scheduled, but plan to be admitted on day of right heart cath with [facility]      Please monitor your weights daily upon waking and after using the bathroom. Keep a written records of your weights and bring to your next appointment. If you have a weight gain of 3 or more pounds overnight OR 5 or more pounds in one week please contact our office. Thank you for allowing us the privilege of being a part of your healthcare team! Please do not hesitate to contact our office at [phone] with any questions or concerns. Virtual Heart Failure [address] invites you to learn more about heart failure and to share your questions, ideas, and experiences with others. Each month, the Heart Failure Support Group features a new educational topic and a guest speaker, followed by an interactive discussion. Our Heart Failure Nurse Navigator will moderate each session. You will be able to participate by phone, tablet or computer through [employer]. This support group takes place on the 3rd Thursday of each month from 6:00-7:30PM. All individuals living with heart failure and their caregivers are welcome to join.      If you are interested in participating, please contact us at [email or web address] and you will be sent the link to join the [employer].

## 2022-09-08 NOTE — TELEPHONE ENCOUNTER
Received call from Inés Johnson with [facility] Xray dept has x-ray results for patient. Please call [phone]. Sent message to heart failure nurses.

## 2022-09-12 ENCOUNTER — APPOINTMENT (OUTPATIENT)
Dept: VASCULAR SURGERY | Age: 77
DRG: 286 | End: 2022-09-12
Attending: EMERGENCY MEDICINE
Payer: MEDICARE

## 2022-09-12 ENCOUNTER — TELEPHONE (OUTPATIENT)
Dept: CARDIOLOGY CLINIC | Age: 77
End: 2022-09-12

## 2022-09-12 ENCOUNTER — APPOINTMENT (OUTPATIENT)
Dept: GENERAL RADIOLOGY | Age: 77
DRG: 286 | End: 2022-09-12
Attending: EMERGENCY MEDICINE
Payer: MEDICARE

## 2022-09-12 ENCOUNTER — HOSPITAL ENCOUNTER (INPATIENT)
Age: 77
LOS: 10 days | Discharge: HOME | DRG: 286 | End: 2022-09-22
Attending: EMERGENCY MEDICINE | Admitting: INTERNAL MEDICINE
Payer: MEDICARE

## 2022-09-12 DIAGNOSIS — I50.23 ACUTE ON CHRONIC HFREF (HEART FAILURE WITH REDUCED EJECTION FRACTION) (HCC): ICD-10-CM

## 2022-09-12 DIAGNOSIS — I50.22 CHRONIC SYSTOLIC HEART FAILURE (HCC): ICD-10-CM

## 2022-09-12 DIAGNOSIS — I50.23 ACUTE ON CHRONIC SYSTOLIC CHF (CONGESTIVE HEART FAILURE) (HCC): Primary | ICD-10-CM

## 2022-09-12 LAB
ALBUMIN SERPL-MCNC: 2.8 G/DL (ref 3.5–5)
ALBUMIN/GLOB SERPL: 0.7 {RATIO} (ref 1.1–2.2)
ALP SERPL-CCNC: 162 U/L (ref 45–117)
ALT SERPL-CCNC: 16 U/L (ref 12–78)
ANION GAP SERPL CALC-SCNC: 8 MMOL/L (ref 5–15)
AST SERPL-CCNC: 31 U/L (ref 15–37)
BASOPHILS # BLD: 3.9 K/UL (ref 0–0.1)
BASOPHILS NFR BLD: 6 % (ref 0–1)
BILIRUB SERPL-MCNC: 0.3 MG/DL (ref 0.2–1)
BNP SERPL-MCNC: ABNORMAL PG/ML
BUN SERPL-MCNC: 34 MG/DL (ref 6–20)
BUN/CREAT SERPL: 22 (ref 12–20)
CALCIUM SERPL-MCNC: 8.9 MG/DL (ref 8.5–10.1)
CHLORIDE SERPL-SCNC: 102 MMOL/L (ref 97–108)
CO2 SERPL-SCNC: 27 MMOL/L (ref 21–32)
COMMENT, HOLDF: NORMAL
CREAT SERPL-MCNC: 1.54 MG/DL (ref 0.55–1.02)
DIFFERENTIAL METHOD BLD: ABNORMAL
EOSINOPHIL # BLD: 1.3 K/UL (ref 0–0.4)
EOSINOPHIL NFR BLD: 2 % (ref 0–7)
ERYTHROCYTE [DISTWIDTH] IN BLOOD BY AUTOMATED COUNT: 16.9 % (ref 11.5–14.5)
GLOBULIN SER CALC-MCNC: 4.3 G/DL (ref 2–4)
GLUCOSE SERPL-MCNC: 98 MG/DL (ref 65–100)
HCT VFR BLD AUTO: 26.9 % (ref 35–47)
HGB BLD-MCNC: 8 G/DL (ref 11.5–16)
IMM GRANULOCYTES # BLD AUTO: 0 K/UL
IMM GRANULOCYTES NFR BLD AUTO: 0 %
LYMPHOCYTES # BLD: 10.9 K/UL (ref 0.8–3.5)
LYMPHOCYTES NFR BLD: 17 % (ref 12–49)
MCH RBC QN AUTO: 24.2 PG (ref 26–34)
MCHC RBC AUTO-ENTMCNC: 29.7 G/DL (ref 30–36.5)
MCV RBC AUTO: 81.3 FL (ref 80–99)
METAMYELOCYTES NFR BLD MANUAL: 3 %
MONOCYTES # BLD: 5.8 K/UL (ref 0–1)
MONOCYTES NFR BLD: 9 % (ref 5–13)
MYELOCYTES NFR BLD MANUAL: 7 %
NEUTS BAND NFR BLD MANUAL: 3 % (ref 0–6)
NEUTS SEG # BLD: 34.7 K/UL (ref 1.8–8)
NEUTS SEG NFR BLD: 51 % (ref 32–75)
NRBC # BLD: 1.69 K/UL (ref 0–0.01)
NRBC BLD-RTO: 2.6 PER 100 WBC
PLATELET # BLD AUTO: 968 K/UL (ref 150–400)
PLATELET COMMENTS,PCOM: ABNORMAL
PMV BLD AUTO: 11.7 FL (ref 8.9–12.9)
POTASSIUM SERPL-SCNC: 4 MMOL/L (ref 3.5–5.1)
PROMYELOCYTES NFR BLD MANUAL: 2 %
PROT SERPL-MCNC: 7.1 G/DL (ref 6.4–8.2)
RBC # BLD AUTO: 3.31 M/UL (ref 3.8–5.2)
RBC MORPH BLD: ABNORMAL
RBC MORPH BLD: ABNORMAL
SAMPLES BEING HELD,HOLD: NORMAL
SODIUM SERPL-SCNC: 137 MMOL/L (ref 136–145)
TROPONIN-HIGH SENSITIVITY: 23 NG/L (ref 0–51)
WBC # BLD AUTO: 64.3 K/UL (ref 3.6–11)
WBC MORPH BLD: ABNORMAL

## 2022-09-12 PROCEDURE — 99285 EMERGENCY DEPT VISIT HI MDM: CPT

## 2022-09-12 PROCEDURE — 83880 ASSAY OF NATRIURETIC PEPTIDE: CPT

## 2022-09-12 PROCEDURE — 36415 COLL VENOUS BLD VENIPUNCTURE: CPT

## 2022-09-12 PROCEDURE — 73562 X-RAY EXAM OF KNEE 3: CPT

## 2022-09-12 PROCEDURE — 84484 ASSAY OF TROPONIN QUANT: CPT

## 2022-09-12 PROCEDURE — 93971 EXTREMITY STUDY: CPT

## 2022-09-12 PROCEDURE — 80053 COMPREHEN METABOLIC PANEL: CPT

## 2022-09-12 PROCEDURE — 93005 ELECTROCARDIOGRAM TRACING: CPT

## 2022-09-12 PROCEDURE — 74011250636 HC RX REV CODE- 250/636: Performed by: EMERGENCY MEDICINE

## 2022-09-12 PROCEDURE — 65270000029 HC RM PRIVATE

## 2022-09-12 PROCEDURE — 73590 X-RAY EXAM OF LOWER LEG: CPT

## 2022-09-12 PROCEDURE — 71045 X-RAY EXAM CHEST 1 VIEW: CPT

## 2022-09-12 PROCEDURE — 85025 COMPLETE CBC W/AUTO DIFF WBC: CPT

## 2022-09-12 RX ORDER — FUROSEMIDE 10 MG/ML
40 INJECTION INTRAMUSCULAR; INTRAVENOUS ONCE
Status: COMPLETED | OUTPATIENT
Start: 2022-09-12 | End: 2022-09-12

## 2022-09-12 RX ORDER — ACETAMINOPHEN 325 MG/1
650 TABLET ORAL
Status: DISCONTINUED | OUTPATIENT
Start: 2022-09-12 | End: 2022-09-13

## 2022-09-12 RX ADMIN — FUROSEMIDE 40 MG: 10 INJECTION, SOLUTION INTRAMUSCULAR; INTRAVENOUS at 22:03

## 2022-09-12 NOTE — Clinical Note
Transfer to [facility] RR from Procedure Area    Verbal report given to RR RN on Amira Dimas being transferred to Cardiac Cath Lab RR for routine progression of care   Patient is post [facility] procedure. Patient stable upon transfer to . Report consisted of patients Situation, Background, Assessment and   Recommendations(SBAR). Information from the following report(s) SBAR, Procedure Summary, Intake/Output, MAR and Cardiac Rhythm NSR was reviewed with the receiving nurse. Opportunity for questions and clarification was provided. Patient medicated during procedure with orders obtained and verified by Dr. Ceilo Castro. Refer to patient PROCEDURE REPORT for vital signs, assessment, status, and response during procedure.

## 2022-09-12 NOTE — ED TRIAGE NOTES
Pt arrives via EMS from [facility] c/o posterior RIGHT knee pain x5 days. Pt reports increased pain with ambulation. No known injury.

## 2022-09-12 NOTE — ED PROVIDER NOTES
51-year-old female with a history of congestive heart failure, hypertension presents with a chief complaint of right calf pain. Patient reports that she has had pain in that area for the last several days. She has noticed that it has gotten hard hydro. She does not describe the pain or rated on a scale. She denies aggravating or alleviating factors. She endorses shortness of breath which she has had since January or February. She denies chest pain, fever, abdominal pain, GI or urinary symptoms       Past Medical History:   Diagnosis Date    Anemia     Congestive heart failure (HCC)     Hypertension     Menopause     Ventral hernia without obstruction or gangrene 4/18/2022       Past Surgical History:   Procedure Laterality Date    HX OTHER SURGICAL  04/13/2022     bone marrow         Family History:   Problem Relation Age of Onset    Breast Cancer Sister         63's    Cancer Sister     Breast Problems Sister     Hypertension Mother        Social History     Socioeconomic History    Marital status: SINGLE     Spouse name: Not on file    Number of children: Not on file    Years of education: Not on file    Highest education level: Not on file   Occupational History    Not on file   Tobacco Use    Smoking status: Never    Smokeless tobacco: Never   Vaping Use    Vaping Use: Never used   Substance and Sexual Activity    Alcohol use: Never    Drug use: Not Currently    Sexual activity: Not on file   Other Topics Concern    Not on file   Social History Narrative    Not on file     Social Determinants of Health     Financial Resource Strain: Not on file   Food Insecurity: Not on file   Transportation Needs: Not on file   Physical Activity: Not on file   Stress: Not on file   Social Connections: Not on file   Intimate Partner Violence: Not on file   Housing Stability: Not on file         ALLERGIES: Patient has no known allergies. Review of Systems   Constitutional:  Negative for fever.    HENT:  Negative for rhinorrhea. Respiratory:  Positive for shortness of breath (Chronic). Cardiovascular:  Negative for chest pain. Gastrointestinal:  Negative for abdominal pain. Genitourinary:  Negative for dysuria. Musculoskeletal:  Negative for back pain. Skin:  Negative for wound. Neurological:  Negative for headaches. Psychiatric/Behavioral:  Negative for confusion. Vitals:    09/12/22 1401   BP: (!) 111/59   Pulse: (!) 102   Resp: 18   Temp: 97.8 °F (36.6 °C)   SpO2: 98%            Physical Exam  Vitals and nursing note reviewed. Constitutional:       General: She is not in acute distress. Appearance: Normal appearance. She is not ill-appearing, toxic-appearing or diaphoretic. HENT:      Head: Normocephalic and atraumatic. Eyes:      Extraocular Movements: Extraocular movements intact. Cardiovascular:      Rate and Rhythm: Normal rate. Pulses: Normal pulses. Pulmonary:      Effort: Pulmonary effort is normal. No respiratory distress. Breath sounds: Rales present. Abdominal:      General: There is no distension. Musculoskeletal:         General: Normal range of motion. Cervical back: Normal range of motion. Comments: Edema and tenderness behind the right calf   Skin:     General: Skin is dry. Neurological:      Mental Status: She is alert and oriented to person, place, and time. Psychiatric:         Mood and Affect: Mood normal.        MDM  Number of Diagnoses or Management Options  Acute on chronic systolic CHF (congestive heart failure) ([facility] [address]  Diagnosis management comments:   Patient presents with calf pain. Differential include but are not limited to DVT, cellulitis, congestive heart failure. Chest x-ray shows increasing pulmonary edema. White blood cell count elevated due to history of leukemia. BNP is significantly elevated. Duplex pending.   Patient will be admitted for evaluation of acute on chronic congestive heart failure      Perfect Serve Consult for Admission  7:52 PM    ED Room Number: C/HC  Patient Name and age:  Moshe Sofia 68 y.o.  female  Working Diagnosis: Acute on chronic systolic CHF (congestive heart failure) ([facility] [address]  (primary encounter diagnosis)    COVID-19 Suspicion:  no  Sepsis present:  no  Reassessment needed: no  Code Status:  Full Code  Readmission: no  Isolation Requirements:  no  Recommended Level of Care:  telemetry  Department:[facility] ED - 21   Other:  BNP 96892           Amount and/or Complexity of Data Reviewed  Clinical lab tests: ordered and reviewed  Tests in the radiology section of CPT®: ordered and reviewed      ED Course as of 09/12/22 2143   Mon Sep 12, 2022   1615 EKG shows sinus tachycardia at a rate of 110, normal axis, prolonged QTC, no ischemic changes.  [RD]      ED Course User Index  [RD] Yoni Ball MD       Procedures

## 2022-09-12 NOTE — TELEPHONE ENCOUNTER
Patient called because she has not heard from Dr. Nikki Liu' office for appt. I called [facility], scheduled appt for Friday, Oct 21 at 9:40, arrival 9;25, [address], [address]. I called patient to inform.

## 2022-09-13 ENCOUNTER — APPOINTMENT (OUTPATIENT)
Dept: CT IMAGING | Age: 77
DRG: 286 | End: 2022-09-13
Attending: INTERNAL MEDICINE
Payer: MEDICARE

## 2022-09-13 LAB
ALBUMIN SERPL-MCNC: 2.6 G/DL (ref 3.5–5)
ALBUMIN/GLOB SERPL: 0.9 {RATIO} (ref 1.1–2.2)
ALP SERPL-CCNC: 164 U/L (ref 45–117)
ALT SERPL-CCNC: 14 U/L (ref 12–78)
ANION GAP SERPL CALC-SCNC: 11 MMOL/L (ref 5–15)
ARTERIAL PATENCY WRIST A: ABNORMAL
AST SERPL-CCNC: 38 U/L (ref 15–37)
ATRIAL RATE: 110 BPM
ATRIAL RATE: 128 BPM
BASE DEFICIT BLD-SCNC: 0.3 MMOL/L
BASOPHILS # BLD: 2.5 K/UL (ref 0–0.1)
BASOPHILS NFR BLD: 4 % (ref 0–1)
BDY SITE: ABNORMAL
BILIRUB SERPL-MCNC: 0.4 MG/DL (ref 0.2–1)
BUN SERPL-MCNC: 30 MG/DL (ref 6–20)
BUN/CREAT SERPL: 21 (ref 12–20)
CALCIUM SERPL-MCNC: 8.2 MG/DL (ref 8.5–10.1)
CALCULATED P AXIS, ECG09: 67 DEGREES
CALCULATED P AXIS, ECG09: 75 DEGREES
CALCULATED R AXIS, ECG10: 41 DEGREES
CALCULATED R AXIS, ECG10: 63 DEGREES
CALCULATED T AXIS, ECG11: -171 DEGREES
CALCULATED T AXIS, ECG11: 109 DEGREES
CHLORIDE SERPL-SCNC: 103 MMOL/L (ref 97–108)
CO2 SERPL-SCNC: 24 MMOL/L (ref 21–32)
COMMENT, HOLDF: NORMAL
CREAT SERPL-MCNC: 1.44 MG/DL (ref 0.55–1.02)
D DIMER PPP FEU-MCNC: 4.02 MG/L FEU (ref 0–0.65)
DIAGNOSIS, 93000: NORMAL
DIAGNOSIS, 93000: NORMAL
DIFFERENTIAL METHOD BLD: ABNORMAL
EOSINOPHIL # BLD: 1.9 K/UL (ref 0–0.4)
EOSINOPHIL NFR BLD: 3 % (ref 0–7)
ERYTHROCYTE [DISTWIDTH] IN BLOOD BY AUTOMATED COUNT: 16.8 % (ref 11.5–14.5)
FERRITIN SERPL-MCNC: 894 NG/ML (ref 26–388)
FOLATE SERPL-MCNC: 36.1 NG/ML (ref 5–21)
GAS FLOW.O2 O2 DELIVERY SYS: ABNORMAL L/MIN
GLOBULIN SER CALC-MCNC: 2.9 G/DL (ref 2–4)
GLUCOSE SERPL-MCNC: 99 MG/DL (ref 65–100)
HCO3 BLD-SCNC: 23 MMOL/L (ref 22–26)
HCT VFR BLD AUTO: 23.2 % (ref 35–47)
HGB BLD-MCNC: 7 G/DL (ref 11.5–16)
IMM GRANULOCYTES # BLD AUTO: 0 K/UL
IMM GRANULOCYTES NFR BLD AUTO: 0 %
IRON SATN MFR SERPL: 3 % (ref 20–50)
IRON SERPL-MCNC: 5 UG/DL (ref 35–150)
IRON SERPL-MCNC: 6 UG/DL (ref 35–150)
LIPASE SERPL-CCNC: 56 U/L (ref 73–393)
LYMPHOCYTES # BLD: 15.5 K/UL (ref 0.8–3.5)
LYMPHOCYTES NFR BLD: 25 % (ref 12–49)
MAGNESIUM SERPL-MCNC: 2.3 MG/DL (ref 1.6–2.4)
MCH RBC QN AUTO: 24 PG (ref 26–34)
MCHC RBC AUTO-ENTMCNC: 30.2 G/DL (ref 30–36.5)
MCV RBC AUTO: 79.5 FL (ref 80–99)
METAMYELOCYTES NFR BLD MANUAL: 4 %
MONOCYTES # BLD: 6.2 K/UL (ref 0–1)
MONOCYTES NFR BLD: 10 % (ref 5–13)
NEUTS BAND NFR BLD MANUAL: 9 % (ref 0–6)
NEUTS SEG # BLD: 30.9 K/UL (ref 1.8–8)
NEUTS SEG NFR BLD: 41 % (ref 32–75)
NRBC # BLD: 3.61 K/UL (ref 0–0.01)
NRBC BLD-RTO: 5.8 PER 100 WBC
O2/TOTAL GAS SETTING VFR VENT: 6 %
P-R INTERVAL, ECG05: 122 MS
P-R INTERVAL, ECG05: 134 MS
PCO2 BLD: 31.7 MMHG (ref 35–45)
PH BLD: 7.47 [PH] (ref 7.35–7.45)
PHOSPHATE SERPL-MCNC: 3.8 MG/DL (ref 2.6–4.7)
PLATELET # BLD AUTO: 931 K/UL (ref 150–400)
PMV BLD AUTO: 11.6 FL (ref 8.9–12.9)
PO2 BLD: 67 MMHG (ref 80–100)
POTASSIUM SERPL-SCNC: 4.8 MMOL/L (ref 3.5–5.1)
PROMYELOCYTES NFR BLD MANUAL: 4 %
PROT SERPL-MCNC: 5.5 G/DL (ref 6.4–8.2)
Q-T INTERVAL, ECG07: 326 MS
Q-T INTERVAL, ECG07: 380 MS
QRS DURATION, ECG06: 68 MS
QRS DURATION, ECG06: 70 MS
QTC CALCULATION (BEZET), ECG08: 475 MS
QTC CALCULATION (BEZET), ECG08: 514 MS
RBC # BLD AUTO: 2.92 M/UL (ref 3.8–5.2)
RBC MORPH BLD: ABNORMAL
SAMPLES BEING HELD,HOLD: NORMAL
SAO2 % BLD: 94.3 % (ref 92–97)
SARS-COV-2, COV2: NORMAL
SARS-COV-2, XPLCVT: NOT DETECTED
SODIUM SERPL-SCNC: 138 MMOL/L (ref 136–145)
SOURCE, COVRS: NORMAL
SPECIMEN TYPE: ABNORMAL
TIBC SERPL-MCNC: 179 UG/DL (ref 250–450)
TROPONIN-HIGH SENSITIVITY: 114 NG/L (ref 0–51)
TROPONIN-HIGH SENSITIVITY: 140 NG/L (ref 0–51)
TROPONIN-HIGH SENSITIVITY: 154 NG/L (ref 0–51)
TSH SERPL DL<=0.05 MIU/L-ACNC: 2.86 UIU/ML (ref 0.36–3.74)
VENTRICULAR RATE, ECG03: 110 BPM
VENTRICULAR RATE, ECG03: 128 BPM
VIT B12 SERPL-MCNC: 930 PG/ML (ref 193–986)
WBC # BLD AUTO: 61.8 K/UL (ref 3.6–11)
WBC MORPH BLD: ABNORMAL

## 2022-09-13 PROCEDURE — 94761 N-INVAS EAR/PLS OXIMETRY MLT: CPT

## 2022-09-13 PROCEDURE — 83735 ASSAY OF MAGNESIUM: CPT

## 2022-09-13 PROCEDURE — 82607 VITAMIN B-12: CPT

## 2022-09-13 PROCEDURE — 71250 CT THORAX DX C-: CPT

## 2022-09-13 PROCEDURE — 93005 ELECTROCARDIOGRAM TRACING: CPT

## 2022-09-13 PROCEDURE — 83540 ASSAY OF IRON: CPT

## 2022-09-13 PROCEDURE — U0005 INFEC AGEN DETEC AMPLI PROBE: HCPCS

## 2022-09-13 PROCEDURE — 74011000258 HC RX REV CODE- 258: Performed by: INTERNAL MEDICINE

## 2022-09-13 PROCEDURE — 74011000250 HC RX REV CODE- 250: Performed by: INTERNAL MEDICINE

## 2022-09-13 PROCEDURE — 82746 ASSAY OF FOLIC ACID SERUM: CPT

## 2022-09-13 PROCEDURE — 82803 BLOOD GASES ANY COMBINATION: CPT

## 2022-09-13 PROCEDURE — 74011250637 HC RX REV CODE- 250/637: Performed by: INTERNAL MEDICINE

## 2022-09-13 PROCEDURE — 80053 COMPREHEN METABOLIC PANEL: CPT

## 2022-09-13 PROCEDURE — 85025 COMPLETE CBC W/AUTO DIFF WBC: CPT

## 2022-09-13 PROCEDURE — 74011250636 HC RX REV CODE- 250/636: Performed by: INTERNAL MEDICINE

## 2022-09-13 PROCEDURE — 65270000046 HC RM TELEMETRY

## 2022-09-13 PROCEDURE — 36415 COLL VENOUS BLD VENIPUNCTURE: CPT

## 2022-09-13 PROCEDURE — 84100 ASSAY OF PHOSPHORUS: CPT

## 2022-09-13 PROCEDURE — 84443 ASSAY THYROID STIM HORMONE: CPT

## 2022-09-13 PROCEDURE — 85379 FIBRIN DEGRADATION QUANT: CPT

## 2022-09-13 PROCEDURE — 87040 BLOOD CULTURE FOR BACTERIA: CPT

## 2022-09-13 PROCEDURE — 36600 WITHDRAWAL OF ARTERIAL BLOOD: CPT

## 2022-09-13 PROCEDURE — 84484 ASSAY OF TROPONIN QUANT: CPT

## 2022-09-13 PROCEDURE — 74011250637 HC RX REV CODE- 250/637: Performed by: NURSE PRACTITIONER

## 2022-09-13 PROCEDURE — 82728 ASSAY OF FERRITIN: CPT

## 2022-09-13 PROCEDURE — 83690 ASSAY OF LIPASE: CPT

## 2022-09-13 RX ORDER — LANOLIN ALCOHOL/MO/W.PET/CERES
1 CREAM (GRAM) TOPICAL
Status: DISCONTINUED | OUTPATIENT
Start: 2022-09-13 | End: 2022-09-22 | Stop reason: HOSPADM

## 2022-09-13 RX ORDER — ONDANSETRON 8 MG/1
8 TABLET, ORALLY DISINTEGRATING ORAL
COMMUNITY

## 2022-09-13 RX ORDER — MULTIVITAMIN/IRON/FOLIC ACID 18MG-0.4MG
1 TABLET ORAL DAILY
COMMUNITY

## 2022-09-13 RX ORDER — METOLAZONE 2.5 MG/1
2.5 TABLET ORAL EVERY OTHER DAY
Status: DISCONTINUED | OUTPATIENT
Start: 2022-09-13 | End: 2022-09-13

## 2022-09-13 RX ORDER — ENOXAPARIN SODIUM 100 MG/ML
30 INJECTION SUBCUTANEOUS DAILY
Status: DISCONTINUED | OUTPATIENT
Start: 2022-09-13 | End: 2022-09-19

## 2022-09-13 RX ORDER — IPRATROPIUM BROMIDE AND ALBUTEROL SULFATE 2.5; .5 MG/3ML; MG/3ML
3 SOLUTION RESPIRATORY (INHALATION)
Status: DISCONTINUED | OUTPATIENT
Start: 2022-09-13 | End: 2022-09-22 | Stop reason: HOSPADM

## 2022-09-13 RX ORDER — ARIPIPRAZOLE 30 MG/1
30 TABLET ORAL DAILY
Status: DISCONTINUED | OUTPATIENT
Start: 2022-09-13 | End: 2022-09-22 | Stop reason: HOSPADM

## 2022-09-13 RX ORDER — BUMETANIDE 2 MG/1
2 TABLET ORAL 2 TIMES DAILY
COMMUNITY

## 2022-09-13 RX ORDER — ASPIRIN 81 MG/1
81 TABLET ORAL DAILY
Status: DISCONTINUED | OUTPATIENT
Start: 2022-09-13 | End: 2022-09-22 | Stop reason: HOSPADM

## 2022-09-13 RX ORDER — BUMETANIDE 0.25 MG/ML
1 INJECTION INTRAMUSCULAR; INTRAVENOUS 2 TIMES DAILY
Status: DISCONTINUED | OUTPATIENT
Start: 2022-09-13 | End: 2022-09-18

## 2022-09-13 RX ORDER — ONDANSETRON 4 MG/1
4 TABLET, ORALLY DISINTEGRATING ORAL
Status: DISCONTINUED | OUTPATIENT
Start: 2022-09-13 | End: 2022-09-22 | Stop reason: HOSPADM

## 2022-09-13 RX ORDER — LANOLIN ALCOHOL/MO/W.PET/CERES
400 CREAM (GRAM) TOPICAL
Status: DISCONTINUED | OUTPATIENT
Start: 2022-09-13 | End: 2022-09-22 | Stop reason: HOSPADM

## 2022-09-13 RX ORDER — METOPROLOL TARTRATE 5 MG/5ML
5 INJECTION INTRAVENOUS
Status: DISCONTINUED | OUTPATIENT
Start: 2022-09-13 | End: 2022-09-22 | Stop reason: HOSPADM

## 2022-09-13 RX ORDER — LOPERAMIDE HYDROCHLORIDE 2 MG/1
4 CAPSULE ORAL AS NEEDED
COMMUNITY

## 2022-09-13 RX ORDER — ACETAMINOPHEN 650 MG/1
650 SUPPOSITORY RECTAL
Status: DISCONTINUED | OUTPATIENT
Start: 2022-09-13 | End: 2022-09-22 | Stop reason: HOSPADM

## 2022-09-13 RX ORDER — SODIUM CHLORIDE 0.9 % (FLUSH) 0.9 %
5-40 SYRINGE (ML) INJECTION EVERY 8 HOURS
Status: DISCONTINUED | OUTPATIENT
Start: 2022-09-13 | End: 2022-09-22 | Stop reason: HOSPADM

## 2022-09-13 RX ORDER — POLYETHYLENE GLYCOL 3350 17 G/17G
17 POWDER, FOR SOLUTION ORAL DAILY PRN
Status: DISCONTINUED | OUTPATIENT
Start: 2022-09-13 | End: 2022-09-22 | Stop reason: HOSPADM

## 2022-09-13 RX ORDER — ONDANSETRON 8 MG/1
8 TABLET, ORALLY DISINTEGRATING ORAL AS NEEDED
COMMUNITY

## 2022-09-13 RX ORDER — PROCHLORPERAZINE MALEATE 10 MG
10 TABLET ORAL
COMMUNITY

## 2022-09-13 RX ORDER — LANOLIN ALCOHOL/MO/W.PET/CERES
400 CREAM (GRAM) TOPICAL DAILY
Status: DISCONTINUED | OUTPATIENT
Start: 2022-09-13 | End: 2022-09-13

## 2022-09-13 RX ORDER — ACETAMINOPHEN 500 MG
1000 TABLET ORAL
COMMUNITY

## 2022-09-13 RX ORDER — SODIUM CHLORIDE 0.9 % (FLUSH) 0.9 %
5-40 SYRINGE (ML) INJECTION AS NEEDED
Status: DISCONTINUED | OUTPATIENT
Start: 2022-09-13 | End: 2022-09-22 | Stop reason: HOSPADM

## 2022-09-13 RX ORDER — ACETAMINOPHEN 325 MG/1
650 TABLET ORAL
Status: DISCONTINUED | OUTPATIENT
Start: 2022-09-13 | End: 2022-09-22 | Stop reason: HOSPADM

## 2022-09-13 RX ORDER — ONDANSETRON 2 MG/ML
4 INJECTION INTRAMUSCULAR; INTRAVENOUS
Status: DISCONTINUED | OUTPATIENT
Start: 2022-09-13 | End: 2022-09-22 | Stop reason: HOSPADM

## 2022-09-13 RX ADMIN — FERROUS SULFATE TAB 325 MG (65 MG ELEMENTAL FE) 325 MG: 325 (65 FE) TAB at 07:09

## 2022-09-13 RX ADMIN — MAGNESIUM OXIDE 400 MG (241.3 MG MAGNESIUM) TABLET 400 MG: TABLET at 23:15

## 2022-09-13 RX ADMIN — ACETAMINOPHEN 650 MG: 325 TABLET, FILM COATED ORAL at 09:20

## 2022-09-13 RX ADMIN — SODIUM CHLORIDE, PRESERVATIVE FREE 10 ML: 5 INJECTION INTRAVENOUS at 07:10

## 2022-09-13 RX ADMIN — Medication 400 MG: at 09:20

## 2022-09-13 RX ADMIN — ACETAMINOPHEN 650 MG: 325 TABLET, FILM COATED ORAL at 03:38

## 2022-09-13 RX ADMIN — DOXYCYCLINE 100 MG: 100 INJECTION, POWDER, LYOPHILIZED, FOR SOLUTION INTRAVENOUS at 09:20

## 2022-09-13 RX ADMIN — ARIPIPRAZOLE 30 MG: 30 TABLET ORAL at 11:37

## 2022-09-13 RX ADMIN — DOXYCYCLINE 100 MG: 100 INJECTION, POWDER, LYOPHILIZED, FOR SOLUTION INTRAVENOUS at 23:15

## 2022-09-13 RX ADMIN — ASPIRIN 81 MG: 81 TABLET, COATED ORAL at 09:19

## 2022-09-13 RX ADMIN — ENOXAPARIN SODIUM 30 MG: 100 INJECTION SUBCUTANEOUS at 09:19

## 2022-09-13 RX ADMIN — BUMETANIDE 1 MG: 0.25 INJECTION, SOLUTION INTRAMUSCULAR; INTRAVENOUS at 07:09

## 2022-09-13 RX ADMIN — BUMETANIDE 1 MG: 0.25 INJECTION, SOLUTION INTRAMUSCULAR; INTRAVENOUS at 17:59

## 2022-09-13 RX ADMIN — SODIUM CHLORIDE, PRESERVATIVE FREE 10 ML: 5 INJECTION INTRAVENOUS at 23:16

## 2022-09-13 RX ADMIN — SODIUM CHLORIDE, PRESERVATIVE FREE 1 G: 5 INJECTION INTRAVENOUS at 04:29

## 2022-09-13 NOTE — ED NOTES
Bedside and Verbal shift change report given to [address] Valeriano Ngo RN (oncoming nurse) by Wagner Neville RN (offgoing nurse). Report included the following information SBAR, Kardex, ED Summary, [facility] and Recent Results.

## 2022-09-13 NOTE — ED NOTES
TRANSFER - OUT REPORT:    Verbal report given to Dangelo(name) on Lubertha Spatz  being transferred to (unit) for routine progression of care       Report consisted of patients Situation, Background, Assessment and   Recommendations(SBAR). Information from the following report(s) SBAR, Kardex, ED Summary, [facility] and Recent Results was reviewed with the receiving nurse. Lines:   Peripheral IV 09/12/22 Left Antecubital (Active)   Site Assessment Clean, dry, & intact 09/12/22 1623   Phlebitis Assessment 0 09/12/22 1623   Infiltration Assessment 0 09/12/22 1623   Dressing Status Clean, dry, & intact 09/12/22 1623   Dressing Type Transparent 09/12/22 1623   Hub Color/Line Status Pink 09/12/22 1623   Action Taken Blood drawn 09/12/22 1623        Opportunity for questions and clarification was provided.       Patient transported with:   O2 @ 6 liters

## 2022-09-13 NOTE — PROGRESS NOTES
Admission Medication Reconciliation:    Information obtained from:  [facility] from [facility] [address] [address]:  YES    Comments/Recommendations:    1)  PTA list updated from [facility] which arrived with patient. 2)  Medication changes (since last review): Added  - Loperamide PRN  - Ondansetron PRN  - Prochlorperazine PRN  - APAP PRN    Adjusted  - Bumex 2 mg now BID (vs. BID with possible supplemental dose)  - Ipratropium now TID as needed for nasal discharge (vs. Not taking)  - Magnesium 400 mg QHS (vs daily in the AM)    Removed  - Hydroxyurea  - Metolazone    3) Confirmed NKDA from [facility] header     4) Attending notified of changes      ¹[employer] pharmacy benefit data reflects medications filled and processed through the patient's insurance, however   this data does NOT capture whether the medication was picked up or is currently being taken by the patient. Allergies:  Patient has no known allergies. Significant PMH/Disease States:   Past Medical History:   Diagnosis Date    Anemia     Congestive heart failure ([address]     Hypertension     Menopause     Ventral hernia without obstruction or gangrene 4/18/2022     Chief Complaint for this Admission:    Chief Complaint   Patient presents with    Knee Pain     Prior to Admission Medications:   Prior to Admission Medications   Prescriptions Last Dose Informant Taking? ARIPiprazole (ABILIFY) 30 mg tablet   Yes   Sig: Take 30 mg by mouth daily. acetaminophen (TYLENOL) 500 mg tablet   Yes   Sig: Take 1,000 mg by mouth two (2) times daily as needed for Pain. aspirin 81 mg cap   Yes   Sig: Take  by mouth daily. bumetanide (BUMEX) 2 mg tablet   Yes   Sig: Take 2 mg by mouth two (2) times a day. cholecalciferol, vitamin D3, 50 mcg (2,000 unit) tab   Yes   Sig: Take 2,000 Units by mouth daily. docusate sodium (COLACE) 100 mg capsule   Yes   Sig: Take 100 mg by mouth two (2) times a day.    ferrous sulfate 325 mg (65 mg iron) tablet   Yes   Sig: Take  by mouth Daily (before breakfast). ipratropium (ATROVENT) 21 mcg (0.03 %) nasal spray   Yes   [place] by Both Nostrils route three (3) times daily as needed (Nasal drainage). loperamide (IMODIUM) 2 mg capsule   Yes   Sig: Take 4 mg by mouth as needed for Diarrhea. Take 2 caplets (4 mg) after the initial loose stool, then take 1 tablet (2 mg) after each loose stool as needed max 4 capsules (8mg)/24hrs   magnesium oxide (MAG-OX) 400 mg tablet   Yes   Sig: Take 400 mg by mouth nightly. multivitamin-iron-FA, hematinic, (Certavite-Antioxidant)  mg-mcg tab tablet   Yes   Sig: Take 1 Tablet by mouth daily. ondansetron (ZOFRAN ODT) 8 mg disintegrating tablet   Yes   Sig: Take 8 mg by mouth every eight (8) hours as needed for Nausea or Vomiting. ondansetron (ZOFRAN ODT) 8 mg disintegrating tablet   Yes   Sig: Take 8 mg by mouth as needed (One tablet by mouth one hour prior to chemo infusion). One tablet by mouth one hour prior to chemo infusion  Indications: prevent nausea and vomiting from cancer chemotherapy   prochlorperazine (COMPAZINE) 10 mg tablet   Yes   Sig: Take 10 mg by mouth every six (6) hours as needed for Nausea or Vomiting. pseudoephed/acetaminophen/cpm (GRACY-SELTZER PLUS COLD PO)   Yes   Sig: Take  by mouth daily as needed. Facility-Administered Medications: None     Please contact the main inpatient pharmacy with any questions or concerns at [phone] and we will direct you to the clinical pharmacist covering this patient's care while in-house.    Jed Dubose, BLADIMIRD

## 2022-09-13 NOTE — CONSULTS
ORTHO CONSULT NOTE    Date of Consultation:  2022      HPI:  Mary Jane Singh is a 68 y.o. female who c/o R knee pain which has dramatically improved since admission. The pain originated in the R calf with some extension to the posterior knee about 5 days ago. She notes a hard lump on the back of her calf. The pain was no associated with an injury, but occurred spontaneously. It is currently mild and localized to the posterior calf. Denies numbness, tingling, focal foot weakness. Current Anticoagulant Medications:   Past Medical History:   Diagnosis Date    Anemia     Congestive heart failure (HCC)     Hypertension     Menopause     Ventral hernia without obstruction or gangrene 2022      Past Surgical History:   Procedure Laterality Date    HX OTHER SURGICAL  2022     bone marrow      Family History   Problem Relation Age of Onset    Breast Cancer Sister         63's    Cancer Sister     Breast Problems Sister     Hypertension Mother       Social History     Tobacco Use    Smoking status: Never    Smokeless tobacco: Never   Substance Use Topics    Alcohol use: Never     No Known Allergies     Review of Systems:  Per HPI. Objective:     Patient Vitals for the past 8 hrs:   BP Temp Pulse Resp SpO2   22 1308 91/62 -- 97 24 95 %   22 1104 102/67 -- 98 20 98 %   22 0745 109/67 98.7 °F (37.1 °C) (!) 105 30 98 %   22 0709 103/62 -- (!) 107 -- --   22 0630 101/66 -- (!) 104 (!) 33 98 %     Temp (24hrs), Av.8 °F (37.7 °C), Min:97.8 °F (36.6 °C), Max:102.6 °F (39.2 °C)      EXAM:   NAD. Answers questions appropriately. R knee fully flexed upon entering the room, with no deficit or pain when placed in extension.  Non- ttp at the knee, posterior calf with hard     Imaging Review:       Labs:   Recent Results (from the past 24 hour(s))   EKG, 12 LEAD, INITIAL    Collection Time: 22  4:12 PM   Result Value Ref Range    Ventricular Rate 110 BPM    Atrial Rate 110 BPM    P-R Interval 134 ms    QRS Duration 70 ms    Q-T Interval 380 ms    QTC Calculation (Bezet) 514 ms    Calculated P Axis 67 degrees    Calculated R Axis 63 degrees    Calculated T Axis 109 degrees    Diagnosis       Sinus tachycardia  Otherwise normal ECG  When compared with ECG of 15-AUG-2022 09:53,  MANUAL COMPARISON REQUIRED, DATA IS UNCONFIRMED     CBC WITH AUTOMATED DIFF    Collection Time: 09/12/22  4:21 PM   Result Value Ref Range    WBC 64.3 (HH) 3.6 - 11.0 K/uL    RBC 3.31 (L) 3.80 - 5.20 M/uL    HGB 8.0 (L) 11.5 - 16.0 g/dL    HCT 26.9 (L) 35.0 - 47.0 %    MCV 81.3 80.0 - 99.0 FL    MCH 24.2 (L) 26.0 - 34.0 PG    MCHC 29.7 (L) 30.0 - 36.5 g/dL    RDW 16.9 (H) 11.5 - 14.5 %    PLATELET 729 (H) 865 - 400 K/uL    MPV 11.7 8.9 - 12.9 FL    NRBC 2.6 (H) 0  WBC    ABSOLUTE NRBC 1.69 (H) 0.00 - 0.01 K/uL    NEUTROPHILS 51 32 - 75 %    BAND NEUTROPHILS 3 0 - 6 %    LYMPHOCYTES 17 12 - 49 %    MONOCYTES 9 5 - 13 %    EOSINOPHILS 2 0 - 7 %    BASOPHILS 6 (H) 0 - 1 %    METAMYELOCYTES 3 (H) 0 %    MYELOCYTES 7 (H) 0 %    PROMYELOCYTES 2 (H) 0 %    IMMATURE GRANULOCYTES 0 %    ABS. NEUTROPHILS 34.7 (H) 1.8 - 8.0 K/UL    ABS. LYMPHOCYTES 10.9 (H) 0.8 - 3.5 K/UL    ABS. MONOCYTES 5.8 (H) 0.0 - 1.0 K/UL    ABS. EOSINOPHILS 1.3 (H) 0.0 - 0.4 K/UL    ABS. BASOPHILS 3.9 (H) 0.0 - 0.1 K/UL    ABS. IMM.  GRANS. 0.0 K/UL    DF MANUAL      PLATELET COMMENTS Large Platelets      RBC COMMENTS NRBC PRESENT  POLYCHROMASIA  PRESENT       RBC COMMENTS ANISOCYTOSIS  1+        WBC COMMENTS REACTIVE LYMPHS     METABOLIC PANEL, COMPREHENSIVE    Collection Time: 09/12/22  4:21 PM   Result Value Ref Range    Sodium 137 136 - 145 mmol/L    Potassium 4.0 3.5 - 5.1 mmol/L    Chloride 102 97 - 108 mmol/L    CO2 27 21 - 32 mmol/L    Anion gap 8 5 - 15 mmol/L    Glucose 98 65 - 100 mg/dL    BUN 34 (H) 6 - 20 MG/DL    Creatinine 1.54 (H) 0.55 - 1.02 MG/DL    BUN/Creatinine ratio 22 (H) 12 - 20      GFR est AA 40 (L) >60 ml/min/1.73m2    GFR est non-AA 33 (L) >60 ml/min/1.73m2    Calcium 8.9 8.5 - 10.1 MG/DL    Bilirubin, total 0.3 0.2 - 1.0 MG/DL    ALT (SGPT) 16 12 - 78 U/L    AST (SGOT) 31 15 - 37 U/L    Alk. phosphatase 162 (H) 45 - 117 U/L    Protein, total 7.1 6.4 - 8.2 g/dL    Albumin 2.8 (L) 3.5 - 5.0 g/dL    Globulin 4.3 (H) 2.0 - 4.0 g/dL    A-G Ratio 0.7 (L) 1.1 - 2.2     NT-PRO BNP    Collection Time: 09/12/22  4:21 PM   Result Value Ref Range    NT pro-BNP 14,053 (H) <450 PG/ML   TROPONIN-HIGH SENSITIVITY    Collection Time: 09/12/22  4:21 PM   Result Value Ref Range    Troponin-High Sensitivity 23 0 - 51 ng/L   SAMPLES BEING HELD    Collection Time: 09/12/22  4:21 PM   Result Value Ref Range    SAMPLES BEING HELD 1BLU 1RED     COMMENT        Add-on orders for these samples will be processed based on acceptable specimen integrity and analyte stability, which may vary by analyte.    POC G3 - PUL    Collection Time: 09/13/22  2:20 AM   Result Value Ref Range    FIO2 (POC) 6 %    pH (POC) 7.47 (H) 7.35 - 7.45      pCO2 (POC) 31.7 (L) 35.0 - 45.0 MMHG    pO2 (POC) 67 (L) 80 - 100 MMHG    HCO3 (POC) 23.0 22 - 26 MMOL/L    sO2 (POC) 94.3 92 - 97 %    Base deficit (POC) 0.3 mmol/L    Site RIGHT BRACHIAL      Device: NASAL CANNULA      Allens test (POC) NOT APPLICABLE      Specimen type (POC) ARTERIAL     EKG, 12 LEAD, INITIAL    Collection Time: 09/13/22  2:39 AM   Result Value Ref Range    Ventricular Rate 128 BPM    Atrial Rate 128 BPM    P-R Interval 122 ms    QRS Duration 68 ms    Q-T Interval 326 ms    QTC Calculation (Bezet) 475 ms    Calculated P Axis 75 degrees    Calculated R Axis 41 degrees    Calculated T Axis -171 degrees    Diagnosis       Sinus tachycardia with occasional premature ventricular complexes  ST & T wave abnormality, consider lateral ischemia  Abnormal ECG  When compared with ECG of 12-SEP-2022 16:12,  MANUAL COMPARISON REQUIRED, DATA IS UNCONFIRMED     SAMPLES BEING HELD    Collection Time: 09/13/22 4:44 AM   Result Value Ref Range    SAMPLES BEING HELD 2 BC(SILV)     COMMENT        Add-on orders for these samples will be processed based on acceptable specimen integrity and analyte stability, which may vary by analyte. METABOLIC PANEL, COMPREHENSIVE    Collection Time: 09/13/22  4:45 AM   Result Value Ref Range    Sodium 138 136 - 145 mmol/L    Potassium 4.8 3.5 - 5.1 mmol/L    Chloride 103 97 - 108 mmol/L    CO2 24 21 - 32 mmol/L    Anion gap 11 5 - 15 mmol/L    Glucose 99 65 - 100 mg/dL    BUN 30 (H) 6 - 20 MG/DL    Creatinine 1.44 (H) 0.55 - 1.02 MG/DL    BUN/Creatinine ratio 21 (H) 12 - 20      GFR est AA 43 (L) >60 ml/min/1.73m2    GFR est non-AA 35 (L) >60 ml/min/1.73m2    Calcium 8.2 (L) 8.5 - 10.1 MG/DL    Bilirubin, total 0.4 0.2 - 1.0 MG/DL    ALT (SGPT) 14 12 - 78 U/L    AST (SGOT) 38 (H) 15 - 37 U/L    Alk. phosphatase 164 (H) 45 - 117 U/L    Protein, total 5.5 (L) 6.4 - 8.2 g/dL    Albumin 2.6 (L) 3.5 - 5.0 g/dL    Globulin 2.9 2.0 - 4.0 g/dL    A-G Ratio 0.9 (L) 1.1 - 2.2     CBC WITH AUTOMATED DIFF    Collection Time: 09/13/22  4:45 AM   Result Value Ref Range    WBC 61.8 (HH) 3.6 - 11.0 K/uL    RBC 2.92 (L) 3.80 - 5.20 M/uL    HGB 7.0 (L) 11.5 - 16.0 g/dL    HCT 23.2 (L) 35.0 - 47.0 %    MCV 79.5 (L) 80.0 - 99.0 FL    MCH 24.0 (L) 26.0 - 34.0 PG    MCHC 30.2 30.0 - 36.5 g/dL    RDW 16.8 (H) 11.5 - 14.5 %    PLATELET 060 (H) 545 - 400 K/uL    MPV 11.6 8.9 - 12.9 FL    NRBC 5.8 (H) 0  WBC    ABSOLUTE NRBC 3.61 (H) 0.00 - 0.01 K/uL    NEUTROPHILS 41 32 - 75 %    BAND NEUTROPHILS 9 (H) 0 - 6 %    LYMPHOCYTES 25 12 - 49 %    MONOCYTES 10 5 - 13 %    EOSINOPHILS 3 0 - 7 %    BASOPHILS 4 (H) 0 - 1 %    METAMYELOCYTES 4 (H) 0 %    PROMYELOCYTES 4 (H) 0 %    IMMATURE GRANULOCYTES 0 %    ABS. NEUTROPHILS 30.9 (H) 1.8 - 8.0 K/UL    ABS. LYMPHOCYTES 15.5 (H) 0.8 - 3.5 K/UL    ABS. MONOCYTES 6.2 (H) 0.0 - 1.0 K/UL    ABS. EOSINOPHILS 1.9 (H) 0.0 - 0.4 K/UL    ABS.  BASOPHILS 2.5 (H) 0.0 - 0.1 K/UL ABS. IMM.  GRANS. 0.0 K/UL    DF MANUAL      RBC COMMENTS ANISOCYTOSIS  1+        RBC COMMENTS HYPOCHROMIA  1+        RBC COMMENTS MICROCYTOSIS  1+        RBC COMMENTS TARGET CELLS  PRESENT        RBC COMMENTS NRBC,+1     WBC COMMENTS REACTIVE LYMPHS     TSH 3RD GENERATION    Collection Time: 09/13/22  4:45 AM   Result Value Ref Range    TSH 2.86 0.36 - 3.74 uIU/mL   MAGNESIUM    Collection Time: 09/13/22  4:45 AM   Result Value Ref Range    Magnesium 2.3 1.6 - 2.4 mg/dL   PHOSPHORUS    Collection Time: 09/13/22  4:45 AM   Result Value Ref Range    Phosphorus 3.8 2.6 - 4.7 MG/DL   FOLATE    Collection Time: 09/13/22  4:45 AM   Result Value Ref Range    Folate 36.1 (H) 5.0 - 21.0 ng/mL   VITAMIN B12    Collection Time: 09/13/22  4:45 AM   Result Value Ref Range    Vitamin B12 930 193 - 986 pg/mL   IRON    Collection Time: 09/13/22  4:45 AM   Result Value Ref Range    Iron 5 (L) 35 - 150 ug/dL   FERRITIN    Collection Time: 09/13/22  4:45 AM   Result Value Ref Range    Ferritin 894 (H) 26 - 388 NG/ML   LIPASE    Collection Time: 09/13/22  4:45 AM   Result Value Ref Range    Lipase 56 (L) 73 - 393 U/L   D DIMER    Collection Time: 09/13/22  4:45 AM   Result Value Ref Range    D-dimer 4.02 (H) 0.00 - 0.65 mg/L FEU   TROPONIN-HIGH SENSITIVITY    Collection Time: 09/13/22  4:45 AM   Result Value Ref Range    Troponin-High Sensitivity 140 (HH) 0 - 51 ng/L   IRON PROFILE    Collection Time: 09/13/22  4:45 AM   Result Value Ref Range    Iron 6 (L) 35 - 150 ug/dL    TIBC 179 (L) 250 - 450 ug/dL    Iron % saturation 3 (L) 20 - 50 %   TROPONIN-HIGH SENSITIVITY    Collection Time: 09/13/22  7:16 AM   Result Value Ref Range    Troponin-High Sensitivity 154 (HH) 0 - 51 ng/L   SARS-COV-2    Collection Time: 09/13/22  8:26 AM   Result Value Ref Range    SARS-CoV-2 by PCR Please find results under separate order     TROPONIN-HIGH SENSITIVITY    Collection Time: 09/13/22 11:29 AM   Result Value Ref Range    Troponin-High Sensitivity 114 (H) 0 - 51 ng/L       Impression:   Resolving R knee pain; possible bakers cyst rupture. No fluctuant mass on the posterior calf concerning for abscess. No trauma or reason to suspect an injury. Function of the lower extremity is wnl. Plan:   Conservative treatment - Ice, Ace wrap, Pain control  PT/OT - WBAT    Dr. Nima Lopez is aware and agrees with above plan.       LEANDER King  Orthopedic Trauma Service  [facility]

## 2022-09-13 NOTE — CONSULTS
[address]    Name:  Jeffrey Hartman  MR#:  [id number]  :  1945  ACCOUNT #:  [de-identified]  DATE OF SERVICE:  2022      HISTORY OF PRESENT ILLNESS:  The patient is a 60-year-old woman with a myeloproliferative/myelodysplastic syndrome, cared for by Dr. Taurus Waite, who is seen in the emergency room after admission for possible pneumonia. This patient came to the emergency room with complaints of pain in her legs, difficulty with ambulation, and shortness of breath with exertion. In the ER, she was found to have a white blood cell count of 61,000, hemoglobin of 7, and platelet count of 726. CT angiogram performed in the ER showed splenomegaly, small bilateral pleural effusions, interstitial edema. Findings consistent with congestive heart failure. She had a duplex Doppler performed which showed no sign of DVT. PAST MEDICAL HISTORY:  Myeloproliferative/myelodysplastic syndrome, she is currently receiving Vidaza. She had her third cycle of Vidaza completed 3 weeks ago in the office. She also has a history of chronic kidney disease. Pleural effusion. Congestive heart failure. Low back pain. ALLERGIES:  NO KNOWN DRUG ALLERGIES. SOCIAL HISTORY:  She lives with her daughter after moving from the [address] [place] [address] 7 years ago. She is a nonsmoker, nondrinker. FAMILY HISTORY:  She has three brothers and a sister, all in good health. No history of cancer or hematologic dyscrasias in the family. PHYSICAL EXAMINATION:  GENERAL:  She is a frail elderly woman in no apparent distress. VITAL SIGNS:  Stable. HEENT:  EOMI. Nonicteric sclerae. She has nasal cannula oxygen in place. NECK:  Supple. LUNGS:  Clear anteriorly. CARDIAC:  Regular rate and rhythm, I/VI systolic murmur. ABDOMEN:  Soft. She has an enlarged spleen. No ascites or other masses felt. EXTREMITIES:  Reveal 1+ pretibial edema. No evidence of cellulitis.   NEUROPSYCHIATRIC: Grossly intact. IMPRESSION:  Myeloproliferative/myelodysplastic syndrome. This woman receives Mark Brakeman in our office and is cared for by Dr. Nirmala Collazo. These counts are consistent with what we had been seeing in the office over the past 6 months. No interventions are required at present for the high white count. No additional measures are required for the high platelet count at present. Her hemoglobin of 7 is borderline in terms of need for transfusion, and I would be quick to offer transfusion given her ongoing health problems. She does have fever and is currently receiving broad-spectrum antibiotics for this. We will follow with you on behalf of [address] [place] [address].       Peter Ayala MD      [id number]  D:  09/13/2022 14:32  T:  09/13/2022 16:25  JOB #:  [id number]

## 2022-09-13 NOTE — PROGRESS NOTES
[facility]  Hospitalist Group                                                                                          Hospitalist Progress Note  Geraldine Vanessa NP  Answering service: [phone] from in house phone        Date of Service:  2022  NAME:  Shaina Cifuentes  :  1945  MRN:  [id number]      Admission Summary:   Roger Azevedo is a 66yo F with a PMH of CHF, HTN, CKD, Myeloproliferative Disorder who presented at the ED with right knee pain x5 days, denies injury: pain is constant/ sharp pain, 5/10 in severity, worse with ambulation, slight relief at rest. Upon arrival to the ED, the patient was found to be in significant respiratory distress, BNP level was elevated, CXR showed evidence of vascular congestion and possible pneumonia. The patient was subsequently referred to the hospitalist service for admission. The patient required being placed on supplemental oxygen in the ED because of the shortness of breath. The patient was last admitted to this hospital from 2022 to 2022 for evaluation of shortness of breath, subsequently attributed to pleural effusion for which the patient underwent thoracentesis and about 1900 mL of fluid was drained from the chest. The patient denied associated fever, rigors, and chills. The patient stated that she has been taking her medication as prescribed including diuretic. Interval history / Subjective: Follow-up for issues listed below.   -Patient seen and examined, no c/o's CP, no nausea, c/o SOB with activity relieved with [email or web address]. Assessment & Plan:     Acute-on-Chronic HFrEF:   [id number]  -continue diuretics  -started Rocephin and Doxycycline   -serial cardiac markers: trop 23, 140, 154  -Cardiology consulted  -ECHO 8/3/22: reduced left ventricular systolic function with EF 40-45%, left ventricle mildly dilated, normal wall thickness.   -ASA continues  -tele    Acute Respiratory Failure with Hypoxia: multifactorial including CHF and suspected bacterial PNA. -serial cardiac markers  -D-dimer: 4.02  -supplemental oxygen  -CXR 9/12: pattern of moderate pulmonary interstitial edema, slightly worse compared to prior study, superimposed infiltrate cannot be excluded. -CT scan chest w/o 9/13: small bilateral pleural effusions, diffusely abnormal bones (differential includes MM, Leukemia/Lymphoma- given splenomegaly) less likely heterogeneous osteopenia or diffuse metastases, Splenomegaly. Suspected Bacterial PNA:  -start Rocephin and doxycycline  -CT scan chest w/o 9/13: small bilateral pleural effusions, diffusely abnormal bones (differential includes MM, Leukemia/Lymphoma- given splenomegaly) less likely heterogeneous osteopenia or diffuse metastases, Splenomegaly. Myeloproliferative Disorder:  -CT scan chest w/o 9/13: small bilateral pleural effusions, diffusely abnormal bones (differential includes MM, Leukemia/Lymphoma- given splenomegaly) less likely heterogeneous osteopenia or diffuse metastases, Splenomegaly.   -consulted hematology  -WBC: 61.8 (9/13)    Anemia:   -stool guaiac   -monitor Hgb 7.0 (9/13), iron:5, ferritin:894  -continue ferrous sulfate    Thrombocytosis: Platelets 847 (0/30)    CKD 3B:   -monitor renal function  -BUN 30 Creat 1.44, improved    HTN: BP stable. Tachycardia: stable/improved low 100's-90's, continue to monitor. Febrile: T max 102.6 (9/13), follow blood and urine Cx's    Right Knee Effusion:  denies trauma. -X-ray Right knee 9/12: moderate effusion. -X-ray Right tib/fib 9/12: no acute abnormality.    -Duplex RLE 9/12: negative for DVT.   -Orthopedic consult         DVTppx: Lovenox  Code Status: Full Code  Diet: Cardiac  Activity: OOB to chair TID and PRN as tolerated  Discharge: TBD  Ambulates: independently       Hospital Problems  Date Reviewed: 9/13/2022            Codes Class Noted POA    * (Principal) Acute on chronic HFrEF (heart failure with reduced ejection fraction) [facility]) ICD-10-CM: H36.41  ICD-9-CM: 428.23  9/12/2022 Yes             Review of Systems:   A comprehensive review of systems was negative except for that written in the HPI. Vital Signs:    Last 24hrs VS reviewed since prior progress note. Most recent are:  Visit Vitals  /67   Pulse (!) 105   Temp 98.7 °F (37.1 °C)   Resp 30   SpO2 98%       No intake or output data in the 24 hours ending 09/13/22 1125     Physical Examination:     I had a face to face encounter with this patient and independently examined them on 9/13/2022 as outlined below:          Constitutional:  No acute distress, cooperative, pleasant. ENT:  Oral mucosa moist.    Resp:  CTA bilaterally. No wheezing/rhonchi/rales. No accessory muscle use. O2 2L. CV:  Regular rhythm, normal rate, S1,S2.    GI/:  Soft, non distended, non tender, no guarding, BS present. Voids Freely. Musculoskeletal:  No edema, warm. Right knee swelling. Neurologic:  Moves all extremities. AAOx3. Skin:  w/d. Psych:  Good insight, Not anxious nor agitated. Data Review:    Review and/or order of clinical lab test      Labs:     Recent Labs     09/13/22 0445 09/12/22  1621   WBC 61.8* 64.3*   HGB 7.0* 8.0*   HCT 23.2* 26.9*   * 968*     Recent Labs     09/13/22  0445 09/12/22  1621    137   K 4.8 4.0    102   CO2 24 27   BUN 30* 34*   CREA 1.44* 1.54*   GLU 99 98   CA 8.2* 8.9   MG 2.3  --    PHOS 3.8  --      Recent Labs     09/13/22  0445 09/12/22  1621   ALT 14 16   * 162*   TBILI 0.4 0.3   TP 5.5* 7.1   ALB 2.6* 2.8*   GLOB 2.9 4.3*   LPSE 56*  --      No results for input(s): INR, PTP, APTT, INREXT in the last 72 hours. Recent Labs     09/13/22  0445   FERR 894*      Lab Results   Component Value Date/Time    Folate 36.1 (H) 09/13/2022 04:45 AM      No results for input(s): PH, PCO2, PO2 in the last 72 hours. No results for input(s): CPK, CKNDX, TROIQ in the last 72 hours.     No lab exists for component: CPKMB  No results found for: CHOL, CHOLX, CHLST, CHOLV, HDL, HDLP, LDL, LDLC, DLDLP, TGLX, TRIGL, TRIGP, CHHD, CHHDX  No results found for: GLUCPOC  No results found for: COLOR, APPRN, SPGRU, REFSG, DOMINGO, PROTU, GLUCU, KETU, BILU, UROU, SANDY, LEUKU, GLUKE, EPSU, BACTU, WBCU, RBCU, CASTS, UCRY      Medications Reviewed:     Current Facility-Administered Medications   Medication Dose Route Frequency    ARIPiprazole (ABILIFY) tablet 30 mg  30 mg Oral DAILY    aspirin delayed-release tablet 81 mg  81 mg Oral DAILY    bumetanide (BUMEX) injection 1 mg  1 mg IntraVENous BID    ferrous sulfate tablet 325 mg  1 Tablet Oral ACB    magnesium oxide (MAG-OX) tablet 400 mg  400 mg Oral DAILY    metOLazone (ZAROXOLYN) tablet 2.5 mg  2.5 mg Oral EVERY OTHER DAY    sodium chloride (NS) flush 5-40 mL  5-40 mL IntraVENous Q8H    sodium chloride (NS) flush 5-40 mL  5-40 mL IntraVENous PRN    acetaminophen (TYLENOL) tablet 650 mg  650 mg Oral Q6H PRN    Or    acetaminophen (TYLENOL) suppository 650 mg  650 mg Rectal Q6H PRN    polyethylene glycol (MIRALAX) packet 17 g  17 g Oral DAILY PRN    ondansetron (ZOFRAN ODT) tablet 4 mg  4 mg Oral Q8H PRN    Or    ondansetron (ZOFRAN) injection 4 mg  4 mg IntraVENous Q6H PRN    enoxaparin (LOVENOX) injection 30 mg  30 mg SubCUTAneous DAILY    cefTRIAXone (ROCEPHIN) 1 g in 0.9% sodium chloride 10 mL IV syringe  1 g IntraVENous Q24H    doxycycline (VIBRAMYCIN) 100 mg in 0.9% sodium chloride (MBP/ADV) 100 mL MBP  100 mg IntraVENous Q12H    albuterol-ipratropium (DUO-NEB) 2.5 MG-0.5 MG/3 ML  3 mL Nebulization Q2H PRN    metoprolol (LOPRESSOR) injection 5 mg  5 mg IntraVENous Q6H PRN     Current Outpatient Medications   Medication Sig    metOLazone (ZAROXOLYN) 2.5 mg tablet Take 1 Tablet by mouth every other day. bumetanide (BUMEX) 2 mg tablet Take one tablet by mouth twice daily.  May take one additional tablet by mouth daily as needed for increased shortness of breath, noticeable swelling, weight gain of 3 lbs or more overnight or 5 lbs or more in one week.    hydroxyurea (HYDREA) 500 mg capsule Take 500 mg by mouth daily. (Patient not taking: No sig reported)    ARIPiprazole (ABILIFY) 30 mg tablet Take 30 mg by mouth daily. aspirin 81 mg cap Take  by mouth daily. multivit-min-FA-lycopen-lutein (Centrum Silver) 0.4-300-250 mg-mcg-mcg tab Take  by mouth daily. docusate sodium (COLACE) 100 mg capsule Take 100 mg by mouth two (2) times a day. ferrous sulfate 325 mg (65 mg iron) tablet Take  by mouth Daily (before breakfast). magnesium oxide (MAG-OX) 400 mg tablet Take 400 mg by mouth daily. cholecalciferol, vitamin D3, 50 mcg (2,000 unit) tab Take 2,000 Units by mouth daily. DM/pseudoephed/acetaminoph/cpm (GRACY-SELTZER PLUS COLD/COUGH PO) Take  by mouth as needed. (Patient not taking: No sig reported)    ipratropium (ATROVENT) 21 mcg (0.03 %) nasal spray 2 Sprays by Both Nostrils route as needed for Rhinitis.  (Patient not taking: No sig reported)     ______________________________________________________________________  EXPECTED LENGTH OF STAY: - - -  ACTUAL LENGTH OF STAY:          1                 Dragna Galarza NP

## 2022-09-13 NOTE — H&P
[address] [facility]  HISTORY AND PHYSICAL    Name:  Wagner Waters  MR#:  [id number]  :  1945  ACCOUNT #:  [de-identified]  ADMIT DATE:  2022      The patient was seen, evaluated, and admitted by me on 2022. PRIMARY CARE PHYSICIAN:      SOURCE OF INFORMATION:  The patient and review of ED and old electronic medical record. CHIEF COMPLAINT:  Right knee pain. HISTORY OF PRESENT ILLNESS:  This 66-year-old woman with past medical history significant for congestive heart failure, hypertension, myeloproliferative disorder, chronic kidney disease presented at the emergency room with right knee pain. This started about 5 days ago. No history of injury. The pain is constant sharp pain, 5/10 in severity, worse with ambulation, slight relief at rest.  The patient was brought to the emergency room for further evaluation. When the patient arrived at the emergency room, the patient was found to be in significant respiratory distress. BNP level was obtained, which was elevated. Chest x-ray shows evidence of vascular congestion and possible pneumonia. The patient was subsequently referred to the hospitalist service for admission. The patient required being placed on supplemental oxygen in the emergency room because of the shortness of breath. The patient was last admitted to this hospital from 2022 to 2022. The patient was admitted for evaluation of shortness of breath. This was subsequently attributed to pleural effusion for which the patient underwent thoracentesis and about 1900 mL of fluid was drained from the chest.  The patient denies associated fever, rigors, and chills. The patient stated that she has been taking her medication as prescribed including diuretic. PAST MEDICAL HISTORY:  Congestive heart failure, hypertension, myeloproliferative disorder, and chronic kidney disease. ALLERGIES:  NO KNOWN DRUG ALLERGIES. MEDICATIONS:  1.   Abilify 30 mg daily.  2.  Aspirin 81 mg daily. 3.  Bumex 2 mg twice daily. 4.  Colace 100 mg twice daily. 5.  Ferrous sulfate 325 mg daily. 6.  Hydrea 500 mg daily. 7.  Magnesium oxide 400 mg daily. 8.  Zaroxolyn 2.5 mg every other day. FAMILY HISTORY:  This was reviewed. Mother had hypertension. PAST SURGICAL HISTORY:  This is significant for bone marrow biopsy. SOCIAL HISTORY:  No history of alcohol or tobacco abuse. REVIEW OF SYSTEMS:  HEAD, EYES, EARS, NOSE AND THROAT:  No headache, no dizziness, no blurring of vision, and no photophobia. RESPIRATORY SYSTEM:  This is positive for shortness of breath and cough. No hemoptysis. CARDIOVASCULAR SYSTEM:  This is positive for orthopnea. No chest pain and no palpitation. GASTROINTESTINAL SYSTEM:  No nausea or vomiting, no diarrhea, and no constipation. GENITOURINARY SYSTEM:  No dysuria, no urgency, and no frequency. All other systems are reviewed and they are negative. PHYSICAL EXAMINATION:  GENERAL APPEARANCE:  The patient appeared ill and in moderate distress. VITAL SIGNS:  On arrival at the emergency room; temperature 97.8, pulse 102, respiratory rate 18, blood pressure 111/59, and oxygen saturation 98%. HEAD:  Normocephalic, atraumatic. EYES:  Normal eye movement. No redness, no drainage, and no discharge. EARS:  Normal external ears with no obvious drainage. NOSE:  No deformity and no drainage. MOUTH AND THROAT:  No visible oral lesion. NECK:  Neck is supple. No JVD and no thyromegaly. CHEST:  Few expiratory wheezing and bilateral basal crackles. HEART:  Normal S1 and S2, regular. No clinically appreciable murmur. ABDOMEN:  Soft and nontender. Normal bowel sounds. CNS:  Alert and oriented x3. No gross focal neurological deficit. EXTREMITIES:  Edema 2+. Pulses 2+ bilaterally. MUSCULOSKELETAL SYSTEM:  No obvious joint deformity and swelling. SKIN:  No active skin lesions seen in the exposed part of the body.   PSYCHIATRY:  Normal mood and affect. LYMPHATIC SYSTEM:  No cervical lymphadenopathy. DIAGNOSTIC DATA:  The EKG shows sinus tachycardia and nonspecific ST and T-waves abnormalities. X-ray of the right knee shows right knee effusion. Chest x-ray shows pattern of moderate pulmonary interstitial edema, slightly worse compared to the prior radiograph. Superimposed infiltrate cannot be excluded. X-ray of the right leg, no acute abnormalities. Ultrasound of the right lower extremity, negative for DVT. LABORATORY DATA:  Pro-BNP level 14,053. Chemistry; sodium 137, potassium 4.0, chloride 102, CO2 is 27, glucose 98, BUN 34, creatinine 1.54, calcium 8.9, total bilirubin 0.3, ALT 15, AST 31, alkaline phosphatase 152, total protein 7.1, albumin level 2.8, globulin 4.3. Cardiac profile, troponin high-sensitivity 23. Hematology; WBC 64.3, hemoglobin 8.0, hematocrit 26.9, and platelets 938. ABG done at the emergency room; pH 7.47, pCO2 is 31.7, pO2 is 67, and bicarbonate 23.0. ASSESSMENT:  1. Acute-on-chronic heart failure with reduced ejection fraction. 2.  Hypertension. 3.  Myeloproliferative disorder. 4.  Anemia. 5.  Thrombocytosis. 6.  Chronic kidney disease, stage III. 7.  Acute renal failure with hypoxia. 8.  Suspected bacterial pneumonia. 9.  Right knee effusion. PLAN:  1. Acute-on-chronic heart failure with reduced ejection fraction. We will admit the patient for further evaluation and treatment. This is contributing to the patient's shortness of breath. We will continue with diuretic. We will check serial cardiac markers to rule out acute myocardial infarction. The patient's Cardiology group will be consulted to assist in further evaluation and treatment. We will obtain echocardiogram if one has not been done recently. 2.  Hypertension. We will continue to monitor the patient's blood pressure closely. We will place the patient on Lopressor as needed both for hypertension and for heart rate.   3. Myeloproliferative disorder. We will continue to monitor. The patient's hematologist will be consulted to assist in further evaluation and treatment. 4.  Anemia. This is most likely due to the myeloproliferative disease. We will carry out anemia workup to evaluate the patient for other possible causes of anemia including checking a stool guaiac to rule out occult GI bleed. 5.  Thrombocytosis. This is most likely reactive thrombocytosis. We will continue to monitor the patient's platelet count. 6.  Chronic kidney disease, stage III. We will monitor the patient's renal function. Nephrology consult if the renal function is getting worse. 7.  Acute respiratory failure with hypoxia. This is multifactorial including congestive heart failure and suspected bacterial pneumonia. We will check serial cardiac markers to rule out acute myocardial infarction. We will check D-dimer to evaluate the patient for thromboembolism as a possible cause of acute respiratory failure with hypoxia. Continue with supplemental oxygen. 8.  Suspected bacterial pneumonia. We will start the patient on Rocephin and doxycycline. We will obtain noncontrast CT scan of the chest for further evaluation and also to evaluate the patient for pleural effusion that could be causing the patient's shortness of breath. 9.  Right knee effusion. No history of trauma. X-ray negative. Ultrasound of the right lower extremity negative for DVT. Orthopedic consult will be requested to assist in further evaluation and treatment. 10.  Other issues. Code status, the patient is a full code. We will place the patient on Lovenox for DVT prophylaxis. FUNCTIONAL STATUS PRIOR TO ADMISSION:  The patient came from assisted living facility. The patient is ambulatory with assistive device. COVID PRECAUTION:  The patient was wearing a face mask. I was wearing a face mask and gloves for this patient's encounter.   The patient will be tested for COVID-19 virus infection.       MD KRISTEN [email or web address]  D:  09/13/2022 3:56  T:  09/13/2022 7:28  JOB #:  [id number]  CC:

## 2022-09-13 NOTE — PROGRESS NOTES
Care Management:  (Basic note. Will update details.)  Transition of Care Plan:     RUR: 20%  Patient resides at [facility] ([phone]  Disposition: return to [facility] vs SNF if necessary  Transportation: TBD  1st IM given 09/13/22  Nahum Raymundo is legal guardian. EMERGENCY CONTACTS:  Guardian - Paulino Villareal   Office/[employer] ([phone]) - Call this number during the weekdays. After hours/weekend message line ([phone]) - Leave voicemail and someone will call back. Paulino Villareal's personal cell ([phone]) - Use this number if need emergency consent or patient is having an emergency. Daughter - Martita Sparks ([phone]). She is allowed to have all medical information regarding patient. [facility] ([phone]). Called patient's daughter-Thea Patel. She gave the below information regarding patient. She said patient has a guardian. His name is Nahum Raymundo. He has been guardian for 5 or 6 years. Patient has lived at [facility] the same amount of time. She gave CM the above number. Discussed IM and BPCI letter with daughter. Daughter said she and guardian usually make the decisions together for patient. She would like to be kept up to date regarding timing of discharge and patient's medical condition. Daughter lives in [facility]. She sees patient about every 2 weeks. Daughter comes to take patient to her doctor's appointments. Demographics confirmed: yes  Living Situation: Patient lives at [facility]. They administer her medications and provide her meals. DME: none. Daughter said she has had increasing difficulty walking with the knee pain and was wondering if she will need a walker. ADLs: Patient is independent with her ADLs. Pharmacy: Medications managed and administered by staff at [facility].   Previous HH/SNF/rehab: [facility]: Medicare, Medicaid   Transportation: Oncology: Dr. Lacey Barnes for leukemia  Cardiology: Dr. Jc Warner    CM called Jaron Metz on personal cell. Discussed IM and BPCI letter. He asked CM to call Sadia Garcia so she can document the information about the patient in patient's file. CM called laura Anguiano. Explained all of the above. Discussed IM and BPCI letter. She asked what the discharge plan will be. If patient is able to return to [facility], that will be the plan. If patient needs SNF at discharged, they are agreeable to any bed that is appropriate for patient. CM called [facility] and spoke with Ms. [phone] [facility]  and med tech. Patient can return if she is ambulatory. She can have oxygen if needed, but it will need to be ordered. Many of their residents use [place] for [place]. Medicare pt has received, reviewed, and signed 1st IM letter informing them of their right to appeal the discharge. Telephone consent by patient's guardian - Jaron Metz. Signed copy has been placed on pt bedside chart. Patient/family seen: YES    Informed patient/family of BPCI-A Bundle Program. Also advised of potential outreach by Care Transitions Team.    Bundle Payment Care Improvement Beneficiary Letter Delivered to Beneficiary or Representative:YES. Date BPCI -A was given:09/13/22        Care Management Interventions  PCP Verified by CM: Yes Yuki Samuel NP)  Palliative Care Criteria Met (RRAT>21 & CHF Dx)?: No  Mode of Transport at Discharge:  Other (see comment)  Transition of Care Consult (CM Consult): Assisted Living, Other  Discharge Durable Medical Equipment: No  Physical Therapy Consult: No  Speech Therapy Consult: No  Support Systems: Child(saurabh), Assisted Living, Other (Comment)  Confirm Follow Up Transport: Family  The Plan for Transition of Care is Related to the Following Treatment Goals : return to [facility] with no services vs with [place] vs SNF  [address] Provided?: No  Discharge Location  Patient Expects to be Discharged to[de-identified] Assisted Living    JES Copeland

## 2022-09-13 NOTE — NURSE NAVIGATOR
HEART FAILURE NURSE NAVIGATOR NOTE  [address]    Patient chart was reviewed by Heart Failure (HF) Nurse Navigators for compliance of prescribed treatment with guidelines directed medical therapy (GDMT) and HF database completed. Please, review beneath recommendations for symptomatic patients with HF with Mildly Reduced Ejection Fraction 41-49% (HFmEF) for your consideration when taking care of this patient. Current Medical Therapy:      Name Emily Jeong    1945   LVEF 40/45%   NYHA Functional Class requested   ARNi/ACEi/ARB    Beta-blocker    Aldosterone Antagonist    SGLT2 inhibitor    Hydralazine/Isosorbide Dinitrate    Consulting Cardiologist Dr. Marsha Desai ([facility])     Recommendations: For patients with previously normal or newly diagnosed HFmrEF 41-49%, consider adding the following GDMT, if appropriate:  SGLT2 inhibitor [Class 2a]  ARNi/ACEi/ARB [Class 2b]  Beta-blockers (carvedilol, sustained-release metoprolol succinate or bisoprolol) [Class 2b]  Aldosterone antagonists GFR > 30 and K< 5 [Class 2b]  Adjust diuretic dose at discharge if hospitalized for volume overload [Class 1]    For patients who have recovered LVEF to 41-49%, GDMT should be continued to prevent heart failure and relapse of LV dysfunction [Class 1].    Please, add the following GDMT [Class 1] or document in discharge summary/progress note why patient cannot take the medication:  ARNi/ACEi or ARB  Beta-blockers (carvedilol, sustained-release metoprolol succinate or bisoprolol)  Aldosterone antagonists GFR > 30 and K< 5  SGLT2 inhibitor  Hydralazine/Isosorbide dinitrate for  Americans with Class III/IV symptoms  Adjust diuretic dose at discharge if hospitalized for volume overload  Note: the following medications may be potentially harmful in heart failure [Class 3]:  Calcium channel blockers (doxazosin, diltiazem, verapamil, nifedipine)  Antiarrhythmics (flecanide, disopyrimide, sotalol, dronedarone)  Diabetes medications (thiasolidinediones, saxagliptin, alogliptin)  NSAIDs and WOLF 2 inhibitors    Consider vaccinations for respiratory illnesses (flu, pneumonia, covid) [Class 2b]    Due to h/o recurrent hospitalizations this patient may benefit from referral to Advanced Heart Failure Program to assess suitability for advanced therapies, such as left-ventricular assist device, heart transplantation, palliative inotropes or palliation [Class 1]. To obtain in-patient consultation or refer to [address] [place] [address], call [phone]    Patient Education:     Teach back in heart failure education provided, including information about medical therapy, lifestyle modifications, diet and fluid restrictions, physical activity. Educational resources provided: Living with Heart Failure booklet; Signs/Symptoms magnet; Weight Calendar; [facility] flyer; Preparing for Successful Discharge check list.  Information provided about HF support group. Heart failure avoiding triggers on discharge instructions. Plan for Transitional Care:    Post discharge follow up phone call to be made within 48-72 hours of discharge. Patient will follow-up with PCP. Patient will follow-up with Primary Cardiologist.  Patient screened for obstructive sleep apnea and referred to Sleep Medicine. Referral/follow-up with Cardiac Rehabilitation. Referral/follow-up with Advanced Heart Failure Specialist.  Referral/follow-up with Palliative Care Specialist.       Heart Failure Nurse Navigator  Phone: [phone] / [phone]    *Recommendations listed above are based on the guidelines: 2022 AHA/ACC/HFSA Guideline for the Management of Heart Failure: A Report of the [address] on Clinical Practice Guidelines. Circulation 2022; [id number].  and 2017 AHA/ACC/HRS guideline for management of patients with ventricular arrhythmias and the prevention of sudden cardiac death: A Report of the [employer] of Cardiology/American Heart Association Task Force on Clinical Practice Guidelines and the Heart Rhythm Society.  Heart Rhythm, Vol 15, No 10, October 2018 *Class of Recommendation: Class 1 (strong), Class 2a (moderate), Class 2b (weak), Class 3 (not recommended, potentially harmful)

## 2022-09-13 NOTE — DISCHARGE INSTRUCTIONS
Download the Heart Failure Royal Wade: Search in your [employer] (Android) or [employer] Wade Store ([employer]): Search for- HF Royal Wade.    streamOnce    HF Royal is a brand-new phone wade that helps you track daily symptoms, vitals, mood, energy level and more. You can even add your heart failure care team members to view your data and monitor your condition at home. HF Royal lets you:  Track symptoms, medications and more  Share health information with your health care team  Connect with others living with heart failure        Discharge Instructions       PATIENT ID: Mary Alice Barahona  MRN: [id number]   YOB: 1945    DATE OF ADMISSION: [unfilled]    DATE OF DISCHARGE: 9/21/2022    PRIMARY CARE PROVIDER: @PCP@     ATTENDING PHYSICIAN: [unfilled]  DISCHARGING PROVIDER: Chely Simon MD    To contact this individual call [phone] and ask the  to page. If unavailable ask to be transferred the Adult Hospitalist Department. DISCHARGE DIAGNOSES CHF    CONSULTATIONS: Cardiology/hematology    PROCEDURES/SURGERIES: Procedure(s):  RIGHT HEART CATH    PENDING TEST RESULTS:   At the time of discharge the following test results are still pending: none    FOLLOW UP APPOINTMENTS:   PCP  Cardiology  Hematology    ADDITIONAL CARE RECOMMENDATIONS: as above    DIET: Cardiac Diet    ACTIVITY: Activity as tolerated    DISCHARGE MEDICATIONS:   See Medication Reconciliation Form    It is important that you take the medication exactly as they are prescribed. Keep your medication in the bottles provided by the pharmacist and keep a list of the medication names, dosages, and times to be taken in your wallet. Do not take other medications without consulting your doctor. NOTIFY YOUR PHYSICIAN FOR ANY OF THE FOLLOWING:   Fever over 101 degrees for 24 hours.    Chest pain, shortness of breath, fever, chills, nausea, vomiting, diarrhea, change in mentation, falling, weakness, bleeding. Severe pain or pain not relieved by medications. Or, any other signs or symptoms that you may have questions about.       DISPOSITION:  x  Home With:   OT  PT  HH  RN       SNF/Inpatient Rehab/LTAC    Independent/assisted living    Hospice    Other:     CDMP Checked:   Yes x     PROBLEM LIST Updated:  Yes x       Signed:   Chely Simon MD  9/21/2022  2:24 PM

## 2022-09-14 LAB
ANION GAP SERPL CALC-SCNC: 7 MMOL/L (ref 5–15)
APPEARANCE UR: CLEAR
BACTERIA URNS QL MICRO: NEGATIVE /HPF
BASOPHILS # BLD: 0 K/UL (ref 0–0.1)
BASOPHILS NFR BLD: 0 % (ref 0–1)
BILIRUB UR QL: NEGATIVE
BUN SERPL-MCNC: 32 MG/DL (ref 6–20)
BUN/CREAT SERPL: 24 (ref 12–20)
CALCIUM SERPL-MCNC: 8.1 MG/DL (ref 8.5–10.1)
CHLORIDE SERPL-SCNC: 105 MMOL/L (ref 97–108)
CO2 SERPL-SCNC: 25 MMOL/L (ref 21–32)
COLOR UR: NORMAL
CREAT SERPL-MCNC: 1.36 MG/DL (ref 0.55–1.02)
DIFFERENTIAL METHOD BLD: ABNORMAL
EOSINOPHIL # BLD: 1.2 K/UL (ref 0–0.4)
EOSINOPHIL NFR BLD: 2 % (ref 0–7)
EPITH CASTS URNS QL MICRO: NORMAL /LPF
ERYTHROCYTE [DISTWIDTH] IN BLOOD BY AUTOMATED COUNT: 16.8 % (ref 11.5–14.5)
GLUCOSE SERPL-MCNC: 73 MG/DL (ref 65–100)
GLUCOSE UR STRIP.AUTO-MCNC: NEGATIVE MG/DL
HCT VFR BLD AUTO: 23.9 % (ref 35–47)
HGB BLD-MCNC: 7 G/DL (ref 11.5–16)
HGB UR QL STRIP: NEGATIVE
HISTORY CHECKED?,CKHIST: NORMAL
HISTORY CHECKED?,CKHIST: NORMAL
HYALINE CASTS URNS QL MICRO: NORMAL /LPF (ref 0–5)
IMM GRANULOCYTES # BLD AUTO: 0 K/UL
IMM GRANULOCYTES NFR BLD AUTO: 0 %
KETONES UR QL STRIP.AUTO: NEGATIVE MG/DL
LACTATE SERPL-SCNC: 1 MMOL/L (ref 0.4–2)
LEUKOCYTE ESTERASE UR QL STRIP.AUTO: NEGATIVE
LYMPHOCYTES # BLD: 10.6 K/UL (ref 0.8–3.5)
LYMPHOCYTES NFR BLD: 18 % (ref 12–49)
MCH RBC QN AUTO: 23.6 PG (ref 26–34)
MCHC RBC AUTO-ENTMCNC: 29.3 G/DL (ref 30–36.5)
MCV RBC AUTO: 80.7 FL (ref 80–99)
METAMYELOCYTES NFR BLD MANUAL: 1 %
MONOCYTES # BLD: 8.2 K/UL (ref 0–1)
MONOCYTES NFR BLD: 14 % (ref 5–13)
MYELOCYTES NFR BLD MANUAL: 4 %
NEUTS BAND NFR BLD MANUAL: 3 % (ref 0–6)
NEUTS SEG # BLD: 35.9 K/UL (ref 1.8–8)
NEUTS SEG NFR BLD: 58 % (ref 32–75)
NITRITE UR QL STRIP.AUTO: NEGATIVE
NRBC # BLD: 2.11 K/UL (ref 0–0.01)
NRBC BLD-RTO: 3.6 PER 100 WBC
PH UR STRIP: 5.5 [PH] (ref 5–8)
PLATELET # BLD AUTO: 881 K/UL (ref 150–400)
PMV BLD AUTO: 11.6 FL (ref 8.9–12.9)
POTASSIUM SERPL-SCNC: 4.6 MMOL/L (ref 3.5–5.1)
POTASSIUM SERPL-SCNC: 6 MMOL/L (ref 3.5–5.1)
PROT UR STRIP-MCNC: NEGATIVE MG/DL
RBC # BLD AUTO: 2.96 M/UL (ref 3.8–5.2)
RBC #/AREA URNS HPF: NORMAL /HPF (ref 0–5)
RBC MORPH BLD: ABNORMAL
SODIUM SERPL-SCNC: 137 MMOL/L (ref 136–145)
SP GR UR REFRACTOMETRY: 1.01 (ref 1–1.03)
UROBILINOGEN UR QL STRIP.AUTO: 0.2 EU/DL (ref 0.2–1)
WBC # BLD AUTO: 58.9 K/UL (ref 3.6–11)
WBC MORPH BLD: ABNORMAL
WBC URNS QL MICRO: NORMAL /HPF (ref 0–4)

## 2022-09-14 PROCEDURE — 5A09357 ASSISTANCE WITH RESPIRATORY VENTILATION, LESS THAN 24 CONSECUTIVE HOURS, CONTINUOUS POSITIVE AIRWAY PRESSURE: ICD-10-PCS | Performed by: ORTHOPAEDIC SURGERY

## 2022-09-14 PROCEDURE — 74011000258 HC RX REV CODE- 258: Performed by: INTERNAL MEDICINE

## 2022-09-14 PROCEDURE — 83605 ASSAY OF LACTIC ACID: CPT

## 2022-09-14 PROCEDURE — 94760 N-INVAS EAR/PLS OXIMETRY 1: CPT

## 2022-09-14 PROCEDURE — 74011250636 HC RX REV CODE- 250/636: Performed by: INTERNAL MEDICINE

## 2022-09-14 PROCEDURE — 81001 URINALYSIS AUTO W/SCOPE: CPT

## 2022-09-14 PROCEDURE — 86923 COMPATIBILITY TEST ELECTRIC: CPT

## 2022-09-14 PROCEDURE — 86900 BLOOD TYPING SEROLOGIC ABO: CPT

## 2022-09-14 PROCEDURE — 74011250637 HC RX REV CODE- 250/637: Performed by: INTERNAL MEDICINE

## 2022-09-14 PROCEDURE — 74011000250 HC RX REV CODE- 250: Performed by: INTERNAL MEDICINE

## 2022-09-14 PROCEDURE — 84132 ASSAY OF SERUM POTASSIUM: CPT

## 2022-09-14 PROCEDURE — 74011250637 HC RX REV CODE- 250/637: Performed by: NURSE PRACTITIONER

## 2022-09-14 PROCEDURE — 80048 BASIC METABOLIC PNL TOTAL CA: CPT

## 2022-09-14 PROCEDURE — 36415 COLL VENOUS BLD VENIPUNCTURE: CPT

## 2022-09-14 PROCEDURE — 85025 COMPLETE CBC W/AUTO DIFF WBC: CPT

## 2022-09-14 PROCEDURE — 65270000046 HC RM TELEMETRY

## 2022-09-14 RX ORDER — DIPHENHYDRAMINE HCL 25 MG
25 CAPSULE ORAL
Status: DISCONTINUED | OUTPATIENT
Start: 2022-09-14 | End: 2022-09-22 | Stop reason: HOSPADM

## 2022-09-14 RX ORDER — SODIUM CHLORIDE 9 MG/ML
250 INJECTION, SOLUTION INTRAVENOUS AS NEEDED
Status: DISCONTINUED | OUTPATIENT
Start: 2022-09-14 | End: 2022-09-20 | Stop reason: SDUPTHER

## 2022-09-14 RX ORDER — ACETAMINOPHEN 325 MG/1
650 TABLET ORAL
Status: DISCONTINUED | OUTPATIENT
Start: 2022-09-14 | End: 2022-09-20 | Stop reason: SDUPTHER

## 2022-09-14 RX ORDER — FUROSEMIDE 10 MG/ML
20 INJECTION INTRAMUSCULAR; INTRAVENOUS 2 TIMES DAILY
Status: DISCONTINUED | OUTPATIENT
Start: 2022-09-15 | End: 2022-09-15

## 2022-09-14 RX ADMIN — BUMETANIDE 1 MG: 0.25 INJECTION, SOLUTION INTRAMUSCULAR; INTRAVENOUS at 08:34

## 2022-09-14 RX ADMIN — SODIUM CHLORIDE, PRESERVATIVE FREE 10 ML: 5 INJECTION INTRAVENOUS at 06:00

## 2022-09-14 RX ADMIN — DOXYCYCLINE 100 MG: 100 INJECTION, POWDER, LYOPHILIZED, FOR SOLUTION INTRAVENOUS at 08:43

## 2022-09-14 RX ADMIN — ENOXAPARIN SODIUM 30 MG: 100 INJECTION SUBCUTANEOUS at 08:32

## 2022-09-14 RX ADMIN — ARIPIPRAZOLE 30 MG: 30 TABLET ORAL at 08:33

## 2022-09-14 RX ADMIN — DOXYCYCLINE 100 MG: 100 INJECTION, POWDER, LYOPHILIZED, FOR SOLUTION INTRAVENOUS at 21:13

## 2022-09-14 RX ADMIN — SODIUM CHLORIDE, PRESERVATIVE FREE 1 G: 5 INJECTION INTRAVENOUS at 04:48

## 2022-09-14 RX ADMIN — ASPIRIN 81 MG: 81 TABLET, COATED ORAL at 08:33

## 2022-09-14 RX ADMIN — FERROUS SULFATE TAB 325 MG (65 MG ELEMENTAL FE) 325 MG: 325 (65 FE) TAB at 07:30

## 2022-09-14 RX ADMIN — MAGNESIUM OXIDE 400 MG (241.3 MG MAGNESIUM) TABLET 400 MG: TABLET at 21:13

## 2022-09-14 RX ADMIN — SODIUM CHLORIDE, PRESERVATIVE FREE 10 ML: 5 INJECTION INTRAVENOUS at 06:01

## 2022-09-14 RX ADMIN — SODIUM CHLORIDE, PRESERVATIVE FREE 10 ML: 5 INJECTION INTRAVENOUS at 21:16

## 2022-09-14 RX ADMIN — BUMETANIDE 1 MG: 0.25 INJECTION, SOLUTION INTRAMUSCULAR; INTRAVENOUS at 16:59

## 2022-09-14 RX ADMIN — SODIUM CHLORIDE, PRESERVATIVE FREE 10 ML: 5 INJECTION INTRAVENOUS at 13:58

## 2022-09-14 RX ADMIN — SODIUM CHLORIDE, PRESERVATIVE FREE 10 ML: 5 INJECTION INTRAVENOUS at 17:05

## 2022-09-14 NOTE — PROGRESS NOTES
HEMATOLOGY / ONCOLOGY    [facility] 1945 Age: 68 y.o. Date of service: 01/23/20   Location: Gracie Iglesias MD    CHIEF COMPLAINT:  leg swelling and pain    DISEASE FEATURES  See marrow report below    PREVIOUS TREATMENTS  . CURRENT TREATMENTS  Vidaza 7/2022 -  Disease Status: . Treatment Goal: . ACTIVE PROBLEMS  Myeloproliferative/myelodysplastic disease  --anemia, leukocytosis, erythrocytosis, marrow fibrosis  CHF  CKD       INTERIM HISTORY AND ASSESSMENT  Ms. Teto Bello was admitted with diagnosis of pneumonia. Her leg was painful and swollen, but doppler was neg for DVT and CT was neg for PE and pneumonia. Main finding was CHF; troponin and pro-BNP were elevated. CT showed CHF: cardiomegaly, interstitial edema, small bilateral pleural effusions. Stress test 8/15/22 neg for ischemia but with EF of 40%. She says she has another cardiac test (cath?) on 9/26/22. I think she may have high-output failure from her chronic anemia. At a hemoglobin of 7.0, I think she will benefit from transfusion of two units of PRBCs. Her iron profile looks like she could be iron deficient with iron of 5, TIBC of 179 and sat of 3%; her MCV is 82, previously 72; however her ferritin is >800 and she had adequate iron stores in the spring on her marrow exam. In addition, she is likely to need multiple transfusions in the future and develop iron overload. She has had 3 cycles of Vidaza with nearly identical blood counts to when she started. No benefit that I can see unless her marrow blasts are disappearing, but that has not made her feel better or improved her peripheral counts. I think she is behaving more like myeloproliferative disease than myelodysplasia.     Here is her marrow report from earlier this year:  Here is her marrow biopsy report from the spring of 2022:  Hypercellular marrow (40-50%) with marked erythroid hypoplasia, bilineage atypia, increased fibrosis (MF-2), and  increased blasts (7% by manual differential; See COMMENT)  - increased blasts (4.6% of events) and basophils (2.0% of events) by flow cytometry (See FLOW)  - no abnormalities detected by [facility] MDS/myeloproliferative panel (See FISH)  - abnormal female karyotype (See CYTO)  - multiple pathogenic variants detected by extended myeloid NGS panel (See MOLECULAR)         Recent Labs     09/14/22  1149 09/14/22  0456 09/13/22  0445 09/12/22  1621   WBC  --  58.9* 61.8* 64.3*   GRANS  --  58 41 51   ANEU  --  35.9* 30.9* 34.7*   HGB  --  7.0* 7.0* 8.0*   PLT  --  881* 931* 968*   NA  --  137 138 137   K 4.6 6.0* 4.8 4.0   GLU  --  73 99 98   CREA  --  1.36* 1.44* 1.54*   ALT  --   --  14 16   TBILI  --   --  0.4 0.3   AP  --   --  164* 162*   CA  --  8.1* 8.2* 8.9   MG  --   --  2.3  --    PHOS  --   --  3.8  --         Past Medical History:   Diagnosis Date    Anemia     Congestive heart failure (HCC)     Hypertension     Menopause     Ventral hernia without obstruction or gangrene 4/18/2022     Past Surgical History:   Procedure Laterality Date    HX OTHER SURGICAL  04/13/2022     bone marrow     Medications and Allergies have been reviewed and updated in the [facility] system. REVIEW OF SYSTEMS  Except as noted in the history and assessement above, all other systems are negative. EXAMINATION   Vital signs were reviewed abnormalities discussed above.    General: Frail  HEENT: conjunctiva clear; no jaundice    Cervical nodes: none palpable  Supraclavicular nodes: none palpable  Axillary nodes: none palpable  Inguinal nodes: none palpable  Lungs: clear  CV: RRR  Abd: soft and non-tender; no HSM; no masses  Skin: no significant rashes  Ext: Edema: none; Tenderness: none  Neuro/Psych:  Gait: NL  Speech: NL   Affect: NL  Cognition: NL       Social History     Tobacco Use    Smoking status: Never    Smokeless tobacco: Never   Substance Use Topics    Alcohol use: Never      Family History   Problem Relation Age of Onset    Breast Cancer Sister 63's    Cancer Sister     Breast Problems Sister     Hypertension Mother         Joyce Martinez MD

## 2022-09-14 NOTE — PROGRESS NOTES
[address] [facility] Adult  Hospitalist Group                                                                                          Hospitalist Progress Note  Etelvina Azul NP  Answering service: [phone] from in house phone        Date of Service:  2022  NAME:  Modesto Quijano  :  1945  MRN:  [id number]      Admission Summary:   Vanessa Fernández is a 68yo F with a PMH of CHF, HTN, CKD, Myeloproliferative Disorder who presented at the ED with right knee pain x5 days, denies injury: pain is constant/ sharp pain, 5/10 in severity, worse with ambulation, slight relief at rest. Upon arrival to the ED, the patient was found to be in significant respiratory distress, BNP level was elevated, CXR showed evidence of vascular congestion and possible pneumonia. The patient was subsequently referred to the hospitalist service for admission. The patient required being placed on supplemental oxygen in the ED because of the shortness of breath. The patient was last admitted to this hospital from 2022 to 2022 for evaluation of shortness of breath, subsequently attributed to pleural effusion for which the patient underwent thoracentesis and about 1900 mL of fluid was drained from the chest. The patient denied associated fever, rigors, and chills. The patient stated that she has been taking her medication as prescribed including diuretic. Interval history / Subjective: Follow-up for issues listed below.   -Patient seen and examined, no c/o's CP,  no SOB. Assessment & Plan:     Acute-on-Chronic HFrEF:   -[id number]  -continue diuretics  -started Rocephin and Doxycycline   -serial cardiac markers: trop peaked 154  -Cardiology consulted  -ECHO 8/3/22: reduced left ventricular systolic function with EF 40-45%, left ventricle mildly dilated, normal wall thickness.   -ASA continues  -tele     Acute Respiratory Failure with Hypoxia: multifactorial including CHF and suspected bacterial PNA. -serial cardiac markers  -D-dimer: 4.02  -supplemental oxygen,  stable on RA  -CXR 9/12: pattern of moderate pulmonary interstitial edema, slightly worse compared to prior study, superimposed infiltrate cannot be excluded. -CT scan chest w/o 9/13: small bilateral pleural effusions, diffusely abnormal bones (differential includes MM, Leukemia/Lymphoma- given splenomegaly) less likely heterogeneous osteopenia or diffuse metastases, Splenomegaly. Suspected Bacterial PNA:  -start Rocephin and doxycycline  -CT scan chest w/o 9/13: small bilateral pleural effusions, diffusely abnormal bones (differential includes MM, Leukemia/Lymphoma- given splenomegaly) less likely heterogeneous osteopenia or diffuse metastases, Splenomegaly. Myeloproliferative Disorder:  -CT scan chest w/o 9/13: small bilateral pleural effusions, diffusely abnormal bones (differential includes MM, Leukemia/Lymphoma- given splenomegaly) less likely heterogeneous osteopenia or diffuse metastases, Splenomegaly.   -consulted hematology  -chronic leukocytosis     Anemia:   -stool guaiac   -monitor Hgb 7.0 (9/14). iron:5, ferritin:894  -continue ferrous sulfate     Thrombocytosis: Platelets 122 (5/48)     CKD 3B:   -monitor renal function  -BUN 32 Creat 1.36, improved     HTN: BP stable. Tachycardia: stable/improved low 100's-90's, continue to monitor. Febrile: T max 102.6 (9/13), follow blood and urine Cx's     Right Knee Effusion:  denies trauma. -X-ray Right knee 9/12: moderate effusion. -X-ray Right tib/fib 9/12: no acute abnormality.    -Duplex RLE 9/12: negative for DVT.   -Orthopedic consult         DVTppx: Lovenox  Code Status: Full Code  Diet: Cardiac  Activity: OOB to chair TID and PRN as tolerated  Discharge: TBD  Ambulates: independently     Hospital Problems  Date Reviewed: 9/13/2022            Codes Class Noted POA    * (Principal) Acute on chronic HFrEF (heart failure with reduced ejection fraction) ([facility] [address] ICD-10-CM: M43.18  ICD-9-CM: 428.23  9/12/2022 Yes             Review of Systems:   A comprehensive review of systems was negative except for that written in the HPI. Vital Signs:    Last 24hrs VS reviewed since prior progress note. Most recent are:  Visit Vitals  /68 (BP 1 Location: Right upper arm, BP Patient Position: Lying)   Pulse 99   Temp 98 °F (36.7 °C)   Resp 22   Wt 65.7 kg (144 lb 13.5 oz)   SpO2 100%   Breastfeeding No   BMI 24.10 kg/m²       No intake or output data in the 24 hours ending 09/14/22 1039     Physical Examination:     I had a face to face encounter with this patient and independently examined them on 9/14/2022 as outlined below:          Constitutional:  No acute distress, cooperative, pleasant. ENT:  Oral mucosa moist.    Resp:  CTA bilaterally. No wheezing/rhonchi/rales. No accessory muscle use. CV:  Regular rhythm, normal rate, S1,S2.    GI/:  Soft, non distended, non tender, no guarding, BS present. Voids Freely. Musculoskeletal:  No edema, warm. Right knee swelling. Neurologic:  Moves all extremities. AAOx3. Skin:  w/d, chronic hyperpigmentation/dark  to  BLE. Psych:  Good insight, Not anxious nor agitated. Data Review:    Review and/or order of clinical lab test      Labs:     Recent Labs     09/14/22 0456 09/13/22 0445   WBC 58.9* 61.8*   HGB 7.0* 7.0*   HCT 23.9* 23.2*   * 931*     Recent Labs     09/14/22 0456 09/13/22 0445 09/12/22  1621    138 137   K 6.0* 4.8 4.0    103 102   CO2 25 24 27   BUN 32* 30* 34*   CREA 1.36* 1.44* 1.54*   GLU 73 99 98   CA 8.1* 8.2* 8.9   MG  --  2.3  --    PHOS  --  3.8  --      Recent Labs     09/13/22 0445 09/12/22  1621   ALT 14 16   * 162*   TBILI 0.4 0.3   TP 5.5* 7.1   ALB 2.6* 2.8*   GLOB 2.9 4.3*   LPSE 56*  --      No results for input(s): INR, PTP, APTT, INREXT in the last 72 hours.    Recent Labs     09/13/22  0445   TIBC 179*   PSAT 3*   FERR 894*      Lab Results   Component Value Date/Time    Folate 36.1 (H) 09/13/2022 04:45 AM      No results for input(s): PH, PCO2, PO2 in the last 72 hours. No results for input(s): CPK, CKNDX, TROIQ in the last 72 hours.     No lab exists for component: CPKMB  No results found for: CHOL, CHOLX, CHLST, CHOLV, HDL, HDLP, LDL, LDLC, DLDLP, TGLX, TRIGL, TRIGP, CHHD, CHHDX  No results found for: GLUCPOC  No results found for: COLOR, APPRN, SPGRU, REFSG, DOMINGO, PROTU, GLUCU, KETU, BILU, UROU, SANDY, LEUKU, GLUKE, EPSU, BACTU, WBCU, RBCU, CASTS, UCRY      Medications Reviewed:     Current Facility-Administered Medications   Medication Dose Route Frequency    ARIPiprazole (ABILIFY) tablet 30 mg  30 mg Oral DAILY    aspirin delayed-release tablet 81 mg  81 mg Oral DAILY    bumetanide (BUMEX) injection 1 mg  1 mg IntraVENous BID    ferrous sulfate tablet 325 mg  1 Tablet Oral ACB    sodium chloride (NS) flush 5-40 mL  5-40 mL IntraVENous Q8H    sodium chloride (NS) flush 5-40 mL  5-40 mL IntraVENous PRN    acetaminophen (TYLENOL) tablet 650 mg  650 mg Oral Q6H PRN    Or    acetaminophen (TYLENOL) suppository 650 mg  650 mg Rectal Q6H PRN    polyethylene glycol (MIRALAX) packet 17 g  17 g Oral DAILY PRN    ondansetron (ZOFRAN ODT) tablet 4 mg  4 mg Oral Q8H PRN    Or    ondansetron (ZOFRAN) injection 4 mg  4 mg IntraVENous Q6H PRN    enoxaparin (LOVENOX) injection 30 mg  30 mg SubCUTAneous DAILY    cefTRIAXone (ROCEPHIN) 1 g in 0.9% sodium chloride 10 mL IV syringe  1 g IntraVENous Q24H    doxycycline (VIBRAMYCIN) 100 mg in 0.9% sodium chloride (MBP/ADV) 100 mL MBP  100 mg IntraVENous Q12H    albuterol-ipratropium (DUO-NEB) 2.5 MG-0.5 MG/3 ML  3 mL Nebulization Q2H PRN    metoprolol (LOPRESSOR) injection 5 mg  5 mg IntraVENous Q6H PRN    magnesium oxide (MAG-OX) tablet 400 mg  400 mg Oral QHS     ______________________________________________________________________  EXPECTED LENGTH OF STAY: 3d 19h  ACTUAL LENGTH OF STAY:          2                 Dragan Galarza NP

## 2022-09-14 NOTE — PROGRESS NOTES
S:No pain in the knee this morning; still some stiffness and soreness in the posterior calf. Pt was walking prior to coming to hospital, but has been in the bed since admission. Denies numbness, tingling, focal weakness. O: No effusion, no swelling in the knee; ttp mild in the posterior medial calf with a dense mass; DF/PF 5/5; NVI  A/P: Can not positively say what the dense posterior calf area is, but could be hematoma to acute muscular injury, inflammatory response to ruptured Bakers cysts; no concern for infection, compartment syndrome, or injury requiring surgery given the exam.   WBAT, Ice, Pain meds as needed to ambulate. No further testing or procedures planned. Can follow up as needed. Will sign off, call with questions.      Signed By: LEANDER Hemphill     September 14, 2022

## 2022-09-14 NOTE — PROGRESS NOTES
Bedsideshift change report given to [facility] (oncoming nurse) by Nataly Palmer (offgoing nurse).  Report included the following information SBAR, Recent Results, and Cardiac Rhythm Sinus Tach

## 2022-09-15 LAB
HCT VFR BLD AUTO: 28.8 % (ref 35–47)
HGB BLD-MCNC: 8.7 G/DL (ref 11.5–16)

## 2022-09-15 PROCEDURE — 85018 HEMOGLOBIN: CPT

## 2022-09-15 PROCEDURE — 74011000250 HC RX REV CODE- 250: Performed by: INTERNAL MEDICINE

## 2022-09-15 PROCEDURE — 74011250637 HC RX REV CODE- 250/637: Performed by: INTERNAL MEDICINE

## 2022-09-15 PROCEDURE — 36430 TRANSFUSION BLD/BLD COMPNT: CPT

## 2022-09-15 PROCEDURE — 30233N1 TRANSFUSION OF NONAUTOLOGOUS RED BLOOD CELLS INTO PERIPHERAL VEIN, PERCUTANEOUS APPROACH: ICD-10-PCS | Performed by: INTERNAL MEDICINE

## 2022-09-15 PROCEDURE — 65270000046 HC RM TELEMETRY

## 2022-09-15 PROCEDURE — 74011250636 HC RX REV CODE- 250/636: Performed by: INTERNAL MEDICINE

## 2022-09-15 PROCEDURE — 74011000258 HC RX REV CODE- 258: Performed by: INTERNAL MEDICINE

## 2022-09-15 PROCEDURE — P9016 RBC LEUKOCYTES REDUCED: HCPCS

## 2022-09-15 PROCEDURE — 36415 COLL VENOUS BLD VENIPUNCTURE: CPT

## 2022-09-15 PROCEDURE — 74011250637 HC RX REV CODE- 250/637: Performed by: NURSE PRACTITIONER

## 2022-09-15 RX ORDER — DOCUSATE SODIUM 100 MG/1
100 CAPSULE, LIQUID FILLED ORAL DAILY
Status: DISCONTINUED | OUTPATIENT
Start: 2022-09-15 | End: 2022-09-22 | Stop reason: HOSPADM

## 2022-09-15 RX ADMIN — ASPIRIN 81 MG: 81 TABLET, COATED ORAL at 08:31

## 2022-09-15 RX ADMIN — BUMETANIDE 1 MG: 0.25 INJECTION, SOLUTION INTRAMUSCULAR; INTRAVENOUS at 17:54

## 2022-09-15 RX ADMIN — SODIUM CHLORIDE, PRESERVATIVE FREE 1 G: 5 INJECTION INTRAVENOUS at 03:49

## 2022-09-15 RX ADMIN — ENOXAPARIN SODIUM 30 MG: 100 INJECTION SUBCUTANEOUS at 08:32

## 2022-09-15 RX ADMIN — SODIUM CHLORIDE, PRESERVATIVE FREE 10 ML: 5 INJECTION INTRAVENOUS at 07:06

## 2022-09-15 RX ADMIN — BUMETANIDE 1 MG: 0.25 INJECTION, SOLUTION INTRAMUSCULAR; INTRAVENOUS at 10:34

## 2022-09-15 RX ADMIN — FUROSEMIDE 20 MG: 10 INJECTION INTRAMUSCULAR; INTRAVENOUS at 10:35

## 2022-09-15 RX ADMIN — DOXYCYCLINE 100 MG: 100 INJECTION, POWDER, LYOPHILIZED, FOR SOLUTION INTRAVENOUS at 10:35

## 2022-09-15 RX ADMIN — SODIUM CHLORIDE, PRESERVATIVE FREE 10 ML: 5 INJECTION INTRAVENOUS at 17:57

## 2022-09-15 RX ADMIN — DOXYCYCLINE 100 MG: 100 INJECTION, POWDER, LYOPHILIZED, FOR SOLUTION INTRAVENOUS at 22:39

## 2022-09-15 RX ADMIN — SODIUM CHLORIDE, PRESERVATIVE FREE 10 ML: 5 INJECTION INTRAVENOUS at 22:36

## 2022-09-15 RX ADMIN — MAGNESIUM OXIDE 400 MG (241.3 MG MAGNESIUM) TABLET 400 MG: TABLET at 22:36

## 2022-09-15 RX ADMIN — FERROUS SULFATE TAB 325 MG (65 MG ELEMENTAL FE) 325 MG: 325 (65 FE) TAB at 07:06

## 2022-09-15 RX ADMIN — ARIPIPRAZOLE 30 MG: 30 TABLET ORAL at 08:32

## 2022-09-15 NOTE — PROGRESS NOTES
Occupational Therapy    Chart reviewed, and note that pt was admitted with heart failure. Most recent troponin resulted as 114 and pt has a consult for cardiology but has no cardiology notes in the chart.  OT will hold at this time, follow, and see as able and appropriate for the eval.    Jerome De La Cruz MS, OTR/L

## 2022-09-15 NOTE — PROGRESS NOTES
Problem: Falls - Risk of  Goal: *Absence of Falls  Description: Document Patricia Rhodes Fall Risk and appropriate interventions in the flowsheet.   Outcome: Progressing Towards Goal  Note: Fall Risk Interventions:  Mobility Interventions: Communicate number of staff needed for ambulation/transfer, Patient to call before getting OOB         Medication Interventions: Patient to call before getting OOB    Elimination Interventions: Call light in reach, Toileting schedule/hourly rounds              Problem: Patient Education: Go to Patient Education Activity  Goal: Patient/Family Education  Outcome: Progressing Towards Goal

## 2022-09-15 NOTE — PROGRESS NOTES
Bedside and Verbal shift change report given to [facility] RN(oncoming nurse) by Char Hadley RN(offgoing nurse). Report included the following information SBAR, Kardex, ED Summary, MAR, and Cardiac Rhythm ***.

## 2022-09-15 NOTE — PROGRESS NOTES
Consult received, chart reviewed, and note that pt was admitted with heart failure. Most recent troponin resulted as 114 and pt has a consult for cardiology but has no cardiology notes in the chart.  PT will hold at this time, follow, and see as able and appropriate for the eval. Enrique Stringer, PT

## 2022-09-15 NOTE — PROGRESS NOTES
HEMATOLOGY / ONCOLOGY    Brittnee Smart 1945 Age: 68 y.o. CHIEF COMPLAINT:  leg swelling and pain    DISEASE FEATURES  See marrow report below    PREVIOUS TREATMENTS  . CURRENT TREATMENTS  Vidaza 7/2022 -  Disease Status: . Treatment Goal: . ACTIVE PROBLEMS  Myeloproliferative/myelodysplastic disease  --anemia, leukocytosis, erythrocytosis, marrow fibrosis  CHF  CKD       INTERIM HISTORY AND ASSESSMENT  Ms. Heather Cruz was admitted with diagnosis of pneumonia. Her leg was painful and swollen, but doppler was neg for DVT and CT was neg for PE and pneumonia. Main finding was CHF; troponin and pro-BNP were elevated. CT showed CHF: cardiomegaly, interstitial edema, small bilateral pleural effusions. Stress test 8/15/22 neg for ischemia but with EF of 40%. She says she has another cardiac test (cath?) on 9/26/22. Dr. Chasity Vázquez thinks she may have high-output failure from her chronic anemia. At a hemoglobin of 7.0, I think she will benefit from transfusion of two units of PRBCs. Her iron profile looks like she could be iron deficient with iron of 5, TIBC of 179 and sat of 3%; her MCV is 82, previously 72; however her ferritin is >800 and she had adequate iron stores in the spring on her marrow exam. In addition, she is likely to need multiple transfusions in the future and develop iron overload. She has had 3 cycles of Vidaza with nearly identical blood counts to when she started. No benefit that I can see unless her marrow blasts are disappearing, but that has not made her feel better or improved her peripheral counts. I think she is behaving more like myeloproliferative disease than myelodysplasia.     Here is her marrow report from earlier this year:  Here is her marrow biopsy report from the spring of 2022:  Hypercellular marrow (40-50%) with marked erythroid hypoplasia, bilineage atypia, increased fibrosis (MF-2), and  increased blasts (7% by manual differential; See COMMENT)  - increased blasts (4.6% of events) and basophils (2.0% of events) by flow cytometry (See FLOW)  - no abnormalities detected by [facility] MDS/myeloproliferative panel (See FISH)  - abnormal female karyotype (See CYTO)  - multiple pathogenic variants detected by extended myeloid NGS panel (See MOLECULAR)    Would rec. Continue to transfuse to keep hgb> 8 if possible given the symptoms from her anemia seen at a lower level. Recent Labs     09/14/22  1149 09/14/22  0456 09/13/22  0445 09/12/22  1621   WBC  --  58.9* 61.8* 64.3*   GRANS  --  58 41 51   ANEU  --  35.9* 30.9* 34.7*   HGB  --  7.0* 7.0* 8.0*   PLT  --  881* 931* 968*   NA  --  137 138 137   K 4.6 6.0* 4.8 4.0   GLU  --  73 99 98   CREA  --  1.36* 1.44* 1.54*   ALT  --   --  14 16   TBILI  --   --  0.4 0.3   AP  --   --  164* 162*   CA  --  8.1* 8.2* 8.9   MG  --   --  2.3  --    PHOS  --   --  3.8  --       SUBJECTIVE:    pt felt a little better after getting blood last night, still weak, still has sob with exertion    Past Medical History:   Diagnosis Date    Anemia     Congestive heart failure (HCC)     Hypertension     Menopause     Ventral hernia without obstruction or gangrene 4/18/2022     Past Surgical History:   Procedure Laterality Date    HX OTHER SURGICAL  04/13/2022     bone marrow     Medications and Allergies have been reviewed and updated in the [facility] system. REVIEW OF SYSTEMS  Except as noted in the history and assessement above, all other systems are negative. EXAMINATION   Vital signs were reviewed abnormalities discussed above.    General: Frail  HEENT: conjunctiva clear; no jaundice    Lungs: clear  CV: RRR  Abd: soft and non-tender; no HSM; no masses  Skin: no significant rashes  Ext: Edema: none; Tenderness: none  Neuro/Psych:         Social History     Tobacco Use    Smoking status: Never    Smokeless tobacco: Never   Substance Use Topics    Alcohol use: Never      Family History   Problem Relation Age of Onset    Breast Cancer Sister 63's    Cancer Sister     Breast Problems Sister     Hypertension Mother         Don Frias MD

## 2022-09-15 NOTE — PROGRESS NOTES
[address] [facility] Adult  Hospitalist Group                                                                                          Hospitalist Progress Note  Jabier Johns MD  Answering service: [phone] OR 36 from in house phone        Date of Service:  9/15/2022  NAME:  Ravinder Kelley  :  1945  MRN:  [id number]      Admission Summary:   Asim Godoy is a 68yo F with a PMH of CHF, HTN, CKD, Myeloproliferative Disorder who presented at the ED with right knee pain x5 days, denies injury: pain is constant/ sharp pain, 5/10 in severity, worse with ambulation, slight relief at rest. Upon arrival to the ED, the patient was found to be in significant respiratory distress, BNP level was elevated, CXR showed evidence of vascular congestion and possible pneumonia. The patient was subsequently referred to the hospitalist service for admission. The patient required being placed on supplemental oxygen in the ED because of the shortness of breath. The patient was last admitted to this hospital from 2022 to 2022 for evaluation of shortness of breath, subsequently attributed to pleural effusion for which the patient underwent thoracentesis and about 1900 mL of fluid was drained from the chest. The patient denied associated fever, rigors, and chills. The patient stated that she has been taking her medication as prescribed including diuretic. Interval history / Subjective:    Follow up Acute on chronic CHF   Now on 3l NC (yesterday on 6lnc)  Assessment & Plan:     Acute-on-Chronic HFrEF: NYHA III--improving  -[id number]  -ECHO 8/3/22: reduced left ventricular systolic function with EF 40-45%, left ventricle mildly dilated, normal wall thickness.  -continue IV diuresis, will stop IV lasix as the patient is already on Bumex  -not on BB sec to low BP, not on ACEi/ARB/ARNi sec to SKYLA  -serial cardiac markers: trop peaked 154  -Cardiology awaited  -ASA continues     Acute Respiratory Failure with Hypoxia: --improving  multifactorial including CHF and suspected bacterial PNA. -serial cardiac markers  -D-dimer: 4.02  -CXR 9/12: pattern of moderate pulmonary interstitial edema, slightly worse compared to prior study, superimposed infiltrate cannot be excluded. -CT scan chest w/o 9/13: small bilateral pleural effusions, diffusely abnormal bones (differential includes MM, Leukemia/Lymphoma- given splenomegaly) less likely heterogeneous osteopenia or diffuse metastases, Splenomegaly. started Rocephin and Doxycycline , continue  -supplemental oxygen, wean as tolerated     Myeloproliferative Disorder  Anemia sec to above  Thrombocytosis  -CT scan chest w/o 9/13: small bilateral pleural effusions, diffusely abnormal bones (differential includes MM, Leukemia/Lymphoma- given splenomegaly) less likely heterogeneous osteopenia or diffuse metastases, Splenomegaly. -Appreciate hematology, transfuse to keep hb>8   -chronic leukocytosis    CKD 3B:   -monitor renal function  -BUN 32 Creat 1.36, improved     HTN: BP stable. Tachycardia: stable/improved low 100's-90's, continue to monitor. Febrile: T max 102.6 (9/13), follow blood and urine Cx's     Right Knee Effusion:  denies trauma. -X-ray Right knee 9/12: moderate effusion. -X-ray Right tib/fib 9/12: no acute abnormality.    -Duplex RLE 9/12: negative for DVT.   -Orthopedic signed off    Regular diet    PT/OT        DVTppx: Lovenox  Code Status: Full Code  PTA: DIAN    Plan: Continue IV diuresis, continue antibiotics, blood transfusion if needed  Diet: Cardiac  Activity: OOB to chair TID and PRN as tolerated  Discharge: in 1-2 days  Ambulates: independently     Hospital Problems  Date Reviewed: 9/13/2022            Codes Class Noted POA    * (Principal) Acute on chronic HFrEF (heart failure with reduced ejection fraction) ([facility]) ICD-10-CM: X07.32  ICD-9-CM: 428.23  9/12/2022 Yes           Review of Systems:   A comprehensive review of systems was negative except for that written in the HPI. Vital Signs:    Last 24hrs VS reviewed since prior progress note. Most recent are:  Visit Vitals  /64 (BP 1 Location: Left upper arm, BP Patient Position: Semi fowlers)   Pulse 98   Temp 98 °F (36.7 °C)   Resp 22   Wt 68.1 kg (150 lb 2.1 oz)   SpO2 94%   Breastfeeding No   BMI 24.98 kg/m²         Intake/Output Summary (Last 24 hours) at 9/15/2022 1008  Last data filed at 9/15/2022 0455  Gross per 24 hour   Intake 100 ml   Output 1050 ml   Net -950 ml          Physical Examination:     I had a face to face encounter with this patient and independently examined them on 9/15/2022 as outlined below:          Constitutional:  No acute distress, cooperative, pleasant. ENT:  Oral mucosa moist.    Resp:  CTA bilaterally. No wheezing/rhonchi/rales. No accessory muscle use. CV:  Regular rhythm, normal rate, S1,S2.    GI/:  Soft, non distended, non tender, no guarding, BS present. Voids Freely. Musculoskeletal:  No edema, warm. Right knee swelling. Neurologic:  Moves all extremities. AAOx3. Skin:  w/d, chronic hyperpigmentation/dark  to  BLE. Psych:  Good insight, Not anxious nor agitated. Data Review:    Review and/or order of clinical lab test      Labs:     Recent Labs     09/14/22 0456 09/13/22 0445   WBC 58.9* 61.8*   HGB 7.0* 7.0*   HCT 23.9* 23.2*   * 931*       Recent Labs     09/14/22  1149 09/14/22 0456 09/13/22 0445 09/12/22  1621   NA  --  137 138 137   K 4.6 6.0* 4.8 4.0   CL  --  105 103 102   CO2  --  25 24 27   BUN  --  32* 30* 34*   CREA  --  1.36* 1.44* 1.54*   GLU  --  73 99 98   CA  --  8.1* 8.2* 8.9   MG  --   --  2.3  --    PHOS  --   --  3.8  --        Recent Labs     09/13/22 0445 09/12/22  1621   ALT 14 16   * 162*   TBILI 0.4 0.3   TP 5.5* 7.1   ALB 2.6* 2.8*   GLOB 2.9 4.3*   LPSE 56*  --        No results for input(s): INR, PTP, APTT, INREXT, INREXT in the last 72 hours.    Recent Labs     09/13/22  5782   TIBC 179*   PSAT 3*   FERR 894*        Lab Results   Component Value Date/Time    Folate 36.1 (H) 09/13/2022 04:45 AM        No results for input(s): PH, PCO2, PO2 in the last 72 hours. No results for input(s): CPK, CKNDX, TROIQ in the last 72 hours.     No lab exists for component: CPKMB  No results found for: CHOL, CHOLX, CHLST, CHOLV, HDL, HDLP, LDL, LDLC, DLDLP, TGLX, TRIGL, TRIGP, CHHD, CHHDX  No results found for: [facility]  Lab Results   Component Value Date/Time    Color YELLOW/STRAW 09/14/2022 02:51 PM    Appearance CLEAR 09/14/2022 02:51 PM    Specific gravity 1.013 09/14/2022 02:51 PM    pH (UA) 5.5 09/14/2022 02:51 PM    Protein Negative 09/14/2022 02:51 PM    Glucose Negative 09/14/2022 02:51 PM    Ketone Negative 09/14/2022 02:51 PM    Bilirubin Negative 09/14/2022 02:51 PM    Urobilinogen 0.2 09/14/2022 02:51 PM    Nitrites Negative 09/14/2022 02:51 PM    Leukocyte Esterase Negative 09/14/2022 02:51 PM    Epithelial cells FEW 09/14/2022 02:51 PM    Bacteria Negative 09/14/2022 02:51 PM    WBC 0-4 09/14/2022 02:51 PM    RBC 0-5 09/14/2022 02:51 PM         Medications Reviewed:     Current Facility-Administered Medications   Medication Dose Route Frequency    0.9% sodium chloride infusion 250 mL  250 mL IntraVENous PRN    acetaminophen (TYLENOL) tablet 650 mg  650 mg Oral Q4H PRN    diphenhydrAMINE (BENADRYL) capsule 25 mg  25 mg Oral Q6H PRN    furosemide (LASIX) injection 20 mg  20 mg IntraVENous BID    0.9% sodium chloride infusion 250 mL  250 mL IntraVENous PRN    ARIPiprazole (ABILIFY) tablet 30 mg  30 mg Oral DAILY    aspirin delayed-release tablet 81 mg  81 mg Oral DAILY    bumetanide (BUMEX) injection 1 mg  1 mg IntraVENous BID    ferrous sulfate tablet 325 mg  1 Tablet Oral ACB    sodium chloride (NS) flush 5-40 mL  5-40 mL IntraVENous Q8H    sodium chloride (NS) flush 5-40 mL  5-40 mL IntraVENous PRN    acetaminophen (TYLENOL) tablet 650 mg  650 mg Oral Q6H PRN    Or    acetaminophen (TYLENOL) suppository 650 mg  650 mg Rectal Q6H PRN    polyethylene glycol (MIRALAX) packet 17 g  17 g Oral DAILY PRN    ondansetron (ZOFRAN ODT) tablet 4 mg  4 mg Oral Q8H PRN    Or    ondansetron (ZOFRAN) injection 4 mg  4 mg IntraVENous Q6H PRN    enoxaparin (LOVENOX) injection 30 mg  30 mg SubCUTAneous DAILY    cefTRIAXone (ROCEPHIN) 1 g in 0.9% sodium chloride 10 mL IV syringe  1 g IntraVENous Q24H    doxycycline (VIBRAMYCIN) 100 mg in 0.9% sodium chloride (MBP/ADV) 100 mL MBP  100 mg IntraVENous Q12H    albuterol-ipratropium (DUO-NEB) 2.5 MG-0.5 MG/3 ML  3 mL Nebulization Q2H PRN    metoprolol (LOPRESSOR) injection 5 mg  5 mg IntraVENous Q6H PRN    magnesium oxide (MAG-OX) tablet 400 mg  400 mg Oral QHS     ______________________________________________________________________  EXPECTED LENGTH OF STAY: 3d 19h  ACTUAL LENGTH OF STAY:          [address] [facility] [address], MD

## 2022-09-15 NOTE — PROGRESS NOTES
Transitions of care:  Pt admitted from [facility], [phone] has court appointed legal guardian  Poonam Pham. If pt is to discharge back to [facility], she will need to be able to ambulate as baseline she is independent in ADL's. EMERGENCY CONTACTS:  Guardian - Paulino Villareal   Office/Jacky Ellsworth Clement ([phone]) - Call this number during the weekdays. After hours/weekend message line ([phone]) - Leave voicemail and someone will call back. Paulino Villareal's personal cell ([phone]) - Use this number if need emergency consent or patient is having an emergency. Daughter - Rizwan Farrar ([phone]). She is allowed to have all medical information regarding patient. [facility] ([phone]). Per IDR's today, pt may be ready in next 24-48 hours. Therapy evaluations are pending but they were not able to complete today due to cardiac concerns, troponin. Referrals have been sent to local SNF's in the event that pt's care needs cannot be met by [facility] and to prepare for a potential weekend discharge. This will need to be reviewed with pt's legal guardian prior to discharge.     Reema Kay 52   [phone]

## 2022-09-15 NOTE — PROGRESS NOTES
8544 pt refusing morning labs, states that she has been stuck too many times. Nurse explained how often labs are checked during the hospital stay, pt still refused.  Will continue to monitor

## 2022-09-16 ENCOUNTER — APPOINTMENT (OUTPATIENT)
Dept: NON INVASIVE DIAGNOSTICS | Age: 77
DRG: 286 | End: 2022-09-16
Attending: NURSE PRACTITIONER
Payer: MEDICARE

## 2022-09-16 LAB
ABO + RH BLD: NORMAL
ANION GAP SERPL CALC-SCNC: 6 MMOL/L (ref 5–15)
BASOPHILS # BLD: 3.5 K/UL (ref 0–0.1)
BASOPHILS NFR BLD: 5 % (ref 0–1)
BLD PROD TYP BPU: NORMAL
BLOOD GROUP ANTIBODIES SERPL: NORMAL
BPU ID: NORMAL
BUN SERPL-MCNC: 34 MG/DL (ref 6–20)
BUN/CREAT SERPL: 23 (ref 12–20)
CALCIUM SERPL-MCNC: 9.1 MG/DL (ref 8.5–10.1)
CHLORIDE SERPL-SCNC: 106 MMOL/L (ref 97–108)
CO2 SERPL-SCNC: 27 MMOL/L (ref 21–32)
CREAT SERPL-MCNC: 1.45 MG/DL (ref 0.55–1.02)
CROSSMATCH RESULT,%XM: NORMAL
DIFFERENTIAL METHOD BLD: ABNORMAL
EOSINOPHIL # BLD: 4.9 K/UL (ref 0–0.4)
EOSINOPHIL NFR BLD: 7 % (ref 0–7)
ERYTHROCYTE [DISTWIDTH] IN BLOOD BY AUTOMATED COUNT: 17.8 % (ref 11.5–14.5)
GLUCOSE SERPL-MCNC: 95 MG/DL (ref 65–100)
HCT VFR BLD AUTO: 29.2 % (ref 35–47)
HGB BLD-MCNC: 8.7 G/DL (ref 11.5–16)
IMM GRANULOCYTES # BLD AUTO: 0 K/UL
IMM GRANULOCYTES NFR BLD AUTO: 0 %
LYMPHOCYTES # BLD: 10.5 K/UL (ref 0.8–3.5)
LYMPHOCYTES NFR BLD: 15 % (ref 12–49)
MCH RBC QN AUTO: 24.5 PG (ref 26–34)
MCHC RBC AUTO-ENTMCNC: 29.8 G/DL (ref 30–36.5)
MCV RBC AUTO: 82.3 FL (ref 80–99)
METAMYELOCYTES NFR BLD MANUAL: 2 %
MONOCYTES # BLD: 9.8 K/UL (ref 0–1)
MONOCYTES NFR BLD: 14 % (ref 5–13)
MYELOCYTES NFR BLD MANUAL: 2 %
NEUTS BAND NFR BLD MANUAL: 16 % (ref 0–6)
NEUTS SEG # BLD: 35 K/UL (ref 1.8–8)
NEUTS SEG NFR BLD: 34 % (ref 32–75)
NRBC # BLD: 3.72 K/UL (ref 0–0.01)
NRBC BLD-RTO: 5.3 PER 100 WBC
PLATELET # BLD AUTO: 988 K/UL (ref 150–400)
PLATELET COMMENTS,PCOM: ABNORMAL
PMV BLD AUTO: 11.5 FL (ref 8.9–12.9)
POTASSIUM SERPL-SCNC: 4.8 MMOL/L (ref 3.5–5.1)
PROCALCITONIN SERPL-MCNC: 0.51 NG/ML
PROMYELOCYTES NFR BLD MANUAL: 5 %
RBC # BLD AUTO: 3.55 M/UL (ref 3.8–5.2)
RBC MORPH BLD: ABNORMAL
RBC MORPH BLD: ABNORMAL
SODIUM SERPL-SCNC: 139 MMOL/L (ref 136–145)
SPECIMEN EXP DATE BLD: NORMAL
STATUS OF UNIT,%ST: NORMAL
UNIT DIVISION, %UDIV: 0
WBC # BLD AUTO: 69.9 K/UL (ref 3.6–11)

## 2022-09-16 PROCEDURE — 84145 PROCALCITONIN (PCT): CPT

## 2022-09-16 PROCEDURE — 74011000258 HC RX REV CODE- 258: Performed by: INTERNAL MEDICINE

## 2022-09-16 PROCEDURE — 93308 TTE F-UP OR LMTD: CPT

## 2022-09-16 PROCEDURE — 97161 PT EVAL LOW COMPLEX 20 MIN: CPT

## 2022-09-16 PROCEDURE — 74011250637 HC RX REV CODE- 250/637: Performed by: NURSE PRACTITIONER

## 2022-09-16 PROCEDURE — 74011250637 HC RX REV CODE- 250/637: Performed by: INTERNAL MEDICINE

## 2022-09-16 PROCEDURE — 85025 COMPLETE CBC W/AUTO DIFF WBC: CPT

## 2022-09-16 PROCEDURE — 74011250636 HC RX REV CODE- 250/636: Performed by: INTERNAL MEDICINE

## 2022-09-16 PROCEDURE — 80048 BASIC METABOLIC PNL TOTAL CA: CPT

## 2022-09-16 PROCEDURE — 97166 OT EVAL MOD COMPLEX 45 MIN: CPT

## 2022-09-16 PROCEDURE — 99221 1ST HOSP IP/OBS SF/LOW 40: CPT | Performed by: INTERNAL MEDICINE

## 2022-09-16 PROCEDURE — 65270000046 HC RM TELEMETRY

## 2022-09-16 PROCEDURE — 97116 GAIT TRAINING THERAPY: CPT

## 2022-09-16 PROCEDURE — 36415 COLL VENOUS BLD VENIPUNCTURE: CPT

## 2022-09-16 PROCEDURE — 74011000250 HC RX REV CODE- 250: Performed by: INTERNAL MEDICINE

## 2022-09-16 PROCEDURE — 97535 SELF CARE MNGMENT TRAINING: CPT

## 2022-09-16 RX ORDER — METOPROLOL TARTRATE 25 MG/1
12.5 TABLET, FILM COATED ORAL EVERY 12 HOURS
Status: DISCONTINUED | OUTPATIENT
Start: 2022-09-16 | End: 2022-09-19

## 2022-09-16 RX ADMIN — MAGNESIUM OXIDE 400 MG (241.3 MG MAGNESIUM) TABLET 400 MG: TABLET at 22:46

## 2022-09-16 RX ADMIN — BUMETANIDE 1 MG: 0.25 INJECTION, SOLUTION INTRAMUSCULAR; INTRAVENOUS at 18:53

## 2022-09-16 RX ADMIN — SODIUM CHLORIDE, PRESERVATIVE FREE 10 ML: 5 INJECTION INTRAVENOUS at 08:45

## 2022-09-16 RX ADMIN — DOCUSATE SODIUM 100 MG: 100 CAPSULE, LIQUID FILLED ORAL at 03:24

## 2022-09-16 RX ADMIN — DOCUSATE SODIUM 100 MG: 100 CAPSULE, LIQUID FILLED ORAL at 22:46

## 2022-09-16 RX ADMIN — DOXYCYCLINE 100 MG: 100 INJECTION, POWDER, LYOPHILIZED, FOR SOLUTION INTRAVENOUS at 10:47

## 2022-09-16 RX ADMIN — ARIPIPRAZOLE 30 MG: 30 TABLET ORAL at 10:46

## 2022-09-16 RX ADMIN — FERROUS SULFATE TAB 325 MG (65 MG ELEMENTAL FE) 325 MG: 325 (65 FE) TAB at 08:45

## 2022-09-16 RX ADMIN — SODIUM CHLORIDE, PRESERVATIVE FREE 10 ML: 5 INJECTION INTRAVENOUS at 22:47

## 2022-09-16 RX ADMIN — ENOXAPARIN SODIUM 30 MG: 100 INJECTION SUBCUTANEOUS at 10:46

## 2022-09-16 RX ADMIN — ASPIRIN 81 MG: 81 TABLET, COATED ORAL at 10:46

## 2022-09-16 RX ADMIN — METOPROLOL TARTRATE 12.5 MG: 25 TABLET, FILM COATED ORAL at 22:46

## 2022-09-16 RX ADMIN — SODIUM CHLORIDE, PRESERVATIVE FREE 1 G: 5 INJECTION INTRAVENOUS at 03:24

## 2022-09-16 RX ADMIN — DOXYCYCLINE 100 MG: 100 INJECTION, POWDER, LYOPHILIZED, FOR SOLUTION INTRAVENOUS at 22:48

## 2022-09-16 RX ADMIN — BUMETANIDE 1 MG: 0.25 INJECTION, SOLUTION INTRAMUSCULAR; INTRAVENOUS at 10:46

## 2022-09-16 NOTE — PROGRESS NOTES
Vitals:      09/16/22 0952 09/16/22 0957 09/16/22 1000 09/16/22 1018   BP: 130/74 125/63    121/73   BP 1 Location: Left upper arm Left upper arm    Left upper arm   BP Patient Position: semi fowlers Sitting EOB    Sitting  Comment: post activity   Pulse: 98 106 (!) 105  (!) 114 (!) 113   Temp:         Resp:         Weight:         SpO2: 90 % room air 85% room air 90%  Comment: 85 93% on 2 liters of 02  99% on 2 liters of 02

## 2022-09-16 NOTE — PROGRESS NOTES
[facility] [facility] Adult  Hospitalist Group                                                                                          Hospitalist Progress Note  Jabier Johns MD  Answering service: [phone] from in house phone        Date of Service:  2022  NAME:  Ravinder Kelley  :  1945  MRN:  [id number]      Admission Summary:   Asim Godoy is a 68yo F with a PMH of CHF, HTN, CKD, Myeloproliferative Disorder who presented at the ED with right knee pain x5 days, denies injury: pain is constant/ sharp pain, 5/10 in severity, worse with ambulation, slight relief at rest. Upon arrival to the ED, the patient was found to be in significant respiratory distress, BNP level was elevated, CXR showed evidence of vascular congestion and possible pneumonia. The patient was subsequently referred to the hospitalist service for admission. The patient required being placed on supplemental oxygen in the ED because of the shortness of breath. The patient was last admitted to this hospital from 2022 to 2022 for evaluation of shortness of breath, subsequently attributed to pleural effusion for which the patient underwent thoracentesis and about 1900 mL of fluid was drained from the chest. The patient denied associated fever, rigors, and chills. The patient stated that she has been taking her medication as prescribed including diuretic. Interval history / Subjective:    Follow up Acute on chronic CHF   Now on 1l NC (yesterday on 3lnc)  Feels better \"breathing is better\"  Assessment & Plan:     Acute-on-Chronic HFrEF: NYHA III--improving  -[id number]  -ECHO 8/3/22: reduced left ventricular systolic function with EF 40-45%, left ventricle mildly dilated, normal wall thickness.  -continue IV diuresis, will stop IV lasix as the patient is already on Bumex  -not on BB sec to low BP, not on ACEi/ARB/ARNi sec to SKYLA  -serial cardiac markers: trop peaked 154  -Cardiology awaited, spoke to Dr Damian Tobar who will see the patient  -ASA continues     Acute Respiratory Failure with Hypoxia: --improving  multifactorial including CHF and suspected bacterial PNA. -serial cardiac markers  -D-dimer: 4.02  -CXR 9/12: pattern of moderate pulmonary interstitial edema, slightly worse compared to prior study, superimposed infiltrate cannot be excluded. -CT scan chest w/o 9/13: small bilateral pleural effusions, diffusely abnormal bones (differential includes MM, Leukemia/Lymphoma- given splenomegaly) less likely heterogeneous osteopenia or diffuse metastases, Splenomegaly.   -completed Rocephin (9/13--9/17) and Doxycycline (9/13--9/17), continue  -supplemental oxygen, wean as tolerated     Myeloproliferative Disorder  Anemia sec to above  Thrombocytosis  -CT scan chest w/o 9/13: small bilateral pleural effusions, diffusely abnormal bones (differential includes MM, Leukemia/Lymphoma- given splenomegaly) less likely heterogeneous osteopenia or diffuse metastases, Splenomegaly. -Appreciate hematology, transfuse to keep hb>8   -chronic leukocytosis    CKD 3B:   -monitor renal function  -BUN 32 Creat 1.36, improved     HTN: BP stable. Tachycardia: stable/improved low 100's-90's, continue to monitor. Febrile: cultures unremarkable,      Right Knee Effusion:  denies trauma. -X-ray Right knee 9/12: moderate effusion. -X-ray Right tib/fib 9/12: no acute abnormality.    -Duplex RLE 9/12: negative for DVT.   -Orthopedic signed off    Regular diet    PT/OT SNF vs home health        DVTppx: Lovenox  Code Status: Full Code  PTA: DIAN, [address]: Continue IV diuresis, continue antibiotics, blood transfusion if needed  Diet: Cardiac  Activity: OOB to chair TID and PRN as tolerated  Discharge: in 1-2 days  Ambulates: independently     Hospital Problems  Date Reviewed: 9/13/2022            Codes Class Noted POA    * (Principal) Acute on chronic HFrEF (heart failure with reduced ejection fraction) ([facility] [address] ICD-10-CM: Y24.66  ICD-9-CM: 428.23  9/12/2022 Yes         Review of Systems:   A comprehensive review of systems was negative except for that written in the HPI. Vital Signs:    Last 24hrs VS reviewed since prior progress note. Most recent are:  Visit Vitals  BP (!) 109/59 (BP 1 Location: Left upper arm, BP Patient Position: Sitting)   Pulse (!) 103   Temp 98 °F (36.7 °C)   Resp 20   Wt 68.1 kg (150 lb 2.1 oz)   SpO2 96%   Breastfeeding No   BMI 24.98 kg/m²         Intake/Output Summary (Last 24 hours) at 9/16/2022 1544  Last data filed at 9/16/2022 1427  Gross per 24 hour   Intake 480 ml   Output 2500 ml   Net -2020 ml          Physical Examination:     I had a face to face encounter with this patient and independently examined them on 9/16/2022 as outlined below:          Constitutional:  No acute distress, cooperative, pleasant. ENT:  Oral mucosa moist.    Resp:  CTA bilaterally. No wheezing/rhonchi/rales. No accessory muscle use. CV:  Regular rhythm, normal rate, S1,S2.    GI/:  Soft, non distended, non tender, no guarding, BS present. Voids Freely. Musculoskeletal:  No edema, warm. Right knee swelling. Neurologic:  Moves all extremities. AAOx3. Skin:  w/d, chronic hyperpigmentation/dark  to  BLE. Psych:  Good insight, Not anxious nor agitated. Data Review:    Review and/or order of clinical lab test      Labs:     Recent Labs     09/16/22  0346 09/15/22  1043 09/14/22  0456   WBC 69.9*  --  58.9*   HGB 8.7* 8.7* 7.0*   HCT 29.2* 28.8* 23.9*   *  --  881*       Recent Labs     09/16/22  0346 09/14/22  1149 09/14/22  0456     --  137   K 4.8 4.6 6.0*     --  105   CO2 27  --  25   BUN 34*  --  32*   CREA 1.45*  --  1.36*   GLU 95  --  73   CA 9.1  --  8.1*       No results for input(s): ALT, AP, TBIL, TBILI, TP, ALB, GLOB, GGT, AML, LPSE in the last 72 hours.     No lab exists for component: SGOT, GPT, AMYP, HLPSE    No results for input(s): INR, PTP, APTT, INREXT, INREXT in the last 72 hours. No results for input(s): FE, TIBC, PSAT, FERR in the last 72 hours. Lab Results   Component Value Date/Time    Folate 36.1 (H) 09/13/2022 04:45 AM        No results for input(s): PH, PCO2, PO2 in the last 72 hours. No results for input(s): CPK, CKNDX, TROIQ in the last 72 hours.     No lab exists for component: CPKMB  No results found for: CHOL, CHOLX, CHLST, CHOLV, HDL, HDLP, LDL, LDLC, DLDLP, TGLX, TRIGL, TRIGP, CHHD, CHHDX  No results found for: [facility]  Lab Results   Component Value Date/Time    Color YELLOW/STRAW 09/14/2022 02:51 PM    Appearance CLEAR 09/14/2022 02:51 PM    Specific gravity 1.013 09/14/2022 02:51 PM    pH (UA) 5.5 09/14/2022 02:51 PM    Protein Negative 09/14/2022 02:51 PM    Glucose Negative 09/14/2022 02:51 PM    Ketone Negative 09/14/2022 02:51 PM    Bilirubin Negative 09/14/2022 02:51 PM    Urobilinogen 0.2 09/14/2022 02:51 PM    Nitrites Negative 09/14/2022 02:51 PM    Leukocyte Esterase Negative 09/14/2022 02:51 PM    Epithelial cells FEW 09/14/2022 02:51 PM    Bacteria Negative 09/14/2022 02:51 PM    WBC 0-4 09/14/2022 02:51 PM    RBC 0-5 09/14/2022 02:51 PM         Medications Reviewed:     Current Facility-Administered Medications   Medication Dose Route Frequency    docusate sodium (COLACE) capsule 100 mg  100 mg Oral DAILY    0.9% sodium chloride infusion 250 mL  250 mL IntraVENous PRN    acetaminophen (TYLENOL) tablet 650 mg  650 mg Oral Q4H PRN    diphenhydrAMINE (BENADRYL) capsule 25 mg  25 mg Oral Q6H PRN    0.9% sodium chloride infusion 250 mL  250 mL IntraVENous PRN    ARIPiprazole (ABILIFY) tablet 30 mg  30 mg Oral DAILY    aspirin delayed-release tablet 81 mg  81 mg Oral DAILY    bumetanide (BUMEX) injection 1 mg  1 mg IntraVENous BID    ferrous sulfate tablet 325 mg  1 Tablet Oral ACB    sodium chloride (NS) flush 5-40 mL  5-40 mL IntraVENous Q8H    sodium chloride (NS) flush 5-40 mL  5-40 mL IntraVENous PRN    acetaminophen (TYLENOL) tablet 650 mg  650 mg Oral Q6H PRN    Or    acetaminophen (TYLENOL) suppository 650 mg  650 mg Rectal Q6H PRN    polyethylene glycol (MIRALAX) packet 17 g  17 g Oral DAILY PRN    ondansetron (ZOFRAN ODT) tablet 4 mg  4 mg Oral Q8H PRN    Or    ondansetron (ZOFRAN) injection 4 mg  4 mg IntraVENous Q6H PRN    enoxaparin (LOVENOX) injection 30 mg  30 mg SubCUTAneous DAILY    cefTRIAXone (ROCEPHIN) 1 g in 0.9% sodium chloride 10 mL IV syringe  1 g IntraVENous Q24H    doxycycline (VIBRAMYCIN) 100 mg in 0.9% sodium chloride (MBP/ADV) 100 mL MBP  100 mg IntraVENous Q12H    albuterol-ipratropium (DUO-NEB) 2.5 MG-0.5 MG/3 ML  3 mL Nebulization Q2H PRN    metoprolol (LOPRESSOR) injection 5 mg  5 mg IntraVENous Q6H PRN    magnesium oxide (MAG-OX) tablet 400 mg  400 mg Oral QHS     ______________________________________________________________________  EXPECTED LENGTH OF STAY: 3d 19h  ACTUAL LENGTH OF STAY:          Riri Kern MD

## 2022-09-16 NOTE — PROGRESS NOTES
Problem: Self Care Deficits Care Plan (Adult)  Goal: *Acute Goals and Plan of Care (Insert Text)  Description: FUNCTIONAL STATUS PRIOR TO ADMISSION: Patient was independent and active without use of DME. Pt lives in assisted living facility with roommate, receiving PT prior to admission. Pt denied falls, not on home O2. Pt independent with ADLs/IADLs prior to admission, but had staff available if needed. HOME SUPPORT: The patient lived alone with [facility] staff to provide assistance. Occupational Therapy Goals  Initiated 9/16/2022  1. Patient will perform grooming in standing with independence within 7 day(s). 2.  Patient will perform lower body dressing with independence within 7 day(s). 3.  Patient will perform bathing in standing with independence within 7 day(s). 4.  Patient will perform toilet transfers with independence within 7 day(s). 5.  Patient will perform all aspects of toileting with independence within 7 day(s). 6.  Patient will participate in upper extremity therapeutic exercise/activities with independence for >8 minutes within 7 day(s). 7.  Patient will utilize energy conservation techniques during functional activities without cueing within 7 day(s). Outcome: Progressing Towards Goal   OCCUPATIONAL THERAPY EVALUATION  Patient: Candice Garcia (39 y.o. female)  Date: 9/16/2022  Primary Diagnosis: Acute on chronic HFrEF (heart failure with reduced ejection fraction) ([facility]) [I50.23]       Precautions: Fall    ASSESSMENT  Based on the objective data described below, the patient presents with decreased activity tolerance and ability to perform ADLs at baseline. Pt received supine in bed, alert and agreeable to therapy, noting that she hasn't been OOB since being admitted. Pt completed supine to sit at EOB with supervision where she completed LB dressing with min A. SpO2 dropped to 85% on RA following exertion, placed on 2L/min O2 via NC, recovered to 92% at rest (RA is baseline).  Pt completed functional mobility to bathroom with min A HHA and transferred to the toilet with CGA for a rest break. Sit to stand from toilet with min A and use of R grab bar. At sink, pt completed ADLs with CGA x7 minutes. O2 stable on 2L/min via NC throughout rest of session. All needs met and within reach. Recommending SNF upon discharge due to increased fall risk, need for O2, and CGA to Min A for ADLs. Current Level of Function Impacting Discharge (ADLs/self-care): CGA to min A for ADLs and functional mobility    Functional Outcome Measure: The patient scored 60/100 on the Barthel Index outcome measure which is indicative of 40% impairment. Other factors to consider for discharge: lives alone (has a roommate) in [facility], usually able to due light meal prep and ADLs independently     Patient will benefit from skilled therapy intervention to address the above noted impairments. PLAN :  Recommendations and Planned Interventions: self care training, functional mobility training, therapeutic exercise, balance training, therapeutic activities, endurance activities, patient education, home safety training, and family training/education    Frequency/Duration: Patient will be followed by occupational therapy 5 times a week to address goals. Recommendation for discharge: (in order for the patient to meet his/her long term goals)  Therapy up to 5 days/week in SNF setting    This discharge recommendation:  Has not yet been discussed the attending provider and/or case management    IF patient discharges home will need the following DME: TBD       SUBJECTIVE:   Patient stated I haven't been out of bed since I got here.     OBJECTIVE DATA SUMMARY:   HISTORY:   Past Medical History:   Diagnosis Date    Anemia     Congestive heart failure ([address]     Hypertension     Menopause     Ventral hernia without obstruction or gangrene 4/18/2022     Past Surgical History:   Procedure Laterality Date    HX OTHER SURGICAL 04/13/2022     bone marrow       Expanded or extensive additional review of patient history:     Home Situation  [facility] [address] Name: Colleen Solomon  One/Two Story Residence: One story  Living Alone: Yes  Support Systems:  (Staff)  Patient Expects to be Discharged to[de-identified] Assisted Living  Current DME Used/Available at Home: Grab bars  Tub or Shower Type: Shower (Has a seat but does not use)    Hand dominance: Right    EXAMINATION OF PERFORMANCE DEFICITS:  Cognitive/Behavioral Status:  Neurologic State: Alert  Orientation Level: Oriented X4  Cognition: Follows commands; Appropriate decision making; Appropriate for age attention/concentration; Appropriate safety awareness  Perception: Appears intact  Perseveration: No perseveration noted  Safety/Judgement: Awareness of environment;Home safety    Skin: intact    Edema: Slight edema in LEs    Hearing: Auditory  Auditory Impairment: None    Vision/Perceptual:               Range of Motion:  BUE  AROM: Generally decreased, functional          Strength:  BUE  Strength: Generally decreased, functional                Coordination:     Fine Motor Skills-Upper: Left Intact; Right Intact    Gross Motor Skills-Upper: Left Intact; Right Intact    Tone & Sensation:     Sensation: Intact          Balance:  Sitting: Impaired; Without support  Sitting - Static: Good (unsupported)  Sitting - Dynamic: Fair (occasional)  Standing: Impaired  Standing - Static: Fair;Constant support  Standing - Dynamic : Constant support; Fair    Functional Mobility and Transfers for ADLs:  Bed Mobility:  Supine to Sit: Supervision    Transfers:  Sit to Stand: Minimum assistance  Stand to Sit: Contact guard assistance  Bathroom Mobility: Contact guard assistance  Toilet Transfer : Minimum assistance    ADL Assessment:  Feeding: Setup    Oral Facial Hygiene/Grooming: Contact guard assistance    Bathing: Minimum assistance    Type of Bath: Chlorhexidine (CHG)    Upper Body Dressing: Contact guard assistance    Lower Body Dressing: Minimum assistance    Toileting: Minimum assistance                  ADL Intervention and task modifications:       Grooming  Washing Face: Set-up  Brushing Teeth: Set-up  Cues: Verbal cues provided (to step closer to sink)         Type of Bath: Chlorhexidine (CHG)              Lower Body Dressing Assistance  Dressing Assistance: Minimum assistance  Socks: Minimum assistance  Position Performed: Seated in chair         Cognitive Retraining  Safety/Judgement: Awareness of environment;Home safety        Functional Measure:    Barthel Index:  Bathin  Bladder: 0  Bowels: 10  Groomin  Dressin  Feeding: 10  Mobility: 10  Stairs: 5  Toilet Use: 5  Transfer (Bed to Chair and Back): 10  Total: 60/100      The Barthel ADL Index: Guidelines  1. The index should be used as a record of what a patient does, not as a record of what a patient could do. 2. The main aim is to establish degree of independence from any help, physical or verbal, however minor and for whatever reason. 3. The need for supervision renders the patient not independent. 4. A patient's performance should be established using the best available evidence. Asking the patient, friends/relatives and nurses are the usual sources, but direct observation and common sense are also important. However direct testing is not needed. 5. Usually the patient's performance over the preceding 24-48 hours is important, but occasionally longer periods will be relevant. 6. Middle categories imply that the patient supplies over 50 per cent of the effort. 7. Use of aids to be independent is allowed. Score Interpretation (from [address] [address])    Independent   60-79 Minimally independent   40-59 Partially dependent   20-39 Very dependent   <20 Totally dependent     -Perla Lacey, Barthel, D.W. (1965). Functional evaluation: the Barthel Index. [address] [facility] ([address] (1997).  The Barthel activities of daily living index: self-reporting versus actual performance in the old (> or = 75 years). Journal of [address] [place] [address] 45(3), [address] [facility], MejiaSUMANTHF, Breanne Aguilera, Digna Rodriguez. (1999). Measuring the change in disability after inpatient rehabilitation; comparison of the responsiveness of the Barthel Index and Functional Oliver Measure. Journal of Neurology, Neurosurgery, and Psychiatry, 66(4), 074-085. CT Gaston, ELVIRA Santos, & Samuel Everett MGLADYS. (2004) Assessment of post-stroke quality of life in cost-effectiveness studies: The usefulness of the Barthel Index and the EuroQoL-5D. Quality of Life Research, 15, 827-35        Occupational Therapy Evaluation Charge Determination   History Examination Decision-Making   MEDIUM Complexity : Expanded review of history including physical, cognitive and psychosocial  history  MEDIUM Complexity : 3-5 performance deficits relating to physical, cognitive , or psychosocial skils that result in activity limitations and / or participation restrictions MEDIUM Complexity : Patient may present with comorbidities that affect occupational performnce. Miniml to moderate modification of tasks or assistance (eg, physical or verbal ) with assesment(s) is necessary to enable patient to complete evaluation       Based on the above components, the patient evaluation is determined to be of the following complexity level: MEDIUM  Pain Rating:  No pain reported    Activity Tolerance:   Good and requires rest breaks    After treatment patient left in no apparent distress:    Sitting in chair and Call bell within reach    COMMUNICATION/EDUCATION:   The patients plan of care was discussed with: Physical therapist and Registered nurse. Patient/family have participated as able in goal setting and plan of care. and Patient/family agree to work toward stated goals and plan of care.     This patients plan of care is appropriate for delegation to [facility]. Thank you for this referral.  ONELIA Singh  Time Calculation: 54 mins     Regarding student involvement in patient care:  A student participated in this treatment session. Per CMS Medicare statements and AOTA guidelines I certify that the following was true:  1. I was present and directly observed the entire session. 2. I made all skilled judgments and clinical decisions regarding care. 3. I am the practitioner responsible for assessment, treatment, and documentation.

## 2022-09-16 NOTE — CONSULTS
CARDIOLOGY CONSULT                 Assessment:     Assessment:       Principal Problem:    Acute on chronic HFrEF (heart failure with reduced ejection fraction) ([facility] [address] (9/12/2022)         Plan:    Pt was not seen by me

## 2022-09-16 NOTE — PROGRESS NOTES
Occupational Therapy  9/16/2022    Orders received, chart reviewed and patient evaluated by occupational therapy. Pending progression with skilled acute occupational therapy, recommend:  Therapy up to 5 days/week in SNF setting     Recommend with nursing ADLs with supervision/setup, OOB to chair 3x/day and toileting via functional mobility to and from bathroom 1 assist and O2 tank. Thank you for completing as able in order to maintain patient strength, endurance and independence. Full evaluation to follow.

## 2022-09-16 NOTE — PROGRESS NOTES
Consult received at 3:31 pm today 9/16/22. Chart reviewed. Patient of advanced heart failure, Dr. Jarred Garcia. Plans for RHC with Dr. Kasia Boothe week after chemo, 9/26. Has appt with me, General Cardiology Dr. Amaury Kohli in October. Full consult to follow. Continue IV diuresis.

## 2022-09-16 NOTE — PROGRESS NOTES
HEMATOLOGY / ONCOLOGY    Peter Perez 1945 Age: 68 y.o. CHIEF COMPLAINT:  leg swelling and pain    DISEASE FEATURES  See marrow report below    PREVIOUS TREATMENTS  . CURRENT TREATMENTS  Vidaza 7/2022 -  Disease Status: . Treatment Goal: . ACTIVE PROBLEMS  Myeloproliferative/myelodysplastic disease  --anemia, leukocytosis, erythrocytosis, marrow fibrosis  CHF  CKD       INTERIM HISTORY AND ASSESSMENT  Ms. Ariel Velasquez was admitted with diagnosis of pneumonia. Her leg was painful and swollen, but doppler was neg for DVT and CT was neg for PE and pneumonia. Main finding was CHF; troponin and pro-BNP were elevated. CT showed CHF: cardiomegaly, interstitial edema, small bilateral pleural effusions. Stress test 8/15/22 neg for ischemia but with EF of 40%. She says she has another cardiac test (cath?) on 9/26/22. Dr. Hemanth Hernandez thinks she may have high-output failure from her chronic anemia. At a hemoglobin of 7.0, I think she will benefit from transfusion of two units of PRBCs. Her iron profile looks like she could be iron deficient with iron of 5, TIBC of 179 and sat of 3%; her MCV is 82, previously 72; however her ferritin is >800 and she had adequate iron stores in the spring on her marrow exam. In addition, she is likely to need multiple transfusions in the future and develop iron overload. She has had 3 cycles of Vidaza with nearly identical blood counts to when she started. No benefit that I can see unless her marrow blasts are disappearing, but that has not made her feel better or improved her peripheral counts. I think she is behaving more like myeloproliferative disease than myelodysplasia.     Here is her marrow report from earlier this year:  Here is her marrow biopsy report from the spring of 2022:  Hypercellular marrow (40-50%) with marked erythroid hypoplasia, bilineage atypia, increased fibrosis (MF-2), and  increased blasts (7% by manual differential; See COMMENT)  - increased blasts (4.6% of events) and basophils (2.0% of events) by flow cytometry (See FLOW)  - no abnormalities detected by [facility] MDS/myeloproliferative panel (See FISH)  - abnormal female karyotype (See CYTO)  - multiple pathogenic variants detected by extended myeloid NGS panel (See MOLECULAR)    Hgb is 8.7 today ( 9/16) stable. Would rec. Continue to transfuse to keep hgb> 8 if possible given the symptoms from her anemia seen at a lower level. SUBJECTIVE:    feels stronger, no c/o pain, no sob today    Past Medical History:   Diagnosis Date    Anemia     Congestive heart failure (HCC)     Hypertension     Menopause     Ventral hernia without obstruction or gangrene 4/18/2022     Past Surgical History:   Procedure Laterality Date    HX OTHER SURGICAL  04/13/2022     bone marrow     Medications and Allergies have been reviewed and updated in the [facility] system. REVIEW OF SYSTEMS  Except as noted in the history and assessement above, all other systems are negative. EXAMINATION   Vital signs were reviewed abnormalities discussed above.    General: Frail  HEENT: conjunctiva clear; no jaundice    Lungs: clear  CV: RRR  Abd: soft and non-tender; no HSM; no masses  Skin: no significant rashes  Ext: Edema: none; Tenderness: none  Neuro/Psych:         Social History     Tobacco Use    Smoking status: Never    Smokeless tobacco: Never   Substance Use Topics    Alcohol use: Never      Family History   Problem Relation Age of Onset    Breast Cancer Sister         63's    Cancer Sister     Breast Problems Sister     Hypertension Mother         Tamar Marquez MD

## 2022-09-16 NOTE — PROGRESS NOTES
Problem: Mobility Impaired (Adult and Pediatric)  Goal: *Acute Goals and Plan of Care (Insert Text)  Description: FUNCTIONAL STATUS PRIOR TO ADMISSION: Patient was independent and active without use of DME. When asked she reported no falls in the last 12 months and baseline is room air. She endorsed HHPT at time of admission. HOME SUPPORT PRIOR TO ADMISSION: The patient lived in an assisted living facility and reported no need for assist with mobility. .    Physical Therapy Goals  Initiated 9/16/2022  1. Patient will move from supine to sit and sit to supine , scoot up and down, and roll side to side in bed with independence within 7 day(s). 2.  Patient will transfer from bed to chair and chair to bed with independence using the least restrictive device within 7 day(s). 3.  Patient will perform sit to stand with independence within 7 day(s). 4.  Patient will ambulate with independence for 300 feet with the least restrictive device within 7 day(s). Outcome: Progressing Towards Goal   PHYSICAL THERAPY EVALUATION  Patient: Nehemias Clark (51 y.o. female)  Date: 9/16/2022  Primary Diagnosis: Acute on chronic HFrEF (heart failure with reduced ejection fraction) ([facility]) [I50.23]       Precautions:   Fall    ASSESSMENT  Based on the objective data described below, the patient presents with slightly impaired sitting balance (note episodic slow progressive posterior lean when seated at the EOB), impaired standing balance requiring ongoing support (baseline is no assistive device), need for supplemental 02 (baseline is room air) and overall mobility not at reported baseline. She is a high fall risk and demonstrates a present need for supplemental 02. Baseline is room air and the nasal cannula will present a tripping hazard. She was admitted with acute on chronic heart failure. Anticipate slow steady gains. As she presents today, recommend rehab.     For the weekend, recommend patient to complete as able in order to maintain strength, endurance and independence with nursing: OOB to chair 3x/day with assist X 1 and ambulating with hand held assist and 02 at 2 liters. PT to follow-up with patient after the weekend. Pulse Oximetry Assessment    85% at rest on room air    87% at rest on 1 LPM    92% at rest on 2 LPM    94% while ambulating on 2 LPM       Vitals:         09/16/22 0952 09/16/22 0957 09/16/22 1000 09/16/22 1018   BP: 130/74 125/63       121/73   BP 1 Location: Left upper arm Left upper arm       Left upper arm   BP Patient Position: semi fowlers Sitting EOB       Sitting  Comment: post activity   Pulse: 98 106 (!) 105   (!) 114 (!) 113   Temp:               Resp:               Weight:               SpO2: 90 % room air 85% room air 90%  Comment: 85 93% on 2 liters of 02   99% on 2 liters of 02       Current Level of Function Impacting Discharge (mobility/balance): up to min assist     Functional Outcome Measure: The patient scored 15/28 on the Tinetti outcome measure which is indicative of high fall risk. .      Other factors to consider for discharge: resides alone (pt reports a roommate) in assisted living, heart failure, pt reports baseline is room air     Patient will benefit from skilled therapy intervention to address the above noted impairments. PLAN :  Recommendations and Planned Interventions: bed mobility training, transfer training, gait training, therapeutic exercises, patient and family training/education, and therapeutic activities      Frequency/Duration: Patient will be followed by physical therapy:  4 times a week to address goals.     Recommendation for discharge: (in order for the patient to meet his/her long term goals)  Therapy up to 5 days/week in SNF setting, if not then assist with all of mobility and HHPT    This discharge recommendation:  A follow-up discussion with the attending provider and/or case management is planned    IF patient discharges home will need the following DME: to be determined (TBD)         SUBJECTIVE:   Patient stated that it felt so good to be up and moving reporting that this is the first time up this admission. OBJECTIVE DATA SUMMARY:   Consult received, chart reviewed, pt cleared by hospitalist per conversation with OT who spoke with hospitalist.  HISTORY:    Past Medical History:   Diagnosis Date    Anemia     Congestive heart failure ([address]     Hypertension     Menopause     Ventral hernia without obstruction or gangrene 4/18/2022     Past Surgical History:   Procedure Laterality Date    HX OTHER SURGICAL  04/13/2022     bone marrow       Personal factors and/or comorbidities impacting plan of care: resides alone (pt reports a roommate) in assisted living, heart failure, pt reports baseline is room air    Home Situation  Home Environment: [address] Name: [place] Carrel  One/Two Story Residence: One story  Living Alone: Yes  Support Systems:  (Staff)  Patient Expects to be Discharged to[de-identified] Assisted Living  Current DME Used/Available at Home: grab bar in bathroom  Tub or Shower Type: Shower (Has a seat but does not use)    EXAMINATION/PRESENTATION/DECISION MAKING:   Critical Behavior:  Neurologic State: Alert  Orientation Level: Oriented X4  Cognition: Follows commands, Appropriate decision making, Appropriate for age attention/concentration, Appropriate safety awareness  Safety/Judgement: Awareness of environment, Good awareness of safety precautions  Hearing:   Auditory  Auditory Impairment: None  Skin:  refer to MD and nursing notes  Edema: slight edema LEs  Range Of Motion:  AROM: Generally decreased, functional                       Strength:    Strength: Generally decreased, functional                    Tone & Sensation:                  Sensation: Intact               Coordination:     Vision:      Functional Mobility:  Bed Mobility:     Supine to Sit: Supervision        Transfers:  Sit to Stand: Minimum assistance  Stand to Sit: Minimum assistance;Contact guard assistance                       Balance:   Sitting: Impaired  Sitting - Static: Good (unsupported)  Sitting - Dynamic: Fair (occasional)  Standing: Impaired  Standing - Static: Constant support; Fair  Standing - Dynamic : Constant support; Fair  Ambulation/Gait Training:  Distance (ft): 30 Feet (ft) (15 feet X 2)  Assistive Device: Gait belt (hand held assist)  Ambulation - Level of Assistance: Minimal assistance        Gait Abnormalities: Decreased step clearance        Base of Support: Widened     Speed/Marielena: Slow;Pace decreased (<100 feet/min)  Step Length: Left shortened;Right shortened                     Stairs: Therapeutic Exercises:   Pursed lip breathing    Functional Measure:  Tinetti test:    Sitting Balance: 1  Arises: 0  Attempts to Rise: 0  Immediate Standing Balance: 0  Standing Balance: 1  Nudged: 2  Eyes Closed: 1  Turn 360 Degrees - Continuous/Discontinuous: 1  Turn 360 Degrees - Steady/Unsteady: 1  Sitting Down: 0  Balance Score: 7 Balance total score  Indication of Gait: 1  R Step Length/Height: 1  L Step Length/Height: 1  R Foot Clearance: 1  L Foot Clearance: 1  Step Symmetry: 1  Step Continuity: 1  Path: 1  Trunk: 0  Walking Time: 0  Gait Score: 8 Gait total score  Total Score: 15/28 Overall total score         Tinetti Tool Score Risk of Falls  <19 = High Fall Risk  19-24 = Moderate Fall Risk  25-28 = Low Fall Risk  Tinetti ME. Performance-Oriented Assessment of Mobility Problems in Elderly Patients. [address]; [id number].  (Scoring Description: PT Bulletin Feb. 10, 1993)    Older adults: Karime Manriquez et al, 2009; n = 1000 [facility] elderly evaluated with ABC, HERMAN, ADL, and IADL)  · Mean HERMAN score for males aged 69-68 years = 26.21(3.40)  · Mean HERMAN score for females age 69-68 years = 25.16(4.30)  · Mean HERMAN score for males over 80 years = 23.29(6.02)  · Mean HERMAN score for females over 80 years = 17.20(8.32)           Physical Therapy Evaluation Charge Determination   History Examination Presentation Decision-Making   HIGH Complexity :3+ comorbidities / personal factors will impact the outcome/ POC  MEDIUM Complexity : 3 Standardized tests and measures addressing body structure, function, activity limitation and / or participation in recreation  MEDIUM Complexity : Evolving with changing characteristics  LOW Complexity : FOTO score of       Based on the above components, the patient evaluation is determined to be of the following complexity level: LOW     Pain Rating:  None rated    Activity Tolerance:   desaturates with exertion and requires oxygen and see assessment    After treatment patient left in no apparent distress:   Sitting in chair and Call bell within reach    COMMUNICATION/EDUCATION:   The patients plan of care was discussed with: Occupational therapist and Registered nurse. Fall prevention education was provided and the patient/caregiver indicated understanding., Patient/family have participated as able in goal setting and plan of care. , and Patient/family agree to work toward stated goals and plan of care.     Thank you for this referral.  Ailyn Bui   Time Calculation: 53 mins

## 2022-09-16 NOTE — PROGRESS NOTES
Bedside shift change report given to CtraMejia Lopez 1 (oncoming nurse) by Feng Stock RN (offgoing nurse). Report included the following information SBAR, MAR, and Cardiac Rhythm NSR .

## 2022-09-16 NOTE — CONSULTS
[facility]  Cardiology Care Note                  []Initial visit     []Established visit     Patient Name: Emily Jeong - :1945 - [facility]:[id number]  Primary Cardiologist: Liliana Beth MD  Consulting Cardiologist: Liliana Beth MD     Reason for initial visit:  SOB  Edema  HFpEF    HPI:       Very pleasant 20-year-old female with past medical history of acute acute on chronic diastolic heart failure heart failure preserved EF, stage C, mildly reduced left ventricular systolic function by echo EF 40 to 45% down from 60% by prior echo, myeloproliferative disorder on chemotherapy, severe leukocytosis, thrombocytopenia ptosis, hypertension, hyperlipidemia, pleural effusion status postthoracentesis admitted with right knee pain found to be in acute on chronic diastolic heart failure. Followed in the advanced heart failure clinic by Dr. Damian Lawler. From review of her notes the plans were for a right heart cath with Dr. Keysha Wells on , which would have been a week after her planned chemotherapy. However given the knee pain and significant lower extremity edema she presented to the hospital.  Here she has undergone IV diuresis with good effect. She still has quite refractory lower extremity edema. Dr. Lazarus Plenty is her oncologist hematologist and from what she tells me he may discontinue chemotherapy with her. She states they may do 1 more round, which will be delayed, thereafter it will be stopped. SUBJECTIVE:    Feels ok  ++ LE edema       Assessment and Plan       Acute on chronic diastolic heart failure-continue IV diuresis with Bumex 1 mg IV twice daily. She is otherwise on a limited outpatient regimen, ASA, metolazone as needed - seems limited by hypotension and SKYLA? I think she could tolerate low dose BB - will start.   Thereafter SGLT2, aldosterone antagonist etc.  CHF regimen may take time to titrate. Looks to need several more days of diuresis, may preliminarily plan to perform RHC early next week. I will review her echo - especially to assess for right sided contribution. MPD - anemia, thrombocytosis. CKD - monitor, creatinine 1.36, improved. ____________________________________________________________    Cardiac testing  08/03/22    ECHO ADULT COMPLETE 08/03/2022 8/3/2022    Interpretation Summary  Formatting of this result is different from the original.      Left Ventricle: Reduced left ventricular systolic function with a visually estimated EF of 40 - 45%. Left ventricle is mildly dilated. Normal wall thickness. Mild global hypokinesis present. Normal diastolic function. Mitral Valve: Mild regurgitation. Left Atrium: Left atrium is mildly dilated. Pericardium: Small (<1 cm) circumferential pericardial effusion present. No indication of cardiac tamponade. Signed by: Farzaneh Weaver MD on 8/3/2022 12:29 PM        08/15/22    NUCLEAR CARDIAC STRESS TEST 08/15/2022, 08/15/2022 8/15/2022    Interpretation Summary  Formatting of this result is different from the original.      ECG: Resting ECG demonstrates normal sinus rhythm. ECG: Stress ECG was negative for ischemia. Stress Test: A pharmacological stress test was performed using lexiscan. Blood pressure demonstrated a normal response and heart rate demonstrated a normal response to stress. The patient's heart rate recovery was normal.    INDICATION: Chronic systolic heart failure. History of hypertension    COMPARISON:  None. CORRELATIVE IMAGING STUDIES:  None    TRACER: Tc 99m Sestamibi    TECHNIQUE:  Resting SPECT images of the heart were obtained following the uneventful intravenous administration of 10.6 mCi of Tc 99m Sestamibi. Gated stress SPECT images of the heart were obtained following Lexiscan protocol and the uneventful intravenous administration of 31.9 mCi of Tc 99m Sestamibi.     FINDINGS: The rest and stress perfusion images demonstrate prominent soft tissue attenuation without significant perfusion defect or evidence of myocardial reversibility. The gated images demonstrate global hypokinesia. Left ventricular ejection fraction is 40 %. Impression:  Prominent soft tissue attenuation. No evidence of myocardial ischemia or infarction. Left ventricular ejection fraction is 40 %. Global hypokinesia. Signed by: Analy Jain MD on 8/15/2022 11:25 AM, Signed by: Sahil Nicholas MD on 8/15/2022 12:03 PM          Most recent HS troponins:  No results for input(s): TROPHS in the last 72 hours. No lab exists for component:  CKMB  ECG: normal EKG, normal sinus rhythm, unchanged from previous tracings  Review of Systems    []All other systems reviewed and all negative except as written in HPI    [] Patient unable to provide secondary to condition         Past Medical History:   Diagnosis Date    Anemia     Congestive heart failure (HCC)     Hypertension     Menopause     Ventral hernia without obstruction or gangrene 4/18/2022     Past Surgical History:   Procedure Laterality Date    HX OTHER SURGICAL  04/13/2022     bone marrow     Social Hx:  reports that she has never smoked. She has never used smokeless tobacco. She reports that she does not currently use drugs. She reports that she does not drink alcohol. Family Hx: family history includes Breast Cancer in her sister; Breast Problems in her sister; Cancer in her sister; Hypertension in her mother.   No Known Allergies       OBJECTIVE:  Wt Readings from Last 3 Encounters:   09/15/22 150 lb 2.1 oz (68.1 kg)   09/08/22 137 lb 12.8 oz (62.5 kg)   08/15/22 138 lb (62.6 kg)       Intake/Output Summary (Last 24 hours) at 9/16/2022 1633  Last data filed at 9/16/2022 1427  Gross per 24 hour   Intake 480 ml   Output 2500 ml   Net -2020 ml         Physical Exam    Vitals:   Vitals:    09/16/22 1200 09/16/22 1209 09/16/22 1400 09/16/22 1618   BP:  (!) 109/59  115/60   Pulse: 100 98 (!) 103 (!) 107   Resp:  20  24   Temp:  98 °F (36.7 °C)  98.5 °F (36.9 °C)   SpO2:  96%  95%   Weight:         Telemetry: normal sinus rhythm    Heart: regular rate and rhythm, S1, S2 normal, no murmur, click, rub or gallop  Lungs: clear to auscultation bilaterally  Abdomen: soft, non-tender. Bowel sounds normal. No masses,  no organomegaly  Extremities: extremities normal, atraumatic, no cyanosis or edema, edema 2-3++ chronic venous stasis changes        Data Review:     Radiology:   XR Results (most recent):  Results from [place] encounter on 09/12/22    XR CHEST PORT    Narrative  EXAM: XR CHEST PORT    DATE: 9/12/2022 4:02 PM    INDICATION: sob    COMPARISON: Chest radiograph September 8, 2022    FINDINGS: AP portable chest radiograph. Heart is moderately enlarged but  stable. There are bilateral patchy airspace infiltrates which are increased from  the previous radiograph. Increasing consolidation is seen at the LEFT base. Trace effusions are suspected. There is no pneumothorax. Impression  Pattern of moderate pulmonary interstitial edema, slightly worse compared to the  prior radiograph. Superimposed infiltrate cannot be excluded. CT Results (most recent):  Results from Hospital Encounter encounter on 09/12/22    CT CHEST WO CONT    Narrative  CT CHEST WITHOUT CONTRAST. 9/13/2022 4:07 AM    INDICATION: Respiratory failure, pneumonia. COMPARISON: 1/28/2022. TECHNIQUE: CT of the chest was performed without contrast. Coronal and sagittal  reconstructions were performed. CT dose reduction was achieved through use of a  standardized protocol tailored for this examination and automatic exposure  control for dose modulation. FINDINGS:  The heart is enlarged. Interlobular septal thickening is consistent with  interstitial edema. Passive atelectasis is associated with small bilateral  pleural effusions. There is a trace pericardial effusion.  Anasarca is mild.    The central airways are patent. No pneumothorax. Aberrant right subclavian  artery is a normal variant. No thoracic lymphadenopathy. The spleen is probably  enlarged. The visualized unenhanced, upper abdomen is otherwise grossly normal.    Bony architecture is abnormal. The cortices are diffusely thinned. The  trabeculae are effaced by smudgy marrow replacement. There are numerous small  lucent lesions in addition to the hyperdense, smudgy, marrow replacement. Differential includes diffuse osseous metastases, multiple myeloma, leukemia,  and heterogeneous osteopenia. Impression  1. CHF: cardiomegaly, interstitial edema, small bilateral pleural effusions. 2. Diffusely abnormal bones. Differential includes multiple myeloma,  leukemia/lymphoma (given splenomegaly), less likely heterogeneous osteopenia or  diffuse metastases. 3. Splenomegaly. MRI Results (most recent):  No results found for this or any previous visit. No results for input(s): CPK, TROIQ in the last 72 hours. No lab exists for component: CKQMB, CPKMB, BMPP  Recent Labs     09/16/22  0346 09/14/22  1149 09/14/22  0456     --  137   K 4.8 4.6 6.0*     --  105   CO2 27  --  25   BUN 34*  --  32*   CREA 1.45*  --  1.36*   GLU 95  --  73   CA 9.1  --  8.1*     Recent Labs     09/16/22  0346 09/15/22  1043 09/14/22  0456   WBC 69.9*  --  58.9*   HGB 8.7* 8.7* 7.0*   HCT 29.2* 28.8* 23.9*   *  --  881*     No results for input(s): PTP, INR, AP, INREXT in the last 72 hours. No lab exists for component: PTTP, GPT, SGOT  No results for input(s): CHOL, LDLC in the last 72 hours. No lab exists for component: TGL, HDLC,  HBA1C  No results for input(s): CRP, TSH, TSHEXT in the last 72 hours.     No lab exists for component: ESR        Current meds:    Current Facility-Administered Medications:     docusate sodium (COLACE) capsule 100 mg, 100 mg, Oral, DAILY, Nancy Sanches NP, 100 mg at 09/16/22 0324    0.9% sodium chloride infusion 250 mL, 250 mL, IntraVENous, PRN, Ashley Dickerson MD    acetaminophen (TYLENOL) tablet 650 mg, 650 mg, Oral, Q4H PRN, Ashley Dickerson MD    diphenhydrAMINE (BENADRYL) capsule 25 mg, 25 mg, Oral, Q6H PRN, Ashley Dickerson MD    0.9% sodium chloride infusion 250 mL, 250 mL, IntraVENous, PRN, Mary Vargas MD    ARIPiprazole (ABILIFY) tablet 30 mg, 30 mg, Oral, DAILY, Sruthi King MD, 30 mg at 09/16/22 1046    aspirin delayed-release tablet 81 mg, 81 mg, Oral, DAILY, Sruthi King MD, 81 mg at 09/16/22 1046    bumetanide (BUMEX) injection 1 mg, 1 mg, IntraVENous, BID, Sruthi King MD, 1 mg at 09/16/22 1046    ferrous sulfate tablet 325 mg, 1 Tablet, Oral, ACB, Sruthi King MD, 325 mg at 09/16/22 0845    sodium chloride (NS) flush 5-40 mL, 5-40 mL, IntraVENous, Q8H, Sruthi King MD, 10 mL at 09/16/22 0845    sodium chloride (NS) flush 5-40 mL, 5-40 mL, IntraVENous, PRN, Sruthi King MD, 10 mL at 09/14/22 0600    acetaminophen (TYLENOL) tablet 650 mg, 650 mg, Oral, Q6H PRN, 650 mg at 09/13/22 0920 **OR** acetaminophen (TYLENOL) suppository 650 mg, 650 mg, Rectal, Q6H PRN, Sruthi King MD    polyethylene glycol (MIRALAX) packet 17 g, 17 g, Oral, DAILY PRN, Sruthi King MD    ondansetron (ZOFRAN ODT) tablet 4 mg, 4 mg, Oral, Q8H PRN **OR** ondansetron (ZOFRAN) injection 4 mg, 4 mg, IntraVENous, Q6H PRN, Sruthi King MD    enoxaparin (LOVENOX) injection 30 mg, 30 mg, SubCUTAneous, DAILY, Sruthi King MD, 30 mg at 09/16/22 1046    cefTRIAXone (ROCEPHIN) 1 g in 0.9% sodium chloride 10 mL IV syringe, 1 g, IntraVENous, Q24H, Sruthi King MD, 1 g at 09/16/22 0324    doxycycline (VIBRAMYCIN) 100 mg in 0.9% sodium chloride (MBP/ADV) 100 mL MBP, 100 mg, IntraVENous, Q12H, Sruthi King MD, Last Rate: 100 mL/hr at 09/16/22 1047, 100 mg at 09/16/22 1047    albuterol-ipratropium (DUO-NEB) 2.5 MG-0.5 MG/3 ML, 3 mL, Nebulization, Q2H PRN, Nathan Narvaez MD    metoprolol (LOPRESSOR) injection 5 mg, 5 mg, IntraVENous, Q6H PRN, Sruthi King MD    magnesium oxide (MAG-OX) tablet 400 mg, 400 mg, Oral, QHS, Dragan Galarza NP, 400 mg at 09/15/22 [address] [facility], MD  [facility] [address] [place] [address] 13, [address] [address]  Venkat Carter  [phone]      CC:Elyssa Steiner NP

## 2022-09-17 LAB
ANION GAP SERPL CALC-SCNC: 7 MMOL/L (ref 5–15)
BASOPHILS # BLD: 3.4 K/UL (ref 0–0.1)
BASOPHILS NFR BLD: 5 % (ref 0–1)
BLASTS NFR BLD MANUAL: 10 %
BUN SERPL-MCNC: 34 MG/DL (ref 6–20)
BUN/CREAT SERPL: 24 (ref 12–20)
CALCIUM SERPL-MCNC: 8.8 MG/DL (ref 8.5–10.1)
CHLORIDE SERPL-SCNC: 106 MMOL/L (ref 97–108)
CO2 SERPL-SCNC: 23 MMOL/L (ref 21–32)
CREAT SERPL-MCNC: 1.4 MG/DL (ref 0.55–1.02)
DIFFERENTIAL METHOD BLD: ABNORMAL
EOSINOPHIL # BLD: 2 K/UL (ref 0–0.4)
EOSINOPHIL NFR BLD: 3 % (ref 0–7)
ERYTHROCYTE [DISTWIDTH] IN BLOOD BY AUTOMATED COUNT: 18.2 % (ref 11.5–14.5)
GLUCOSE SERPL-MCNC: 90 MG/DL (ref 65–100)
HCT VFR BLD AUTO: 26.2 % (ref 35–47)
HGB BLD-MCNC: 7.8 G/DL (ref 11.5–16)
IMM GRANULOCYTES # BLD AUTO: 0 K/UL
IMM GRANULOCYTES NFR BLD AUTO: 0 %
LYMPHOCYTES # BLD: 7.5 K/UL (ref 0.8–3.5)
LYMPHOCYTES NFR BLD: 11 % (ref 12–49)
MCH RBC QN AUTO: 24.5 PG (ref 26–34)
MCHC RBC AUTO-ENTMCNC: 29.8 G/DL (ref 30–36.5)
MCV RBC AUTO: 82.4 FL (ref 80–99)
METAMYELOCYTES NFR BLD MANUAL: 4 %
MONOCYTES # BLD: 2 K/UL (ref 0–1)
MONOCYTES NFR BLD: 3 % (ref 5–13)
MYELOCYTES NFR BLD MANUAL: 20 %
NEUTS BAND NFR BLD MANUAL: 4 % (ref 0–6)
NEUTS SEG # BLD: 29.9 K/UL (ref 1.8–8)
NEUTS SEG NFR BLD: 40 % (ref 32–75)
NRBC # BLD: 2.89 K/UL (ref 0–0.01)
NRBC BLD-RTO: 4.3 PER 100 WBC
PATH REV BLD -IMP: ABNORMAL
PLATELET # BLD AUTO: 803 K/UL (ref 150–400)
PLATELET COMMENTS,PCOM: ABNORMAL
PMV BLD AUTO: 11.3 FL (ref 8.9–12.9)
POTASSIUM SERPL-SCNC: 5.2 MMOL/L (ref 3.5–5.1)
RBC # BLD AUTO: 3.18 M/UL (ref 3.8–5.2)
RBC MORPH BLD: ABNORMAL
SODIUM SERPL-SCNC: 136 MMOL/L (ref 136–145)
WBC # BLD AUTO: 68 K/UL (ref 3.6–11)
WBC MORPH BLD: ABNORMAL

## 2022-09-17 PROCEDURE — 85025 COMPLETE CBC W/AUTO DIFF WBC: CPT

## 2022-09-17 PROCEDURE — 74011250636 HC RX REV CODE- 250/636: Performed by: INTERNAL MEDICINE

## 2022-09-17 PROCEDURE — 65270000046 HC RM TELEMETRY

## 2022-09-17 PROCEDURE — 74011250637 HC RX REV CODE- 250/637: Performed by: NURSE PRACTITIONER

## 2022-09-17 PROCEDURE — 74011250637 HC RX REV CODE- 250/637: Performed by: INTERNAL MEDICINE

## 2022-09-17 PROCEDURE — 99233 SBSQ HOSP IP/OBS HIGH 50: CPT | Performed by: INTERNAL MEDICINE

## 2022-09-17 PROCEDURE — 36415 COLL VENOUS BLD VENIPUNCTURE: CPT

## 2022-09-17 PROCEDURE — 74011000258 HC RX REV CODE- 258: Performed by: INTERNAL MEDICINE

## 2022-09-17 PROCEDURE — 80048 BASIC METABOLIC PNL TOTAL CA: CPT

## 2022-09-17 PROCEDURE — 74011000250 HC RX REV CODE- 250: Performed by: INTERNAL MEDICINE

## 2022-09-17 RX ADMIN — BUMETANIDE 1 MG: 0.25 INJECTION, SOLUTION INTRAMUSCULAR; INTRAVENOUS at 15:33

## 2022-09-17 RX ADMIN — ENOXAPARIN SODIUM 30 MG: 100 INJECTION SUBCUTANEOUS at 09:08

## 2022-09-17 RX ADMIN — SODIUM CHLORIDE, PRESERVATIVE FREE 1 G: 5 INJECTION INTRAVENOUS at 06:00

## 2022-09-17 RX ADMIN — ASPIRIN 81 MG: 81 TABLET, COATED ORAL at 09:08

## 2022-09-17 RX ADMIN — METOPROLOL TARTRATE 12.5 MG: 25 TABLET, FILM COATED ORAL at 09:08

## 2022-09-17 RX ADMIN — SODIUM CHLORIDE, PRESERVATIVE FREE 10 ML: 5 INJECTION INTRAVENOUS at 06:01

## 2022-09-17 RX ADMIN — FERROUS SULFATE TAB 325 MG (65 MG ELEMENTAL FE) 325 MG: 325 (65 FE) TAB at 09:08

## 2022-09-17 RX ADMIN — DOXYCYCLINE 100 MG: 100 INJECTION, POWDER, LYOPHILIZED, FOR SOLUTION INTRAVENOUS at 09:15

## 2022-09-17 RX ADMIN — DOCUSATE SODIUM 100 MG: 100 CAPSULE, LIQUID FILLED ORAL at 23:09

## 2022-09-17 RX ADMIN — MAGNESIUM OXIDE 400 MG (241.3 MG MAGNESIUM) TABLET 400 MG: TABLET at 21:34

## 2022-09-17 RX ADMIN — SODIUM CHLORIDE, PRESERVATIVE FREE 10 ML: 5 INJECTION INTRAVENOUS at 21:36

## 2022-09-17 RX ADMIN — BUMETANIDE 1 MG: 0.25 INJECTION, SOLUTION INTRAMUSCULAR; INTRAVENOUS at 09:08

## 2022-09-17 RX ADMIN — DOXYCYCLINE 100 MG: 100 INJECTION, POWDER, LYOPHILIZED, FOR SOLUTION INTRAVENOUS at 21:34

## 2022-09-17 RX ADMIN — METOPROLOL TARTRATE 12.5 MG: 25 TABLET, FILM COATED ORAL at 21:34

## 2022-09-17 RX ADMIN — ARIPIPRAZOLE 30 MG: 30 TABLET ORAL at 09:08

## 2022-09-17 RX ADMIN — SODIUM CHLORIDE, PRESERVATIVE FREE 10 ML: 5 INJECTION INTRAVENOUS at 15:34

## 2022-09-17 NOTE — PROGRESS NOTES
2000  Verbal bedside report given to Nini Frias RN oncoming nurse by Bienvenido Davila. Kenn Marie RN off-going nurse. Report included current pt status and condition, recent results, hx of present illness, heart rate and rhythm (NSR), and respiratory status. 1500  Got pt up to chair, did well with one person assist.    0900  Pt alert sitting in bed eating breakfast.  O2 sats 93%. 0730  Verbal bedside report received from [facility], RN off-going nurse by Bienvenido Davila. BENITO Lowery oncoming nurse. Report included current pt status and condition, recent results, hx of present illness, heart rate and rhythm (NSR), and respiratory status. Greeted pt and enquired about present needs and goals for the day.

## 2022-09-17 NOTE — CONSULTS
[facility]  Cardiology Care Note                  []Initial visit     [x]Established visit     Patient Name: Nehemias Clark - :1945 - RMW:[id number]  Primary Cardiologist: Ale Donnelly MD  Consulting Cardiologist: Ale Donnelly MD     Reason for initial visit:  SOB  Edema  HFpEF    HPI:       Very pleasant 77-year-old female with past medical history of acute acute on chronic diastolic heart failure heart failure preserved EF, stage C, mildly reduced left ventricular systolic function by echo EF 40 to 45% down from 60% by prior echo, myeloproliferative disorder on chemotherapy, severe leukocytosis, thrombocytopenia ptosis, hypertension, hyperlipidemia, pleural effusion status postthoracentesis admitted with right knee pain found to be in acute on chronic diastolic heart failure. Followed in the advanced heart failure clinic by Dr. Vu Chaparro. From review of her notes the plans were for a right heart cath with Dr. Ada Lawrence on , which would have been a week after her planned chemotherapy. However given the knee pain and significant lower extremity edema she presented to the hospital.  Here she has undergone IV diuresis with good effect. She still has quite refractory lower extremity edema. Dr. Himanshu Rodriguez is her oncologist hematologist and from what she tells me he may discontinue chemotherapy with her. She states they may do 1 more round, which will be delayed, thereafter it will be stopped. SUBJECTIVE:    Feels ok  ++ LE edema       Assessment and Plan       Acute on chronic diastolic heart failure-continue IV diuresis with Bumex 1 mg IV twice daily. She is otherwise on a limited outpatient regimen, ASA, metolazone as needed - seems limited by hypotension and SKYLA? I think she could tolerate low dose BB - will start.   Thereafter SGLT2, aldosterone antagonist etc.  CHF regimen may take time to titrate. Looks to need several more days of diuresis, may preliminarily plan to perform RHC early next week. I will review her echo - especially to assess for right sided contribution. MPD - anemia, thrombocytosis. CKD - monitor, creatinine 1.4. Anemia - MPD           ____________________________________________________________    Cardiac testing  08/03/22    ECHO ADULT COMPLETE 08/03/2022 8/3/2022    Interpretation Summary  Formatting of this result is different from the original.      Left Ventricle: Reduced left ventricular systolic function with a visually estimated EF of 40 - 45%. Left ventricle is mildly dilated. Normal wall thickness. Mild global hypokinesis present. Normal diastolic function. Mitral Valve: Mild regurgitation. Left Atrium: Left atrium is mildly dilated. Pericardium: Small (<1 cm) circumferential pericardial effusion present. No indication of cardiac tamponade. Signed by: Gary Soto MD on 8/3/2022 12:29 PM        08/15/22    NUCLEAR CARDIAC STRESS TEST 08/15/2022, 08/15/2022 8/15/2022    Interpretation Summary  Formatting of this result is different from the original.      ECG: Resting ECG demonstrates normal sinus rhythm. ECG: Stress ECG was negative for ischemia. Stress Test: A pharmacological stress test was performed using lexiscan. Blood pressure demonstrated a normal response and heart rate demonstrated a normal response to stress. The patient's heart rate recovery was normal.    INDICATION: Chronic systolic heart failure. History of hypertension    COMPARISON:  None. CORRELATIVE IMAGING STUDIES:  None    TRACER: Tc 99m Sestamibi    TECHNIQUE:  Resting SPECT images of the heart were obtained following the uneventful intravenous administration of 10.6 mCi of Tc 99m Sestamibi.     Gated stress SPECT images of the heart were obtained following Lexiscan protocol and the uneventful intravenous administration of 31.9 mCi of Tc 99m Sestamibi. FINDINGS:  The rest and stress perfusion images demonstrate prominent soft tissue attenuation without significant perfusion defect or evidence of myocardial reversibility. The gated images demonstrate global hypokinesia. Left ventricular ejection fraction is 40 %. Impression:  Prominent soft tissue attenuation. No evidence of myocardial ischemia or infarction. Left ventricular ejection fraction is 40 %. Global hypokinesia. Signed by: Beba Klein MD on 8/15/2022 11:25 AM, Signed by: Lakshmi Whitman MD on 8/15/2022 12:03 PM          Most recent HS troponins:  No results for input(s): TROPHS in the last 72 hours. No lab exists for component:  CKMB  ECG: normal EKG, normal sinus rhythm, unchanged from previous tracings  Review of Systems    []All other systems reviewed and all negative except as written in HPI    [] Patient unable to provide secondary to condition         Past Medical History:   Diagnosis Date    Anemia     Congestive heart failure (HCC)     Hypertension     Menopause     Ventral hernia without obstruction or gangrene 4/18/2022     Past Surgical History:   Procedure Laterality Date    HX OTHER SURGICAL  04/13/2022     bone marrow     Social Hx:  reports that she has never smoked. She has never used smokeless tobacco. She reports that she does not currently use drugs. She reports that she does not drink alcohol. Family Hx: family history includes Breast Cancer in her sister; Breast Problems in her sister; Cancer in her sister; Hypertension in her mother.   No Known Allergies       OBJECTIVE:  Wt Readings from Last 3 Encounters:   09/16/22 150 lb (68 kg)   09/08/22 137 lb 12.8 oz (62.5 kg)   08/15/22 138 lb (62.6 kg)       Intake/Output Summary (Last 24 hours) at 9/17/2022 0559  Last data filed at 9/16/2022 1427  Gross per 24 hour   Intake 480 ml   Output --   Net 480 ml           Physical Exam    Vitals:   Vitals:    09/17/22 0155 09/17/22 0335 09/17/22 0354 09/17/22 0550 BP:  108/63     Pulse: 86 89 95 91   Resp:  23     Temp:  98.4 °F (36.9 °C)     SpO2:       Weight:       Height:         Telemetry: normal sinus rhythm    Heart: regular rate and rhythm, S1, S2 normal, no murmur, click, rub or gallop  Lungs: clear to auscultation bilaterally  Abdomen: soft, non-tender. Bowel sounds normal. No masses,  no organomegaly  Extremities: extremities normal, atraumatic, no cyanosis or edema, edema 2-3++ chronic venous stasis changes        Data Review:     Radiology:   XR Results (most recent):  Results from [place] encounter on 09/12/22    XR CHEST PORT    Narrative  EXAM: XR CHEST PORT    DATE: 9/12/2022 4:02 PM    INDICATION: sob    COMPARISON: Chest radiograph September 8, 2022    FINDINGS: AP portable chest radiograph. Heart is moderately enlarged but  stable. There are bilateral patchy airspace infiltrates which are increased from  the previous radiograph. Increasing consolidation is seen at the LEFT base. Trace effusions are suspected. There is no pneumothorax. Impression  Pattern of moderate pulmonary interstitial edema, slightly worse compared to the  prior radiograph. Superimposed infiltrate cannot be excluded. CT Results (most recent):  Results from Hospital Encounter encounter on 09/12/22    CT CHEST WO CONT    Narrative  CT CHEST WITHOUT CONTRAST. 9/13/2022 4:07 AM    INDICATION: Respiratory failure, pneumonia. COMPARISON: 1/28/2022. TECHNIQUE: CT of the chest was performed without contrast. Coronal and sagittal  reconstructions were performed. CT dose reduction was achieved through use of a  standardized protocol tailored for this examination and automatic exposure  control for dose modulation. FINDINGS:  The heart is enlarged. Interlobular septal thickening is consistent with  interstitial edema. Passive atelectasis is associated with small bilateral  pleural effusions. There is a trace pericardial effusion. Anasarca is mild.     The central airways are patent. No pneumothorax. Aberrant right subclavian  artery is a normal variant. No thoracic lymphadenopathy. The spleen is probably  enlarged. The visualized unenhanced, upper abdomen is otherwise grossly normal.    Bony architecture is abnormal. The cortices are diffusely thinned. The  trabeculae are effaced by smudgy marrow replacement. There are numerous small  lucent lesions in addition to the hyperdense, smudgy, marrow replacement. Differential includes diffuse osseous metastases, multiple myeloma, leukemia,  and heterogeneous osteopenia. Impression  1. CHF: cardiomegaly, interstitial edema, small bilateral pleural effusions. 2. Diffusely abnormal bones. Differential includes multiple myeloma,  leukemia/lymphoma (given splenomegaly), less likely heterogeneous osteopenia or  diffuse metastases. 3. Splenomegaly. MRI Results (most recent):  No results found for this or any previous visit. No results for input(s): CPK, TROIQ in the last 72 hours. No lab exists for component: CKQMB, CPKMB, BMPP  Recent Labs     09/16/22  0346 09/14/22  1149     --    K 4.8 4.6     --    CO2 27  --    BUN 34*  --    CREA 1.45*  --    GLU 95  --    CA 9.1  --        Recent Labs     09/16/22  0346 09/15/22  1043   WBC 69.9*  --    HGB 8.7* 8.7*   HCT 29.2* 28.8*   *  --        No results for input(s): PTP, INR, AP, INREXT, INREXT in the last 72 hours. No lab exists for component: PTTP, GPT, SGOT  No results for input(s): CHOL, LDLC in the last 72 hours. No lab exists for component: TGL, HDLC,  HBA1C  No results for input(s): CRP, TSH, TSHEXT, TSHEXT in the last 72 hours.     No lab exists for component: ESR        Current meds:    Current Facility-Administered Medications:     metoprolol tartrate (LOPRESSOR) tablet 12.5 mg, 12.5 mg, Oral, Q12H, Yasmin Carney MD, 12.5 mg at 09/16/22 2246    docusate sodium (COLACE) capsule 100 mg, 100 mg, Oral, DAILY, Nancy Sanches NP, 100 mg at 09/16/22 2246    0.9% sodium chloride infusion 250 mL, 250 mL, IntraVENous, PRN, Mandy Vasquez MD    acetaminophen (TYLENOL) tablet 650 mg, 650 mg, Oral, Q4H PRN, Mandy Vasquez MD    diphenhydrAMINE (BENADRYL) capsule 25 mg, 25 mg, Oral, Q6H PRN, Mandy Vasquez MD    0.9% sodium chloride infusion 250 mL, 250 mL, IntraVENous, PRN, Eulalia Puente MD    ARIPiprazole (ABILIFY) tablet 30 mg, 30 mg, Oral, DAILY, Sruthi King MD, 30 mg at 09/16/22 1046    aspirin delayed-release tablet 81 mg, 81 mg, Oral, DAILY, Sruthi King MD, 81 mg at 09/16/22 1046    bumetanide (BUMEX) injection 1 mg, 1 mg, IntraVENous, BID, Sruthi King MD, 1 mg at 09/16/22 1853    ferrous sulfate tablet 325 mg, 1 Tablet, Oral, ACB, Sruthi King MD, 325 mg at 09/16/22 0845    sodium chloride (NS) flush 5-40 mL, 5-40 mL, IntraVENous, Q8H, Sruthi King MD, 10 mL at 09/16/22 2247    sodium chloride (NS) flush 5-40 mL, 5-40 mL, IntraVENous, PRN, Sruthi King MD, 10 mL at 09/14/22 0600    acetaminophen (TYLENOL) tablet 650 mg, 650 mg, Oral, Q6H PRN, 650 mg at 09/13/22 0920 **OR** acetaminophen (TYLENOL) suppository 650 mg, 650 mg, Rectal, Q6H PRN, Sruthi King MD    polyethylene glycol (MIRALAX) packet 17 g, 17 g, Oral, DAILY PRN, Sruthi King MD    ondansetron (ZOFRAN ODT) tablet 4 mg, 4 mg, Oral, Q8H PRN **OR** ondansetron (ZOFRAN) injection 4 mg, 4 mg, IntraVENous, Q6H PRN, Sruthi King MD    enoxaparin (LOVENOX) injection 30 mg, 30 mg, SubCUTAneous, DAILY, Sruthi King MD, 30 mg at 09/16/22 1046    cefTRIAXone (ROCEPHIN) 1 g in 0.9% sodium chloride 10 mL IV syringe, 1 g, IntraVENous, Q24H, Sruthi King MD, 1 g at 09/16/22 0324    doxycycline (VIBRAMYCIN) 100 mg in 0.9% sodium chloride (MBP/ADV) 100 mL MBP, 100 mg, IntraVENous, Q12H, Sruthi King MD, Last Rate: 100 mL/hr at 09/16/22 2248, 100 mg at 09/16/22 2248    albuterol-ipratropium (DUO-NEB) 2.5 MG-0.5 MG/3 ML, 3 mL, Nebulization, Q2H PRN, Sruthi King MD    metoprolol (LOPRESSOR) injection 5 mg, 5 mg, IntraVENous, Q6H PRN, Sruthi King MD    magnesium oxide (MAG-OX) tablet 400 mg, 400 mg, Oral, QHS, Dragan Galarza NP, 400 mg at 09/16/22 1314  3Rd Dasia MD  [facility] [address] [place] [address] 13, [address] [place] [address],[address]  Venkat Carter  [phone]      CC:Aden Steiner, NP

## 2022-09-17 NOTE — PROGRESS NOTES
[facility] Adult  Hospitalist Group                                                                                          Hospitalist Progress Note  Piero Johnson MD  Answering service: [phone] OR 36 from in house phone        Date of Service:  2022  NAME:  Tonya Roman  :  1945  MRN:  [id number]      Admission Summary:   Fletcher Solis is a 68yo F with a PMH of CHF, HTN, CKD, Myeloproliferative Disorder who presented at the ED with right knee pain x5 days, denies injury: pain is constant/ sharp pain, 5/10 in severity, worse with ambulation, slight relief at rest. Upon arrival to the ED, the patient was found to be in significant respiratory distress, BNP level was elevated, CXR showed evidence of vascular congestion and possible pneumonia. The patient was subsequently referred to the hospitalist service for admission. The patient required being placed on supplemental oxygen in the ED because of the shortness of breath. The patient was last admitted to this hospital from 2022 to 2022 for evaluation of shortness of breath, subsequently attributed to pleural effusion for which the patient underwent thoracentesis and about 1900 mL of fluid was drained from the chest. The patient denied associated fever, rigors, and chills. The patient stated that she has been taking her medication as prescribed including diuretic. Interval history / Subjective: Follow up Acute on chronic CHF   Now on 2l NC   Feels better \"breathing is better\"  \"When Can I go home? \"  Assessment & Plan:     Acute-on-Chronic HFrEF: NYHA III--improving  -UYP:[id number]  -ECHO 8/3/22: reduced left ventricular systolic function with EF 40-45%, left ventricle mildly dilated, normal wall thickness.  -continue IV diuresis, will stop IV lasix as the patient is already on Bumex  -not on BB sec to low BP, not on ACEi/ARB/ARNi sec to SKYLA  -serial cardiac markers: trop peaked 154  -[address] [facility], COntinue IV diuresis, RHC early next week  -ASA continues     Acute Respiratory Failure with Hypoxia: --improving  multifactorial including CHF and suspected bacterial PNA. -serial cardiac markers  -D-dimer: 4.02  -CXR 9/12: pattern of moderate pulmonary interstitial edema, slightly worse compared to prior study, superimposed infiltrate cannot be excluded. -CT scan chest w/o 9/13: small bilateral pleural effusions, diffusely abnormal bones (differential includes MM, Leukemia/Lymphoma- given splenomegaly) less likely heterogeneous osteopenia or diffuse metastases, Splenomegaly.   -completed Rocephin (9/13--9/17) and Doxycycline (9/13--9/17), continue  -supplemental oxygen, wean as tolerated     Myeloproliferative Disorder  Anemia sec to above  Thrombocytosis  -CT scan chest w/o 9/13: small bilateral pleural effusions, diffusely abnormal bones (differential includes MM, Leukemia/Lymphoma- given splenomegaly) less likely heterogeneous osteopenia or diffuse metastases, Splenomegaly. -Appreciate hematology, transfuse to keep hb>8   -chronic leukocytosis    CKD 3B:   -monitor renal function  -BUN 32 Creat 1.36, improved     HTN: BP stable. Tachycardia: stable/improved low 100's-90's, continue to monitor. Febrile: cultures unremarkable,      Right Knee Effusion:  denies trauma. -X-ray Right knee 9/12: moderate effusion. -X-ray Right tib/fib 9/12: no acute abnormality.    -Duplex RLE 9/12: negative for DVT.   -Orthopedic signed off    Regular diet    PT/OT SNF vs home health        DVTppx: Lovenox  Code Status: Full Code  PTA: DIAN, [address]: Continue IV diuresis, continue antibiotics, blood transfusion if needed, RHC early next week  Diet: Cardiac  Activity: OOB to chair TID and PRN as tolerated  Discharge: in 1-2 days  Ambulates: independently     Hospital Problems  Date Reviewed: 9/13/2022            Codes Class Noted POA    * (Principal) Acute on chronic HFrEF (heart failure with reduced ejection fraction) [facility]) ICD-10-CM: J04.43  ICD-9-CM: 428.23  9/12/2022 Yes         Review of Systems:   A comprehensive review of systems was negative except for that written in the HPI. Vital Signs:    Last 24hrs VS reviewed since prior progress note. Most recent are:  Visit Vitals  /66   Pulse 96   Temp 97.6 °F (36.4 °C)   Resp 18   Ht 5' 5\" (1.651 m)   Wt 68 kg (150 lb)   SpO2 93%   Breastfeeding No   BMI 24.96 kg/m²         Intake/Output Summary (Last 24 hours) at 9/17/2022 1238  Last data filed at 9/17/2022 0911  Gross per 24 hour   Intake 580 ml   Output 0 ml   Net 580 ml          Physical Examination:     I had a face to face encounter with this patient and independently examined them on 9/17/2022 as outlined below:          Constitutional:  No acute distress, cooperative, pleasant. ENT:  Oral mucosa moist.    Resp:  CTA bilaterally. No wheezing/rhonchi/rales. No accessory muscle use. CV:  Regular rhythm, normal rate, S1,S2.    GI/:  Soft, non distended, non tender, no guarding, BS present. Voids Freely. Musculoskeletal:  No edema, warm. Right knee swelling. Neurologic:  Moves all extremities. AAOx3. Skin:  w/d, chronic hyperpigmentation/dark  to  BLE. Psych:  Good insight, Not anxious nor agitated. Data Review:    Review and/or order of clinical lab test      Labs:     Recent Labs     09/17/22  0625 09/16/22 0346   WBC 68.0* 69.9*   HGB 7.8* 8.7*   HCT 26.2* 29.2*   * 988*       Recent Labs     09/17/22  0625 09/16/22  0346    139   K 5.2* 4.8    106   CO2 23 27   BUN 34* 34*   CREA 1.40* 1.45*   GLU 90 95   CA 8.8 9.1       No results for input(s): ALT, AP, TBIL, TBILI, TP, ALB, GLOB, GGT, AML, LPSE in the last 72 hours. No lab exists for component: SGOT, GPT, AMYP, HLPSE    No results for input(s): INR, PTP, APTT, INREXT, INREXT in the last 72 hours. No results for input(s): FE, TIBC, PSAT, FERR in the last 72 hours.      Lab Results   Component Value Date/Time    Folate 36.1 (H) 09/13/2022 04:45 AM        No results for input(s): PH, PCO2, PO2 in the last 72 hours. No results for input(s): CPK, CKNDX, TROIQ in the last 72 hours.     No lab exists for component: CPKMB  No results found for: CHOL, CHOLX, CHLST, CHOLV, HDL, HDLP, LDL, LDLC, DLDLP, TGLX, TRIGL, TRIGP, CHHD, CHHDX  No results found for: [facility]  Lab Results   Component Value Date/Time    Color YELLOW/STRAW 09/14/2022 02:51 PM    Appearance CLEAR 09/14/2022 02:51 PM    Specific gravity 1.013 09/14/2022 02:51 PM    pH (UA) 5.5 09/14/2022 02:51 PM    Protein Negative 09/14/2022 02:51 PM    Glucose Negative 09/14/2022 02:51 PM    Ketone Negative 09/14/2022 02:51 PM    Bilirubin Negative 09/14/2022 02:51 PM    Urobilinogen 0.2 09/14/2022 02:51 PM    Nitrites Negative 09/14/2022 02:51 PM    Leukocyte Esterase Negative 09/14/2022 02:51 PM    Epithelial cells FEW 09/14/2022 02:51 PM    Bacteria Negative 09/14/2022 02:51 PM    WBC 0-4 09/14/2022 02:51 PM    RBC 0-5 09/14/2022 02:51 PM         Medications Reviewed:     Current Facility-Administered Medications   Medication Dose Route Frequency    metoprolol tartrate (LOPRESSOR) tablet 12.5 mg  12.5 mg Oral Q12H    docusate sodium (COLACE) capsule 100 mg  100 mg Oral DAILY    0.9% sodium chloride infusion 250 mL  250 mL IntraVENous PRN    acetaminophen (TYLENOL) tablet 650 mg  650 mg Oral Q4H PRN    diphenhydrAMINE (BENADRYL) capsule 25 mg  25 mg Oral Q6H PRN    0.9% sodium chloride infusion 250 mL  250 mL IntraVENous PRN    ARIPiprazole (ABILIFY) tablet 30 mg  30 mg Oral DAILY    aspirin delayed-release tablet 81 mg  81 mg Oral DAILY    bumetanide (BUMEX) injection 1 mg  1 mg IntraVENous BID    ferrous sulfate tablet 325 mg  1 Tablet Oral ACB    sodium chloride (NS) flush 5-40 mL  5-40 mL IntraVENous Q8H    sodium chloride (NS) flush 5-40 mL  5-40 mL IntraVENous PRN    acetaminophen (TYLENOL) tablet 650 mg  650 mg Oral Q6H PRN    Or    acetaminophen (TYLENOL) suppository 650 mg  650 mg Rectal Q6H PRN    polyethylene glycol (MIRALAX) packet 17 g  17 g Oral DAILY PRN    ondansetron (ZOFRAN ODT) tablet 4 mg  4 mg Oral Q8H PRN    Or    ondansetron (ZOFRAN) injection 4 mg  4 mg IntraVENous Q6H PRN    enoxaparin (LOVENOX) injection 30 mg  30 mg SubCUTAneous DAILY    doxycycline (VIBRAMYCIN) 100 mg in 0.9% sodium chloride (MBP/ADV) 100 mL MBP  100 mg IntraVENous Q12H    albuterol-ipratropium (DUO-NEB) 2.5 MG-0.5 MG/3 ML  3 mL Nebulization Q2H PRN    metoprolol (LOPRESSOR) injection 5 mg  5 mg IntraVENous Q6H PRN    magnesium oxide (MAG-OX) tablet 400 mg  400 mg Oral QHS     ______________________________________________________________________  EXPECTED LENGTH OF STAY: 3d 19h  ACTUAL LENGTH OF STAY:          159 Yani Rousseau MD

## 2022-09-17 NOTE — PROGRESS NOTES
HEMATOLOGY / ONCOLOGY    Meghan Rod 1945 Age: 68 y.o. CHIEF COMPLAINT:  leg swelling and pain    DISEASE FEATURES  See marrow report below    PREVIOUS TREATMENTS    CURRENT TREATMENTS  Erickson Corey 7/2022 -    Disease Status:   Treatment Goal:     ACTIVE PROBLEMS  Myeloproliferative/myelodysplastic disease  --anemia, leukocytosis, erythrocytosis, marrow fibrosis  CHF  CKD       INTERIM HISTORY AND ASSESSMENT  Ms. Deborah Dinero was admitted with diagnosis of pneumonia. Her leg was painful and swollen, but doppler was neg for DVT and CT was neg for PE and pneumonia. Main finding was CHF; troponin and pro-BNP were elevated. CT showed CHF: cardiomegaly, interstitial edema, small bilateral pleural effusions. Stress test 8/15/22 neg for ischemia but with EF of 40%. She says she has another cardiac test (cath?) on 9/26/22. Dr. Ibis Argueta thinks she may have high-output failure from her chronic anemia. At a hemoglobin of 7.0, I think she will benefit from transfusion of two units of PRBCs. Her iron profile looks like she could be iron deficient with iron of 5, TIBC of 179 and sat of 3%; her MCV is 82, previously 72; however her ferritin is >800 and she had adequate iron stores in the spring on her marrow exam. In addition, she is likely to need multiple transfusions in the future and develop iron overload. She has had 3 cycles of Vidaza with nearly identical blood counts to when she started. No benefit that I can see unless her marrow blasts are disappearing, but that has not made her feel better or improved her peripheral counts. I think she is behaving more like myeloproliferative disease than myelodysplasia.     Here is her marrow report from earlier this year:  Here is her marrow biopsy report from the spring of 2022:  Hypercellular marrow (40-50%) with marked erythroid hypoplasia, bilineage atypia, increased fibrosis (MF-2), and  increased blasts (7% by manual differential; See COMMENT)  - increased blasts (4.6% of events) and basophils (2.0% of events) by flow cytometry (See FLOW)  - no abnormalities detected by [facility] MDS/myeloproliferative panel (See FISH)  - abnormal female karyotype (See CYTO)  - multiple pathogenic variants detected by extended myeloid NGS panel (See MOLECULAR). Hgb is 7.8 today ( 9/17) stable. Would rec. Continue to transfuse to keep hgb> 7.5 if possible given the symptoms from her anemia seen at a lower level. SUBJECTIVE:    feels stronger, no c/o pain, no sob today    Past Medical History:   Diagnosis Date    Anemia     Congestive heart failure (HCC)     Hypertension     Menopause     Ventral hernia without obstruction or gangrene 4/18/2022     Past Surgical History:   Procedure Laterality Date    HX OTHER SURGICAL  04/13/2022     bone marrow     Medications and Allergies have been reviewed and updated in the [facility] system. REVIEW OF SYSTEMS  Except as noted in the history and assessement above, all other systems are negative. EXAMINATION   Vital signs were reviewed abnormalities discussed above.    General: Frail  HEENT: conjunctiva clear; no jaundice  Lungs: clear  CV: RRR  Abd: soft and non-tender; no HSM; no masses  Skin: no significant rashes  Ext: Edema: none; Tenderness: none  Neuro/Psych:    Social History     Tobacco Use    Smoking status: Never    Smokeless tobacco: Never   Substance Use Topics    Alcohol use: Never      Family History   Problem Relation Age of Onset    Breast Cancer Sister         63's    Cancer Sister     Breast Problems Sister     Hypertension Mother         Dimitry Herbert MD

## 2022-09-18 LAB
ANION GAP SERPL CALC-SCNC: 6 MMOL/L (ref 5–15)
BASOPHILS # BLD: 2.2 K/UL (ref 0–0.1)
BASOPHILS NFR BLD: 3 % (ref 0–1)
BLASTS NFR BLD MANUAL: 9 %
BUN SERPL-MCNC: 33 MG/DL (ref 6–20)
BUN/CREAT SERPL: 26 (ref 12–20)
CALCIUM SERPL-MCNC: 8.9 MG/DL (ref 8.5–10.1)
CHLORIDE SERPL-SCNC: 105 MMOL/L (ref 97–108)
CO2 SERPL-SCNC: 26 MMOL/L (ref 21–32)
CREAT SERPL-MCNC: 1.29 MG/DL (ref 0.55–1.02)
DIFFERENTIAL METHOD BLD: ABNORMAL
EOSINOPHIL # BLD: 2.2 K/UL (ref 0–0.4)
EOSINOPHIL NFR BLD: 3 % (ref 0–7)
ERYTHROCYTE [DISTWIDTH] IN BLOOD BY AUTOMATED COUNT: 18.4 % (ref 11.5–14.5)
GLUCOSE SERPL-MCNC: 92 MG/DL (ref 65–100)
HCT VFR BLD AUTO: 27.5 % (ref 35–47)
HGB BLD-MCNC: 8.2 G/DL (ref 11.5–16)
IMM GRANULOCYTES # BLD AUTO: 0 K/UL
IMM GRANULOCYTES NFR BLD AUTO: 0 %
LYMPHOCYTES # BLD: 8.2 K/UL (ref 0.8–3.5)
LYMPHOCYTES NFR BLD: 11 % (ref 12–49)
MCH RBC QN AUTO: 25 PG (ref 26–34)
MCHC RBC AUTO-ENTMCNC: 29.8 G/DL (ref 30–36.5)
MCV RBC AUTO: 83.8 FL (ref 80–99)
METAMYELOCYTES NFR BLD MANUAL: 3 %
MONOCYTES # BLD: 5.2 K/UL (ref 0–1)
MONOCYTES NFR BLD: 7 % (ref 5–13)
MYELOCYTES NFR BLD MANUAL: 16 %
NEUTS BAND NFR BLD MANUAL: 8 % (ref 0–6)
NEUTS SEG # BLD: 35.8 K/UL (ref 1.8–8)
NEUTS SEG NFR BLD: 40 % (ref 32–75)
NRBC # BLD: 1.73 K/UL (ref 0–0.01)
NRBC BLD-RTO: 2.3 PER 100 WBC
PLATELET # BLD AUTO: 853 K/UL (ref 150–400)
PLATELET COMMENTS,PCOM: ABNORMAL
PMV BLD AUTO: 11.7 FL (ref 8.9–12.9)
POTASSIUM SERPL-SCNC: 4.7 MMOL/L (ref 3.5–5.1)
RBC # BLD AUTO: 3.28 M/UL (ref 3.8–5.2)
RBC MORPH BLD: ABNORMAL
RBC MORPH BLD: ABNORMAL
SODIUM SERPL-SCNC: 137 MMOL/L (ref 136–145)
WBC # BLD AUTO: 74.5 K/UL (ref 3.6–11)

## 2022-09-18 PROCEDURE — 80048 BASIC METABOLIC PNL TOTAL CA: CPT

## 2022-09-18 PROCEDURE — 99232 SBSQ HOSP IP/OBS MODERATE 35: CPT | Performed by: INTERNAL MEDICINE

## 2022-09-18 PROCEDURE — 85025 COMPLETE CBC W/AUTO DIFF WBC: CPT

## 2022-09-18 PROCEDURE — 77010033678 HC OXYGEN DAILY

## 2022-09-18 PROCEDURE — 74011250636 HC RX REV CODE- 250/636: Performed by: INTERNAL MEDICINE

## 2022-09-18 PROCEDURE — 65270000046 HC RM TELEMETRY

## 2022-09-18 PROCEDURE — 93308 TTE F-UP OR LMTD: CPT | Performed by: INTERNAL MEDICINE

## 2022-09-18 PROCEDURE — 74011000250 HC RX REV CODE- 250: Performed by: INTERNAL MEDICINE

## 2022-09-18 PROCEDURE — 93325 DOPPLER ECHO COLOR FLOW MAPG: CPT | Performed by: INTERNAL MEDICINE

## 2022-09-18 PROCEDURE — 36415 COLL VENOUS BLD VENIPUNCTURE: CPT

## 2022-09-18 PROCEDURE — 74011250637 HC RX REV CODE- 250/637: Performed by: INTERNAL MEDICINE

## 2022-09-18 RX ORDER — BUMETANIDE 0.25 MG/ML
2 INJECTION INTRAMUSCULAR; INTRAVENOUS 2 TIMES DAILY
Status: DISCONTINUED | OUTPATIENT
Start: 2022-09-19 | End: 2022-09-21

## 2022-09-18 RX ADMIN — ARIPIPRAZOLE 30 MG: 30 TABLET ORAL at 09:46

## 2022-09-18 RX ADMIN — SODIUM CHLORIDE, PRESERVATIVE FREE 10 ML: 5 INJECTION INTRAVENOUS at 22:43

## 2022-09-18 RX ADMIN — BUMETANIDE 1 MG: 0.25 INJECTION, SOLUTION INTRAMUSCULAR; INTRAVENOUS at 09:45

## 2022-09-18 RX ADMIN — SODIUM CHLORIDE, PRESERVATIVE FREE 10 ML: 5 INJECTION INTRAVENOUS at 15:55

## 2022-09-18 RX ADMIN — FERROUS SULFATE TAB 325 MG (65 MG ELEMENTAL FE) 325 MG: 325 (65 FE) TAB at 06:35

## 2022-09-18 RX ADMIN — ENOXAPARIN SODIUM 30 MG: 100 INJECTION SUBCUTANEOUS at 09:46

## 2022-09-18 RX ADMIN — METOPROLOL TARTRATE 12.5 MG: 25 TABLET, FILM COATED ORAL at 09:46

## 2022-09-18 RX ADMIN — BUMETANIDE 1 MG: 0.25 INJECTION, SOLUTION INTRAMUSCULAR; INTRAVENOUS at 15:54

## 2022-09-18 RX ADMIN — ASPIRIN 81 MG: 81 TABLET, COATED ORAL at 09:46

## 2022-09-18 NOTE — CONSULTS
[facility]  Cardiology Care Note                  []Initial visit     [x]Established visit     Patient Name: Kelsy Potts - :1945 - CDQ:[id number]  Primary Cardiologist: Heriberto Lopez MD  Consulting Cardiologist: Heriberto Lopez MD     Reason for initial visit:  SOB  Edema  HFpEF    HPI:       Very pleasant 66-year-old female with past medical history of acute acute on chronic diastolic heart failure heart failure preserved EF, stage C, mildly reduced left ventricular systolic function by echo EF 40 to 45% down from 60% by prior echo, myeloproliferative disorder on chemotherapy, severe leukocytosis, thrombocytopenia ptosis, hypertension, hyperlipidemia, pleural effusion status postthoracentesis admitted with right knee pain found to be in acute on chronic diastolic heart failure. Followed in the advanced heart failure clinic by Dr. Mihir Azul. From review of her notes the plans were for a right heart cath with Dr. Levon Zee on , which would have been a week after her planned chemotherapy. However given the knee pain and significant lower extremity edema she presented to the hospital.  Here she has undergone IV diuresis with good effect. She still has quite refractory lower extremity edema. Dr. Nasim Del Toro is her oncologist hematologist and from what she tells me he may discontinue chemotherapy with her. She states they may do 1 more round, which will be delayed, thereafter it will be stopped. SUBJECTIVE:    Feels ok  ++ LE edema  ? net positive? ? Accuracy of I/Os       Assessment and Plan       Acute on chronic diastolic heart failure-continue IV diuresis with Bumex 1 mg IV twice daily. She is otherwise on a limited outpatient regimen, ASA, metolazone as needed - seems limited by hypotension and SKYLA? I think she could tolerate low dose BB - will start.   Thereafter SGLT2, aldosterone antagonist etc.  CHF regimen may take time to titrate. Looks to need several more days of diuresis, increased bumex to 2mg IV BID, may preliminarily plan to perform RHC early next week, add ionotropes if needed. MPD - anemia, thrombocytosis. CKD - monitor, creatinine 1.29. Anemia - MPD           ____________________________________________________________    Cardiac testing  08/03/22    ECHO ADULT COMPLETE 08/03/2022 8/3/2022    Interpretation Summary  Formatting of this result is different from the original.      Left Ventricle: Reduced left ventricular systolic function with a visually estimated EF of 40 - 45%. Left ventricle is mildly dilated. Normal wall thickness. Mild global hypokinesis present. Normal diastolic function. Mitral Valve: Mild regurgitation. Left Atrium: Left atrium is mildly dilated. Pericardium: Small (<1 cm) circumferential pericardial effusion present. No indication of cardiac tamponade. Signed by: Ximena Bowles MD on 8/3/2022 12:29 PM        08/15/22    NUCLEAR CARDIAC STRESS TEST 08/15/2022, 08/15/2022 8/15/2022    Interpretation Summary  Formatting of this result is different from the original.      ECG: Resting ECG demonstrates normal sinus rhythm. ECG: Stress ECG was negative for ischemia. Stress Test: A pharmacological stress test was performed using lexiscan. Blood pressure demonstrated a normal response and heart rate demonstrated a normal response to stress. The patient's heart rate recovery was normal.    INDICATION: Chronic systolic heart failure. History of hypertension    COMPARISON:  None. CORRELATIVE IMAGING STUDIES:  None    TRACER: Tc 99m Sestamibi    TECHNIQUE:  Resting SPECT images of the heart were obtained following the uneventful intravenous administration of 10.6 mCi of Tc 99m Sestamibi.     Gated stress SPECT images of the heart were obtained following Lexiscan protocol and the uneventful intravenous administration of 31.9 mCi of Tc 99m Sestamibi. FINDINGS:  The rest and stress perfusion images demonstrate prominent soft tissue attenuation without significant perfusion defect or evidence of myocardial reversibility. The gated images demonstrate global hypokinesia. Left ventricular ejection fraction is 40 %. Impression:  Prominent soft tissue attenuation. No evidence of myocardial ischemia or infarction. Left ventricular ejection fraction is 40 %. Global hypokinesia. Signed by: Candie Matthews MD on 8/15/2022 11:25 AM, Signed by: Raúl Atwood MD on 8/15/2022 12:03 PM          Most recent HS troponins:  No results for input(s): TROPHS in the last 72 hours. No lab exists for component:  CKMB  ECG: normal EKG, normal sinus rhythm, unchanged from previous tracings  Review of Systems    []All other systems reviewed and all negative except as written in HPI    [] Patient unable to provide secondary to condition         Past Medical History:   Diagnosis Date    Anemia     Congestive heart failure (HCC)     Hypertension     Menopause     Ventral hernia without obstruction or gangrene 4/18/2022     Past Surgical History:   Procedure Laterality Date    HX OTHER SURGICAL  04/13/2022     bone marrow     Social Hx:  reports that she has never smoked. She has never used smokeless tobacco. She reports that she does not currently use drugs. She reports that she does not drink alcohol. Family Hx: family history includes Breast Cancer in her sister; Breast Problems in her sister; Cancer in her sister; Hypertension in her mother.   No Known Allergies       OBJECTIVE:  Wt Readings from Last 3 Encounters:   09/17/22 149 lb 11.1 oz (67.9 kg)   09/08/22 137 lb 12.8 oz (62.5 kg)   08/15/22 138 lb (62.6 kg)       Intake/Output Summary (Last 24 hours) at 9/18/2022 0614  Last data filed at 9/18/2022 0006  Gross per 24 hour   Intake 550 ml   Output 900 ml   Net -350 ml           Physical Exam    Vitals:   Vitals:    09/18/22 0200 09/18/22 0300 09/18/22 0400 09/18/22 0500   BP:  (!) 104/55  97/61   Pulse: 90 87 94 87   Resp:  15  18   Temp:       SpO2:    96%   Weight:       Height:         Telemetry: normal sinus rhythm    Heart: regular rate and rhythm, S1, S2 normal, no murmur, click, rub or gallop  Lungs: clear to auscultation bilaterally  Abdomen: soft, non-tender. Bowel sounds normal. No masses,  no organomegaly  Extremities: extremities normal, atraumatic, no cyanosis or edema, edema 2-3++ chronic venous stasis changes        Data Review:     Radiology:   XR Results (most recent):  Results from [place] encounter on 09/12/22    XR CHEST PORT    Narrative  EXAM: XR CHEST PORT    DATE: 9/12/2022 4:02 PM    INDICATION: sob    COMPARISON: Chest radiograph September 8, 2022    FINDINGS: AP portable chest radiograph. Heart is moderately enlarged but  stable. There are bilateral patchy airspace infiltrates which are increased from  the previous radiograph. Increasing consolidation is seen at the LEFT base. Trace effusions are suspected. There is no pneumothorax. Impression  Pattern of moderate pulmonary interstitial edema, slightly worse compared to the  prior radiograph. Superimposed infiltrate cannot be excluded. CT Results (most recent):  Results from Hospital Encounter encounter on 09/12/22    CT CHEST WO CONT    Narrative  CT CHEST WITHOUT CONTRAST. 9/13/2022 4:07 AM    INDICATION: Respiratory failure, pneumonia. COMPARISON: 1/28/2022. TECHNIQUE: CT of the chest was performed without contrast. Coronal and sagittal  reconstructions were performed. CT dose reduction was achieved through use of a  standardized protocol tailored for this examination and automatic exposure  control for dose modulation. FINDINGS:  The heart is enlarged. Interlobular septal thickening is consistent with  interstitial edema. Passive atelectasis is associated with small bilateral  pleural effusions. There is a trace pericardial effusion. Anasarca is mild.     The central airways are patent. No pneumothorax. Aberrant right subclavian  artery is a normal variant. No thoracic lymphadenopathy. The spleen is probably  enlarged. The visualized unenhanced, upper abdomen is otherwise grossly normal.    Bony architecture is abnormal. The cortices are diffusely thinned. The  trabeculae are effaced by smudgy marrow replacement. There are numerous small  lucent lesions in addition to the hyperdense, smudgy, marrow replacement. Differential includes diffuse osseous metastases, multiple myeloma, leukemia,  and heterogeneous osteopenia. Impression  1. CHF: cardiomegaly, interstitial edema, small bilateral pleural effusions. 2. Diffusely abnormal bones. Differential includes multiple myeloma,  leukemia/lymphoma (given splenomegaly), less likely heterogeneous osteopenia or  diffuse metastases. 3. Splenomegaly. MRI Results (most recent):  No results found for this or any previous visit. No results for input(s): CPK, TROIQ in the last 72 hours. No lab exists for component: CKQMB, CPKMB, BMPP  Recent Labs     09/17/22  0625 09/16/22  0346    139   K 5.2* 4.8    106   CO2 23 27   BUN 34* 34*   CREA 1.40* 1.45*   GLU 90 95   CA 8.8 9.1       Recent Labs     09/17/22  0625 09/16/22  0346   WBC 68.0* 69.9*   HGB 7.8* 8.7*   HCT 26.2* 29.2*   * 988*       No results for input(s): PTP, INR, AP, INREXT, INREXT in the last 72 hours. No lab exists for component: PTTP, GPT, SGOT  No results for input(s): CHOL, LDLC in the last 72 hours. No lab exists for component: TGL, HDLC,  HBA1C  No results for input(s): CRP, TSH, TSHEXT, TSHEXT in the last 72 hours.     No lab exists for component: ESR        Current meds:    Current Facility-Administered Medications:     metoprolol tartrate (LOPRESSOR) tablet 12.5 mg, 12.5 mg, Oral, Q12H, Ana Rosa Fang MD, 12.5 mg at 09/17/22 2134    docusate sodium (COLACE) capsule 100 mg, 100 mg, Oral, DAILY, Antoni Sanches NP, 100 mg at 09/17/22 2309    0.9% sodium chloride infusion 250 mL, 250 mL, IntraVENous, PRN, Ashley Dickerson MD    acetaminophen (TYLENOL) tablet 650 mg, 650 mg, Oral, Q4H PRN, Ashley Dickerson MD    diphenhydrAMINE (BENADRYL) capsule 25 mg, 25 mg, Oral, Q6H PRN, Ashley Dickerson MD    0.9% sodium chloride infusion 250 mL, 250 mL, IntraVENous, PRN, Mary Vargas MD    ARIPiprazole (ABILIFY) tablet 30 mg, 30 mg, Oral, DAILY, Sruthi King MD, 30 mg at 09/17/22 0908    aspirin delayed-release tablet 81 mg, 81 mg, Oral, DAILY, Sruthi King MD, 81 mg at 09/17/22 0908    bumetanide (BUMEX) injection 1 mg, 1 mg, IntraVENous, BID, Sruthi King MD, 1 mg at 09/17/22 1533    ferrous sulfate tablet 325 mg, 1 Tablet, Oral, ACB, Sruthi King MD, 325 mg at 09/17/22 0908    sodium chloride (NS) flush 5-40 mL, 5-40 mL, IntraVENous, Q8H, Sruthi King MD, 10 mL at 09/17/22 2136    sodium chloride (NS) flush 5-40 mL, 5-40 mL, IntraVENous, PRN, Sruthi King MD, 10 mL at 09/14/22 0600    acetaminophen (TYLENOL) tablet 650 mg, 650 mg, Oral, Q6H PRN, 650 mg at 09/13/22 0920 **OR** acetaminophen (TYLENOL) suppository 650 mg, 650 mg, Rectal, Q6H PRN, Sruthi King MD    polyethylene glycol (MIRALAX) packet 17 g, 17 g, Oral, DAILY PRN, Sruthi King MD    ondansetron (ZOFRAN ODT) tablet 4 mg, 4 mg, Oral, Q8H PRN **OR** ondansetron (ZOFRAN) injection 4 mg, 4 mg, IntraVENous, Q6H PRN, Sruthi King MD    enoxaparin (LOVENOX) injection 30 mg, 30 mg, SubCUTAneous, DAILY, Sruthi King MD, 30 mg at 09/17/22 0908    albuterol-ipratropium (DUO-NEB) 2.5 MG-0.5 MG/3 ML, 3 mL, Nebulization, Q2H PRN, Sruthi King MD    metoprolol (LOPRESSOR) injection 5 mg, 5 mg, IntraVENous, Q6H PRN, Sruthi King MD    magnesium oxide (MAG-OX) tablet 400 mg, 400 mg, Oral, QHS, Dragan Galarza NP, 400 mg at 09/17/22 0350    Lori Coker MD  [facility] [place] [address], [address] [facility], [address]  [phone]      CC:Kareem Steiner, NP

## 2022-09-18 NOTE — PROGRESS NOTES
[address] [facility] Adult  Hospitalist Group                                                                                          Hospitalist Progress Note  Nina Stevens MD  Answering service: [phone] OR 36 from in house phone        Date of Service:  2022  NAME:  Steffen Avila  :  1945  MRN:  [id number]      Admission Summary:   Jennifer Carrillo is a 66yo F with a PMH of CHF, HTN, CKD, Myeloproliferative Disorder who presented at the ED with right knee pain x5 days, denies injury: pain is constant/ sharp pain, 5/10 in severity, worse with ambulation, slight relief at rest. Upon arrival to the ED, the patient was found to be in significant respiratory distress, BNP level was elevated, CXR showed evidence of vascular congestion and possible pneumonia. The patient was subsequently referred to the hospitalist service for admission. The patient required being placed on supplemental oxygen in the ED because of the shortness of breath. The patient was last admitted to this hospital from 2022 to 2022 for evaluation of shortness of breath, subsequently attributed to pleural effusion for which the patient underwent thoracentesis and about 1900 mL of fluid was drained from the chest. The patient denied associated fever, rigors, and chills. The patient stated that she has been taking her medication as prescribed including diuretic. Interval history / Subjective: Follow up Acute on chronic CHF   Now on 1l NC and saturating close to 98-99%  Feels better \"breathing is better\"  \"When Can I go home? \"  Assessment & Plan:     Acute-on-Chronic HFrEF: NYHA III--improving  -RGE:[id number]  -ECHO 8/3/22: reduced left ventricular systolic function with EF 40-45%, left ventricle mildly dilated, normal wall thickness.  -continue IV diuresis, will stop IV lasix as the patient is already on Bumex  -not on BB sec to low BP, not on ACEi/ARB/ARNi sec to SKYLA  -serial cardiac markers: trop peaked 90 [facility], Continue IV diuresis, RHC early next week  -ASA continues     Acute Respiratory Failure with Hypoxia: --improving  multifactorial including CHF and suspected bacterial PNA. -serial cardiac markers  -D-dimer: 4.02  -CXR 9/12: pattern of moderate pulmonary interstitial edema, slightly worse compared to prior study, superimposed infiltrate cannot be excluded. -CT scan chest w/o 9/13: small bilateral pleural effusions, diffusely abnormal bones (differential includes MM, Leukemia/Lymphoma- given splenomegaly) less likely heterogeneous osteopenia or diffuse metastases, Splenomegaly.   -completed Rocephin (9/13--9/17) and Doxycycline (9/13--9/17)  -supplemental oxygen, wean as tolerated     Myeloproliferative Disorder  Anemia sec to above  Thrombocytosis  -CT scan chest w/o 9/13: small bilateral pleural effusions, diffusely abnormal bones (differential includes MM, Leukemia/Lymphoma- given splenomegaly) less likely heterogeneous osteopenia or diffuse metastases, Splenomegaly. -Appreciate hematology, transfuse to keep hb>7.5  -chronic leukocytosis    CKD 3B:   -monitor renal function  -BUN 32 Creat 1.36, improved     HTN: BP stable. Tachycardia: stable/improved low 100's-90's, continue to monitor. Febrile: cultures unremarkable,      Right Knee Effusion:  denies trauma. -X-ray Right knee 9/12: moderate effusion. -X-ray Right tib/fib 9/12: no acute abnormality.    -Duplex RLE 9/12: negative for DVT.   -Orthopedic signed off    Regular diet    PT/OT SNF vs home health        DVTppx: Lovenox  Code Status: Full Code  PTA: DIAN, [address]: Continue IV diuresis, blood transfusion if needed, RHC early next week, still waiting for echo   Diet: Cardiac  Activity: OOB to chair TID and PRN as tolerated  Discharge: in 1-2 days  Ambulates: independently     Hospital Problems  Date Reviewed: 9/13/2022            Codes Class Noted POA    * (Principal) Acute on chronic HFrEF (heart failure with reduced ejection fraction) [facility]) ICD-10-CM: I50.23  ICD-9-CM: 428.23  9/12/2022 Yes       Review of Systems:   A comprehensive review of systems was negative except for that written in the HPI. Vital Signs:    Last 24hrs VS reviewed since prior progress note. Most recent are:  Visit Vitals  BP (!) 117/57   Pulse 83   Temp 98.4 °F (36.9 °C)   Resp 22   Ht 5' 5\" (1.651 m)   Wt 67.9 kg (149 lb 11.1 oz)   SpO2 96%   Breastfeeding No   BMI 24.91 kg/m²         Intake/Output Summary (Last 24 hours) at 9/18/2022 1432  Last data filed at 9/18/2022 0500  Gross per 24 hour   Intake 540 ml   Output 900 ml   Net -360 ml          Physical Examination:     I had a face to face encounter with this patient and independently examined them on 9/18/2022 as outlined below:          Constitutional:  No acute distress, cooperative, pleasant. ENT:  Oral mucosa moist.    Resp:  CTA bilaterally. No wheezing/rhonchi/rales. No accessory muscle use. CV:  Regular rhythm, normal rate, S1,S2.    GI/:  Soft, non distended, non tender, no guarding, BS present. Voids Freely. Musculoskeletal:  No edema, warm. Right knee swelling. Neurologic:  Moves all extremities. AAOx3. Skin:  w/d, chronic hyperpigmentation/dark  to  BLE. Psych:  Good insight, Not anxious nor agitated. Data Review:    Review and/or order of clinical lab test      Labs:     Recent Labs     09/18/22 0630 09/17/22  0625   WBC 74.5* 68.0*   HGB 8.2* 7.8*   HCT 27.5* 26.2*   * 803*       Recent Labs     09/18/22  0630 09/17/22  0625 09/16/22  0346    136 139   K 4.7 5.2* 4.8    106 106   CO2 26 23 27   BUN 33* 34* 34*   CREA 1.29* 1.40* 1.45*   GLU 92 90 95   CA 8.9 8.8 9.1       No results for input(s): ALT, AP, TBIL, TBILI, TP, ALB, GLOB, GGT, AML, LPSE in the last 72 hours. No lab exists for component: SGOT, GPT, AMYP, HLPSE    No results for input(s): INR, PTP, APTT, INREXT, INREXT in the last 72 hours.    No results for input(s): FE, TIBC, PSAT, FERR in the last 72 hours. Lab Results   Component Value Date/Time    Folate 36.1 (H) 09/13/2022 04:45 AM        No results for input(s): PH, PCO2, PO2 in the last 72 hours. No results for input(s): CPK, CKNDX, TROIQ in the last 72 hours.     No lab exists for component: CPKMB  No results found for: CHOL, CHOLX, CHLST, CHOLV, HDL, HDLP, LDL, LDLC, DLDLP, TGLX, TRIGL, TRIGP, CHHD, CHHDX  No results found for: [facility]  Lab Results   Component Value Date/Time    Color YELLOW/STRAW 09/14/2022 02:51 PM    Appearance CLEAR 09/14/2022 02:51 PM    Specific gravity 1.013 09/14/2022 02:51 PM    pH (UA) 5.5 09/14/2022 02:51 PM    Protein Negative 09/14/2022 02:51 PM    Glucose Negative 09/14/2022 02:51 PM    Ketone Negative 09/14/2022 02:51 PM    Bilirubin Negative 09/14/2022 02:51 PM    Urobilinogen 0.2 09/14/2022 02:51 PM    Nitrites Negative 09/14/2022 02:51 PM    Leukocyte Esterase Negative 09/14/2022 02:51 PM    Epithelial cells FEW 09/14/2022 02:51 PM    Bacteria Negative 09/14/2022 02:51 PM    WBC 0-4 09/14/2022 02:51 PM    RBC 0-5 09/14/2022 02:51 PM         Medications Reviewed:     Current Facility-Administered Medications   Medication Dose Route Frequency    metoprolol tartrate (LOPRESSOR) tablet 12.5 mg  12.5 mg Oral Q12H    docusate sodium (COLACE) capsule 100 mg  100 mg Oral DAILY    0.9% sodium chloride infusion 250 mL  250 mL IntraVENous PRN    acetaminophen (TYLENOL) tablet 650 mg  650 mg Oral Q4H PRN    diphenhydrAMINE (BENADRYL) capsule 25 mg  25 mg Oral Q6H PRN    0.9% sodium chloride infusion 250 mL  250 mL IntraVENous PRN    ARIPiprazole (ABILIFY) tablet 30 mg  30 mg Oral DAILY    aspirin delayed-release tablet 81 mg  81 mg Oral DAILY    bumetanide (BUMEX) injection 1 mg  1 mg IntraVENous BID    ferrous sulfate tablet 325 mg  1 Tablet Oral ACB    sodium chloride (NS) flush 5-40 mL  5-40 mL IntraVENous Q8H    sodium chloride (NS) flush 5-40 mL  5-40 mL IntraVENous PRN acetaminophen (TYLENOL) tablet 650 mg  650 mg Oral Q6H PRN    Or    acetaminophen (TYLENOL) suppository 650 mg  650 mg Rectal Q6H PRN    polyethylene glycol (MIRALAX) packet 17 g  17 g Oral DAILY PRN    ondansetron (ZOFRAN ODT) tablet 4 mg  4 mg Oral Q8H PRN    Or    ondansetron (ZOFRAN) injection 4 mg  4 mg IntraVENous Q6H PRN    enoxaparin (LOVENOX) injection 30 mg  30 mg SubCUTAneous DAILY    albuterol-ipratropium (DUO-NEB) 2.5 MG-0.5 MG/3 ML  3 mL Nebulization Q2H PRN    metoprolol (LOPRESSOR) injection 5 mg  5 mg IntraVENous Q6H PRN    magnesium oxide (MAG-OX) tablet 400 mg  400 mg Oral QHS     ______________________________________________________________________  EXPECTED LENGTH OF STAY: 3d 19h  ACTUAL LENGTH OF STAY:          Han Fontaine MD

## 2022-09-18 NOTE — PROGRESS NOTES
HEMATOLOGY / ONCOLOGY    Winnie Simon 1945 Age: 68 y.o. CHIEF COMPLAINT:  leg swelling and pain    DISEASE FEATURES  See marrow report below    PREVIOUS TREATMENTS    CURRENT TREATMENTS  Scarlet Jones 7/2022 -    Disease Status:   Treatment Goal:     ACTIVE PROBLEMS  Myeloproliferative/myelodysplastic disease  --anemia, leukocytosis, erythrocytosis, marrow fibrosis  CHF  CKD       INTERIM HISTORY AND ASSESSMENT  Ms. Bartolo Iraheta was admitted with diagnosis of pneumonia. Her leg was painful and swollen, but doppler was neg for DVT and CT was neg for PE and pneumonia. Main finding was CHF; troponin and pro-BNP were elevated. CT showed CHF: cardiomegaly, interstitial edema, small bilateral pleural effusions. Stress test 8/15/22 neg for ischemia but with EF of 40%. She says she has another cardiac test (cath?) on 9/26/22. Dr. Octavio Cook thinks she may have high-output failure from her chronic anemia. At a hemoglobin of 7.0, I think she will benefit from transfusion of two units of PRBCs. Her iron profile looks like she could be iron deficient with iron of 5, TIBC of 179 and sat of 3%; her MCV is 82, previously 72; however her ferritin is >800 and she had adequate iron stores in the spring on her marrow exam. In addition, she is likely to need multiple transfusions in the future and develop iron overload. She has had 3 cycles of Vidaza with nearly identical blood counts to when she started. No benefit that I can see unless her marrow blasts are disappearing, but that has not made her feel better or improved her peripheral counts. I think she is behaving more like myeloproliferative disease than myelodysplasia.     Here is her marrow report from earlier this year:  Here is her marrow biopsy report from the spring of 2022:  Hypercellular marrow (40-50%) with marked erythroid hypoplasia, bilineage atypia, increased fibrosis (MF-2), and  increased blasts (7% by manual differential; See COMMENT)  - increased blasts (4.6% of events) and basophils (2.0% of events) by flow cytometry (See FLOW)  - no abnormalities detected by [facility] MDS/myeloproliferative panel (See FISH)  - abnormal female karyotype (See CYTO)  - multiple pathogenic variants detected by extended myeloid NGS panel (See MOLECULAR). Hgb is 7.8 today ( 9/17) stable. Would rec. Continue to transfuse to keep hgb> 7.5 if possible given the symptoms from her anemia seen at a lower level. 9/18/22:   No transfusion today  We will hold her chemo (due tomorrow) while inpatient  Dr. Sylvester will be rounding tomorrow. SUBJECTIVE:    feels stronger, no c/o pain, no sob today    Past Medical History:   Diagnosis Date    Anemia     Congestive heart failure (HCC)     Hypertension     Menopause     Ventral hernia without obstruction or gangrene 4/18/2022     Past Surgical History:   Procedure Laterality Date    HX OTHER SURGICAL  04/13/2022     bone marrow     Medications and Allergies have been reviewed and updated in the [facility] system. REVIEW OF SYSTEMS  Except as noted in the history and assessement above, all other systems are negative. EXAMINATION   Vital signs were reviewed abnormalities discussed above.    General: Frail  HEENT: conjunctiva clear; no jaundice  Lungs: clear  CV: RRR  Abd: soft and non-tender; no HSM; no masses  Skin: no significant rashes  Ext: Edema: none; Tenderness: none  Neuro/Psych:    Social History     Tobacco Use    Smoking status: Never    Smokeless tobacco: Never   Substance Use Topics    Alcohol use: Never      Family History   Problem Relation Age of Onset    Breast Cancer Sister         63's    Cancer Sister     Breast Problems Sister     Hypertension Mother         Cesario Dancer, MD

## 2022-09-19 LAB
BACTERIA SPEC CULT: NORMAL
HISTORY CHECKED?,CKHIST: NORMAL
SERVICE CMNT-IMP: NORMAL

## 2022-09-19 PROCEDURE — 74011250637 HC RX REV CODE- 250/637: Performed by: INTERNAL MEDICINE

## 2022-09-19 PROCEDURE — 36415 COLL VENOUS BLD VENIPUNCTURE: CPT

## 2022-09-19 PROCEDURE — 97535 SELF CARE MNGMENT TRAINING: CPT

## 2022-09-19 PROCEDURE — 99232 SBSQ HOSP IP/OBS MODERATE 35: CPT | Performed by: SPECIALIST

## 2022-09-19 PROCEDURE — 86923 COMPATIBILITY TEST ELECTRIC: CPT

## 2022-09-19 PROCEDURE — 86900 BLOOD TYPING SEROLOGIC ABO: CPT

## 2022-09-19 PROCEDURE — 74011250637 HC RX REV CODE- 250/637: Performed by: NURSE PRACTITIONER

## 2022-09-19 PROCEDURE — 97116 GAIT TRAINING THERAPY: CPT

## 2022-09-19 PROCEDURE — 74011250636 HC RX REV CODE- 250/636: Performed by: INTERNAL MEDICINE

## 2022-09-19 PROCEDURE — 65270000046 HC RM TELEMETRY

## 2022-09-19 PROCEDURE — 77010033678 HC OXYGEN DAILY

## 2022-09-19 PROCEDURE — 97530 THERAPEUTIC ACTIVITIES: CPT

## 2022-09-19 PROCEDURE — 74011000250 HC RX REV CODE- 250: Performed by: INTERNAL MEDICINE

## 2022-09-19 RX ORDER — ENOXAPARIN SODIUM 100 MG/ML
40 INJECTION SUBCUTANEOUS DAILY
Status: DISCONTINUED | OUTPATIENT
Start: 2022-09-20 | End: 2022-09-22 | Stop reason: HOSPADM

## 2022-09-19 RX ORDER — SODIUM CHLORIDE 9 MG/ML
250 INJECTION, SOLUTION INTRAVENOUS AS NEEDED
Status: DISCONTINUED | OUTPATIENT
Start: 2022-09-19 | End: 2022-09-22 | Stop reason: HOSPADM

## 2022-09-19 RX ORDER — CARVEDILOL 3.12 MG/1
3.12 TABLET ORAL 2 TIMES DAILY WITH MEALS
Status: DISCONTINUED | OUTPATIENT
Start: 2022-09-19 | End: 2022-09-22 | Stop reason: HOSPADM

## 2022-09-19 RX ADMIN — ENOXAPARIN SODIUM 30 MG: 100 INJECTION SUBCUTANEOUS at 12:02

## 2022-09-19 RX ADMIN — ARIPIPRAZOLE 30 MG: 30 TABLET ORAL at 12:01

## 2022-09-19 RX ADMIN — DOCUSATE SODIUM 100 MG: 100 CAPSULE, LIQUID FILLED ORAL at 22:41

## 2022-09-19 RX ADMIN — MAGNESIUM OXIDE 400 MG (241.3 MG MAGNESIUM) TABLET 400 MG: TABLET at 00:05

## 2022-09-19 RX ADMIN — SODIUM CHLORIDE, PRESERVATIVE FREE 10 ML: 5 INJECTION INTRAVENOUS at 17:39

## 2022-09-19 RX ADMIN — SODIUM CHLORIDE, PRESERVATIVE FREE 10 ML: 5 INJECTION INTRAVENOUS at 22:44

## 2022-09-19 RX ADMIN — SODIUM CHLORIDE, PRESERVATIVE FREE 10 ML: 5 INJECTION INTRAVENOUS at 06:00

## 2022-09-19 RX ADMIN — DOCUSATE SODIUM 100 MG: 100 CAPSULE, LIQUID FILLED ORAL at 00:06

## 2022-09-19 RX ADMIN — MAGNESIUM OXIDE 400 MG (241.3 MG MAGNESIUM) TABLET 400 MG: TABLET at 22:42

## 2022-09-19 RX ADMIN — ASPIRIN 81 MG: 81 TABLET, COATED ORAL at 12:01

## 2022-09-19 NOTE — PROGRESS NOTES
HEMATOLOGY / ONCOLOGY    Chris Hopper 1945 Age: 68 y.o. CHIEF COMPLAINT:  leg swelling and pain    DISEASE FEATURES  See marrow report below    PREVIOUS TREATMENTS    CURRENT TREATMENTS  Yaakov Khan 7/2022 -    Disease Status:   Treatment Goal:     ACTIVE PROBLEMS  Myeloproliferative/myelodysplastic disease  --anemia, leukocytosis, erythrocytosis, marrow fibrosis  CHF, mixed systolic and diastolic, acute on chronic  --high output features due to severe anemia  CKD       INTERIM HISTORY AND ASSESSMENT  Ms. Chetna Ferrari feels better overall. She is less dyspneic she says. She can walk to bedside commode she says. She thinks she has another cardiac procedure tomorrow and hopes to go home soon. WBC and plts are elevated and stable since the spring, before she started her chemotherapy. I don't think the chemo has done anything except maybe suppress her RBC production. Will hold it for now. She is in no shape for more. I think her severe anemia worsens her situation. She is still only 8.2 hgb. I'll give her another unit slowly overnight over 4 hours. She gets her Bumex at 8am.       SUBJECTIVE:    feels stronger, no c/o pain, no sob today    Past Medical History:   Diagnosis Date    Anemia     Congestive heart failure (HCC)     Hypertension     Menopause     Ventral hernia without obstruction or gangrene 4/18/2022     Past Surgical History:   Procedure Laterality Date    HX OTHER SURGICAL  04/13/2022     bone marrow     Medications and Allergies have been reviewed and updated in the [facility] system. REVIEW OF SYSTEMS  Except as noted in the history and assessement above, all other systems are negative. EXAMINATION   Vital signs were reviewed abnormalities discussed above.    General: Frail, edentulous  HEENT: conjunctiva clear; no jaundice  Lungs: clear  CV: RRR  Abd: soft and non-tender; no HSM; no masses  Skin: no significant rashes  Ext: Edema: none; Tenderness: none  Neuro/Psych:    Social History     Tobacco Use    Smoking status: Never    Smokeless tobacco: Never   Substance Use Topics    Alcohol use: Never      Family History   Problem Relation Age of Onset    Breast Cancer Sister         63's    Cancer Sister     Breast Problems Sister     Hypertension Mother         Juli Cho MD

## 2022-09-19 NOTE — PROGRESS NOTES
Transition of Care Plan  RUR 21%    Cardiac Cath tomorrow (920/22)  IV abx completed    Disposition  TBD  pending medical progress. Home vs SNF   Resides at [facility] [phone]  Patient will need to be able to ambulate as at baseline she is independent with ADL's  Snf-- [facility] has accepted if SNF is needed- no authorization required for admission to SNF. Contact  Sherrie Burgess [phone]  Therapy today-- SNF vs HH    Transportation TBD pending progress    Contacts  Guardian  Wojciech Quiroz  personal cell [phone] (call if need emergency consent or patient is having an emergency)  Office/  Waqar Churchill -[phone] --call this number of weekdays  After hours/weekend message line [phone] -leave voicemail and some one will call you back    Daughter  Olu Pope [phone]  She is allowed to have all medical information regarding patient     [facility] [phone]    CM continues to follow patient for transition of care plan-- either return to group home[facility] or discharge to SNF-- [facility] accepted--   Patient will have cardiac cath tomorrow and therapy will work with her again to determine best recommendation at discharge.     Plan will need to be reviewed with legal guardian prior to discharge

## 2022-09-19 NOTE — PROGRESS NOTES
[facility]  Cardiology Care Note                  []Initial visit     [x]Established visit     Patient Name: Paul Milner - :1945 - SAAD:[id number]  Primary Cardiologist: Onofre Veliz MD  Consulting Cardiologist: Onofre Veliz MD     Reason for initial visit:  SOB  Edema  HFpEF    HPI:       Very pleasant 51-year-old female with past medical history of acute acute on chronic diastolic heart failure heart failure preserved EF, stage C, mildly reduced left ventricular systolic function by echo EF 40 to 45% down from 60% by prior echo, myeloproliferative disorder on chemotherapy, severe leukocytosis, thrombocytopenia ptosis, hypertension, hyperlipidemia, pleural effusion status postthoracentesis admitted with right knee pain found to be in acute on chronic diastolic heart failure. Followed in the advanced heart failure clinic by Dr. Shaun Perez. From review of her notes the plans were for a right heart cath with Dr. Jatin Landers on , which would have been a week after her planned chemotherapy. However given the knee pain and significant lower extremity edema she presented to the hospital.  Here she has undergone IV diuresis with good effect. She still has quite refractory lower extremity edema. Dr. Hoa Dunaway is her oncologist hematologist and from what she tells me he may discontinue chemotherapy with her. She states they may do 1 more round, which will be delayed, thereafter it will be stopped. SUBJECTIVE:    She denies worsening SOB, orthopnea, PND or edema. Denies CP, palpitations       Assessment and Plan   Patient seen on the day of progress note and examined  and agree with Advance Practice Provider (MINDY, NP,PA)  assessment and plans. Better compensated today. She denies particular shortness of breath at rest.    Change metoprolol to carvedilol.     For right heart cath with  Anselmo.    -3280 cc thus far continue IV Bumex for now. She will eventually need afterload reduction agent as well. Acute on chronic diastolic heart failure-I/O not recorded for yesterday. Weight documented as 146lbs down from 149lbs on 9/17. Need strict I/O and daily weights in order to better determine effectiveness of diuresis. Would continue IV diuresis with Bumex 2 mg IV twice daily. She is tolerating addition of Metoprolol 12.5mg bid. Plans for right heart cath tomorrow. Will be able to ascertain her volume status at that time. She is otherwise on a limited outpatient regimen, ASA, metolazone as needed - seems limited by hypotension and SKYLA? MPD - anemia, thrombocytosis. CKD - monitor, creatinine 1.29. BMP not obtained as of yet. Anemia - MPD           ____________________________________________________________    Cardiac testing  08/03/22    ECHO ADULT COMPLETE 08/03/2022 8/3/2022    Interpretation Summary  Formatting of this result is different from the original.      Left Ventricle: Reduced left ventricular systolic function with a visually estimated EF of 40 - 45%. Left ventricle is mildly dilated. Normal wall thickness. Mild global hypokinesis present. Normal diastolic function. Mitral Valve: Mild regurgitation. Left Atrium: Left atrium is mildly dilated. Pericardium: Small (<1 cm) circumferential pericardial effusion present. No indication of cardiac tamponade. Signed by: Sarita Sarah MD on 8/3/2022 12:29 PM        08/15/22    NUCLEAR CARDIAC STRESS TEST 08/15/2022, 08/15/2022 8/15/2022    Interpretation Summary  Formatting of this result is different from the original.      ECG: Resting ECG demonstrates normal sinus rhythm. ECG: Stress ECG was negative for ischemia. Stress Test: A pharmacological stress test was performed using lexiscan. Blood pressure demonstrated a normal response and heart rate demonstrated a normal response to stress.  The patient's heart rate recovery was normal.    INDICATION: Chronic systolic heart failure. History of hypertension    COMPARISON:  None. CORRELATIVE IMAGING STUDIES:  None    TRACER: Tc 99m Sestamibi    TECHNIQUE:  Resting SPECT images of the heart were obtained following the uneventful intravenous administration of 10.6 mCi of Tc 99m Sestamibi. Gated stress SPECT images of the heart were obtained following Lexiscan protocol and the uneventful intravenous administration of 31.9 mCi of Tc 99m Sestamibi. FINDINGS:  The rest and stress perfusion images demonstrate prominent soft tissue attenuation without significant perfusion defect or evidence of myocardial reversibility. The gated images demonstrate global hypokinesia. Left ventricular ejection fraction is 40 %. Impression:  Prominent soft tissue attenuation. No evidence of myocardial ischemia or infarction. Left ventricular ejection fraction is 40 %. Global hypokinesia. Signed by: Chelsea Arizmendi MD on 8/15/2022 11:25 AM, Signed by: Darryl Ozuna MD on 8/15/2022 12:03 PM          Most recent HS troponins:  No results for input(s): TROPHS in the last 72 hours. No lab exists for component:  CKMB  ECG: normal EKG, normal sinus rhythm, unchanged from previous tracings    Review of Systems    [x]All other systems reviewed and all negative except as written in HPI    [] Patient unable to provide secondary to condition         Past Medical History:   Diagnosis Date    Anemia     Congestive heart failure (HCC)     Hypertension     Menopause     Ventral hernia without obstruction or gangrene 4/18/2022     Past Surgical History:   Procedure Laterality Date    HX OTHER SURGICAL  04/13/2022     bone marrow     Social Hx:  reports that she has never smoked. She has never used smokeless tobacco. She reports that she does not currently use drugs. She reports that she does not drink alcohol.   Family Hx: family history includes Breast Cancer in her sister; Breast Problems in her sister; Cancer in her sister; Hypertension in her mother. No Known Allergies       OBJECTIVE:  Wt Readings from Last 3 Encounters:   09/19/22 146 lb 2.6 oz (66.3 kg)   09/08/22 137 lb 12.8 oz (62.5 kg)   08/15/22 138 lb (62.6 kg)     No intake or output data in the 24 hours ending 09/19/22 1100      Physical Exam    Vitals:   Vitals:    09/19/22 0558 09/19/22 0600 09/19/22 0700 09/19/22 1000   BP:  (!) 106/54 (!) 108/57    Pulse: 85 87 86 (!) 107   Resp:  18 17    Temp:       SpO2:       Weight:       Height:         Telemetry: normal sinus rhythm    Heart: regular rate and rhythm, S1, S2 normal, no murmur, click, rub or gallop  Lungs: clear to auscultation bilaterally, O2 via NC at 2L  Abdomen: soft, non-tender. Bowel sounds normal. No masses,  no organomegaly  Extremities: extremities normal, atraumatic, no cyanosis or edema, edema 1+ chronic venous stasis changes        Data Review:     Radiology:   XR Results (most recent):  Results from [place] encounter on 09/12/22    XR CHEST PORT    Narrative  EXAM: XR CHEST PORT    DATE: 9/12/2022 4:02 PM    INDICATION: sob    COMPARISON: Chest radiograph September 8, 2022    FINDINGS: AP portable chest radiograph. Heart is moderately enlarged but  stable. There are bilateral patchy airspace infiltrates which are increased from  the previous radiograph. Increasing consolidation is seen at the LEFT base. Trace effusions are suspected. There is no pneumothorax. Impression  Pattern of moderate pulmonary interstitial edema, slightly worse compared to the  prior radiograph. Superimposed infiltrate cannot be excluded. CT Results (most recent):  Results from Hospital Encounter encounter on 09/12/22    CT CHEST WO CONT    Narrative  CT CHEST WITHOUT CONTRAST. 9/13/2022 4:07 AM    INDICATION: Respiratory failure, pneumonia. COMPARISON: 1/28/2022. TECHNIQUE: CT of the chest was performed without contrast. Coronal and sagittal  reconstructions were performed. CT dose reduction was achieved through use of a  standardized protocol tailored for this examination and automatic exposure  control for dose modulation. FINDINGS:  The heart is enlarged. Interlobular septal thickening is consistent with  interstitial edema. Passive atelectasis is associated with small bilateral  pleural effusions. There is a trace pericardial effusion. Anasarca is mild. The central airways are patent. No pneumothorax. Aberrant right subclavian  artery is a normal variant. No thoracic lymphadenopathy. The spleen is probably  enlarged. The visualized unenhanced, upper abdomen is otherwise grossly normal.    Bony architecture is abnormal. The cortices are diffusely thinned. The  trabeculae are effaced by smudgy marrow replacement. There are numerous small  lucent lesions in addition to the hyperdense, smudgy, marrow replacement. Differential includes diffuse osseous metastases, multiple myeloma, leukemia,  and heterogeneous osteopenia. Impression  1. CHF: cardiomegaly, interstitial edema, small bilateral pleural effusions. 2. Diffusely abnormal bones. Differential includes multiple myeloma,  leukemia/lymphoma (given splenomegaly), less likely heterogeneous osteopenia or  diffuse metastases. 3. Splenomegaly. MRI Results (most recent):  No results found for this or any previous visit. No results for input(s): CPK, TROIQ in the last 72 hours. No lab exists for component: CKQMB, CPKMB, BMPP  Recent Labs     09/18/22  0630 09/17/22  0625    136   K 4.7 5.2*    106   CO2 26 23   BUN 33* 34*   CREA 1.29* 1.40*   GLU 92 90   CA 8.9 8.8     Recent Labs     09/18/22  0630 09/17/22  0625   WBC 74.5* 68.0*   HGB 8.2* 7.8*   HCT 27.5* 26.2*   * 803*     No results for input(s): PTP, INR, AP, INREXT, INREXT in the last 72 hours. No lab exists for component: PTTP, GPT, SGOT  No results for input(s): CHOL, LDLC in the last 72 hours.     No lab exists for component: TGL, HDLC,  HBA1C  No results for input(s): CRP, TSH, TSHEXT, TSHEXT in the last 72 hours.     No lab exists for component: ESR        Current meds:    Current Facility-Administered Medications:     carvediloL (COREG) tablet 3.125 mg, 3.125 mg, Oral, BID WITH MEALS, Harpal Cano MD    bumetanide (BUMEX) injection 2 mg, 2 mg, IntraVENous, BID, Grace Mena MD    docusate sodium (COLACE) capsule 100 mg, 100 mg, Oral, DAILY, Nancy Sanches NP, 100 mg at 09/19/22 0006    0.9% sodium chloride infusion 250 mL, 250 mL, IntraVENous, PRN, Isaiah Jones MD    acetaminophen (TYLENOL) tablet 650 mg, 650 mg, Oral, Q4H PRN, Isaiah Jones MD    diphenhydrAMINE (BENADRYL) capsule 25 mg, 25 mg, Oral, Q6H PRN, Isaiah Jones MD    0.9% sodium chloride infusion 250 mL, 250 mL, IntraVENous, PRN, Eunice Trevino MD    ARIPiprazole (ABILIFY) tablet 30 mg, 30 mg, Oral, DAILY, Sruthi King MD, 30 mg at 09/18/22 0946    aspirin delayed-release tablet 81 mg, 81 mg, Oral, DAILY, Sruthi King MD, 81 mg at 09/18/22 0946    ferrous sulfate tablet 325 mg, 1 Tablet, Oral, ACB, Sruthi King MD, 325 mg at 09/18/22 0635    sodium chloride (NS) flush 5-40 mL, 5-40 mL, IntraVENous, Q8H, Sruthi King MD, 10 mL at 09/19/22 0600    sodium chloride (NS) flush 5-40 mL, 5-40 mL, IntraVENous, PRN, Sruthi King MD, 10 mL at 09/14/22 0600    acetaminophen (TYLENOL) tablet 650 mg, 650 mg, Oral, Q6H PRN, 650 mg at 09/13/22 0920 **OR** acetaminophen (TYLENOL) suppository 650 mg, 650 mg, Rectal, Q6H PRN, Sruthi King MD    polyethylene glycol (MIRALAX) packet 17 g, 17 g, Oral, DAILY PRN, Sruthi King MD    ondansetron (ZOFRAN ODT) tablet 4 mg, 4 mg, Oral, Q8H PRN **OR** ondansetron (ZOFRAN) injection 4 mg, 4 mg, IntraVENous, Q6H PRN, Sruthi King MD    enoxaparin (LOVENOX) injection 30 mg, 30 mg, SubCUTAneous, DAILY, Srtuhi King MD, 30 mg at 09/18/22 0946    albuterol-ipratropium (DUO-NEB) 2.5 MG-0.5 MG/3 ML, 3 mL, Nebulization, Q2H PRN, Sruthi King MD    metoprolol (LOPRESSOR) injection 5 mg, 5 mg, IntraVENous, Q6H PRN, Sruthi King MD    magnesium oxide (MAG-OX) tablet 400 mg, 400 mg, Oral, QHS, Dragan Galarza NP, 400 mg at 09/19/22 0005    Jarred Burt MD  [facility] [address] [address]  [place], [facility] [address] [facility]  [phone]      CC:Yvette Steiner NP

## 2022-09-19 NOTE — PROGRESS NOTES
[facility] Adult  Hospitalist Group                                                                                          Hospitalist Progress Note  Valeria Blair MD  Answering service: [phone] OR 36 from in house phone        Date of Service:  2022  NAME:  Lady Guillen  :  1945  MRN:  [id number]      Admission Summary:   Danish Esparza is a 66yo F with a PMH of CHF, HTN, CKD, Myeloproliferative Disorder who presented at the ED with right knee pain x5 days, denies injury: pain is constant/ sharp pain, 5/10 in severity, worse with ambulation, slight relief at rest. Upon arrival to the ED, the patient was found to be in significant respiratory distress, BNP level was elevated, CXR showed evidence of vascular congestion and possible pneumonia. The patient was subsequently referred to the hospitalist service for admission. The patient required being placed on supplemental oxygen in the ED because of the shortness of breath. The patient was last admitted to this hospital from 2022 to 2022 for evaluation of shortness of breath, subsequently attributed to pleural effusion for which the patient underwent thoracentesis and about 1900 mL of fluid was drained from the chest. The patient denied associated fever, rigors, and chills. The patient stated that she has been taking her medication as prescribed including diuretic. Interval history / Subjective:    Follow up Acute on chronic CHF   No c/o active CP or shortness of breath, she has periodic cough   Assessment & Plan:     Acute-on-Chronic HFrEF: NYHA III--improving  -EWL:30922  -ECHO 8/3/22: reduced left ventricular systolic function with EF 40-45%, left ventricle mildly dilated, normal wall thickness.  -continue IV diuresis, will stop IV lasix as the patient is already on Bumex  -not on BB sec to low BP, not on ACEi/ARB/ARNi sec to SKYLA  -serial cardiac markers: trop peaked 154  -[address] [facility], Continue IV diuresis, RHC early next week  --09/19 resumed on IV Bumex, plan for right heart cardiac cath 09/20      Acute Respiratory Failure with Hypoxia: --improving  multifactorial including CHF and suspected bacterial PNA. -serial cardiac markers  -D-dimer: 4.02  -CXR 9/12: pattern of moderate pulmonary interstitial edema, slightly worse compared to prior study, superimposed infiltrate cannot be excluded. -CT scan chest w/o 9/13: small bilateral pleural effusions, diffusely abnormal bones (differential includes MM, Leukemia/Lymphoma- given splenomegaly) less likely heterogeneous osteopenia or diffuse metastases, Splenomegaly.   -completed Rocephin (9/13--9/17) and Doxycycline (9/13--9/17)  -supplemental oxygen, wean as tolerated     Myeloproliferative Disorder  Anemia sec to above  Thrombocytosis  -CT scan chest w/o 9/13: small bilateral pleural effusions, diffusely abnormal bones (differential includes MM, Leukemia/Lymphoma- given splenomegaly) less likely heterogeneous osteopenia or diffuse metastases, Splenomegaly. -Appreciate hematology, transfuse to keep hb>7.5  -chronic leukocytosis    CKD 3B:   -monitor renal function  -BUN 32 Creat 1.36, improved     HTN: BP stable. Tachycardia: stable/improved low 100's-90's, continue to monitor. Febrile: cultures unremarkable,      Right Knee Effusion:  denies trauma. -X-ray Right knee 9/12: moderate effusion. -X-ray Right tib/fib 9/12: no acute abnormality.    -Duplex RLE 9/12: negative for DVT.   -Orthopedic signed off    Regular diet    PT/OT SNF vs home health        DVTppx: Lovenox  Code Status: Full Code  PTA: group home, [address]: Continue IV diuresis, blood transfusion if needed, RHC early next week, still waiting for echo   Diet: Cardiac  Activity: OOB to chair TID and PRN as tolerated  Discharge: in 1-2 days  Ambulates: independently     Hospital Problems  Date Reviewed: 9/13/2022            Codes Class Noted POA    * (Principal) Acute on chronic HFrEF (heart failure with reduced ejection fraction) ([facility] [address] ICD-10-CM: I50.23  ICD-9-CM: 428.23  9/12/2022 Yes       Review of Systems:   A comprehensive review of systems was negative except for that written in the HPI. Vital Signs:    Last 24hrs VS reviewed since prior progress note. Most recent are:  Visit Vitals  BP (!) 101/52 (BP 1 Location: Left upper arm, BP Patient Position: Sitting)   Pulse 88   Temp 98.3 °F (36.8 °C)   Resp 21   Ht 5' 5\" (1.651 m)   Wt 66.3 kg (146 lb 2.6 oz)   SpO2 97%   Breastfeeding No   BMI 24.32 kg/m²       No intake or output data in the 24 hours ending 09/19/22 1502       Physical Examination:     I had a face to face encounter with this patient and independently examined them on 9/19/2022 as outlined below:          Constitutional:  No acute distress, cooperative, pleasant. ENT:  Oral mucosa moist.    Resp:  CTA bilaterally. No wheezing/rhonchi/rales. No accessory muscle use. CV:  Regular rhythm, normal rate, S1,S2.    GI/:  Soft, non distended, non tender, no guarding, BS present. Voids Freely. Musculoskeletal:  No edema, warm. Right knee swelling. Neurologic:  Moves all extremities. AAOx3. Skin:  w/d, chronic hyperpigmentation/dark  to  BLE. Psych:  Good insight, Not anxious nor agitated. Data Review:    Review and/or order of clinical lab test      Labs:     Recent Labs     09/18/22  0630 09/17/22  0625   WBC 74.5* 68.0*   HGB 8.2* 7.8*   HCT 27.5* 26.2*   * 803*       Recent Labs     09/18/22  0630 09/17/22  0625    136   K 4.7 5.2*    106   CO2 26 23   BUN 33* 34*   CREA 1.29* 1.40*   GLU 92 90   CA 8.9 8.8       No results for input(s): ALT, AP, TBIL, TBILI, TP, ALB, GLOB, GGT, AML, LPSE in the last 72 hours. No lab exists for component: SGOT, GPT, AMYP, HLPSE    No results for input(s): INR, PTP, APTT, INREXT, INREXT in the last 72 hours. No results for input(s): FE, TIBC, PSAT, FERR in the last 72 hours.      Lab Results   Component Value Date/Time    Folate 36.1 (H) 09/13/2022 04:45 AM        No results for input(s): PH, PCO2, PO2 in the last 72 hours. No results for input(s): CPK, CKNDX, TROIQ in the last 72 hours.     No lab exists for component: CPKMB  No results found for: CHOL, CHOLX, CHLST, CHOLV, HDL, HDLP, LDL, LDLC, DLDLP, TGLX, TRIGL, TRIGP, CHHD, CHHDX  No results found for: [facility]  Lab Results   Component Value Date/Time    Color YELLOW/STRAW 09/14/2022 02:51 PM    Appearance CLEAR 09/14/2022 02:51 PM    Specific gravity 1.013 09/14/2022 02:51 PM    pH (UA) 5.5 09/14/2022 02:51 PM    Protein Negative 09/14/2022 02:51 PM    Glucose Negative 09/14/2022 02:51 PM    Ketone Negative 09/14/2022 02:51 PM    Bilirubin Negative 09/14/2022 02:51 PM    Urobilinogen 0.2 09/14/2022 02:51 PM    Nitrites Negative 09/14/2022 02:51 PM    Leukocyte Esterase Negative 09/14/2022 02:51 PM    Epithelial cells FEW 09/14/2022 02:51 PM    Bacteria Negative 09/14/2022 02:51 PM    WBC 0-4 09/14/2022 02:51 PM    RBC 0-5 09/14/2022 02:51 PM         Medications Reviewed:     Current Facility-Administered Medications   Medication Dose Route Frequency    carvediloL (COREG) tablet 3.125 mg  3.125 mg Oral BID WITH MEALS    [START ON 9/20/2022] enoxaparin (LOVENOX) injection 40 mg  40 mg SubCUTAneous DAILY    bumetanide (BUMEX) injection 2 mg  2 mg IntraVENous BID    docusate sodium (COLACE) capsule 100 mg  100 mg Oral DAILY    0.9% sodium chloride infusion 250 mL  250 mL IntraVENous PRN    acetaminophen (TYLENOL) tablet 650 mg  650 mg Oral Q4H PRN    diphenhydrAMINE (BENADRYL) capsule 25 mg  25 mg Oral Q6H PRN    0.9% sodium chloride infusion 250 mL  250 mL IntraVENous PRN    ARIPiprazole (ABILIFY) tablet 30 mg  30 mg Oral DAILY    aspirin delayed-release tablet 81 mg  81 mg Oral DAILY    ferrous sulfate tablet 325 mg  1 Tablet Oral ACB    sodium chloride (NS) flush 5-40 mL  5-40 mL IntraVENous Q8H    sodium chloride (NS) flush 5-40 mL  5-40 mL IntraVENous PRN    acetaminophen (TYLENOL) tablet 650 mg  650 mg Oral Q6H PRN    Or    acetaminophen (TYLENOL) suppository 650 mg  650 mg Rectal Q6H PRN    polyethylene glycol (MIRALAX) packet 17 g  17 g Oral DAILY PRN    ondansetron (ZOFRAN ODT) tablet 4 mg  4 mg Oral Q8H PRN    Or    ondansetron (ZOFRAN) injection 4 mg  4 mg IntraVENous Q6H PRN    albuterol-ipratropium (DUO-NEB) 2.5 MG-0.5 MG/3 ML  3 mL Nebulization Q2H PRN    metoprolol (LOPRESSOR) injection 5 mg  5 mg IntraVENous Q6H PRN    magnesium oxide (MAG-OX) tablet 400 mg  400 mg Oral QHS     ______________________________________________________________________  EXPECTED LENGTH OF STAY: 3d 19h  ACTUAL LENGTH OF STAY:          7                 Rishi Wang MD

## 2022-09-19 NOTE — PROGRESS NOTES
Lovenox Monitoring  Indication: DVT Prophylaxis  Recent Labs     09/18/22  0630 09/17/22  0625   HGB 8.2* 7.8*   * 803*   CREA 1.29* 1.40*     Current Weight: 66.3 kg  Est. CrCl = 32.9 ml/min  Current Dose: 30 mg subcutaneously every 24 hours. Plan: Change to 40 mg subcutaneously every 24 hours per [facility] P&T Committee Protocol with respect to renal function. Pharmacy will continue to monitor patient daily and will make dosage adjustments based upon changing renal function.

## 2022-09-19 NOTE — PROGRESS NOTES
Problem: Mobility Impaired (Adult and Pediatric)  Goal: *Acute Goals and Plan of Care (Insert Text)  Description: FUNCTIONAL STATUS PRIOR TO ADMISSION: Patient was independent and active without use of DME. When asked she reported no falls in the last 12 months and baseline is room air. She endorsed HHPT at time of admission. HOME SUPPORT PRIOR TO ADMISSION: The patient lived in an assisted living facility and reported no need for assist with mobility. .    Physical Therapy Goals  Initiated 9/16/2022  1. Patient will move from supine to sit and sit to supine , scoot up and down, and roll side to side in bed with independence within 7 day(s). 2.  Patient will transfer from bed to chair and chair to bed with independence using the least restrictive device within 7 day(s). 3.  Patient will perform sit to stand with independence within 7 day(s). 4.  Patient will ambulate with independence for 300 feet with the least restrictive device within 7 day(s). Outcome: Progressing Towards Goal   PHYSICAL THERAPY TREATMENT  Patient: Mya Rosenthal (49 y.o. female)  Date: 9/19/2022  Diagnosis: Acute on chronic HFrEF (heart failure with reduced ejection fraction) ([facility]) [I50.23] Acute on chronic HFrEF (heart failure with reduced ejection fraction) ([facility])  Procedure(s) (LRB):  RIGHT HEART CATH (N/A)    Precautions: Fall  Chart, physical therapy assessment, plan of care and goals were reviewed. ASSESSMENT  Patient continues with skilled PT services and is progressing towards goals. Pt remains limited by impaired standing balance and need for assistance for management of the nasal cannula line. Sp02 while amb on the 2 liters was stable in the mid 90s. She was able to able 20 feet X 2 today without any UE support and was slightly unsteady but without any overt loss of balance. She is not at her reported baseline of fully independent and not using any supplemental 02.  Disposition depending on progress, at this point still recommend short term rehab. .     Current Level of Function Impacting Discharge (mobility/balance): provided contact guard with occasional min assist (as well as assist for management of nasal cannula    Other factors to consider for discharge: in the process of additional cardiac procedures/work up, chemo pt, and resides alone (roommate) in assisted living         PLAN :  Patient continues to benefit from skilled intervention to address the above impairments. Continue treatment per established plan of care. to address goals. Recommendation for discharge: (in order for the patient to meet his/her long term goals)  Therapy up to 5 days/week in SNF setting as she presents today vs HHPT pending additional progress    This discharge recommendation:  A follow-up discussion with the attending provider and/or case management is planned    IF patient discharges home will need the following DME: to be determined (TBD)       SUBJECTIVE:   Patient stated staying in that bed will make you weak.     OBJECTIVE DATA SUMMARY:   Chart checked, pt cleared by nursing  Critical Behavior:  Neurologic State: Alert  Orientation Level: Oriented X4  Cognition: Appropriate decision making, Follows commands  Safety/Judgement: Good awareness of safety precautions, Insight into deficits  Functional Mobility Training:  Bed Mobility:                    Transfers:  Sit to Stand: Contact guard assistance  Stand to Sit: Contact guard assistance                             Balance:  Sitting: Intact; Without support  Standing: Impaired  Standing - Static: Good  Standing - Dynamic : Fair  Ambulation/Gait Training:  Distance (ft): 40 Feet (ft) (20 feet X 2)  Assistive Device: Gait belt  Ambulation - Level of Assistance: Contact guard assistance (occasional min balance assisst, assist for nasal cannula line managment)        Gait Abnormalities: Decreased step clearance        Base of Support: Widened     Speed/Marielena: Slow;Pace decreased (<100 feet/min)  Step Length: Left shortened;Right shortened                    Stairs: Therapeutic Exercises:     Pain Rating:  None rated/mentioned    Activity Tolerance:   Good and SpO2 stable on 2 liters of 02, see assessment    After treatment patient left in no apparent distress:   Sitting in chair and Call bell within reach    COMMUNICATION/COLLABORATION:   The patients plan of care was discussed with: Occupational therapist and Registered nurse.      Patsi Helling   Time Calculation: 25 mins

## 2022-09-19 NOTE — PROGRESS NOTES
Problem: Self Care Deficits Care Plan (Adult)  Goal: *Acute Goals and Plan of Care (Insert Text)  Description: FUNCTIONAL STATUS PRIOR TO ADMISSION: Patient was independent and active without use of DME. Pt lives in assisted living facility with roommate, receiving PT prior to admission. Pt denied falls, not on home O2. Pt independent with ADLs/IADLs prior to admission, but had staff available if needed. HOME SUPPORT: The patient lived alone with [facility] staff to provide assistance. Occupational Therapy Goals  Initiated 9/16/2022  1. Patient will perform grooming in standing with independence within 7 day(s). 2.  Patient will perform lower body dressing with independence within 7 day(s). 3.  Patient will perform bathing in standing with independence within 7 day(s). 4.  Patient will perform toilet transfers with independence within 7 day(s). 5.  Patient will perform all aspects of toileting with independence within 7 day(s). 6.  Patient will participate in upper extremity therapeutic exercise/activities with independence for >8 minutes within 7 day(s). 7.  Patient will utilize energy conservation techniques during functional activities without cueing within 7 day(s). Outcome: Progressing Towards Goal     OCCUPATIONAL THERAPY TREATMENT  Patient: Sailaja Goldstein (89 y.o. female)  Date: 9/19/2022  Diagnosis: Acute on chronic HFrEF (heart failure with reduced ejection fraction) ([facility]) [I50.23] Acute on chronic HFrEF (heart failure with reduced ejection fraction) ([facility])  Procedure(s) (LRB):  RIGHT HEART CATH (N/A)    Precautions: Fall  Chart, occupational therapy assessment, plan of care, and goals were reviewed. ASSESSMENT  Patient continues with skilled OT services and is progressing towards goals, however, remains limited by impaired functional mobility, generalized weakness and low activity tolerance.  Pt cleared for therapy by nursing and received supine in bed with PCT in room about to transfer to BSC. Pt completed bed mobility to sit EOB with SBA and transferred from Bed to [facility] with CGA and RW. Completed toileting and transfer to chair for bathing (Mod A for LB bathing), gown change, and grooming tasks. At end of session pt upright in chair with all needs met. Currently recommend SNF at discharge as pt is functioning below baseline. Current Level of Function Impacting Discharge (ADLs): Mod A for LB ADLs, Set up -Min A for UB ADLs, CGA-Supervision for mobility     Other factors to consider for discharge: Independent at baseline         PLAN :  Patient continues to benefit from skilled intervention to address the above impairments. Continue treatment per established plan of care to address goals. Recommend with staff: OOB to chair with Ax1 and to [facility], Pt participation in self care     Recommend next OT session: functional reach/LB dressing     Recommendation for discharge: (in order for the patient to meet his/her long term goals)  Therapy up to 5 days/week in SNF setting    This discharge recommendation:  Has been made in collaboration with the attending provider and/or case management    IF patient discharges home will need the following DME: portable oxygen       SUBJECTIVE:   Patient stated If I weren't hooked up to so many things I'd be walking around.     OBJECTIVE DATA SUMMARY:   Cognitive/Behavioral Status:  Neurologic State: Alert  Orientation Level: Oriented X4  Cognition: Appropriate decision making; Follows commands  Perception: Appears intact  Perseveration: No perseveration noted  Safety/Judgement: Good awareness of safety precautions; Insight into deficits    Functional Mobility and Transfers for ADLs:  Bed Mobility:   SBA     Transfers:  Sit to Stand: Contact guard assistance          Balance:  Sitting: Intact; Without support  Standing: Impaired  Standing - Static: Good  Standing - Dynamic : Fair    ADL Intervention:       Grooming  Position Performed: Seated in chair  Washing Face: Set-up  Brushing Teeth: Set-up    Upper Body Bathing  Bathing Assistance: Set-up  Position Performed: Seated in chair    Type of Bath: Chlorhexidine (CHG); Full    Lower Body Bathing  Bathing Assistance: Moderate assistance  Position Performed: Seated in chair    [address] [place] [address] Gown: Stand-by assistance    Lower Body Dressing Assistance  Protective [place]: Moderate assistance         Cognitive Retraining  Safety/Judgement: Good awareness of safety precautions; Insight into deficits    Pain:  None reported     Activity Tolerance:   Fair    After treatment patient left in no apparent distress:   Sitting in chair and Call bell within reach    COMMUNICATION/COLLABORATION:   The patients plan of care was discussed with: Physical therapist and Registered nurse.      Billy Pace OT  Time Calculation: 34 mins

## 2022-09-20 LAB
ANION GAP SERPL CALC-SCNC: 6 MMOL/L (ref 5–15)
BASOPHILS # BLD: 0 K/UL (ref 0–0.1)
BASOPHILS NFR BLD: 0 % (ref 0–1)
BLASTS NFR BLD MANUAL: 9 %
BUN SERPL-MCNC: 34 MG/DL (ref 6–20)
BUN/CREAT SERPL: 25 (ref 12–20)
CALCIUM SERPL-MCNC: 8.9 MG/DL (ref 8.5–10.1)
CHLORIDE SERPL-SCNC: 106 MMOL/L (ref 97–108)
CO2 SERPL-SCNC: 26 MMOL/L (ref 21–32)
CREAT SERPL-MCNC: 1.34 MG/DL (ref 0.55–1.02)
DIFFERENTIAL METHOD BLD: ABNORMAL
EOSINOPHIL # BLD: 3.3 K/UL (ref 0–0.4)
EOSINOPHIL NFR BLD: 4 % (ref 0–7)
ERYTHROCYTE [DISTWIDTH] IN BLOOD BY AUTOMATED COUNT: 18.5 % (ref 11.5–14.5)
GLUCOSE SERPL-MCNC: 92 MG/DL (ref 65–100)
HCT VFR BLD AUTO: 26 % (ref 35–47)
HGB BLD-MCNC: 7.5 G/DL (ref 11.5–16)
HISTORY CHECKED?,CKHIST: NORMAL
IMM GRANULOCYTES # BLD AUTO: 0 K/UL
IMM GRANULOCYTES NFR BLD AUTO: 0 %
LYMPHOCYTES # BLD: 11.5 K/UL (ref 0.8–3.5)
LYMPHOCYTES NFR BLD: 14 % (ref 12–49)
MAGNESIUM SERPL-MCNC: 2.5 MG/DL (ref 1.6–2.4)
MCH RBC QN AUTO: 24.3 PG (ref 26–34)
MCHC RBC AUTO-ENTMCNC: 28.8 G/DL (ref 30–36.5)
MCV RBC AUTO: 84.1 FL (ref 80–99)
METAMYELOCYTES NFR BLD MANUAL: 6 %
MONOCYTES # BLD: 2.5 K/UL (ref 0–1)
MONOCYTES NFR BLD: 3 % (ref 5–13)
MYELOCYTES NFR BLD MANUAL: 16 %
NEUTS BAND NFR BLD MANUAL: 4 % (ref 0–6)
NEUTS SEG # BLD: 39.4 K/UL (ref 1.8–8)
NEUTS SEG NFR BLD: 44 % (ref 32–75)
NRBC # BLD: 1.06 K/UL (ref 0–0.01)
NRBC BLD-RTO: 1.3 PER 100 WBC
PLATELET # BLD AUTO: 791 K/UL (ref 150–400)
PMV BLD AUTO: 12 FL (ref 8.9–12.9)
POTASSIUM SERPL-SCNC: 5.1 MMOL/L (ref 3.5–5.1)
RBC # BLD AUTO: 3.09 M/UL (ref 3.8–5.2)
RBC MORPH BLD: ABNORMAL
SODIUM SERPL-SCNC: 138 MMOL/L (ref 136–145)
WBC # BLD AUTO: 82 K/UL (ref 3.6–11)

## 2022-09-20 PROCEDURE — 77030013797 HC KT TRNSDUC PRSSR EDWD -A: Performed by: INTERNAL MEDICINE

## 2022-09-20 PROCEDURE — 77030013406 HC CATH CTRL EDWD -B: Performed by: INTERNAL MEDICINE

## 2022-09-20 PROCEDURE — 76937 US GUIDE VASCULAR ACCESS: CPT | Performed by: INTERNAL MEDICINE

## 2022-09-20 PROCEDURE — 97530 THERAPEUTIC ACTIVITIES: CPT

## 2022-09-20 PROCEDURE — 74011000250 HC RX REV CODE- 250: Performed by: INTERNAL MEDICINE

## 2022-09-20 PROCEDURE — 36430 TRANSFUSION BLD/BLD COMPNT: CPT

## 2022-09-20 PROCEDURE — 99152 MOD SED SAME PHYS/QHP 5/>YRS: CPT | Performed by: INTERNAL MEDICINE

## 2022-09-20 PROCEDURE — 93451 RIGHT HEART CATH: CPT | Performed by: INTERNAL MEDICINE

## 2022-09-20 PROCEDURE — 74011250637 HC RX REV CODE- 250/637: Performed by: NURSE PRACTITIONER

## 2022-09-20 PROCEDURE — 77030013744: Performed by: INTERNAL MEDICINE

## 2022-09-20 PROCEDURE — 83735 ASSAY OF MAGNESIUM: CPT

## 2022-09-20 PROCEDURE — P9016 RBC LEUKOCYTES REDUCED: HCPCS

## 2022-09-20 PROCEDURE — 36415 COLL VENOUS BLD VENIPUNCTURE: CPT

## 2022-09-20 PROCEDURE — 65270000046 HC RM TELEMETRY

## 2022-09-20 PROCEDURE — 74011250637 HC RX REV CODE- 250/637: Performed by: SPECIALIST

## 2022-09-20 PROCEDURE — 4A023N6 MEASUREMENT OF CARDIAC SAMPLING AND PRESSURE, RIGHT HEART, PERCUTANEOUS APPROACH: ICD-10-PCS | Performed by: INTERNAL MEDICINE

## 2022-09-20 PROCEDURE — 77030027138 HC INCENT SPIROMETER -A

## 2022-09-20 PROCEDURE — 2709999900 HC NON-CHARGEABLE SUPPLY: Performed by: INTERNAL MEDICINE

## 2022-09-20 PROCEDURE — 99232 SBSQ HOSP IP/OBS MODERATE 35: CPT | Performed by: SPECIALIST

## 2022-09-20 PROCEDURE — 97116 GAIT TRAINING THERAPY: CPT

## 2022-09-20 PROCEDURE — 74011250636 HC RX REV CODE- 250/636: Performed by: INTERNAL MEDICINE

## 2022-09-20 PROCEDURE — P9040 RBC LEUKOREDUCED IRRADIATED: HCPCS

## 2022-09-20 PROCEDURE — C1894 INTRO/SHEATH, NON-LASER: HCPCS | Performed by: INTERNAL MEDICINE

## 2022-09-20 PROCEDURE — 80048 BASIC METABOLIC PNL TOTAL CA: CPT

## 2022-09-20 PROCEDURE — 74011250637 HC RX REV CODE- 250/637: Performed by: INTERNAL MEDICINE

## 2022-09-20 PROCEDURE — 99153 MOD SED SAME PHYS/QHP EA: CPT | Performed by: INTERNAL MEDICINE

## 2022-09-20 PROCEDURE — 85025 COMPLETE CBC W/AUTO DIFF WBC: CPT

## 2022-09-20 RX ORDER — MIDAZOLAM HYDROCHLORIDE 1 MG/ML
INJECTION, SOLUTION INTRAMUSCULAR; INTRAVENOUS AS NEEDED
Status: DISCONTINUED | OUTPATIENT
Start: 2022-09-20 | End: 2022-09-20 | Stop reason: HOSPADM

## 2022-09-20 RX ORDER — FENTANYL CITRATE 50 UG/ML
INJECTION, SOLUTION INTRAMUSCULAR; INTRAVENOUS AS NEEDED
Status: DISCONTINUED | OUTPATIENT
Start: 2022-09-20 | End: 2022-09-20 | Stop reason: HOSPADM

## 2022-09-20 RX ORDER — LIDOCAINE HYDROCHLORIDE 10 MG/ML
INJECTION INFILTRATION; PERINEURAL AS NEEDED
Status: DISCONTINUED | OUTPATIENT
Start: 2022-09-20 | End: 2022-09-20 | Stop reason: HOSPADM

## 2022-09-20 RX ORDER — SODIUM CHLORIDE 9 MG/ML
250 INJECTION, SOLUTION INTRAVENOUS AS NEEDED
Status: DISCONTINUED | OUTPATIENT
Start: 2022-09-20 | End: 2022-09-22 | Stop reason: HOSPADM

## 2022-09-20 RX ADMIN — SODIUM CHLORIDE, PRESERVATIVE FREE 10 ML: 5 INJECTION INTRAVENOUS at 07:00

## 2022-09-20 RX ADMIN — FERROUS SULFATE TAB 325 MG (65 MG ELEMENTAL FE) 325 MG: 325 (65 FE) TAB at 06:55

## 2022-09-20 RX ADMIN — MAGNESIUM OXIDE 400 MG (241.3 MG MAGNESIUM) TABLET 400 MG: TABLET at 21:46

## 2022-09-20 RX ADMIN — ARIPIPRAZOLE 30 MG: 30 TABLET ORAL at 08:29

## 2022-09-20 RX ADMIN — CARVEDILOL 3.12 MG: 3.12 TABLET, FILM COATED ORAL at 06:55

## 2022-09-20 RX ADMIN — DOCUSATE SODIUM 100 MG: 100 CAPSULE, LIQUID FILLED ORAL at 23:25

## 2022-09-20 RX ADMIN — ACETAMINOPHEN 650 MG: 325 TABLET, FILM COATED ORAL at 06:59

## 2022-09-20 RX ADMIN — ASPIRIN 81 MG: 81 TABLET, COATED ORAL at 08:29

## 2022-09-20 RX ADMIN — SODIUM CHLORIDE, PRESERVATIVE FREE 10 ML: 5 INJECTION INTRAVENOUS at 21:51

## 2022-09-20 NOTE — PROGRESS NOTES
TRANSFER - OUT REPORT:    Verbal report given to BENITO Cifuentes(name) on [place]  being transferred to [facility] [phone] (unit) for routine progression of care       Report consisted of patients Situation, Background, Assessment and   Recommendations(SBAR). Information from the following report(s) Procedure Summary and Intake/Output was reviewed with the receiving nurse. Lines:   Peripheral IV 09/20/22 Posterior;Proximal;Right Forearm (Active)   Site Assessment Clean, dry, & intact 09/20/22 1005   Phlebitis Assessment 0 09/20/22 1005   Infiltration Assessment 0 09/20/22 1005   Dressing Status Clean, dry, & intact 09/20/22 1005   Dressing Type Transparent 09/20/22 1005   Hub Color/Line Status Pink 09/20/22 1005   Action Taken Other (comment) 09/20/22 1005   Alcohol Cap Used Yes 09/20/22 1005        Opportunity for questions and clarification was provided.       Patient transported with:   Monitor  Registered Nurse

## 2022-09-20 NOTE — PROGRESS NOTES
0800: Bedside shift change report given to Stanislav (oncoming nurse) by Pepe Carrillo (offgoing nurse). Report included the following information SBAR, Kardex, MAR, and Cardiac Rhythm NSR .

## 2022-09-20 NOTE — PROGRESS NOTES
Occupational Therapy: Pt is off the unit for a cardiac cath. PT will defer, follow, and see as able and appropriate.   Jolly Leyden, OTR/L

## 2022-09-20 NOTE — PROGRESS NOTES
[facility] Adult  Hospitalist Group                                                                                          Hospitalist Progress Note  Vanessa Mojica MD  Answering service: [phone] from in house phone        Date of Service:  2022  NAME:  Justin Childers  :  1945  MRN:  [id number]      Admission Summary:   Julito Jacome is a 66yo F with a PMH of CHF, HTN, CKD, Myeloproliferative Disorder who presented at the ED with right knee pain x5 days, denies injury: pain is constant/ sharp pain, 5/10 in severity, worse with ambulation, slight relief at rest. Upon arrival to the ED, the patient was found to be in significant respiratory distress, BNP level was elevated, CXR showed evidence of vascular congestion and possible pneumonia. The patient was subsequently referred to the hospitalist service for admission. The patient required being placed on supplemental oxygen in the ED because of the shortness of breath. The patient was last admitted to this hospital from 2022 to 2022 for evaluation of shortness of breath, subsequently attributed to pleural effusion for which the patient underwent thoracentesis and about 1900 mL of fluid was drained from the chest. The patient denied associated fever, rigors, and chills. The patient stated that she has been taking her medication as prescribed including diuretic. Interval history / Subjective: Follow up Acute on chronic CHF   No c/o active CP or shortness of breath  S/p cardiac cath   Feels fine  Waiting for her lunch   Assessment & Plan:     Acute-on-Chronic HFrEF: NYHA III--improving  -KLE:19411  -ECHO 8/3/22: reduced left ventricular systolic function with EF 40-45%, left ventricle mildly dilated, normal wall thickness.   -S/p cardiac cath   -continue IV diuresis on Bumex  -Continue Coreg, not on ACEi/ARB/ARNi sec to SKYLA  -Appreciate Cardiology, Continue IV diuresis     Acute Respiratory Failure with Hypoxia: --improving  multifactorial including CHF and suspected bacterial PNA. -serial cardiac markers  -D-dimer: 4.02  -CXR 9/12: pattern of moderate pulmonary interstitial edema, slightly worse compared to prior study, superimposed infiltrate cannot be excluded. -CT scan chest w/o 9/13: small bilateral pleural effusions, diffusely abnormal bones (differential includes MM, Leukemia/Lymphoma- given splenomegaly) less likely heterogeneous osteopenia or diffuse metastases, Splenomegaly.   -completed Rocephin (9/13--9/17) and Doxycycline (9/13--9/17)  -supplemental oxygen, wean as tolerated     Myeloproliferative Disorder  Anemia sec to above  Thrombocytosis  -CT scan chest w/o 9/13: small bilateral pleural effusions, diffusely abnormal bones (differential includes MM, Leukemia/Lymphoma- given splenomegaly) less likely heterogeneous osteopenia or diffuse metastases, Splenomegaly. -Appreciate hematology, transfuse to keep hb>7.5  -chronic leukocytosis    CKD 3B:   -monitor renal function  -BUN 32 Creat 1.36, improved     HTN: BP stable. Tachycardia: stable/improved low 100's-90's, continue to monitor. Febrile: cultures unremarkable,      Right Knee Effusion:  denies trauma. -X-ray Right knee 9/12: moderate effusion. -X-ray Right tib/fib 9/12: no acute abnormality.    -Duplex RLE 9/12: negative for DVT.   -Orthopedic signed off    Regular diet    PT/OT SNF vs home health        DVTppx: Lovenox  Code Status: Full Code  PTA: DIAN, [address]: Discharge tomorrow once cleared by Cardiology  Diet: Cardiac  Activity: OOB to chair TID and PRN as tolerated  Discharge: in 1-2 days  Ambulates: independently     Hospital Problems  Date Reviewed: 9/13/2022            Codes Class Noted POA    * (Principal) Acute on chronic HFrEF (heart failure with reduced ejection fraction) ([facility] [address] ICD-10-CM: W84.59  ICD-9-CM: 428.23  9/12/2022 Yes       Review of Systems:   A comprehensive review of systems was negative except for that written in the HPI. Vital Signs:    Last 24hrs VS reviewed since prior progress note. Most recent are:  Visit Vitals  /63   Pulse 86   Temp 97.9 °F (36.6 °C)   Resp 18   Ht 5' 5\" (1.651 m)   Wt 67.1 kg (147 lb 14.9 oz)   SpO2 99%   Breastfeeding No   BMI 24.62 kg/m²         Intake/Output Summary (Last 24 hours) at 9/20/2022 1427  Last data filed at 9/20/2022 0545  Gross per 24 hour   Intake 480 ml   Output 550 ml   Net -70 ml          Physical Examination:     I had a face to face encounter with this patient and independently examined them on 9/20/2022 as outlined below:          Constitutional:  No acute distress, cooperative, pleasant. ENT:  Oral mucosa moist.    Resp:  CTA bilaterally. No wheezing/rhonchi/rales. No accessory muscle use. CV:  Regular rhythm, normal rate, S1,S2.    GI/:  Soft, non distended, non tender, no guarding, BS present. Voids Freely. Musculoskeletal:  No edema, warm. Right knee swelling. Neurologic:  Moves all extremities. AAOx3. Skin:  w/d, chronic hyperpigmentation/dark  to  BLE. Psych:  Good insight, Not anxious nor agitated. Data Review:    Review and/or order of clinical lab test      Labs:     Recent Labs     09/20/22  0344 09/18/22  0630   WBC 82.0* 74.5*   HGB 7.5* 8.2*   HCT 26.0* 27.5*   * 853*       Recent Labs     09/20/22  0344 09/18/22  0630    137   K 5.1 4.7    105   CO2 26 26   BUN 34* 33*   CREA 1.34* 1.29*   GLU 92 92   CA 8.9 8.9   MG 2.5*  --        No results for input(s): ALT, AP, TBIL, TBILI, TP, ALB, GLOB, GGT, AML, LPSE in the last 72 hours. No lab exists for component: SGOT, GPT, AMYP, HLPSE    No results for input(s): INR, PTP, APTT, INREXT, INREXT in the last 72 hours. No results for input(s): FE, TIBC, PSAT, FERR in the last 72 hours.      Lab Results   Component Value Date/Time    Folate 36.1 (H) 09/13/2022 04:45 AM        No results for input(s): PH, PCO2, PO2 in the last 72 hours. No results for input(s): CPK, CKNDX, TROIQ in the last 72 hours.     No lab exists for component: CPKMB  No results found for: CHOL, CHOLX, CHLST, CHOLV, HDL, HDLP, LDL, LDLC, DLDLP, TGLX, TRIGL, TRIGP, CHHD, CHHDX  No results found for: [facility]  Lab Results   Component Value Date/Time    Color YELLOW/STRAW 09/14/2022 02:51 PM    Appearance CLEAR 09/14/2022 02:51 PM    Specific gravity 1.013 09/14/2022 02:51 PM    pH (UA) 5.5 09/14/2022 02:51 PM    Protein Negative 09/14/2022 02:51 PM    Glucose Negative 09/14/2022 02:51 PM    Ketone Negative 09/14/2022 02:51 PM    Bilirubin Negative 09/14/2022 02:51 PM    Urobilinogen 0.2 09/14/2022 02:51 PM    Nitrites Negative 09/14/2022 02:51 PM    Leukocyte Esterase Negative 09/14/2022 02:51 PM    Epithelial cells FEW 09/14/2022 02:51 PM    Bacteria Negative 09/14/2022 02:51 PM    WBC 0-4 09/14/2022 02:51 PM    RBC 0-5 09/14/2022 02:51 PM         Medications Reviewed:     Current Facility-Administered Medications   Medication Dose Route Frequency    carvediloL (COREG) tablet 3.125 mg  3.125 mg Oral BID WITH MEALS    enoxaparin (LOVENOX) injection 40 mg  40 mg SubCUTAneous DAILY    0.9% sodium chloride infusion 250 mL  250 mL IntraVENous PRN    bumetanide (BUMEX) injection 2 mg  2 mg IntraVENous BID    docusate sodium (COLACE) capsule 100 mg  100 mg Oral DAILY    diphenhydrAMINE (BENADRYL) capsule 25 mg  25 mg Oral Q6H PRN    ARIPiprazole (ABILIFY) tablet 30 mg  30 mg Oral DAILY    aspirin delayed-release tablet 81 mg  81 mg Oral DAILY    ferrous sulfate tablet 325 mg  1 Tablet Oral ACB    sodium chloride (NS) flush 5-40 mL  5-40 mL IntraVENous Q8H    sodium chloride (NS) flush 5-40 mL  5-40 mL IntraVENous PRN    acetaminophen (TYLENOL) tablet 650 mg  650 mg Oral Q6H PRN    Or    acetaminophen (TYLENOL) suppository 650 mg  650 mg Rectal Q6H PRN    polyethylene glycol (MIRALAX) packet 17 g  17 g Oral DAILY PRN    ondansetron (ZOFRAN ODT) tablet 4 mg  4 mg Oral Q8H PRN Or    ondansetron (ZOFRAN) injection 4 mg  4 mg IntraVENous Q6H PRN    albuterol-ipratropium (DUO-NEB) 2.5 MG-0.5 MG/3 ML  3 mL Nebulization Q2H PRN    metoprolol (LOPRESSOR) injection 5 mg  5 mg IntraVENous Q6H PRN    magnesium oxide (MAG-OX) tablet 400 mg  400 mg Oral QHS     ______________________________________________________________________  EXPECTED LENGTH OF STAY: 3d 19h  ACTUAL LENGTH OF STAY:          Edward Huynh MD

## 2022-09-20 NOTE — PROGRESS NOTES
1815 blood bank informed this RN that MD Bianka Posey will not approve blood transfusion order as pt hgb is above 7 and she is not actively bleeding, If Dr Marshall Garrison feels she still needs it, he can call her number- [phone].    [phone] this RN left  for Dr Nasim Del Toro re: above. 80 other MD at [address] [place] [address] returned call, information and number for blood bank MD given, he states he agrees with Dr Marshall Garrison that pt should be above 8 and states he will call.    ~1915 blood bank states blood is ready, will inform oncoming night shift RN    2000 Verbal bedside report given to Gege Morin RN oncoming nurse by Shantelle Michael RN off-going nurse. Report included current pt status and condition, recent results, hx of present illness, heart rate and rhythm, and respiratory status.

## 2022-09-20 NOTE — PROGRESS NOTES
2000 Report received from Fang Staley, Replaced by [facility]. Patient alert and oriented, resting in bed and no needs expressed. 200 Pt daughter, Zeke Fernandez, updated on pt's care. Called blood bank to find out if blood was ready for pt, but blood couldn't be released due to protocol as HGB is >7. Notified  order was per Hematologist, lab to reach out to on call MD to release blood if really needed. Received call from blood bank, on call hematologist denied blood transfusion for now. 0000 Bedside shift change report given to Windy More by Jarred Coleman RN. Report included the following information SBAR, Kardex, MAR, Accordion, and Recent Results and Cardiac Rhythm NSR/Afib. Problem: Falls - Risk of  Goal: *Absence of Falls  Description: Document Starleen Freeze Fall Risk and appropriate interventions in the flowsheet.   Outcome: Progressing Towards Goal  Note: Fall Risk Interventions:  Mobility Interventions: Assess mobility with egress test    Medication Interventions: Patient to call before getting OOB, Teach patient to arise slowly    Elimination Interventions: Call light in reach, Patient to call for help with toileting needs      Problem: Heart Failure: Day 2  Goal: Activity/Safety  Outcome: Progressing Towards Goal     Problem: Chronic Renal Failure  Goal: *Fluid and electrolytes stabilized  Outcome: Progressing Towards Goal

## 2022-09-20 NOTE — PROGRESS NOTES
TRANSFER - IN REPORT:    Verbal report received from Sol Miners on Amira Dimas  being received from Procedure for routine progression of care. Report consisted of patients Situation, Background, Assessment and Recommendations(SBAR). Information from the following report(s) SBAR, Procedure Summary, and MAR was reviewed with the receiving clinician. Opportunity for questions and clarification was provided. Assessment completed upon patients arrival to [address] [place] [address] and care assumed. Cardiac Cath Lab Recovery Arrival Note:    Amira Dimas arrived to [facility] recovery area. Patient procedure= RHC. Patient on cardiac monitor, non-invasive blood pressure, SPO2 monitor. On  O2 @ 2 lpm via NC.  IV  of Normal saline on pump at 10 ml/hr. Patient status doing well without problems. Patient is A&Ox 4. Patient reports no pain. PROCEDURE SITE CHECK:    Procedure site:without any bleeding and no hematoma, no pain/discomfort reported at procedure site. No change in patient status. Continue to monitor patient and status.

## 2022-09-20 NOTE — PROGRESS NOTES
HEMATOLOGY / ONCOLOGY    Paul Milner 1945 Age: 68 y.o. CHIEF COMPLAINT:  leg swelling and pain    DISEASE FEATURES  See marrow report below    PREVIOUS TREATMENTS  [address] 7/2022 -9/1/22, no response    CURRENT TREATMENTS      Disease Status:   Treatment Goal:     ACTIVE PROBLEMS  Myeloproliferative/myelodysplastic disease  --anemia, leukocytosis, erythrocytosis, marrow fibrosis  CHF, mixed systolic and diastolic, acute on chronic  --high output features due to severe anemia  CKD       INTERIM HISTORY AND ASSESSMENT  Ms. Brittany Stout looks better. She is sitting up and eating. She had her R heart cath today. Results pending. She did not receive the blood I ordered today. I don't know why. I will order it again. WBC and plts are elevated, but stable. Will stop Vidaza. SUBJECTIVE:    feels stronger, no c/o pain, no sob today    Past Medical History:   Diagnosis Date    Anemia     Congestive heart failure (HCC)     Hypertension     Menopause     Ventral hernia without obstruction or gangrene 4/18/2022     Past Surgical History:   Procedure Laterality Date    HX OTHER SURGICAL  04/13/2022     bone marrow     Medications and Allergies have been reviewed and updated in the [facility] system. REVIEW OF SYSTEMS  Except as noted in the history and assessement above, all other systems are negative. EXAMINATION   Vital signs were reviewed abnormalities discussed above.    General: Frail, edentulous  HEENT: conjunctiva clear; no jaundice  Lungs: clear  CV: RRR  Abd: soft and non-tender; no HSM; no masses  Skin: no significant rashes  Ext: Edema: none; Tenderness: none  Neuro/Psych:    Social History     Tobacco Use    Smoking status: Never    Smokeless tobacco: Never   Substance Use Topics    Alcohol use: Never      Family History   Problem Relation Age of Onset    Breast Cancer Sister         63's    Cancer Sister     Breast Problems Sister     Hypertension Mother         Keesha Pérez MD

## 2022-09-20 NOTE — PROGRESS NOTES
[facility]  Cardiology Care Note                  []Initial visit     [x]Established visit     Patient Name: Steffen Casas :1945 - ATX:[id number]  Primary Cardiologist: James Weaver MD  Consulting Cardiologist: James Weaver MD     Reason for initial visit:  SOB  Edema  HFpEF    HPI:       Very pleasant 45-year-old female with past medical history of acute acute on chronic diastolic heart failure heart failure preserved EF, stage C, mildly reduced left ventricular systolic function by echo EF 40 to 45% down from 60% by prior echo, myeloproliferative disorder on chemotherapy, severe leukocytosis, thrombocytopenia ptosis, hypertension, hyperlipidemia, pleural effusion status postthoracentesis admitted with right knee pain found to be in acute on chronic diastolic heart failure. Followed in the advanced heart failure clinic by Dr. Lakshmi Cook. From review of her notes the plans were for a right heart cath with Dr. Maria Luz Tabor on , which would have been a week after her planned chemotherapy. However given the knee pain and significant lower extremity edema she presented to the hospital.  Here she has undergone IV diuresis with good effect. She still has quite refractory lower extremity edema. Dr. Nina Castillo is her oncologist hematologist and from what she tells me he may discontinue chemotherapy with her. She states they may do 1 more round, which will be delayed, thereafter it will be stopped. SUBJECTIVE:    She is up this morning with assistance. Denies CP, worsening SOB, orthopnea, PND. Assessment and Plan     Acute on chronic diastolic heart failure: yesterday Metoprolol changed to Coreg 3.125mg bid, BP trending low normal. Bumex held yesterday for low BP. I/O yesterday -79. Net -3350. Plans for right heart cath today. Further treatment recommendations after results are reviewed. Advanced Heart Failure had been following outpt. Could consider official consult to the team to provide input as well. MPD - anemia, thrombocytosis. Managed by Dr Katherine Sykes  CKD - stable at 1.34. Patient seen on the day of progress note and examined  and agree with Advance Practice Provider (MINDY, NP,PA)  assessment and plans. She seems to be compensated. No particular shortness of breath reported. Able to lie flat in bed with no issues. For right heart catheterization today. Discussed with patient procedures she is aware of risk and benefits and agreeable to proceed    Metoprolol changed to Coreg. Her blood pressure remains borderline low. Afterload reduction agents eventually if blood pressure allows. She remains on Bumex. She is negative output 3350 cc. Depending also right heart catheterization number consider resuming home p.o. Bumex and stopping IV Bumex.           ____________________________________________________________    Cardiac testing  08/03/22    ECHO ADULT COMPLETE 08/03/2022 8/3/2022    Interpretation Summary  Formatting of this result is different from the original.      Left Ventricle: Reduced left ventricular systolic function with a visually estimated EF of 40 - 45%. Left ventricle is mildly dilated. Normal wall thickness. Mild global hypokinesis present. Normal diastolic function. Mitral Valve: Mild regurgitation. Left Atrium: Left atrium is mildly dilated. Pericardium: Small (<1 cm) circumferential pericardial effusion present. No indication of cardiac tamponade. Signed by: Carlos Allan MD on 8/3/2022 12:29 PM        08/15/22    NUCLEAR CARDIAC STRESS TEST 08/15/2022, 08/15/2022 8/15/2022    Interpretation Summary  Formatting of this result is different from the original.      ECG: Resting ECG demonstrates normal sinus rhythm. ECG: Stress ECG was negative for ischemia. Stress Test: A pharmacological stress test was performed using lexiscan.  Blood pressure demonstrated a normal response and heart rate demonstrated a normal response to stress. The patient's heart rate recovery was normal.    INDICATION: Chronic systolic heart failure. History of hypertension    COMPARISON:  None. CORRELATIVE IMAGING STUDIES:  None    TRACER: Tc 99m Sestamibi    TECHNIQUE:  Resting SPECT images of the heart were obtained following the uneventful intravenous administration of 10.6 mCi of Tc 99m Sestamibi. Gated stress SPECT images of the heart were obtained following Lexiscan protocol and the uneventful intravenous administration of 31.9 mCi of Tc 99m Sestamibi. FINDINGS:  The rest and stress perfusion images demonstrate prominent soft tissue attenuation without significant perfusion defect or evidence of myocardial reversibility. The gated images demonstrate global hypokinesia. Left ventricular ejection fraction is 40 %. Impression:  Prominent soft tissue attenuation. No evidence of myocardial ischemia or infarction. Left ventricular ejection fraction is 40 %. Global hypokinesia. Signed by: Mg aFtima MD on 8/15/2022 11:25 AM, Signed by: Abimael Cruz MD on 8/15/2022 12:03 PM          Most recent HS troponins:  No results for input(s): TROPHS in the last 72 hours. No lab exists for component:  CKMB  ECG: normal EKG, normal sinus rhythm, unchanged from previous tracings    Review of Systems    [x]All other systems reviewed and all negative except as written in HPI    [] Patient unable to provide secondary to condition         Past Medical History:   Diagnosis Date    Anemia     Congestive heart failure (HCC)     Hypertension     Menopause     Ventral hernia without obstruction or gangrene 4/18/2022     Past Surgical History:   Procedure Laterality Date    HX OTHER SURGICAL  04/13/2022     bone marrow     Social Hx:  reports that she has never smoked. She has never used smokeless tobacco. She reports that she does not currently use drugs.  She reports that she does not drink alcohol. Family Hx: family history includes Breast Cancer in her sister; Breast Problems in her sister; Cancer in her sister; Hypertension in her mother. No Known Allergies       OBJECTIVE:  Wt Readings from Last 3 Encounters:   09/20/22 147 lb 14.9 oz (67.1 kg)   09/08/22 137 lb 12.8 oz (62.5 kg)   08/15/22 138 lb (62.6 kg)       Intake/Output Summary (Last 24 hours) at 9/20/2022 1046  Last data filed at 9/20/2022 0545  Gross per 24 hour   Intake 480 ml   Output 550 ml   Net -70 ml         Physical Exam    Vitals:   Vitals:    09/20/22 0644 09/20/22 0820 09/20/22 1005 09/20/22 1040   BP: (!) 108/57 (!) 104/56 (!) 105/53 (!) 116/57   Pulse: 85 86 75 91   Resp: 17 12 16    Temp: 98.1 °F (36.7 °C) 98.6 °F (37 °C) 98.1 °F (36.7 °C)    SpO2: 96% 96% 100% 93%   Weight:       Height:         Telemetry: normal sinus rhythm    Heart: regular rate and rhythm, S1, S2 normal, no murmur, click, rub or gallop  Lungs: clear to auscultation bilaterally, O2 via NC at 2L  Abdomen: soft, non-tender. Bowel sounds normal. No masses,  no organomegaly  Extremities: extremities normal, atraumatic, no cyanosis or edema, edema 1+ chronic venous stasis changes        Data Review:     Radiology:   XR Results (most recent):  Results from [place] encounter on 09/12/22    XR CHEST PORT    Narrative  EXAM: XR CHEST PORT    DATE: 9/12/2022 4:02 PM    INDICATION: sob    COMPARISON: Chest radiograph September 8, 2022    FINDINGS: AP portable chest radiograph. Heart is moderately enlarged but  stable. There are bilateral patchy airspace infiltrates which are increased from  the previous radiograph. Increasing consolidation is seen at the LEFT base. Trace effusions are suspected. There is no pneumothorax. Impression  Pattern of moderate pulmonary interstitial edema, slightly worse compared to the  prior radiograph. Superimposed infiltrate cannot be excluded.     CT Results (most recent):  Results from [facility] Encounter encounter on 09/12/22    CT CHEST WO CONT    Narrative  CT CHEST WITHOUT CONTRAST. 9/13/2022 4:07 AM    INDICATION: Respiratory failure, pneumonia. COMPARISON: 1/28/2022. TECHNIQUE: CT of the chest was performed without contrast. Coronal and sagittal  reconstructions were performed. CT dose reduction was achieved through use of a  standardized protocol tailored for this examination and automatic exposure  control for dose modulation. FINDINGS:  The heart is enlarged. Interlobular septal thickening is consistent with  interstitial edema. Passive atelectasis is associated with small bilateral  pleural effusions. There is a trace pericardial effusion. Anasarca is mild. The central airways are patent. No pneumothorax. Aberrant right subclavian  artery is a normal variant. No thoracic lymphadenopathy. The spleen is probably  enlarged. The visualized unenhanced, upper abdomen is otherwise grossly normal.    Bony architecture is abnormal. The cortices are diffusely thinned. The  trabeculae are effaced by smudgy marrow replacement. There are numerous small  lucent lesions in addition to the hyperdense, smudgy, marrow replacement. Differential includes diffuse osseous metastases, multiple myeloma, leukemia,  and heterogeneous osteopenia. Impression  1. CHF: cardiomegaly, interstitial edema, small bilateral pleural effusions. 2. Diffusely abnormal bones. Differential includes multiple myeloma,  leukemia/lymphoma (given splenomegaly), less likely heterogeneous osteopenia or  diffuse metastases. 3. Splenomegaly. MRI Results (most recent):  No results found for this or any previous visit. No results for input(s): CPK, TROIQ in the last 72 hours.     No lab exists for component: CKQMB, CPKMB, BMPP  Recent Labs     09/20/22  0344 09/18/22  0630    137   K 5.1 4.7    105   CO2 26 26   BUN 34* 33*   CREA 1.34* 1.29*   GLU 92 92   CA 8.9 8.9     Recent Labs     09/20/22  0344 09/18/22  0630   WBC 82.0* 74.5*   HGB 7.5* 8.2*   HCT 26.0* 27.5*   * 853*     No results for input(s): PTP, INR, AP, INREXT, INREXT in the last 72 hours. No lab exists for component: PTTP, GPT, SGOT  No results for input(s): CHOL, LDLC in the last 72 hours. No lab exists for component: TGL, HDLC,  HBA1C  No results for input(s): CRP, TSH, TSHEXT, TSHEXT in the last 72 hours.     No lab exists for component: ESR        Current meds:    Current Facility-Administered Medications:     carvediloL (COREG) tablet 3.125 mg, 3.125 mg, Oral, BID WITH MEALS, Harpal Cano MD, 3.125 mg at 09/20/22 0655    enoxaparin (LOVENOX) injection 40 mg, 40 mg, SubCUTAneous, DAILY, Eligio Mckeon MD    0.9% sodium chloride infusion 250 mL, 250 mL, IntraVENous, PRN, Anamika Livingston MD    [facility]) injection 2 mg, 2 mg, IntraVENous, BID, Chandler Crocker MD    docusate sodium (COLACE) capsule 100 mg, 100 mg, Oral, DAILY, Nancy Sanches NP, 100 mg at 09/19/22 2241    diphenhydrAMINE (BENADRYL) capsule 25 mg, 25 mg, Oral, Q6H PRN, Anamika Livingston MD    ARIPiprazole (ABILIFY) tablet 30 mg, 30 mg, Oral, DAILY, Sruthi King MD, 30 mg at 09/20/22 4064    aspirin delayed-release tablet 81 mg, 81 mg, Oral, DAILY, Sruthi King MD, 81 mg at 09/20/22 0829    ferrous sulfate tablet 325 mg, 1 Tablet, Oral, ACB, Sruthi King MD, 325 mg at 09/20/22 0655    sodium chloride (NS) flush 5-40 mL, 5-40 mL, IntraVENous, Q8H, Sruthi King MD, 10 mL at 09/20/22 0700    sodium chloride (NS) flush 5-40 mL, 5-40 mL, IntraVENous, PRN, Sruthi King MD, 10 mL at 09/14/22 0600    acetaminophen (TYLENOL) tablet 650 mg, 650 mg, Oral, Q6H PRN, 650 mg at 09/20/22 0659 **OR** acetaminophen (TYLENOL) suppository 650 mg, 650 mg, Rectal, Q6H PRN, Sruthi King MD    polyethylene glycol (MIRALAX) packet 17 g, 17 g, Oral, DAILY PRN, Sruthi King MD    ondansetron (ZOFRAN ODT) tablet 4 mg, 4 mg, Oral, Q8H PRN **OR** ondansetron (ZOFRAN) injection 4 mg, 4 mg, IntraVENous, Q6H PRN, Sruthi King MD    albuterol-ipratropium (DUO-NEB) 2.5 MG-0.5 MG/3 ML, 3 mL, Nebulization, Q2H PRN, Sruthi King MD    metoprolol (LOPRESSOR) injection 5 mg, 5 mg, IntraVENous, Q6H PRN, Sruthi King MD    magnesium oxide (MAG-OX) tablet 400 mg, 400 mg, Oral, QHS, Dragan Galarza NP, 400 mg at 09/19/22 5932    Jarred Burt MD  [facility] [address], [address] [place] [address],[address] [place] [address]  [phone]      CC:Yvette Steiner NP

## 2022-09-20 NOTE — PROGRESS NOTES
Pt is off the unit for a cardiac cath. PT will defer, follow, and see as able and appropriate.  Thank you, Ritesh Gomez, PT

## 2022-09-20 NOTE — PROGRESS NOTES
Problem: Mobility Impaired (Adult and Pediatric)  Goal: *Acute Goals and Plan of Care (Insert Text)  Description: FUNCTIONAL STATUS PRIOR TO ADMISSION: Patient was independent and active without use of DME. When asked she reported no falls in the last 12 months and baseline is room air. She endorsed HHPT at time of admission. HOME SUPPORT PRIOR TO ADMISSION: The patient lived in an assisted living facility and reported no need for assist with mobility. .    Physical Therapy Goals  Initiated 9/16/2022  1. Patient will move from supine to sit and sit to supine , scoot up and down, and roll side to side in bed with independence within 7 day(s). 2.  Patient will transfer from bed to chair and chair to bed with independence using the least restrictive device within 7 day(s). 3.  Patient will perform sit to stand with independence within 7 day(s). 4.  Patient will ambulate with independence for 300 feet with the least restrictive device within 7 day(s). Outcome: Progressing Towards Goal   PHYSICAL THERAPY TREATMENT  Patient: Paul Milner (12 y.o. female)  Date: 9/20/2022  Diagnosis: Acute on chronic HFrEF (heart failure with reduced ejection fraction) ([facility]) [I50.23] Acute on chronic HFrEF (heart failure with reduced ejection fraction) ([facility])  Procedure(s) (LRB):  RIGHT HEART CATH (N/A) Day of Surgery  Precautions: Fall, alarm pad  Chart, physical therapy assessment, plan of care and goals were reviewed. ASSESSMENT  Patient continues with skilled PT services and is progressing towards goals. Pt continues with slightly impaired standing balance (but no overt losses of balance). She amb some with and some without a rolling walker and gait was more stable and fluid with the walker. Monitored Sp02 during amb and lowest reading was 89% but for the most part was 90-94% while amb on room air. Provided her with an incentive spirometer and instructed her in its use.  She continued to make steady gains but is not at her baseline of fully independent. She would benefit from short term rehab stay but verbalized that she wants to discharge to the assisted living. Current Level of Function Impacting Discharge (mobility/balance): provided contact guard. Other factors to consider for discharge: fall risk, Sree of 3. PLAN :  Patient continues to benefit from skilled intervention to address the above impairments. Continue treatment per established plan of care. to address goals. Recommendation for discharge: (in order for the patient to meet his/her long term goals)  Therapy up to 5 days/week in SNF setting, if not then HHPT and assist with mobility prn    This discharge recommendation:  A follow-up discussion with the attending provider and/or case management is planned    IF patient discharges home will need the following DME: rolling walker       SUBJECTIVE:   Patient stated It feels good to be up.     OBJECTIVE DATA SUMMARY:   Chart checked, pt cleared by nursing. Critical Behavior:  Neurologic State: Alert, Eyes open spontaneously  Orientation Level: Oriented X4  Cognition: Follows commands  Safety/Judgement: Good awareness of safety precautions, Insight into deficits  Functional Mobility Training:  Bed Mobility:                    Transfers:  Sit to Stand: Supervision  Stand to Sit: Supervision                             Balance:  Sitting: Intact; Without support  Standing: Impaired  Standing - Static: Good  Standing - Dynamic : Fair  Ambulation/Gait Training:  Distance (ft): 150 Feet (ft)  Assistive Device: Gait belt (with and without a rolling walker)  Ambulation - Level of Assistance: Stand-by assistance;Contact guard assistance        Gait Abnormalities: Decreased step clearance              Speed/Marielena: Slow  Step Length: Left shortened;Right shortened                    Stairs:               Therapeutic Exercises:   Use of incentive spirometer   Pain Ratin on her bottom with prolonged sitting     Activity Tolerance:   SpO2 stable on RA    After treatment patient left in no apparent distress:   Sitting in chair, Call bell within reach, and Bed / chair alarm activated    COMMUNICATION/COLLABORATION:   The patients plan of care was discussed with: Registered nurse.      Roman Jurado   Time Calculation: 26 mins

## 2022-09-20 NOTE — PROGRESS NOTES
2030 Verbal bedside report given to BENITO Morrissey/ Kelly Singh LPN oncoming nurses by Jay Jay Alicea RN off-going nurse. Report included current pt status and condition, recent results, hx of present illness, heart rate and rhythm, and respiratory status. 2145 this RN attempted to return phone call request for update to daughter Yamini Bell left to call unit.

## 2022-09-20 NOTE — ROUTINE PROCESS
1005    Cardiac Cath Lab Recovery Arrival Note:      Lizette Salinas arrived to Cardiac Cath Lab, Recovery Area. Staff introduced to patient. Patient identifiers verified with NAME and DATE OF BIRTH. Procedure verified with patient. Consent forms reviewed and signed by patient or authorized representative and verified. Allergies verified. Patient and family oriented to department. Patient and family informed of procedure and plan of care. Questions answered with review. Patient prepped for procedure, per orders from physician, prior to arrival.    Patient on cardiac monitor, non-invasive blood pressure, SPO2 monitor. On 2 L O2 via NC. Patient is A&Ox 4. Patient reports no complaints. Patient in stretcher, in low position, with side rails up, call bell within reach, patient instructed to call if assistance as needed. Patient prep in: [facility], [address] [place] [address].      Prep by: Anaya Gracia

## 2022-09-21 LAB
ABO + RH BLD: NORMAL
ANION GAP SERPL CALC-SCNC: 4 MMOL/L (ref 5–15)
BASOPHILS # BLD: 1.8 K/UL (ref 0–0.1)
BASOPHILS NFR BLD: 2 % (ref 0–1)
BLASTS NFR BLD MANUAL: 5 %
BLD PROD TYP BPU: NORMAL
BLOOD GROUP ANTIBODIES SERPL: NORMAL
BPU ID: NORMAL
BUN SERPL-MCNC: 32 MG/DL (ref 6–20)
BUN/CREAT SERPL: 24 (ref 12–20)
CALCIUM SERPL-MCNC: 8.9 MG/DL (ref 8.5–10.1)
CHLORIDE SERPL-SCNC: 106 MMOL/L (ref 97–108)
CO2 SERPL-SCNC: 27 MMOL/L (ref 21–32)
COVID-19 RAPID TEST, COVR: NOT DETECTED
CREAT SERPL-MCNC: 1.33 MG/DL (ref 0.55–1.02)
CROSSMATCH RESULT,%XM: NORMAL
DIFFERENTIAL METHOD BLD: ABNORMAL
EOSINOPHIL # BLD: 2.8 K/UL (ref 0–0.4)
EOSINOPHIL NFR BLD: 3 % (ref 0–7)
ERYTHROCYTE [DISTWIDTH] IN BLOOD BY AUTOMATED COUNT: 18.2 % (ref 11.5–14.5)
GLUCOSE SERPL-MCNC: 87 MG/DL (ref 65–100)
HCT VFR BLD AUTO: 28.9 % (ref 35–47)
HGB BLD-MCNC: 8.8 G/DL (ref 11.5–16)
IMM GRANULOCYTES # BLD AUTO: 0 K/UL
IMM GRANULOCYTES NFR BLD AUTO: 0 %
LYMPHOCYTES # BLD: 11 K/UL (ref 0.8–3.5)
LYMPHOCYTES NFR BLD: 12 % (ref 12–49)
MCH RBC QN AUTO: 25.1 PG (ref 26–34)
MCHC RBC AUTO-ENTMCNC: 30.4 G/DL (ref 30–36.5)
MCV RBC AUTO: 82.3 FL (ref 80–99)
METAMYELOCYTES NFR BLD MANUAL: 6 %
MONOCYTES # BLD: 3.7 K/UL (ref 0–1)
MONOCYTES NFR BLD: 4 % (ref 5–13)
MYELOCYTES NFR BLD MANUAL: 15 %
NEUTS BAND NFR BLD MANUAL: 8 % (ref 0–6)
NEUTS SEG # BLD: 45.9 K/UL (ref 1.8–8)
NEUTS SEG NFR BLD: 42 % (ref 32–75)
NRBC # BLD: 1.18 K/UL (ref 0–0.01)
NRBC BLD-RTO: 1.3 PER 100 WBC
PLATELET # BLD AUTO: 836 K/UL (ref 150–400)
PMV BLD AUTO: 12 FL (ref 8.9–12.9)
POTASSIUM SERPL-SCNC: 5 MMOL/L (ref 3.5–5.1)
PROMYELOCYTES NFR BLD MANUAL: 3 %
RBC # BLD AUTO: 3.51 M/UL (ref 3.8–5.2)
RBC MORPH BLD: ABNORMAL
SODIUM SERPL-SCNC: 137 MMOL/L (ref 136–145)
SOURCE, COVRS: NORMAL
SPECIMEN EXP DATE BLD: NORMAL
STATUS OF UNIT,%ST: NORMAL
UNIT DIVISION, %UDIV: 0
WBC # BLD AUTO: 91.7 K/UL (ref 3.6–11)

## 2022-09-21 PROCEDURE — 99232 SBSQ HOSP IP/OBS MODERATE 35: CPT | Performed by: SPECIALIST

## 2022-09-21 PROCEDURE — 74011250637 HC RX REV CODE- 250/637: Performed by: NURSE PRACTITIONER

## 2022-09-21 PROCEDURE — 74011250637 HC RX REV CODE- 250/637: Performed by: INTERNAL MEDICINE

## 2022-09-21 PROCEDURE — 74011250637 HC RX REV CODE- 250/637: Performed by: SPECIALIST

## 2022-09-21 PROCEDURE — 80048 BASIC METABOLIC PNL TOTAL CA: CPT

## 2022-09-21 PROCEDURE — 97116 GAIT TRAINING THERAPY: CPT

## 2022-09-21 PROCEDURE — 74011250636 HC RX REV CODE- 250/636: Performed by: HOSPITALIST

## 2022-09-21 PROCEDURE — 94760 N-INVAS EAR/PLS OXIMETRY 1: CPT

## 2022-09-21 PROCEDURE — 87635 SARS-COV-2 COVID-19 AMP PRB: CPT

## 2022-09-21 PROCEDURE — 65270000046 HC RM TELEMETRY

## 2022-09-21 PROCEDURE — 97530 THERAPEUTIC ACTIVITIES: CPT

## 2022-09-21 PROCEDURE — 85025 COMPLETE CBC W/AUTO DIFF WBC: CPT

## 2022-09-21 PROCEDURE — 74011000250 HC RX REV CODE- 250: Performed by: INTERNAL MEDICINE

## 2022-09-21 RX ORDER — BUMETANIDE 1 MG/1
2 TABLET ORAL 2 TIMES DAILY
Status: DISCONTINUED | OUTPATIENT
Start: 2022-09-21 | End: 2022-09-22 | Stop reason: HOSPADM

## 2022-09-21 RX ORDER — CARVEDILOL 3.12 MG/1
3.12 TABLET ORAL 2 TIMES DAILY WITH MEALS
Qty: 60 TABLET | Refills: 1 | Status: SHIPPED | OUTPATIENT
Start: 2022-09-21

## 2022-09-21 RX ADMIN — ARIPIPRAZOLE 30 MG: 30 TABLET ORAL at 09:16

## 2022-09-21 RX ADMIN — ENOXAPARIN SODIUM 40 MG: 100 INJECTION SUBCUTANEOUS at 09:16

## 2022-09-21 RX ADMIN — FERROUS SULFATE TAB 325 MG (65 MG ELEMENTAL FE) 325 MG: 325 (65 FE) TAB at 08:47

## 2022-09-21 RX ADMIN — DOCUSATE SODIUM 100 MG: 100 CAPSULE, LIQUID FILLED ORAL at 22:08

## 2022-09-21 RX ADMIN — CARVEDILOL 3.12 MG: 3.12 TABLET, FILM COATED ORAL at 09:16

## 2022-09-21 RX ADMIN — BUMETANIDE 2 MG: 1 TABLET ORAL at 09:17

## 2022-09-21 RX ADMIN — ASPIRIN 81 MG: 81 TABLET, COATED ORAL at 09:16

## 2022-09-21 RX ADMIN — SODIUM CHLORIDE, PRESERVATIVE FREE 10 ML: 5 INJECTION INTRAVENOUS at 07:07

## 2022-09-21 RX ADMIN — SODIUM CHLORIDE, PRESERVATIVE FREE 10 ML: 5 INJECTION INTRAVENOUS at 22:08

## 2022-09-21 RX ADMIN — CARVEDILOL 3.12 MG: 3.12 TABLET, FILM COATED ORAL at 17:53

## 2022-09-21 RX ADMIN — BUMETANIDE 2 MG: 1 TABLET ORAL at 17:53

## 2022-09-21 RX ADMIN — MAGNESIUM OXIDE 400 MG (241.3 MG MAGNESIUM) TABLET 400 MG: TABLET at 22:08

## 2022-09-21 RX ADMIN — SODIUM CHLORIDE, PRESERVATIVE FREE 10 ML: 5 INJECTION INTRAVENOUS at 13:44

## 2022-09-21 NOTE — NURSE NAVIGATOR
Post discharge follow up appt scheduled with Dr Oseas Cordero 9/30/22 @ 240PM.  This was the first available appt.  AVS updated

## 2022-09-21 NOTE — PROGRESS NOTES
[facility]  Cardiology Care Note                  []Initial visit     [x]Established visit     Patient Name: Steffen Avila - :1945 - UFN:[id number]  Primary Cardiologist: James Weaver MD  Consulting Cardiologist: James Weaver MD     Reason for initial visit:  SOB  Edema  HFpEF    HPI:       Very pleasant 22-year-old female with past medical history of acute acute on chronic diastolic heart failure heart failure preserved EF, stage C, mildly reduced left ventricular systolic function by echo EF 40 to 45% down from 60% by prior echo, myeloproliferative disorder on chemotherapy, severe leukocytosis, thrombocytopenia ptosis, hypertension, hyperlipidemia, pleural effusion status postthoracentesis admitted with right knee pain found to be in acute on chronic diastolic heart failure. Followed in the advanced heart failure clinic by Dr. Lakshmi Cook. From review of her notes the plans were for a right heart cath with Dr. Maria Luz Tabor on , which would have been a week after her planned chemotherapy. However given the knee pain and significant lower extremity edema she presented to the hospital.  Here she has undergone IV diuresis with good effect. She still has quite refractory lower extremity edema. Dr. Nina Castillo is her oncologist hematologist and from what she tells me he may discontinue chemotherapy with her. She states they may do 1 more round, which will be delayed, thereafter it will be stopped. SUBJECTIVE:    Patient is post blood transfusion last pm. She reports feeling a bit better. Denies SOB, orthopnea, PND or edema. Denies CP. She wants to go home today. Assessment and Plan     Acute on chronic diastolic heart failure: post right heart cath yesterday showing NL right sided filling pressures, elevated CO and index c/w anemia. Recommendation is to treat pt's anemia.  Denies symptoms today, appears compensated. She did receive PRBC last pm, would transition to po Bumex this am and cont Coreg 3.125mg bid. MPD - anemia, thrombocytosis. Managed by Dr Brain Parada  CKD - stable at 1.34 yesterday. Patient seen on the day of progress note and examined  and agree with Advance Practice Provider (MINDY, NP,PA)  assessment and plans. She is doing very well. Clinically she is compensated from the cardiomyopathy standpoint no shortness of breath reported. She is able to lay flat with no problems. Status post right heart catheterization with normal cardiac output and no significant pulmonary hypertension. Stop IV Bumex and resume p.o. Bumex as per home dose. Continue same dose of Coreg at this time. Eventually she will need afterload reduction agent to the blood pressure remains somewhat soft therefore this will be put on hold and likely started as an outpatient. No additional cardiac interventions for now okay to discharge from my standpoint the patient needs to follow with Ashley Spangler regular cardiologist as previously planned.           ____________________________________________________________    Cardiac testing  08/03/22    ECHO ADULT COMPLETE 08/03/2022 8/3/2022    Interpretation Summary  Formatting of this result is different from the original.      Left Ventricle: Reduced left ventricular systolic function with a visually estimated EF of 40 - 45%. Left ventricle is mildly dilated. Normal wall thickness. Mild global hypokinesis present. Normal diastolic function. Mitral Valve: Mild regurgitation. Left Atrium: Left atrium is mildly dilated. Pericardium: Small (<1 cm) circumferential pericardial effusion present. No indication of cardiac tamponade.     Signed by: Zion Nino MD on 8/3/2022 12:29 PM          Review of Systems    [x]All other systems reviewed and all negative except as written in HPI    [] Patient unable to provide secondary to condition         Past Medical History:   Diagnosis Date    Anemia     Congestive heart failure ([address]     Hypertension     Menopause     Ventral hernia without obstruction or gangrene 4/18/2022     Past Surgical History:   Procedure Laterality Date    HX OTHER SURGICAL  04/13/2022     bone marrow     Social Hx:  reports that she has never smoked. She has never used smokeless tobacco. She reports that she does not currently use drugs. She reports that she does not drink alcohol. Family Hx: family history includes Breast Cancer in her sister; Breast Problems in her sister; Cancer in her sister; Hypertension in her mother. No Known Allergies       OBJECTIVE:  Wt Readings from Last 3 Encounters:   09/20/22 147 lb 14.9 oz (67.1 kg)   09/08/22 137 lb 12.8 oz (62.5 kg)   08/15/22 138 lb (62.6 kg)       Intake/Output Summary (Last 24 hours) at 9/21/2022 1004  Last data filed at 9/21/2022 0552  Gross per 24 hour   Intake 300 ml   Output 350 ml   Net -50 ml         Physical Exam    Vitals:   Vitals:    09/21/22 0400 09/21/22 0552 09/21/22 0841 09/21/22 0916   BP:   97/63 (!) 105/58   Pulse: 91 100 89 89   Resp:   23    Temp:   98.6 °F (37 °C)    SpO2:   93%    Weight:       Height:         Telemetry: normal sinus rhythm    Heart: regular rate and rhythm, S1, S2 normal, no murmur, click, rub or gallop  Lungs: clear to auscultation bilaterally  Abdomen: soft, non-tender. Bowel sounds normal. No masses,  no organomegaly  Extremities: extremities normal, atraumatic, no cyanosis or edema, + chronic venous stasis changes        Data Review:     Radiology:   XR Results (most recent):  Results from [place] encounter on 09/12/22    XR CHEST PORT    Narrative  EXAM: XR CHEST PORT    DATE: 9/12/2022 4:02 PM    INDICATION: sob    COMPARISON: Chest radiograph September 8, 2022    FINDINGS: AP portable chest radiograph. Heart is moderately enlarged but  stable.  There are bilateral patchy airspace infiltrates which are increased from  the previous radiograph. Increasing consolidation is seen at the LEFT base. Trace effusions are suspected. There is no pneumothorax. Impression  Pattern of moderate pulmonary interstitial edema, slightly worse compared to the  prior radiograph. Superimposed infiltrate cannot be excluded. CT Results (most recent):  Results from Hospital Encounter encounter on 09/12/22    CT CHEST WO CONT    Narrative  CT CHEST WITHOUT CONTRAST. 9/13/2022 4:07 AM    INDICATION: Respiratory failure, pneumonia. COMPARISON: 1/28/2022. TECHNIQUE: CT of the chest was performed without contrast. Coronal and sagittal  reconstructions were performed. CT dose reduction was achieved through use of a  standardized protocol tailored for this examination and automatic exposure  control for dose modulation. FINDINGS:  The heart is enlarged. Interlobular septal thickening is consistent with  interstitial edema. Passive atelectasis is associated with small bilateral  pleural effusions. There is a trace pericardial effusion. Anasarca is mild. The central airways are patent. No pneumothorax. Aberrant right subclavian  artery is a normal variant. No thoracic lymphadenopathy. The spleen is probably  enlarged. The visualized unenhanced, upper abdomen is otherwise grossly normal.    Bony architecture is abnormal. The cortices are diffusely thinned. The  trabeculae are effaced by smudgy marrow replacement. There are numerous small  lucent lesions in addition to the hyperdense, smudgy, marrow replacement. Differential includes diffuse osseous metastases, multiple myeloma, leukemia,  and heterogeneous osteopenia. Impression  1. CHF: cardiomegaly, interstitial edema, small bilateral pleural effusions. 2. Diffusely abnormal bones. Differential includes multiple myeloma,  leukemia/lymphoma (given splenomegaly), less likely heterogeneous osteopenia or  diffuse metastases. 3. Splenomegaly.     MRI Results (most recent):  No results found for this or any previous visit. No results for input(s): CPK, TROIQ in the last 72 hours. No lab exists for component: CKQMB, CPKMB, BMPP  Recent Labs     09/20/22  0344      K 5.1      CO2 26   BUN 34*   CREA 1.34*   GLU 92   CA 8.9     Recent Labs     09/21/22  0653 09/20/22  0344   WBC 91.7* 82.0*   HGB 8.8* 7.5*   HCT 28.9* 26.0*   * 791*     No results for input(s): PTP, INR, AP, INREXT, INREXT in the last 72 hours. No lab exists for component: PTTP, GPT, SGOT  No results for input(s): CHOL, LDLC in the last 72 hours. No lab exists for component: TGL, HDLC,  HBA1C  No results for input(s): CRP, TSH, TSHEXT, TSHEXT in the last 72 hours.     No lab exists for component: ESR        Current meds:    Current Facility-Administered Medications:     bumetanide (BUMEX) tablet 2 mg, 2 mg, Oral, BID, Harpal Cano MD, 2 mg at 09/21/22 0917    0.9% sodium chloride infusion 250 mL, 250 mL, IntraVENous, PRN, Al Simmons MD    carvediloL (COREG) tablet 3.125 mg, 3.125 mg, Oral, BID WITH MEALS, Harpal Cano MD, 3.125 mg at 09/21/22 0916    enoxaparin (LOVENOX) injection 40 mg, 40 mg, SubCUTAneous, DAILY, Darrel Mesa MD, 40 mg at 09/21/22 0916    0.9% sodium chloride infusion 250 mL, 250 mL, IntraVENous, PRN, Al Simmons MD    docusate sodium (COLACE) capsule 100 mg, 100 mg, Oral, DAILY, Nancy Sanches NP, 100 mg at 09/20/22 2325    diphenhydrAMINE (BENADRYL) capsule 25 mg, 25 mg, Oral, Q6H PRN, Al Simmons MD    ARIPiprazole (ABILIFY) tablet 30 mg, 30 mg, Oral, DAILY, Sruthi King MD, 30 mg at 09/21/22 0916    aspirin delayed-release tablet 81 mg, 81 mg, Oral, DAILY, Sruthi King MD, 81 mg at 09/21/22 0916    ferrous sulfate tablet 325 mg, 1 Tablet, Oral, ACB, Sruthi King MD, 325 mg at 09/21/22 0847    sodium chloride (NS) flush 5-40 mL, 5-40 mL, IntraVENous, Q8H, Sruthi King MD, 10 mL at 09/21/22 0707    sodium chloride (NS) flush 5-40 mL, 5-40 mL, IntraVENous, PRN, Sruthi King MD, 10 mL at 09/14/22 0600    acetaminophen (TYLENOL) tablet 650 mg, 650 mg, Oral, Q6H PRN, 650 mg at 09/20/22 0659 **OR** acetaminophen (TYLENOL) suppository 650 mg, 650 mg, Rectal, Q6H PRN, Sruthi King MD    polyethylene glycol (MIRALAX) packet 17 g, 17 g, Oral, DAILY PRN, Sruthi King MD    ondansetron (ZOFRAN ODT) tablet 4 mg, 4 mg, Oral, Q8H PRN **OR** ondansetron (ZOFRAN) injection 4 mg, 4 mg, IntraVENous, Q6H PRN, Sruthi King MD    albuterol-ipratropium (DUO-NEB) 2.5 MG-0.5 MG/3 ML, 3 mL, Nebulization, Q2H PRN, Sruthi King MD    metoprolol (LOPRESSOR) injection 5 mg, 5 mg, IntraVENous, Q6H PRN, Sruthi King MD    magnesium oxide (MAG-OX) tablet 400 mg, 400 mg, Oral, QHS, Dragan Galarza NP, 400 mg at 09/20/22 9483    Ayanna De La O MD  [facility] [address] [place] [address] 13, [address] [place] [address],[address] [place] [address]  [phone]      CC:Brianna Steiner NP

## 2022-09-21 NOTE — PROGRESS NOTES
HEMATOLOGY / ONCOLOGY    [facility] 1945 Age: 68 y.o. CHIEF COMPLAINT:  leg swelling and pain    DISEASE FEATURES  See marrow report below    PREVIOUS TREATMENTS  Jed Alcala 7/2022 -9/1/22, no response    CURRENT TREATMENTS      Disease Status:   Treatment Goal:     ACTIVE PROBLEMS  Myeloproliferative/myelodysplastic disease  --anemia, leukocytosis, erythrocytosis, marrow fibrosis  CHF, mixed systolic and diastolic, acute on chronic  --high output features due to severe anemia  CKD       INTERIM HISTORY AND ASSESSMENT  Ms. Junie Carrasco looks good. Sitting up in chair. R heart cath results noted. She seems stable. I agree with cardiology that she can go home any time now. I can see her next week or the following week in follow-up. Will try to keep her hgb up towards 9. No Vidaza for now. Will consider Jakafi with or without a little hydroxyurea. SUBJECTIVE:    feels stronger, no c/o pain, no sob today    Past Medical History:   Diagnosis Date    Anemia     Congestive heart failure (HCC)     Hypertension     Menopause     Ventral hernia without obstruction or gangrene 4/18/2022     Past Surgical History:   Procedure Laterality Date    HX OTHER SURGICAL  04/13/2022     bone marrow     Medications and Allergies have been reviewed and updated in the [facility] system. REVIEW OF SYSTEMS  Except as noted in the history and assessement above, all other systems are negative. EXAMINATION   Vital signs were reviewed abnormalities discussed above.    General: Frail, edentulous  HEENT: conjunctiva clear; no jaundice  Lungs: clear  CV: RRR  Abd: soft and non-tender; no HSM; no masses  Skin: no significant rashes  Ext: Edema: none; Tenderness: none  Neuro/Psych:    Social History     Tobacco Use    Smoking status: Never    Smokeless tobacco: Never   Substance Use Topics    Alcohol use: Never      Family History   Problem Relation Age of Onset    Breast Cancer Sister         63's    Cancer Sister     Breast Problems Sister     Hypertension Mother         Mago Melendrez MD

## 2022-09-21 NOTE — PROGRESS NOTES
Transition Plan of Care  RUR 21%-Med  Disposition-will discharge back to [facility] [phone] assisted Living. Spoke with Ms Tez Mckenzie and they can admit back tomorrow. Order placed for Rapid covid. CM will arrange transport tomorrow for 1000.

## 2022-09-21 NOTE — PROGRESS NOTES
Problem: Mobility Impaired (Adult and Pediatric)  Goal: *Acute Goals and Plan of Care (Insert Text)  Description: FUNCTIONAL STATUS PRIOR TO ADMISSION: Patient was independent and active without use of DME. When asked she reported no falls in the last 12 months and baseline is room air. She endorsed HHPT at time of admission. HOME SUPPORT PRIOR TO ADMISSION: The patient lived in an assisted living facility and reported no need for assist with mobility. .    Physical Therapy Goals  Initiated 9/16/2022  1. Patient will move from supine to sit and sit to supine , scoot up and down, and roll side to side in bed with independence within 7 day(s). 2.  Patient will transfer from bed to chair and chair to bed with independence using the least restrictive device within 7 day(s). 3.  Patient will perform sit to stand with independence within 7 day(s). 4.  Patient will ambulate with independence for 300 feet with the least restrictive device within 7 day(s). Outcome: Progressing Towards Goal     PHYSICAL THERAPY TREATMENT  Patient: Kelsy Potts (89 y.o. female)  Date: 9/21/2022  Diagnosis: Acute on chronic HFrEF (heart failure with reduced ejection fraction) ([facility]) [I50.23] Acute on chronic HFrEF (heart failure with reduced ejection fraction) ([facility])  Procedure(s) (LRB):  RIGHT HEART CATH (N/A) 1 Day Post-Op  Precautions: Fall  Chart, physical therapy assessment, plan of care and goals were reviewed. ASSESSMENT  Patient continues with skilled PT services and is progressing towards goals. Pt demonstrates improvement in activity tolerance and functional mobility. She ambulated 300 ft with the RW with steady gait, no rest breaks, and VSS. She is modified independent with sit<->stand and supine<->sit. Previously recommended SNF, now recommending return to [facility] w/ HHPT to progress to her baseline of ambulation w/o an assistive device. Plan next session: gait training w/o AD as appropriate.       Current Level of Function Impacting Discharge (mobility/balance): Mod indep supine<->sit w/ HOB flat; Mod indep sit<->stand; Supv/ Mod indep ambulating 300 ft w/ RW    Other factors to consider for discharge: From [facility], has access to the walker          PLAN :  Patient continues to benefit from skilled intervention to address the above impairments. Continue treatment per established plan of care. to address goals. Recommendation for discharge: (in order for the patient to meet his/her long term goals)  Previously recommended SNF. Given pt's progress and that she is from [facility], recommend return to [facility] with HHPT to progress her gait (ambulation w/o AD). This discharge recommendation:  A follow-up discussion with the attending provider and/or case management is planned    IF patient discharges home will need the following DME: Pt states the facility will provide RW if needed. SUBJECTIVE:   Patient stated that's my daughter.  (on the phone)    OBJECTIVE DATA SUMMARY:   Critical Behavior:  Neurologic State: Alert  Orientation Level: Oriented to person, Oriented to place, Oriented to situation, Oriented to time  Cognition: Appropriate decision making, Appropriate for age attention/concentration, Follows commands  Safety/Judgement: Good awareness of safety precautions, Insight into deficits  Functional Mobility Training:  Bed Mobility:  Supine to Sit: Modified independent (HOB flat)  Sit to Supine: Modified independent (HOB flat)  Scooting: Modified independent    Transfers:  Sit to Stand: Modified independent  Stand to Sit: Modified independent                             Balance:  Sitting: Intact; Without support  Standing: Intact; With support  Standing - Static: Good (walker support)  Standing - Dynamic : Good (walker support)  Ambulation/Gait Training:  Distance (ft): 300 Feet (ft)  Assistive Device: Walker, rolling;Gait belt  Ambulation - Level of Assistance: Supervision;Modified independent;Assist x1;Adaptive equipment                       Speed/Marielena: Slow  Step Length: Left shortened;Right shortened  Tx: cues for standing rest break. RN in the room at the end of this session. Informed of pt's progress, portable tele box with pt to allow staff to assist with ambulation. RN removed tele box and placed pt back on the monitor. Tele box is in the basket below the monitor for next session. Pain Rating:  None indicated    Activity Tolerance:   Good, Fair, and SpO2 stable on RA    After treatment patient left in no apparent distress:   Sitting in chair, Call bell within reach, and Bed / chair alarm activated. COMMUNICATION/COLLABORATION:   The patients plan of care was discussed with: Registered nurse.      Billy Martinez, PT   Time Calculation: 29 mins

## 2022-09-21 NOTE — PROGRESS NOTES
Consent seen and sisgned, 1 Transfusion education given, vital signs done and rcorded. PRBC co-signed with staff nurse same commenced.

## 2022-09-21 NOTE — PROGRESS NOTES
[facility] Adult  Hospitalist Group                                                                                          Hospitalist Progress Note  Rupali Saeed MD  Answering service: [phone] OR 36 from in house phone        Date of Service:  2022  NAME:  Emily Jeong  :  1945  MRN:  [id number]      Admission Summary:   Clarisa Guthrie is a 68yo F with a PMH of CHF, HTN, CKD, Myeloproliferative Disorder who presented at the ED with right knee pain x5 days, denies injury: pain is constant/ sharp pain, 5/10 in severity, worse with ambulation, slight relief at rest. Upon arrival to the ED, the patient was found to be in significant respiratory distress, BNP level was elevated, CXR showed evidence of vascular congestion and possible pneumonia. The patient was subsequently referred to the hospitalist service for admission. The patient required being placed on supplemental oxygen in the ED because of the shortness of breath. The patient was last admitted to this hospital from 2022 to 2022 for evaluation of shortness of breath, subsequently attributed to pleural effusion for which the patient underwent thoracentesis and about 1900 mL of fluid was drained from the chest. The patient denied associated fever, rigors, and chills. The patient stated that she has been taking her medication as prescribed including diuretic. Interval history / Subjective: Follow up Acute on chronic CHF   No c/o active CP or shortness of breath  S/p cardiac cath   Feels fine  Wants to go home  Assessment & Plan:     Acute-on-Chronic HFrEF: NYHA III--improving  -[id number]  -ECHO 8/3/22: reduced left ventricular systolic function with EF 40-45%, left ventricle mildly dilated, normal wall thickness.   -S/p cardiac cath   -IV diuresis switched to oral  -Continue Coreg, not on ACEi/ARB/ARNi sec to SKYLA  -Appreciate Cardiology, cleared for discharge     Acute Respiratory Failure with Hypoxia: --improving  multifactorial including CHF and suspected bacterial PNA. -serial cardiac markers  -D-dimer: 4.02  -CXR 9/12: pattern of moderate pulmonary interstitial edema, slightly worse compared to prior study, superimposed infiltrate cannot be excluded. -CT scan chest w/o 9/13: small bilateral pleural effusions, diffusely abnormal bones (differential includes MM, Leukemia/Lymphoma- given splenomegaly) less likely heterogeneous osteopenia or diffuse metastases, Splenomegaly.   -completed Rocephin (9/13--9/17) and Doxycycline (9/13--9/17)  -now on room air     Myeloproliferative Disorder  Anemia sec to above  Thrombocytosis  -CT scan chest w/o 9/13: small bilateral pleural effusions, diffusely abnormal bones (differential includes MM, Leukemia/Lymphoma- given splenomegaly) less likely heterogeneous osteopenia or diffuse metastases, Splenomegaly. -Appreciate hematology, transfuse to keep hb>7.5  -chronic leukocytosis    CKD 3B:   -monitor renal function  -BUN 32 Creat 1.36, improved     HTN: BP stable. Tachycardia: stable/improved low 100's-90's, continue to monitor. Febrile: cultures unremarkable,      Right Knee Effusion:  denies trauma. -X-ray Right knee 9/12: moderate effusion. -X-ray Right tib/fib 9/12: no acute abnormality.    -Duplex RLE 9/12: negative for DVT. -Orthopedic signed off    Regular diet    PT/OT [facility]        DVTppx: Lovenox  Code Status: Full Code  PTA: DIAN, [address]: Discharge today if cleared by hematology  Diet: Cardiac  Activity: OOB to chair TID and PRN as tolerated  Discharge: ?today  Ambulates: independently     Hospital Problems  Date Reviewed: 9/13/2022            Codes Class Noted POA    * (Principal) Acute on chronic HFrEF (heart failure with reduced ejection fraction) ([facility] [address] ICD-10-CM: L72.15  ICD-9-CM: 428.23  9/12/2022 Yes       Review of Systems:   A comprehensive review of systems was negative except for that written in the HPI. Vital Signs:    Last 24hrs VS reviewed since prior progress note. Most recent are:  Visit Vitals  BP (!) 122/53 (BP 1 Location: Left arm, BP Patient Position: Sitting)   Pulse 84   Temp 98.2 °F (36.8 °C)   Resp 20   Ht 5' 5\" (1.651 m)   Wt 67.1 kg (147 lb 14.9 oz)   SpO2 96%   Breastfeeding No   BMI 24.62 kg/m²         Intake/Output Summary (Last 24 hours) at 9/21/2022 1417  Last data filed at 9/21/2022 1000  Gross per 24 hour   Intake 840 ml   Output 350 ml   Net 490 ml          Physical Examination:     I had a face to face encounter with this patient and independently examined them on 9/21/2022 as outlined below:          Constitutional:  No acute distress, cooperative, pleasant. ENT:  Oral mucosa moist.    Resp:  CTA bilaterally. No wheezing/rhonchi/rales. No accessory muscle use. CV:  Regular rhythm, normal rate, S1,S2.    GI/:  Soft, non distended, non tender, no guarding, BS present. Voids Freely. Musculoskeletal:  No edema, warm. Right knee swelling. Neurologic:  Moves all extremities. AAOx3. Skin:  w/d, chronic hyperpigmentation/dark  to  BLE. Psych:  Good insight, Not anxious nor agitated. Data Review:    Review and/or order of clinical lab test      Labs:     Recent Labs     09/21/22  0653 09/20/22  0344   WBC 91.7* 82.0*   HGB 8.8* 7.5*   HCT 28.9* 26.0*   * 791*       Recent Labs     09/21/22  0653 09/20/22  0344    138   K 5.0 5.1    106   CO2 27 26   BUN 32* 34*   CREA 1.33* 1.34*   GLU 87 92   CA 8.9 8.9   MG  --  2.5*       No results for input(s): ALT, AP, TBIL, TBILI, TP, ALB, GLOB, GGT, AML, LPSE in the last 72 hours. No lab exists for component: SGOT, GPT, AMYP, HLPSE    No results for input(s): INR, PTP, APTT, INREXT, INREXT in the last 72 hours. No results for input(s): FE, TIBC, PSAT, FERR in the last 72 hours.      Lab Results   Component Value Date/Time    Folate 36.1 (H) 09/13/2022 04:45 AM        No results for input(s): PH, PCO2, PO2 in the last 72 hours. No results for input(s): CPK, CKNDX, TROIQ in the last 72 hours.     No lab exists for component: CPKMB  No results found for: CHOL, CHOLX, CHLST, CHOLV, HDL, HDLP, LDL, LDLC, DLDLP, TGLX, TRIGL, TRIGP, CHHD, CHHDX  No results found for: [facility]  Lab Results   Component Value Date/Time    Color YELLOW/STRAW 09/14/2022 02:51 PM    Appearance CLEAR 09/14/2022 02:51 PM    Specific gravity 1.013 09/14/2022 02:51 PM    pH (UA) 5.5 09/14/2022 02:51 PM    Protein Negative 09/14/2022 02:51 PM    Glucose Negative 09/14/2022 02:51 PM    Ketone Negative 09/14/2022 02:51 PM    Bilirubin Negative 09/14/2022 02:51 PM    Urobilinogen 0.2 09/14/2022 02:51 PM    Nitrites Negative 09/14/2022 02:51 PM    Leukocyte Esterase Negative 09/14/2022 02:51 PM    Epithelial cells FEW 09/14/2022 02:51 PM    Bacteria Negative 09/14/2022 02:51 PM    WBC 0-4 09/14/2022 02:51 PM    RBC 0-5 09/14/2022 02:51 PM         Medications Reviewed:     Current Facility-Administered Medications   Medication Dose Route Frequency    bumetanide (BUMEX) tablet 2 mg  2 mg Oral BID    0.9% sodium chloride infusion 250 mL  250 mL IntraVENous PRN    carvediloL (COREG) tablet 3.125 mg  3.125 mg Oral BID WITH MEALS    enoxaparin (LOVENOX) injection 40 mg  40 mg SubCUTAneous DAILY    0.9% sodium chloride infusion 250 mL  250 mL IntraVENous PRN    docusate sodium (COLACE) capsule 100 mg  100 mg Oral DAILY    diphenhydrAMINE (BENADRYL) capsule 25 mg  25 mg Oral Q6H PRN    ARIPiprazole (ABILIFY) tablet 30 mg  30 mg Oral DAILY    aspirin delayed-release tablet 81 mg  81 mg Oral DAILY    ferrous sulfate tablet 325 mg  1 Tablet Oral ACB    sodium chloride (NS) flush 5-40 mL  5-40 mL IntraVENous Q8H    sodium chloride (NS) flush 5-40 mL  5-40 mL IntraVENous PRN    acetaminophen (TYLENOL) tablet 650 mg  650 mg Oral Q6H PRN    Or    acetaminophen (TYLENOL) suppository 650 mg  650 mg Rectal Q6H PRN    polyethylene glycol (MIRALAX) packet 17 g  17 g Oral DAILY PRN    ondansetron (ZOFRAN ODT) tablet 4 mg  4 mg Oral Q8H PRN    Or    ondansetron (ZOFRAN) injection 4 mg  4 mg IntraVENous Q6H PRN    albuterol-ipratropium (DUO-NEB) 2.5 MG-0.5 MG/3 ML  3 mL Nebulization Q2H PRN    metoprolol (LOPRESSOR) injection 5 mg  5 mg IntraVENous Q6H PRN    magnesium oxide (MAG-OX) tablet 400 mg  400 mg Oral QHS     ______________________________________________________________________  EXPECTED LENGTH OF STAY: 5d 4h  ACTUAL LENGTH OF STAY:          Valentino Welsh MD

## 2022-09-21 NOTE — DISCHARGE SUMMARY
Discharge Summary       PATIENT ID: Paul Milner  MRN: [id number]   YOB: 1945    DATE OF ADMISSION: 9/12/2022  2:01 PM    DATE OF DISCHARGE: 9/21/2022   PRIMARY CARE PROVIDER: Martina Jj NP     ATTENDING PHYSICIAN: Dr Nadira Levy   DISCHARGING PROVIDER: Nadira Levy MD    To contact this individual call [phone] and ask the  to page. If unavailable ask to be transferred the Adult Hospitalist Department. CONSULTATIONS: IP CONSULT TO CARDIOLOGY  IP CONSULT TO HEMATOLOGY  IP CONSULT TO ORTHOPEDIC SURGERY    PROCEDURES/SURGERIES: Procedure(s):  RIGHT HEART CATH    DISCHARGE DIAGNOSES:   Acute-on-Chronic HFrEF: NYHA III--improving  -[id number]  -ECHO 8/3/22: reduced left ventricular systolic function with EF 40-45%, left ventricle mildly dilated, normal wall thickness. -S/p cardiac cath 9/20  -IV diuresis switched to oral  -Continue Coreg, not on ACEi/ARB/ARNi sec to SKYLA  -Appreciate Cardiology, cleared for discharge     Acute Respiratory Failure with Hypoxia: --improving  multifactorial including CHF and suspected bacterial PNA. -serial cardiac markers  -D-dimer: 4.02  -CXR 9/12: pattern of moderate pulmonary interstitial edema, slightly worse compared to prior study, superimposed infiltrate cannot be excluded. -CT scan chest w/o 9/13: small bilateral pleural effusions, diffusely abnormal bones (differential includes MM, Leukemia/Lymphoma- given splenomegaly) less likely heterogeneous osteopenia or diffuse metastases, Splenomegaly.   -completed Rocephin (9/13--9/17) and Doxycycline (9/13--9/17)  -now on room air     Myeloproliferative Disorder  Anemia sec to above  Thrombocytosis  -CT scan chest w/o 9/13: small bilateral pleural effusions, diffusely abnormal bones (differential includes MM, Leukemia/Lymphoma- given splenomegaly) less likely heterogeneous osteopenia or diffuse metastases, Splenomegaly.    -Appreciate hematology, transfuse to keep hb>7.5  -chronic leukocytosis    CKD 3B:   -monitor renal function  -BUN 32 Creat 1.36, improved     HTN: BP stable. Tachycardia: stable/improved low 100's-90's, continue to monitor. Febrile: cultures unremarkable,      Right Knee Effusion:  denies trauma. -X-ray Right knee 9/12: moderate effusion. -X-ray Right tib/fib 9/12: no acute abnormality.    -Duplex RLE 9/12: negative for DVT. -Orthopedic signed off      ADDITIONAL CARE RECOMMENDATIONS:   Follow up PMD  Cardiology  Hematology     NOTIFY YOUR PHYSICIAN FOR ANY OF THE FOLLOWING:   Fever over 101 degrees for 24 hours. Chest pain, shortness of breath, fever, chills, nausea, vomiting, diarrhea, change in mentation, falling, weakness, bleeding. Severe pain or pain not relieved by medications, as well as any other signs or symptoms that you may have questions about. FOLLOW UP APPOINTMENTS:    Follow-up Information       Follow up With Specialties Details Why Contact Info    Jaspal Roman NP Nurse Practitioner Follow up in 1 week(s)  [address] [place] [address]      Jing Dobbins MD Specialist Undefined, Cardiovascular Disease Physician Follow up in 1 week(s)  [address] [facility] [phone]                 DIET: Cardiac Diet    ACTIVITY: Activity as tolerated    DISCHARGE MEDICATIONS:  Current Discharge Medication List        START taking these medications    Details   carvediloL (COREG) 3.125 mg tablet Take 1 Tablet by mouth two (2) times daily (with meals). Qty: 60 Tablet, Refills: 1  Start date: 9/21/2022           CONTINUE these medications which have NOT CHANGED    Details   loperamide (IMODIUM) 2 mg capsule Take 4 mg by mouth as needed for Diarrhea.  Take 2 caplets (4 mg) after the initial loose stool, then take 1 tablet (2 mg) after each loose stool as needed max 4 capsules (8mg)/24hrs      !! ondansetron (ZOFRAN ODT) 8 mg disintegrating tablet Take 8 mg by mouth every eight (8) hours as needed for Nausea or Vomiting. !! ondansetron (ZOFRAN ODT) 8 mg disintegrating tablet Take 8 mg by mouth as needed (One tablet by mouth one hour prior to chemo infusion). One tablet by mouth one hour prior to chemo infusion  Indications: prevent nausea and vomiting from cancer chemotherapy      prochlorperazine (COMPAZINE) 10 mg tablet Take 10 mg by mouth every six (6) hours as needed for Nausea or Vomiting. bumetanide (BUMEX) 2 mg tablet Take 2 mg by mouth two (2) times a day. multivitamin-iron-FA, hematinic, (Certavite-Antioxidant)  mg-mcg tab tablet Take 1 Tablet by mouth daily. acetaminophen (TYLENOL) 500 mg tablet Take 1,000 mg by mouth two (2) times daily as needed for Pain.      pseudoephed/acetaminophen/cpm (GRACY-SELTZER PLUS COLD PO) Take  by mouth daily as needed. ARIPiprazole (ABILIFY) 30 mg tablet Take 30 mg by mouth daily. aspirin 81 mg cap Take  by mouth daily. docusate sodium (COLACE) 100 mg capsule Take 100 mg by mouth two (2) times a day. ferrous sulfate 325 mg (65 mg iron) tablet Take  by mouth Daily (before breakfast). magnesium oxide (MAG-OX) 400 mg tablet Take 400 mg by mouth nightly. cholecalciferol, vitamin D3, 50 mcg (2,000 unit) tab Take 2,000 Units by mouth daily. ipratropium (ATROVENT) 21 mcg (0.03 %) nasal spray 1 [place] by Both Nostrils route three (3) times daily as needed (Nasal drainage). !! - Potential duplicate medications found. Please discuss with provider.         STOP taking these medications       multivit-min-FA-lycopen-lutein (Centrum Silver) 0.4-300-250 mg-mcg-mcg tab Comments:   Reason for Stopping:               DISPOSITION:   x Home With:   OT  PT  HH  RN       Long term SNF/Inpatient Rehab    Independent/assisted living    Hospice    Other:       PATIENT CONDITION AT DISCHARGE:     Functional status    Poor     Deconditioned    x Independent      Cognition   x  Lucid     Forgetful     Dementia      Catheters/lines (plus indication)    Liao     PICC     PEG    x None      Code status    x Full code     DNR      PHYSICAL EXAMINATION AT DISCHARGE:  Please see progress note      CHRONIC MEDICAL DIAGNOSES:  Problem List as of 9/21/2022 Date Reviewed: 9/13/2022            Codes Class Noted - Resolved    * (Principal) Acute on chronic HFrEF (heart failure with reduced ejection fraction) ([facility]) ICD-10-CM: I50.23  ICD-9-CM: 428.23  9/12/2022 - Present        CKD (chronic kidney disease) stage 4, GFR 15-29 ml/min ([facility]) ICD-10-CM: N18.4  ICD-9-CM: 585.4  8/4/2022 - Present        Chronic renal disease, stage III ICD-10-CM: N18.30  ICD-9-CM: 585.3  5/9/2022 - Present        Ventral hernia without obstruction or gangrene ICD-10-CM: K43.9  ICD-9-CM: 553.20  4/18/2022 - Present        Pleural effusion on right ICD-10-CM: J90  ICD-9-CM: 511.9  2/18/2022 - Present        SOB (shortness of breath) ICD-10-CM: R06.02  ICD-9-CM: 786.05  2/16/2022 - Present        Acute respiratory failure ([address] ICD-10-CM: J96.00  ICD-9-CM: 518.81  1/28/2022 - Present           Greater than 39 minutes were spent with the patient on counseling and coordination of care    Signed:   Gifty Hickey MD  9/21/2022  2:25 PM    .

## 2022-09-22 VITALS
DIASTOLIC BLOOD PRESSURE: 63 MMHG | HEART RATE: 91 BPM | TEMPERATURE: 98.4 F | RESPIRATION RATE: 22 BRPM | BODY MASS INDEX: 24.28 KG/M2 | HEIGHT: 65 IN | SYSTOLIC BLOOD PRESSURE: 111 MMHG | WEIGHT: 145.72 LBS | OXYGEN SATURATION: 99 %

## 2022-09-22 LAB
ANION GAP SERPL CALC-SCNC: 7 MMOL/L (ref 5–15)
BASOPHILS # BLD: 2.8 K/UL (ref 0–0.1)
BASOPHILS NFR BLD: 3 % (ref 0–1)
BUN SERPL-MCNC: 35 MG/DL (ref 6–20)
BUN/CREAT SERPL: 25 (ref 12–20)
CALCIUM SERPL-MCNC: 8.8 MG/DL (ref 8.5–10.1)
CHLORIDE SERPL-SCNC: 104 MMOL/L (ref 97–108)
CO2 SERPL-SCNC: 25 MMOL/L (ref 21–32)
CREAT SERPL-MCNC: 1.38 MG/DL (ref 0.55–1.02)
DIFFERENTIAL METHOD BLD: ABNORMAL
EOSINOPHIL # BLD: 2.8 K/UL (ref 0–0.4)
EOSINOPHIL NFR BLD: 3 % (ref 0–7)
ERYTHROCYTE [DISTWIDTH] IN BLOOD BY AUTOMATED COUNT: 18.2 % (ref 11.5–14.5)
GLUCOSE SERPL-MCNC: 87 MG/DL (ref 65–100)
HCT VFR BLD AUTO: 28.2 % (ref 35–47)
HGB BLD-MCNC: 8.5 G/DL (ref 11.5–16)
IMM GRANULOCYTES # BLD AUTO: 0 K/UL
IMM GRANULOCYTES NFR BLD AUTO: 0 %
LYMPHOCYTES # BLD: 14.2 K/UL (ref 0.8–3.5)
LYMPHOCYTES NFR BLD: 15 % (ref 12–49)
MCH RBC QN AUTO: 24.6 PG (ref 26–34)
MCHC RBC AUTO-ENTMCNC: 30.1 G/DL (ref 30–36.5)
MCV RBC AUTO: 81.7 FL (ref 80–99)
METAMYELOCYTES NFR BLD MANUAL: 4 %
MONOCYTES # BLD: 9.5 K/UL (ref 0–1)
MONOCYTES NFR BLD: 10 % (ref 5–13)
MYELOCYTES NFR BLD MANUAL: 14 %
NEUTS BAND NFR BLD MANUAL: 9 % (ref 0–6)
NEUTS SEG # BLD: 48.3 K/UL (ref 1.8–8)
NEUTS SEG NFR BLD: 42 % (ref 32–75)
NRBC # BLD: 1.36 K/UL (ref 0–0.01)
NRBC BLD-RTO: 1.4 PER 100 WBC
PLATELET # BLD AUTO: 778 K/UL (ref 150–400)
PLATELET COMMENTS,PCOM: ABNORMAL
PMV BLD AUTO: 11.8 FL (ref 8.9–12.9)
POTASSIUM SERPL-SCNC: 4.7 MMOL/L (ref 3.5–5.1)
RBC # BLD AUTO: 3.45 M/UL (ref 3.8–5.2)
RBC MORPH BLD: ABNORMAL
SODIUM SERPL-SCNC: 136 MMOL/L (ref 136–145)
WBC # BLD AUTO: 94.7 K/UL (ref 3.6–11)
WBC MORPH BLD: ABNORMAL

## 2022-09-22 PROCEDURE — 80048 BASIC METABOLIC PNL TOTAL CA: CPT

## 2022-09-22 PROCEDURE — 85025 COMPLETE CBC W/AUTO DIFF WBC: CPT

## 2022-09-22 PROCEDURE — 74011000250 HC RX REV CODE- 250: Performed by: INTERNAL MEDICINE

## 2022-09-22 PROCEDURE — 74011250637 HC RX REV CODE- 250/637: Performed by: INTERNAL MEDICINE

## 2022-09-22 PROCEDURE — 36415 COLL VENOUS BLD VENIPUNCTURE: CPT

## 2022-09-22 PROCEDURE — 74011250636 HC RX REV CODE- 250/636: Performed by: HOSPITALIST

## 2022-09-22 PROCEDURE — 74011250637 HC RX REV CODE- 250/637: Performed by: SPECIALIST

## 2022-09-22 RX ADMIN — ENOXAPARIN SODIUM 40 MG: 100 INJECTION SUBCUTANEOUS at 08:44

## 2022-09-22 RX ADMIN — FERROUS SULFATE TAB 325 MG (65 MG ELEMENTAL FE) 325 MG: 325 (65 FE) TAB at 07:34

## 2022-09-22 RX ADMIN — ARIPIPRAZOLE 30 MG: 30 TABLET ORAL at 08:43

## 2022-09-22 RX ADMIN — CARVEDILOL 3.12 MG: 3.12 TABLET, FILM COATED ORAL at 08:43

## 2022-09-22 RX ADMIN — BUMETANIDE 2 MG: 1 TABLET ORAL at 08:43

## 2022-09-22 RX ADMIN — ASPIRIN 81 MG: 81 TABLET, COATED ORAL at 08:43

## 2022-09-22 RX ADMIN — SODIUM CHLORIDE, PRESERVATIVE FREE 10 ML: 5 INJECTION INTRAVENOUS at 07:34

## 2022-09-22 NOTE — PROGRESS NOTES
Bedside and Verbal shift change report given to Darlene Elias RN (oncoming nurse) by Kelly Singh LPN and Griselda Fat, RN (offgoing nurse). Report included the following information SBAR, Kardex, Intake/Output, MAR, and Recent Results.

## 2022-09-22 NOTE — PROGRESS NOTES
Transition Plan of Care  RUR 20%-Med  Disposition-discharging back to [facility] today via [facility] at 1100. Left message for patient's guardian Amna Elliott [phone] regarding disposition. Spoke with  Ms. Shaka Hollingsworth [phone] regarding discharge today. Rapid Covid completed and copy paced in envelope provided. One new prescription and attending has dent to patient's pharmacy. Nursing to call report to the facility and send copy of AVS with transport. Medicare pt has received, reviewed, and signed 2nd IM letter informing them of their right to appeal the discharge. Signed copy has been placed on pt bedside chart. Reviewed with guardian. Also spoke with daughter Guillaume Sons and updated on disposition. She is agreeable to this disposition.

## 2022-09-22 NOTE — PROGRESS NOTES
Hospital follow-up PCP transitional care appointment has been scheduled with Lakesha Lopez NP for Wednesday, September 28th, 2022. This will be HOME VISITATION Pending patient discharge.   Madie Habermann, Care Management Assistant

## 2022-09-22 NOTE — DISCHARGE SUMMARY
Discharge Summary       PATIENT ID: Moshe Sofia  MRN: [id number]   YOB: 1945    DATE OF ADMISSION: 9/12/2022  2:01 PM    DATE OF DISCHARGE: 9/21/2022   PRIMARY CARE PROVIDER: Lorenzo Wright NP     ATTENDING PHYSICIAN: Dr Jennie Shore   DISCHARGING PROVIDER: Reagan Oviedo MD    To contact this individual call [phone] and ask the  to page. If unavailable ask to be transferred the Adult Hospitalist Department. CONSULTATIONS: IP CONSULT TO CARDIOLOGY  IP CONSULT TO HEMATOLOGY  IP CONSULT TO ORTHOPEDIC SURGERY    PROCEDURES/SURGERIES: Procedure(s):  RIGHT HEART CATH    DISCHARGE DIAGNOSES:   Acute-on-Chronic HFrEF: NYHA III--improved  -[id number]  -ECHO 8/3/22: reduced left ventricular systolic function with EF 40-45%, left ventricle mildly dilated, normal wall thickness. -S/p cardiac cath 9/20  -IV diuresis switched to oral  -Continue Coreg, not on ACEi/ARB/ARNi sec to SKYLA  -Appreciate Cardiology, cleared for discharge     Acute Respiratory Failure with Hypoxia: --improved  multifactorial including CHF and suspected bacterial PNA. -serial cardiac markers  -D-dimer: 4.02  -CXR 9/12: pattern of moderate pulmonary interstitial edema, slightly worse compared to prior study, superimposed infiltrate cannot be excluded. -CT scan chest w/o 9/13: small bilateral pleural effusions, diffusely abnormal bones (differential includes MM, Leukemia/Lymphoma- given splenomegaly) less likely heterogeneous osteopenia or diffuse metastases, Splenomegaly.   -completed Rocephin (9/13--9/17) and Doxycycline (9/13--9/17)  -now on room air     Myeloproliferative Disorder  Anemia sec to above  Thrombocytosis  -CT scan chest w/o 9/13: small bilateral pleural effusions, diffusely abnormal bones (differential includes MM, Leukemia/Lymphoma- given splenomegaly) less likely heterogeneous osteopenia or diffuse metastases, Splenomegaly.    -Appreciate hematology, transfuse to keep hb>7.5  -chronic leukocytosis    CKD 3B:   -monitor renal function  -BUN 32 Creat 1.36, improved     HTN: BP stable. Tachycardia: stable/improved low 100's-90's, continue to monitor. Febrile: cultures unremarkable,      Right Knee Effusion:  denies trauma. -X-ray Right knee 9/12: moderate effusion. -X-ray Right tib/fib 9/12: no acute abnormality.    -Duplex RLE 9/12: negative for DVT. -Orthopedic signed off      ADDITIONAL CARE RECOMMENDATIONS:   Follow up PMD  Cardiology in 1 week  Hematology in 1 week   CHF guidelines  - daily weight, fluid restriction, low salt     NOTIFY YOUR PHYSICIAN FOR ANY OF THE FOLLOWING:   Fever over 101 degrees for 24 hours. Chest pain, shortness of breath, fever, chills, nausea, vomiting, diarrhea, change in mentation, falling, weakness, bleeding. Severe pain or pain not relieved by medications, as well as any other signs or symptoms that you may have questions about. FOLLOW UP APPOINTMENTS:    Follow-up Information       Follow up With Specialties Details Why Contact Info    Megan Dorman NP Nurse Practitioner Follow up in 1 week(s) Hospital follow up with PCP scheduled for Wednesday, September 28th, 2022. This will be a home visitation [address] [place] [address] [address]      Nadia Quintero MD Cardiovascular Disease Physician Follow up on 9/30/2022 APPT TIME 240 PM WITH DR Giselle Malin.  WILL NEED TO ARRIVE 15 MINUTES PRIOR TO APPT. [address] [facility]  [address] [facility] 421 [facility]      Farshad Rosa, [address] [facility] Vascular Surgery, Cardiovascular Disease Physician Follow up in 1 week(s)  Jess Fleming 99      Merlinda Pickett, MD Hematology and Oncology, Hematology Physician, Oncology Follow up  [facility] [phone]                 DIET: Cardiac Diet    ACTIVITY: Activity as tolerated    DISCHARGE MEDICATIONS:  Discharge Medication List as of 9/22/2022 10:23 AM        START taking these medications    Details   carvediloL (COREG) 3.125 mg tablet Take 1 Tablet by mouth two (2) times daily (with meals). , Normal, Disp-60 Tablet, R-1           CONTINUE these medications which have NOT CHANGED    Details   loperamide (IMODIUM) 2 mg capsule Take 4 mg by mouth as needed for Diarrhea. Take 2 caplets (4 mg) after the initial loose stool, then take 1 tablet (2 mg) after each loose stool as needed max 4 capsules (8mg)/24hrs, Historical Med      !! ondansetron (ZOFRAN ODT) 8 mg disintegrating tablet Take 8 mg by mouth every eight (8) hours as needed for Nausea or Vomiting., Historical Med      !! ondansetron (ZOFRAN ODT) 8 mg disintegrating tablet Take 8 mg by mouth as needed (One tablet by mouth one hour prior to chemo infusion). One tablet by mouth one hour prior to chemo infusion  Indications: prevent nausea and vomiting from cancer chemotherapy, Historical Med      prochlorperazine (COMPAZINE) 10 mg tablet Take 10 mg by mouth every six (6) hours as needed for Nausea or Vomiting., Historical Med      bumetanide (BUMEX) 2 mg tablet Take 2 mg by mouth two (2) times a day., Historical Med      multivitamin-iron-FA, hematinic, (Certavite-Antioxidant)  mg-mcg tab tablet Take 1 Tablet by mouth daily. , Historical Med      acetaminophen (TYLENOL) 500 mg tablet Take 1,000 mg by mouth two (2) times daily as needed for Pain., Historical Med      pseudoephed/acetaminophen/cpm (GRACY-SELTZER PLUS COLD PO) Take  by mouth daily as needed., Historical Med      ARIPiprazole (ABILIFY) 30 mg tablet Take 30 mg by mouth daily. , Historical Med      aspirin 81 mg cap Take  by mouth daily. , Historical Med      docusate sodium (COLACE) 100 mg capsule Take 100 mg by mouth two (2) times a day., Historical Med      ferrous sulfate 325 mg (65 mg iron) tablet Take  by mouth Daily (before breakfast). , Historical Med      magnesium oxide (MAG-OX) 400 mg tablet Take 400 mg by mouth nightly., Historical Med      cholecalciferol, vitamin D3, 50 mcg (2,000 unit) tab Take 2,000 Units by mouth daily. , Historical Med      ipratropium (ATROVENT) 21 mcg (0.03 %) nasal spray 1 [place] by Both Nostrils route three (3) times daily as needed (Nasal drainage). , Historical Med       !! - Potential duplicate medications found. Please discuss with provider.         STOP taking these medications       multivit-min-FA-lycopen-lutein (Centrum Silver) 0.4-300-250 mg-mcg-mcg tab Comments:   Reason for Stopping:               DISPOSITION:   x Home With:   OT  PT  HH  RN       Long term SNF/Inpatient Rehab    Independent/assisted living    Hospice    Other:       PATIENT CONDITION AT DISCHARGE:     Functional status    Poor     Deconditioned    x Independent      Cognition   x  Lucid     Forgetful     Dementia      Catheters/lines (plus indication)    Liao     PICC     PEG    x None      Code status    x Full code     DNR      PHYSICAL EXAMINATION AT DISCHARGE:  Please see progress note      CHRONIC MEDICAL DIAGNOSES:  Problem List as of 9/22/2022 Date Reviewed: 9/13/2022            Codes Class Noted - Resolved    * (Principal) Acute on chronic HFrEF (heart failure with reduced ejection fraction) ([facility]) ICD-10-CM: A52.71  ICD-9-CM: 428.23  9/12/2022 - Present        CKD (chronic kidney disease) stage 4, GFR 15-29 ml/min ([facility]) ICD-10-CM: N18.4  ICD-9-CM: 585.4  8/4/2022 - Present        Chronic renal disease, stage III ICD-10-CM: N18.30  ICD-9-CM: 585.3  5/9/2022 - Present        Ventral hernia without obstruction or gangrene ICD-10-CM: K43.9  ICD-9-CM: 553.20  4/18/2022 - Present        Pleural effusion on right ICD-10-CM: J90  ICD-9-CM: 511.9  2/18/2022 - Present        SOB (shortness of breath) ICD-10-CM: R06.02  ICD-9-CM: 786.05  2/16/2022 - Present        Acute respiratory failure ([facility] [address] ICD-10-CM: J96.00  ICD-9-CM: 518.81  1/28/2022 - Present         Greater than 39 minutes were spent with the patient on counseling and coordination of care    Signed:   Kassy Morton Brianne Lyle MD  9/22/2022  2:25 PM    .

## 2022-09-22 NOTE — PROGRESS NOTES
Writer gave telephone report to jennyfer at returning facility. Pertinent medical status given. Also updated all meds, and discharge instructions. Right neck dressing dry and intact. Latest vitals and labs given. All questions answered. Jennyfer verbalized understanding. sending discharge summary.

## 2022-09-22 NOTE — PROGRESS NOTES
2000 Verbal bedside update given to Alysa Bal returning nurse by Tran Sanchez RN off-going nurse. Report included update on pt status and condition, recent results,heart rate and rhythm, respiratory status and MAR.

## 2022-09-23 ENCOUNTER — PATIENT OUTREACH (OUTPATIENT)
Dept: CASE MANAGEMENT | Age: 77
End: 2022-09-23

## 2022-09-23 NOTE — PROGRESS NOTES
Care Transitions Initial Call    Call within 2 business days of discharge: Yes     Patient: Camden Nur Patient : 1945 MRN: [id number]    Last Discharge [address]       Date Complaint Diagnosis Description Type Department Provider    22 Knee Pain Acute on chronic systolic CHF (congestive heart failure) ([facility] [address] . .. ED to Hosp-Admission (Discharged) (ADMIT) Armando Jolley MD; Fior Hunt. .. Was this an external facility discharge? No     Challenges to be reviewed by the provider   Additional needs identified to be addressed with provider: no    Needs follow up appointment scheduled with Hematology-Oncology- 1-2 weeks. Method of communication with provider : none    Discussed COVID-19 related testing which was available at this time. Test results were negative. Patient informed of results, if available? [facility] required before returning. Advance Care Planning:   Does patient have an Advance Directive: not on file. Inpatient Readmission Risk score: Unplanned Readmit Risk Score: 20.5    Was this a readmission? no     Patients top risk factors for readmission: medical condition- , support system, transportation, and utilization of services   Interventions to address risk factors: Education of patient/family/caregiver/guardian to support self-management-  and Assessment and support for treatment adherence and medication management-     Care Transition Nurse (CTN) contacted the  daughter and later spoke with nurse Shey at [facility]  by telephone to perform post hospital discharge assessment. Verified name and  with  both  as identifiers. Provided introduction to self, and explanation of the CTN role. CTN reviewed discharge instructions, medical action plan and red flags with  both  who verbalized understanding. Were discharge instructions available to patient? yes.  Reviewed appropriate site of care based on symptoms and resources available to patient including: Specialist and dispatch health and [facility] staff/provider . Both  given an opportunity to ask questions and does not have any further questions or concerns at this time. The  daughter and [facility] staff  agree to contact the PCP and/or specialist office for questions related to their healthcare. Medication reconciliation was performed with  nurse Dante at [facility] , who verbalizes understanding of administration of home medications. Advised obtaining a 90-day supply of all daily and as-needed medications. Referral to Pharm D needed: no     Home Health/Outpatient orders at discharge: none    Durable Medical Equipment ordered at discharge: None    Was patient discharged with a pulse oximeter? [facility] staff have pulse oximeters and can check PRN. Discussed follow-up appointments. If no appointment was previously scheduled, appointment scheduling offered:  daughter and facility schedule her appointments . Is follow up appointment scheduled within 7 days of discharge? yes. [facility] follow up appointment(s):   Future Appointments   Date Time Provider Sara Wei   9/30/2022  2:40 PM MD YARA Teran [facility]   10/4/2022 11:00 AM [address]     Non-[facility] follow up appointment(s):   PCP- Lona Nava NP- on 9/28. Sees her at [facility]  Hem-oncology- Dr. Luann Rojas- 1-2 weeks-his office will schedule     Plan for follow-up call in 5-7 days based on severity of symptoms and risk factors. Plan for next call:     CTN provided contact information for future needs. Goals Addressed                   This Visit's Progress       Heart Failure     Supportive resources in place to maintain patient in the community (ie. Home Health, DME equipment, refer to, medication assistant plan, etc.)        9/23/22- spoke with daughterAltagracia. Answered questions and discussed plan for recovery. Later spoke with Mila Huerta, nurse at [facility]. She reports she is doing well- was able to shower and slept well last night.    This morning her VS- 123/64 and HR 92. She states usual HR in 100's. Briefly explained hospital stay and recovery and answered questions. Relayed I had spoken earlier to daughter, relayed cardiology follow up appointment and need to schedule Hem-Oncology follow up in 1-2 weeks per provider's note. May be considering a new medication for her leukemia.                                   [employer]

## 2022-09-30 ENCOUNTER — OFFICE VISIT (OUTPATIENT)
Dept: CARDIOLOGY CLINIC | Age: 77
End: 2022-09-30
Payer: MEDICARE

## 2022-09-30 VITALS
BODY MASS INDEX: 23.49 KG/M2 | HEART RATE: 92 BPM | DIASTOLIC BLOOD PRESSURE: 60 MMHG | RESPIRATION RATE: 16 BRPM | WEIGHT: 141 LBS | SYSTOLIC BLOOD PRESSURE: 100 MMHG | HEIGHT: 65 IN | OXYGEN SATURATION: 98 %

## 2022-09-30 DIAGNOSIS — D64.89 ANEMIA DUE TO OTHER CAUSE, NOT CLASSIFIED: ICD-10-CM

## 2022-09-30 DIAGNOSIS — C95.11 CHRONIC LEUKEMIA IN REMISSION (HCC): ICD-10-CM

## 2022-09-30 DIAGNOSIS — J90 PLEURAL EFFUSION ON RIGHT: ICD-10-CM

## 2022-09-30 DIAGNOSIS — N18.30 STAGE 3 CHRONIC KIDNEY DISEASE, UNSPECIFIED WHETHER STAGE 3A OR 3B CKD (HCC): Primary | ICD-10-CM

## 2022-09-30 PROBLEM — I50.23 ACUTE ON CHRONIC HFREF (HEART FAILURE WITH REDUCED EJECTION FRACTION) (HCC): Status: RESOLVED | Noted: 2022-09-12 | Resolved: 2022-09-30

## 2022-09-30 PROCEDURE — 1123F ACP DISCUSS/DSCN MKR DOCD: CPT | Performed by: INTERNAL MEDICINE

## 2022-09-30 PROCEDURE — G0463 HOSPITAL OUTPT CLINIC VISIT: HCPCS | Performed by: INTERNAL MEDICINE

## 2022-09-30 PROCEDURE — 99204 OFFICE O/P NEW MOD 45 MIN: CPT | Performed by: INTERNAL MEDICINE

## 2022-09-30 NOTE — PROGRESS NOTES
Tyler Malin MD, MS, [facility]            HISTORY OF PRESENT ILLNESS:    Maday Roman is a 68 y.o. female here for hospital FU. Admitted [facility] 9/2022. HFpEF. IV diuresed in house. RHC Findings  1. Normal right-sided filling pressures  2. Elevated cardiac output and index consistent with anemia  3. Normal PVR and SVR  4. Normal pulmonary capillary wedge pressure     Recommendations  1. Treat as heart failure secondary to anemia. Is anemic, sees Dr. Scout Rosado 10/3. Labs 9/22 creatinine stable @ 1.38. Has chronic tight edema, perhaps due more to lymphedema. Possible could consider lymphedema clinic. Advised FU with Dr. Scout Rosado regarding anemia. Continue buex 2mg po daily. BP well controlled. Total time spent >35 min, reviewing my prior notes, referring physician notes, other subspecialty and primary care notes, all available lab values, all available cardiac testing, all available radiology imaging, vital signs, weights, medications, potential medication interactions, family history, preparing my notes, updating diagnoses, correcting chart errors from other providers, documenting the above, discussing findings/results/testing methodologies/plan with patient, ordering tests/lab, sending prescriptions.        SUMMARY:   Problem List  Date Reviewed: 9/30/2022            Codes Class Noted    Other specified anemias ICD-10-CM: D64.89  ICD-9-CM: 285.8  9/30/2022        Chronic leukemia in remission [facility]) ICD-10-CM: C95.11  ICD-9-CM: 208.11  9/30/2022        CKD (chronic kidney disease) stage 4, GFR 15-29 ml/min ([facility]) ICD-10-CM: N18.4  ICD-9-CM: 585.4  8/4/2022        Chronic renal disease, stage III ICD-10-CM: N18.30  ICD-9-CM: 585.3  5/9/2022        Ventral hernia without obstruction or gangrene ICD-10-CM: K43.9  ICD-9-CM: 553.20  4/18/2022        Pleural effusion on right ICD-10-CM: J90  ICD-9-CM: 511.9  2/18/2022        SOB (shortness of breath) ICD-10-CM: R06.02  ICD-9-CM: 786.05 2/16/2022        Acute respiratory failure [facility]) ICD-10-CM: J96.00  ICD-9-CM: 518.81  1/28/2022           Current Outpatient Medications on File Prior to Visit   Medication Sig    carvediloL (COREG) 3.125 mg tablet Take 1 Tablet by mouth two (2) times daily (with meals). loperamide (IMODIUM) 2 mg capsule Take 4 mg by mouth as needed for Diarrhea. Take 2 caplets (4 mg) after the initial loose stool, then take 1 tablet (2 mg) after each loose stool as needed max 4 capsules (8mg)/24hrs    ondansetron (ZOFRAN ODT) 8 mg disintegrating tablet Take 8 mg by mouth every eight (8) hours as needed for Nausea or Vomiting. ondansetron (ZOFRAN ODT) 8 mg disintegrating tablet Take 8 mg by mouth as needed (One tablet by mouth one hour prior to chemo infusion). One tablet by mouth one hour prior to chemo infusion  Indications: prevent nausea and vomiting from cancer chemotherapy    prochlorperazine (COMPAZINE) 10 mg tablet Take 10 mg by mouth every six (6) hours as needed for Nausea or Vomiting. bumetanide (BUMEX) 2 mg tablet Take 2 mg by mouth two (2) times a day. multivitamin-iron-FA, hematinic, (Certavite-Antioxidant)  mg-mcg tab tablet Take 1 Tablet by mouth daily. acetaminophen (TYLENOL) 500 mg tablet Take 1,000 mg by mouth two (2) times daily as needed for Pain.    pseudoephed/acetaminophen/cpm (GRACY-SELTZER PLUS COLD PO) Take  by mouth daily as needed. ARIPiprazole (ABILIFY) 30 mg tablet Take 30 mg by mouth daily. aspirin 81 mg cap Take  by mouth daily. docusate sodium (COLACE) 100 mg capsule Take 100 mg by mouth two (2) times a day. ferrous sulfate 325 mg (65 mg iron) tablet Take  by mouth Daily (before breakfast). magnesium oxide (MAG-OX) 400 mg tablet Take 400 mg by mouth nightly. cholecalciferol, vitamin D3, 50 mcg (2,000 unit) tab Take 2,000 Units by mouth daily.     ipratropium (ATROVENT) 21 mcg (0.03 %) nasal spray 1 [place] by Both Nostrils route three (3) times daily as needed (Nasal drainage). No current facility-administered medications on file prior to visit. CARDIOLOGY STUDIES TO DATE:  No results found for this visit on 09/30/22.   09/12/22    CARDIAC PROCEDURE 09/20/2022 9/20/2022    Conclusion  Findings  1. Normal right-sided filling pressures  2. Elevated cardiac output and index consistent with anemia  3. Normal PVR and SVR  4. Normal pulmonary capillary wedge pressure    Recommendations  1. Treat as heart failure secondary to anemia. Signed by: Denise Melo MD on 9/20/2022  2:28 PM     09/12/22    ECHO ADULT FOLLOW-UP OR LIMITED 09/18/2022 9/18/2022    Interpretation Summary    Left Ventricle: Normal left ventricular systolic function with a visually estimated EF of 40 - 45%. Left ventricle is mildly dilated. Normal wall thickness. Mild global hypokinesis present. Left Atrium: Left atrium is mildly dilated. Signed by: Amina Spring MD on 9/18/2022  9:59 PM     08/15/22    NUCLEAR CARDIAC STRESS TEST 08/15/2022, 08/15/2022 8/15/2022    Interpretation Summary    ECG: Resting ECG demonstrates normal sinus rhythm. ECG: Stress ECG was negative for ischemia. Stress Test: A pharmacological stress test was performed using lexiscan. Blood pressure demonstrated a normal response and heart rate demonstrated a normal response to stress. The patient's heart rate recovery was normal.    INDICATION: Chronic systolic heart failure. History of hypertension    COMPARISON:  None. CORRELATIVE IMAGING STUDIES:  None    TRACER: Tc 99m Sestamibi    TECHNIQUE:  Resting SPECT images of the heart were obtained following the uneventful intravenous administration of 10.6 mCi of Tc 99m Sestamibi. Gated stress SPECT images of the heart were obtained following Lexiscan protocol and the uneventful intravenous administration of 31.9 mCi of Tc 99m Sestamibi.     FINDINGS:  The rest and stress perfusion images demonstrate prominent soft tissue attenuation without significant perfusion defect or evidence of myocardial reversibility. The gated images demonstrate global hypokinesia. Left ventricular ejection fraction is 40 %. Impression:  Prominent soft tissue attenuation. No evidence of myocardial ischemia or infarction. Left ventricular ejection fraction is 40 %. Global hypokinesia. Signed by: Sunil Osborn MD on 8/15/2022 11:25 AM, Signed by: Jarred Burt MD on 8/15/2022 12:03 PM         CARDIAC ROS:   positive for lower extremity edema    Past Medical History:   Diagnosis Date    Anemia     Congestive heart failure (HCC)     Hypertension     Menopause     Ventral hernia without obstruction or gangrene 4/18/2022       Family History   Problem Relation Age of Onset    Breast Cancer Sister         63's    Cancer Sister     Breast Problems Sister     Hypertension Mother        Social History     Socioeconomic History    Marital status: SINGLE     Spouse name: Not on file    Number of children: Not on file    Years of education: Not on file    Highest education level: Not on file   Occupational History    Not on file   Tobacco Use    Smoking status: Never    Smokeless tobacco: Never   Vaping Use    Vaping Use: Never used   Substance and Sexual Activity    Alcohol use: Never    Drug use: Not Currently    Sexual activity: Not on file   Other Topics Concern    Not on file   Social History Narrative    Not on file     Social Determinants of Health     Financial Resource Strain: Not on file   Food Insecurity: Not on file   Transportation Needs: Not on file   Physical Activity: Not on file   Stress: Not on file   Social Connections: Not on file   Intimate Partner Violence: Not on file   Housing Stability: Not on file        GENERAL ROS:  A comprehensive review of systems was negative except for that written in the HPI.     Visit Vitals  /60   Pulse 92   Resp 16   Ht 5' 5\" (1.651 m)   Wt 141 lb (64 kg)   SpO2 98%   BMI 23.46 kg/m²     Vitals:    09/30/22 1423   BP: 100/60   Pulse: 92   Resp: 16   SpO2: 98%   Weight: 141 lb (64 kg)   Height: 5' 5\" (1.651 m)        Wt Readings from Last 3 Encounters:   09/30/22 141 lb (64 kg)   09/21/22 145 lb 11.6 oz (66.1 kg)   09/08/22 137 lb 12.8 oz (62.5 kg)            BP Readings from Last 3 Encounters:   09/30/22 100/60   09/22/22 111/63   09/08/22 (!) 102/54       PHYSICAL EXAM  General appearance: alert, cooperative, no distress, appears stated age  Neck: supple, symmetrical, trachea midline, no adenopathy, thyroid: not enlarged, symmetric, no tenderness/mass/nodules, no carotid bruit, and no JVD  Lungs: clear to auscultation bilaterally  Heart: regular rate and rhythm, S1, S2 normal, no murmur, click, rub or gallop  Extremities: edema     No results found for: CHOL, CHOLX, CHLST, CHOLV, 869527, HDL, HDLP, LDL, LDLC, DLDLP, TGLX, TRIGL, TRIGP, CHHD, CHHDX    Lab Results   Component Value Date/Time    WBC 94.7 (HH) 09/22/2022 03:59 AM    Hemoglobin (POC) 6.8 (L) 06/16/2022 11:55 AM    HGB 8.5 (L) 09/22/2022 03:59 AM    HCT 28.2 (L) 09/22/2022 03:59 AM    PLATELET 099 (H) 11/97/1061 03:59 AM    MCV 81.7 09/22/2022 03:59 AM        No results found for: CHOL, CHOLPOCT, CHOLX, CHLST, CHOLV, HDL, HDLP, LDL, LDLC, DLDLP, TGLX, TRIGL, TRIGP, TGLPOCT, NTGLT, CHHD, CHHDX     ASSESSMENT    ICD-10-CM ICD-9-CM    1. Stage 3 chronic kidney disease, unspecified whether stage 3a or 3b CKD (HCC)  N18.30 585.3       2. Anemia due to other cause, not classified  D64.89 285.8       3. Pleural effusion on right  J90 511.9       4. Chronic leukemia in remission [facility])  C95.11 208.11           Encounter Diagnoses   Name Primary? Stage 3 chronic kidney disease, unspecified whether stage 3a or 3b CKD ([facility] [address] Yes    Anemia due to other cause, not classified     Pleural effusion on right     [facility]      No orders of the defined types were placed in this encounter.       1000 Flory Baldwin MD  9/30/2022        [address] [place] [address] [address]  [address] [facility], [address] [place] [address]  [phone] (F)    [address] [facility] [address]  [address], [id number] [place] [address]  [phone] (P)  [phone] (F)    ATTENTION:   This medical record was transcribed using an electronic medical records/speech recognition system. Although proofread, it may and can contain electronic, spelling and other errors. Corrections may be executed at a later time. Please feel free to contact us for any clarifications as needed.

## 2022-10-04 ENCOUNTER — HOSPITAL ENCOUNTER (OUTPATIENT)
Dept: MAMMOGRAPHY | Age: 77
Discharge: HOME | End: 2022-10-04
Attending: NURSE PRACTITIONER
Payer: MEDICARE

## 2022-10-04 DIAGNOSIS — Z12.31 VISIT FOR SCREENING MAMMOGRAM: ICD-10-CM

## 2022-10-04 PROCEDURE — 77063 BREAST TOMOSYNTHESIS BI: CPT

## 2022-10-11 ENCOUNTER — PATIENT OUTREACH (OUTPATIENT)
Dept: CASE MANAGEMENT | Age: 77
End: 2022-10-11

## 2022-10-11 NOTE — PROGRESS NOTES
Care Transitions Follow Up Call    Challenges to be reviewed by the provider   Additional needs identified to be addressed with provider: no    Saw Dr. Mariza Al on 10/3- his plan is to stop chemo- Vidaza. He is researching to determine next steps. She has follow up on 11/3. He did fingerstick HGB- reports improved to \"right where he wants it\". Her LE are slightly tight, dark in color. Remains on Bumex 2 mg BID. Facility weighs daily and checks her BP and HR values. Method of communication with provider : chart routing    Care Transition Nurse (CTN) contacted the patient by telephone to follow up after admission on 22. Verified name and  with patient as identifiers. Addressed changes since last contact: refer to above yellow box, goals section    CTN reviewed current symptoms, discharge instructions, medical action plan and red flags with patient and discussed any barriers to care and/or understanding of plan of care after discharge. Discussed appropriate site of care based on symptoms and resources available to patient including: PCP, Specialist, and FAcility based therapy team and staff, and CTN . The patient agrees to contact the PCP office for questions related to their healthcare. [address] follow up appointment(s):   Future Appointments   Date Time Provider Sara Sanjuana   2023 10:00 AM Sherrian Epley, MD [facility]     [facility] follow up appointment(s):   PCP- Usha Gonzalez, NP- sees at facility. Saw on . Hem-Oncology. Dr. San Sicard, saw on 10/3, next 11/3. CTN provided contact information for future needs. Plan for follow-up call in 7-10 days based on severity of symptoms and risk factors. Plan for next call:   Assess current symptoms including daily monitoring items  Progress with therapy. Clarify if AMD in place, if so, secure copy for EMR. If not, assist with completing.        Goals Addressed                   This Visit's Progress       Heart Failure     Supportive resources in place to maintain patient in the community (ie. Home Health, DME equipment, refer to, medication assistant plan, etc.)        10/11/22- spoke initially with Ms. Ortega- at [facility]. She said Ms. Nadia Wells is available to talk, call her cell. Spoke with Ms. Nadia Wells. She said she is feeling much better since discharge from the hospital. She reports seeing Hem-Oncology last week, he did fingerstick for her HGB- it had improved- states \"right where he wants it\". His plan is to stop the chemo, not helping and making her sick. He is going to research to determine next steps, she will see again on 11/3. Attended follow up with cardiology- HF 2/2 anemia. Plan continue and treat anemia. She states that facility weighs each morning, checks her BP and HR. She said she has small amount of LE - slight tight, dark in color. VS- 100/60, HR 92. She has been working daily with facility based PT, they will be decreasing schedule. [place].     9/23/22- spoke with daughter, Leonardo Allan. Answered questions and discussed plan for recovery. Later spoke with Lissette Marcano, nurse at [facility]. She reports she is doing well- was able to shower and slept well last night. This morning her VS- 123/64 and HR 92. She states usual HR in 100's. Briefly explained hospital stay and recovery and answered questions. Relayed I had spoken earlier to daughter, relayed cardiology follow up appointment and need to schedule Hem-Oncology follow up in 1-2 weeks per provider's note. May be considering a new medication for her leukemia.                                   [employer]

## 2022-12-09 ENCOUNTER — OFFICE VISIT (OUTPATIENT)
Dept: CARDIOLOGY CLINIC | Age: 77
End: 2022-12-09
Payer: MEDICARE

## 2022-12-09 VITALS
WEIGHT: 134 LBS | SYSTOLIC BLOOD PRESSURE: 126 MMHG | DIASTOLIC BLOOD PRESSURE: 70 MMHG | HEIGHT: 65 IN | BODY MASS INDEX: 22.33 KG/M2 | HEART RATE: 99 BPM | OXYGEN SATURATION: 96 %

## 2022-12-09 DIAGNOSIS — R06.02 SOB (SHORTNESS OF BREATH): Primary | ICD-10-CM

## 2022-12-09 DIAGNOSIS — N18.4 CKD (CHRONIC KIDNEY DISEASE) STAGE 4, GFR 15-29 ML/MIN (HCC): ICD-10-CM

## 2022-12-09 DIAGNOSIS — I50.33 DIASTOLIC CHF, ACUTE ON CHRONIC (HCC): ICD-10-CM

## 2022-12-09 NOTE — PROGRESS NOTES
Noel Westbrook MD, MS, [facility]            HISTORY OF PRESENT ILLNESS:    Carter Pickett is a 68 y.o. female here for FU. Admitted [facility] 9/2022. HFpEF. IV diuresed. RHC Findings  1. Normal right-sided filling pressures  2. Elevated cardiac output and index consistent with anemia  3. Normal PVR and SVR  4. Normal pulmonary capillary wedge pressure     Recommendations  1. Treat as heart failure secondary to anemia. Is anemic, sees Dr. Ravindra Anderson 1/19/2023. Labs 9/22 creatinine stable @ 1.38. Has chronic tight edema, perhaps due more to lymphedema. Possible could consider lymphedema clinic. Advised FU with Dr. Ravindra Anderson regarding anemia. Continue bumex 2mg po BID. BP well controlled. In the interim - tripped, fell, hit her face on her way to go to [employer], stitches in bottom lip, [facility]. Broke teeth. Tight legs - but denies SOB. SUMMARY:   Problem List  Date Reviewed: 9/30/2022            Codes Class Noted    Diastolic CHF, acute on chronic [facility]) ICD-10-CM: I50.33  ICD-9-CM: 428.33, 428.0  12/9/2022        Other specified anemias ICD-10-CM: D64.89  ICD-9-CM: 285.8  9/30/2022        Chronic leukemia in remission [facility]) ICD-10-CM: C95.11  ICD-9-CM: 208.11  9/30/2022        CKD (chronic kidney disease) stage 4, GFR 15-29 ml/min ([facility]) ICD-10-CM: N18.4  ICD-9-CM: 585.4  8/4/2022        Chronic renal disease, stage III ICD-10-CM: N18.30  ICD-9-CM: 585.3  5/9/2022        Ventral hernia without obstruction or gangrene ICD-10-CM: K43.9  ICD-9-CM: 553.20  4/18/2022        Pleural effusion on right ICD-10-CM: J90  ICD-9-CM: 511.9  2/18/2022        SOB (shortness of breath) ICD-10-CM: R06.02  ICD-9-CM: 786.05  2/16/2022        Acute respiratory failure [facility]) ICD-10-CM: J96.00  ICD-9-CM: 518.81  1/28/2022           Current Outpatient Medications on File Prior to Visit   Medication Sig    carvediloL (COREG) 3.125 mg tablet Take 1 Tablet by mouth two (2) times daily (with meals).     loperamide (IMODIUM) 2 mg capsule Take 4 mg by mouth as needed for Diarrhea. Take 2 caplets (4 mg) after the initial loose stool, then take 1 tablet (2 mg) after each loose stool as needed max 4 capsules (8mg)/24hrs    ondansetron (ZOFRAN ODT) 8 mg disintegrating tablet Take 8 mg by mouth every eight (8) hours as needed for Nausea or Vomiting. ondansetron (ZOFRAN ODT) 8 mg disintegrating tablet Take 8 mg by mouth as needed (One tablet by mouth one hour prior to chemo infusion). One tablet by mouth one hour prior to chemo infusion  Indications: prevent nausea and vomiting from cancer chemotherapy    prochlorperazine (COMPAZINE) 10 mg tablet Take 10 mg by mouth every six (6) hours as needed for Nausea or Vomiting. bumetanide (BUMEX) 2 mg tablet Take 2 mg by mouth two (2) times a day. multivitamin-iron-FA, hematinic, (Certavite-Antioxidant)  mg-mcg tab tablet Take 1 Tablet by mouth daily. acetaminophen (TYLENOL) 500 mg tablet Take 1,000 mg by mouth two (2) times daily as needed for Pain.    pseudoephed/acetaminophen/cpm (GRACY-SELTZER PLUS COLD PO) Take  by mouth daily as needed. ARIPiprazole (ABILIFY) 30 mg tablet Take 30 mg by mouth daily. aspirin 81 mg cap Take  by mouth daily. docusate sodium (COLACE) 100 mg capsule Take 100 mg by mouth two (2) times a day. ferrous sulfate 325 mg (65 mg iron) tablet Take  by mouth Daily (before breakfast). magnesium oxide (MAG-OX) 400 mg tablet Take 400 mg by mouth nightly. cholecalciferol, vitamin D3, 50 mcg (2,000 unit) tab Take 2,000 Units by mouth daily. ipratropium (ATROVENT) 21 mcg (0.03 %) nasal spray 1 [place] by Both Nostrils route three (3) times daily as needed (Nasal drainage). No current facility-administered medications on file prior to visit. CARDIOLOGY STUDIES TO DATE:  No results found for this visit on 12/09/22.   09/12/22    CARDIAC PROCEDURE 09/20/2022 9/20/2022    Conclusion  Findings  1.   Normal right-sided filling pressures  2. Elevated cardiac output and index consistent with anemia  3. Normal PVR and SVR  4. Normal pulmonary capillary wedge pressure    Recommendations  1. Treat as heart failure secondary to anemia. Signed by: Nilo Gale MD on 9/20/2022  2:28 PM     09/12/22    ECHO ADULT FOLLOW-UP OR LIMITED 09/18/2022 9/18/2022    Interpretation Summary    Left Ventricle: Normal left ventricular systolic function with a visually estimated EF of 40 - 45%. Left ventricle is mildly dilated. Normal wall thickness. Mild global hypokinesis present. Left Atrium: Left atrium is mildly dilated. Signed by: Sabrina Leal MD on 9/18/2022  9:59 PM     08/15/22    NUCLEAR CARDIAC STRESS TEST 08/15/2022, 08/15/2022 8/15/2022    Interpretation Summary    ECG: Resting ECG demonstrates normal sinus rhythm. ECG: Stress ECG was negative for ischemia. Stress Test: A pharmacological stress test was performed using lexiscan. Blood pressure demonstrated a normal response and heart rate demonstrated a normal response to stress. The patient's heart rate recovery was normal.    INDICATION: Chronic systolic heart failure. History of hypertension    COMPARISON:  None. CORRELATIVE IMAGING STUDIES:  None    TRACER: Tc 99m Sestamibi    TECHNIQUE:  Resting SPECT images of the heart were obtained following the uneventful intravenous administration of 10.6 mCi of Tc 99m Sestamibi. Gated stress SPECT images of the heart were obtained following Lexiscan protocol and the uneventful intravenous administration of 31.9 mCi of Tc 99m Sestamibi. FINDINGS:  The rest and stress perfusion images demonstrate prominent soft tissue attenuation without significant perfusion defect or evidence of myocardial reversibility. The gated images demonstrate global hypokinesia. Left ventricular ejection fraction is 40 %. Impression:  Prominent soft tissue attenuation. No evidence of myocardial ischemia or infarction.  Left ventricular ejection fraction is 40 %. Global hypokinesia. Signed by: Michelle Darnell MD on 8/15/2022 11:25 AM, Signed by: Mynor Marie MD on 8/15/2022 12:03 PM         CARDIAC ROS:   positive for lower extremity edema    Past Medical History:   Diagnosis Date    Anemia     Congestive heart failure ([address]     Hypertension     Menopause     Ventral hernia without obstruction or gangrene 04/18/2022       Family History   Problem Relation Age of Onset    Breast Cancer Sister         63's    Cancer Sister     Breast Problems Sister     Hypertension Mother        Social History     Socioeconomic History    Marital status: SINGLE     Spouse name: Not on file    Number of children: Not on file    Years of education: Not on file    Highest education level: Not on file   Occupational History    Not on file   Tobacco Use    Smoking status: Never    Smokeless tobacco: Never   Vaping Use    Vaping Use: Never used   Substance and Sexual Activity    Alcohol use: Never    Drug use: Not Currently    Sexual activity: Not on file   Other Topics Concern    Not on file   Social History Narrative    Not on file     Social Determinants of Health     Financial Resource Strain: Not on file   Food Insecurity: Not on file   Transportation Needs: Not on file   Physical Activity: Not on file   Stress: Not on file   Social Connections: Not on file   Intimate Partner Violence: Not on file   Housing Stability: Not on file        GENERAL ROS:  A comprehensive review of systems was negative except for that written in the HPI.     Visit Vitals  Ht 5' 5\" (1.651 m)   BMI 23.46 kg/m²     Vitals:    12/09/22 0847   Height: 5' 5\" (1.651 m)        Wt Readings from Last 3 Encounters:   09/30/22 64 kg (141 lb)   09/21/22 66.1 kg (145 lb 11.6 oz)   09/08/22 62.5 kg (137 lb 12.8 oz)            BP Readings from Last 3 Encounters:   09/30/22 100/60   09/22/22 111/63   09/08/22 (!) 102/54       PHYSICAL EXAM  General appearance: alert, cooperative, no distress, appears stated age  Neck: supple, symmetrical, trachea midline, no adenopathy, thyroid: not enlarged, symmetric, no tenderness/mass/nodules, no carotid bruit, and no JVD  Lungs: clear to auscultation bilaterally  Heart: regular rate and rhythm, S1, S2 normal, no murmur, click, rub or gallop  Extremities: 1-2++ edema     No results found for: CHOL, CHOLX, CHLST, CHOLV, 183890, HDL, HDLP, LDL, LDLC, DLDLP, TGLX, TRIGL, TRIGP, CHHD, CHHDX    Lab Results   Component Value Date/Time    WBC 94.7 (HH) 09/22/2022 03:59 AM    Hemoglobin (POC) 6.8 (L) 06/16/2022 11:55 AM    HGB 8.5 (L) 09/22/2022 03:59 AM    HCT 28.2 (L) 09/22/2022 03:59 AM    PLATELET 532 (H) 38/21/3605 03:59 AM    MCV 81.7 09/22/2022 03:59 AM        No results found for: CHOL, CHOLPOCT, CHOLX, CHLST, CHOLV, HDL, HDLP, LDL, LDLC, DLDLP, TGLX, TRIGL, TRIGP, TGLPOCT, NTGLT, CHHD, CHHDX     ASSESSMENT    ICD-10-CM ICD-9-CM    1. SOB (shortness of breath)  R06.02 786.05       2. CKD (chronic kidney disease) stage 4, GFR 15-29 ml/min ([facility])  N18.4 585.4       3. Diastolic CHF, acute on chronic ([facility])  I50.33 428.33      428.0             Encounter Diagnoses   Name Primary? SOB (shortness of breath) Yes    CKD (chronic kidney disease) stage 4, GFR 15-29 ml/min ([facility])     Diastolic CHF, acute on chronic ([facility])        No orders of the defined types were placed in this encounter. Plan    Continue diuretics  FU 3 months      Unruly Peoples MD  12/9/2022        [address] [place] [address] Dr  [address], [address] [place] [address]  [phone] (F)    [address]  [phone] (P)  [phone] (F)    ATTENTION:   This medical record was transcribed using an electronic medical records/speech recognition system. Although proofread, it may and can contain electronic, spelling and other errors. Corrections may be executed at a later time. Please feel free to contact us for any clarifications as needed.

## 2022-12-09 NOTE — PROGRESS NOTES
Rhina Joshi is a 68 y.o. female    Visit Vitals  /70 (BP 1 Location: Left upper arm, BP Patient Position: Sitting, BP Cuff Size: Adult)   Pulse 99   Ht 5' 5\" (1.651 m)   Wt 134 lb (60.8 kg)   SpO2 96%   BMI 22.30 kg/m²       Chief Complaint   Patient presents with    Hospital Follow Up    CHF       Chest pain NO  SOB NO  Dizziness NO  Swelling NO  Recent hospital visit [facility], SOB, TRIPPED AND FELL, STITCHES IN ALHPS134/56  Refills NO  COVID VACCINE STATUS YES  HAD COVID?  NO

## 2023-03-03 NOTE — NURSE NAVIGATOR
NN called [facility] to get patient on list to see primary care provider, as patient may potentially discharge tomorrow 2/19/22. PCP will be requested to see patient on Wednesday 2/23/22. AL staff member stated she would have to check if the facility can accept patient back on the weekend, as they are generally not able to do this because there is no front office staff available on the weekend.  NN gave the AL staff member the name and phone number of the care manager attending this patient Evin Lew, [phone]. AL staff member will contact Letty once she determines if patient can be accepted back to the facility over the weekend.
Chart reviewed by Heart Failure Nurse Navigator. Heart Failure database completed. EF:  55-60% (echo dated 1/28/22)     ACEi/ARB/ARNi: Cozaar 25 mg QD    BB: currently not indicated    Aldosterone Antagonist: currently not indicated    Obstructive Sleep Apnea Screening: screening priority 4/age   STOP-BANG score:   Referred to Sleep Medicine:     CRT currently not indicated. NYHA Functional Class IV on admission. Heart Failure Teach Back in Patient Education. Heart Failure Avoiding Triggers on Discharge Instructions. Cardiologist: [facility]      Post discharge follow up phone call to be made within 48-72 hours of discharge.
declines

## 2023-03-16 ENCOUNTER — TELEPHONE (OUTPATIENT)
Dept: CARDIOLOGY CLINIC | Age: 78
End: 2023-03-16

## 2023-03-16 NOTE — TELEPHONE ENCOUNTER
Called patient 3/16/23 at 3:59 pm left a      Dr. Shelby Betancur called out on 3/17/23 and unable to see patients.  When patient calls back please reschedule patients appointment    Thank you,

## 2023-04-28 ENCOUNTER — OFFICE VISIT (OUTPATIENT)
Dept: CARDIOLOGY CLINIC | Age: 78
End: 2023-04-28

## 2023-04-28 VITALS
HEIGHT: 65 IN | WEIGHT: 132 LBS | HEART RATE: 72 BPM | SYSTOLIC BLOOD PRESSURE: 120 MMHG | BODY MASS INDEX: 21.99 KG/M2 | RESPIRATION RATE: 18 BRPM | OXYGEN SATURATION: 95 % | DIASTOLIC BLOOD PRESSURE: 62 MMHG

## 2023-04-28 DIAGNOSIS — I50.33 DIASTOLIC CHF, ACUTE ON CHRONIC (HCC): ICD-10-CM

## 2023-04-28 DIAGNOSIS — C95.11 CHRONIC LEUKEMIA IN REMISSION (HCC): Primary | ICD-10-CM

## 2023-04-28 DIAGNOSIS — N18.30 STAGE 3 CHRONIC KIDNEY DISEASE, UNSPECIFIED WHETHER STAGE 3A OR 3B CKD (HCC): ICD-10-CM

## 2023-04-28 RX ORDER — RUXOLITINIB 5 MG/1
TABLET ORAL
COMMUNITY

## 2023-04-28 NOTE — PROGRESS NOTES
Room: 10a  Identified pt with two pt identifiers(name and ). Reviewed record in preparation for visit and have obtained necessary documentation. Chief Complaint   Patient presents with    CHF    Shortness of Breath      Vitals:    23 1050   BP: 120/62   Pulse: 72   Resp: 18   SpO2: 95%   Weight: 132 lb (59.9 kg)   Height: 5' 5\" (1.651 m)   PainSc:   0 - No pain       Health Maintenance Review: Patient reminded of \"due or due soon\" health maintenance. I have asked the patient to contact his/her primary care provider (PCP) for follow-up on his/her health maintenance. 1. \"Have you been to the ER, urgent care clinic since your last visit? Hospitalized since your last visit? \" No    2. \"Have you seen or consulted any other health care providers outside of the [address] [place] [address] since your last visit? \" No

## 2023-04-28 NOTE — PROGRESS NOTES
Otoniel Calvin MD, MS, [address] [facility]            HISTORY OF PRESENT ILLNESS:    Petra Montes is a 68 y.o. female here for FU. HFpEF  HTN  CLL  CKD  Anemia    Admitted [facility] 9/2022. HFpEF. IV diuresed. RHC Findings  1. Normal right-sided filling pressures  2. Elevated cardiac output and index consistent with anemia  3. Normal PVR and SVR  4. Normal pulmonary capillary wedge pressure     Recommendations  1. Treat as heart failure secondary to anemia. Is anemic, sees Dr. Orlando Patel 1/19/2023. Labs 9/22 creatinine stable @ 1.38. Has chronic tight edema, perhaps due more to lymphedema. Possible could consider lymphedema clinic. Advised FU with Dr. Orlando Patel regarding anemia. Continue bumex 2mg po BID. BP well controlled. In the interim - tripped, fell, hit her face on her way to go to [place], stitches in bottom lip, [facility]. Broke teeth. Got dentures. Feels well. HR and BP controlled. Feels ok. Reports her blood counts were better.         SUMMARY:   Problem List  Date Reviewed: 4/28/2023            Codes Class Noted    Diastolic CHF, acute on chronic [facility]) ICD-10-CM: I50.33  ICD-9-CM: 428.33, 428.0  12/9/2022        Other specified anemias ICD-10-CM: D64.89  ICD-9-CM: 285.8  9/30/2022        Chronic leukemia in remission [facility]) ICD-10-CM: C95.11  ICD-9-CM: 208.11  9/30/2022        CKD (chronic kidney disease) stage 4, GFR 15-29 ml/min ([facility]) ICD-10-CM: N18.4  ICD-9-CM: 585.4  8/4/2022        Chronic renal disease, stage III ICD-10-CM: N18.30  ICD-9-CM: 585.3  5/9/2022        Ventral hernia without obstruction or gangrene ICD-10-CM: K43.9  ICD-9-CM: 553.20  4/18/2022        Pleural effusion on right ICD-10-CM: J90  ICD-9-CM: 511.9  2/18/2022        SOB (shortness of breath) ICD-10-CM: R06.02  ICD-9-CM: 786.05  2/16/2022        Acute respiratory failure [facility]) ICD-10-CM: J96.00  ICD-9-CM: 518.81  1/28/2022           Current Outpatient Medications on File Prior to Visit   Medication Sig ruxolitinib (Jakafi) 5 mg tab Jakafi 5 mg tablet   Take 1 tablet twice a day by oral route for 30 days. carvediloL (COREG) 3.125 mg tablet Take 1 Tablet by mouth two (2) times daily (with meals). loperamide (IMODIUM) 2 mg capsule Take 2 Capsules by mouth as needed for Diarrhea. Take 2 caplets (4 mg) after the initial loose stool, then take 1 tablet (2 mg) after each loose stool as needed max 4 capsules (8mg)/24hrs    bumetanide (BUMEX) 2 mg tablet Take 1 Tablet by mouth two (2) times a day. multivitamin-iron-FA, hematinic, (Certavite-Antioxidant)  mg-mcg tab tablet Take 1 Tablet by mouth daily. acetaminophen (TYLENOL) 500 mg tablet Take 2 Tablets by mouth two (2) times daily as needed for Pain. ARIPiprazole (ABILIFY) 30 mg tablet Take 1 Tablet by mouth daily. aspirin 81 mg cap Take  by mouth daily. docusate sodium (COLACE) 100 mg capsule Take 1 Capsule by mouth two (2) times a day. ferrous sulfate 325 mg (65 mg iron) tablet Take  by mouth Daily (before breakfast). magnesium oxide (MAG-OX) 400 mg tablet Take 1 Tablet by mouth nightly. cholecalciferol, vitamin D3, 50 mcg (2,000 unit) tab Take 1 Tablet by mouth daily. ipratropium (ATROVENT) 21 mcg (0.03 %) nasal spray 1 Lester by Both Nostrils route three (3) times daily as needed for Rhinitis (Nasal drainage). No current facility-administered medications on file prior to visit. CARDIOLOGY STUDIES TO DATE:  No results found for this visit on 04/28/23.   09/12/22    CARDIAC PROCEDURE 09/20/2022 9/20/2022    Conclusion  Findings  1. Normal right-sided filling pressures  2. Elevated cardiac output and index consistent with anemia  3. Normal PVR and SVR  4. Normal pulmonary capillary wedge pressure    Recommendations  1. Treat as heart failure secondary to anemia. Signed by:  Josselin Mccauley MD on 9/20/2022  2:28 PM     09/12/22    ECHO ADULT FOLLOW-UP OR LIMITED 09/18/2022 9/18/2022    Interpretation Summary Left Ventricle: Normal left ventricular systolic function with a visually estimated EF of 40 - 45%. Left ventricle is mildly dilated. Normal wall thickness. Mild global hypokinesis present. Left Atrium: Left atrium is mildly dilated. Signed by: Maximiliano Cueva MD on 9/18/2022  9:59 PM     08/15/22    NUCLEAR CARDIAC STRESS TEST 08/15/2022, 08/15/2022 8/15/2022    Interpretation Summary    ECG: Resting ECG demonstrates normal sinus rhythm. ECG: Stress ECG was negative for ischemia. Stress Test: A pharmacological stress test was performed using lexiscan. Blood pressure demonstrated a normal response and heart rate demonstrated a normal response to stress. The patient's heart rate recovery was normal.    INDICATION: Chronic systolic heart failure. History of hypertension    COMPARISON:  None. CORRELATIVE IMAGING STUDIES:  None    TRACER: Tc 99m Sestamibi    TECHNIQUE:  Resting SPECT images of the heart were obtained following the uneventful intravenous administration of 10.6 mCi of Tc 99m Sestamibi. Gated stress SPECT images of the heart were obtained following Lexiscan protocol and the uneventful intravenous administration of 31.9 mCi of Tc 99m Sestamibi. FINDINGS:  The rest and stress perfusion images demonstrate prominent soft tissue attenuation without significant perfusion defect or evidence of myocardial reversibility. The gated images demonstrate global hypokinesia. Left ventricular ejection fraction is 40 %. Impression:  Prominent soft tissue attenuation. No evidence of myocardial ischemia or infarction. Left ventricular ejection fraction is 40 %. Global hypokinesia.     Signed by: Cristy Goff MD on 8/15/2022 11:25 AM, Signed by: Julien Lord MD on 8/15/2022 12:03 PM         CARDIAC ROS:   positive for lower extremity edema    Past Medical History:   Diagnosis Date    Anemia     Congestive heart failure ([address]     Hypertension     Menopause     Ventral hernia without obstruction or gangrene 04/18/2022       Family History   Problem Relation Age of Onset    Breast Cancer Sister         63's    Cancer Sister     Breast Problems Sister     Hypertension Mother        Social History     Socioeconomic History    Marital status: SINGLE     Spouse name: Not on file    Number of children: Not on file    Years of education: Not on file    Highest education level: Not on file   Occupational History    Not on file   Tobacco Use    Smoking status: Never    Smokeless tobacco: Never   Vaping Use    Vaping Use: Never used   Substance and Sexual Activity    Alcohol use: Never    Drug use: Not Currently    Sexual activity: Not on file   Other Topics Concern    Not on file   Social History Narrative    Not on file     Social Determinants of Health     Financial Resource Strain: Not on file   Food Insecurity: Not on file   Transportation Needs: Not on file   Physical Activity: Not on file   Stress: Not on file   Social Connections: Not on file   Intimate Partner Violence: Not on file   Housing Stability: Not on file        GENERAL ROS:  A comprehensive review of systems was negative except for that written in the HPI.     Visit Vitals  /62 (BP 1 Location: Left upper arm, BP Patient Position: Sitting, BP Cuff Size: Adult)   Pulse 72   Resp 18   Ht 5' 5\" (1.651 m)   Wt 59.9 kg (132 lb)   SpO2 95%   BMI 21.97 kg/m²     Vitals:    04/28/23 1050   BP: 120/62   Pulse: 72   Resp: 18   SpO2: 95%   Weight: 59.9 kg (132 lb)   Height: 5' 5\" (1.651 m)        Wt Readings from Last 3 Encounters:   04/28/23 59.9 kg (132 lb)   12/09/22 60.8 kg (134 lb)   09/30/22 64 kg (141 lb)            BP Readings from Last 3 Encounters:   04/28/23 120/62   12/09/22 126/70   09/30/22 100/60       PHYSICAL EXAM  General appearance: alert, cooperative, no distress, appears stated age  Neck: supple, symmetrical, trachea midline, no adenopathy, thyroid: not enlarged, symmetric, no tenderness/mass/nodules, no carotid bruit, and no JVD  Lungs: clear to auscultation bilaterally  Heart: regular rate and rhythm, S1, S2 normal, no murmur, click, rub or gallop  Extremities: 1-2++ edema     No results found for: CHOL, CHOLX, CHLST, CHOLV, 811917, HDL, HDLP, LDL, LDLC, DLDLP, TGLX, TRIGL, TRIGP, CHHD, CHHDX    Lab Results   Component Value Date/Time    WBC 94.7 (HH) 09/22/2022 03:59 AM    Hemoglobin (POC) 6.8 (L) 06/16/2022 11:55 AM    HGB 8.5 (L) 09/22/2022 03:59 AM    HCT 28.2 (L) 09/22/2022 03:59 AM    PLATELET 116 (H) 25/09/2334 03:59 AM    MCV 81.7 09/22/2022 03:59 AM        No results found for: CHOL, CHOLPOCT, CHOLX, CHLST, CHOLV, HDL, HDLP, LDL, LDLC, DLDLP, TGLX, TRIGL, TRIGP, TGLPOCT, NTGLT, CHHD, CHHDX     ASSESSMENT    ICD-10-CM ICD-9-CM    1. Chronic leukemia in remission (HCC)  C95.11 208.11       2. Diastolic CHF, acute on chronic (HCC)  I50.33 428.33      428.0       3. Stage 3 chronic kidney disease, unspecified whether stage 3a or 3b CKD ([facility]  N18.30 585. 3               Encounter Diagnoses   Name Primary? Chronic leukemia in remission ([facility] Yes    Diastolic CHF, acute on chronic (HCC)     Stage 3 chronic kidney disease, unspecified whether stage 3a or 3b CKD ([facility]          Orders Placed This Encounter    ruxolitinib (Jakafi) 5 mg tab         Plan    Continue diuretics  FU one year    Jose Alanis MD  4/28/2023        [address]   [address] [facility] [address]  [address] [facility], [address]  [phone] (F)    [address] [facility]  [address]  [phone] (P)  [phone] (F)    ATTENTION:   This medical record was transcribed using an electronic medical records/speech recognition system. Although proofread, it may and can contain electronic, spelling and other errors. Corrections may be executed at a later time. Please feel free to contact us for any clarifications as needed.

## 2023-10-10 ENCOUNTER — HOSPITAL ENCOUNTER (OUTPATIENT)
Facility: HOSPITAL | Age: 78
Discharge: HOME | End: 2023-10-13
Payer: MEDICARE

## 2023-10-10 VITALS — BODY MASS INDEX: 23.32 KG/M2 | HEIGHT: 65 IN | WEIGHT: 140 LBS

## 2023-10-10 DIAGNOSIS — Z12.31 VISIT FOR SCREENING MAMMOGRAM: ICD-10-CM

## 2023-10-10 PROCEDURE — 77063 BREAST TOMOSYNTHESIS BI: CPT

## 2023-10-10 RX ORDER — BUMETANIDE 2 MG/1
2 TABLET ORAL 2 TIMES DAILY
Qty: 62 TABLET | Refills: 0 | OUTPATIENT
Start: 2023-10-10

## 2023-10-13 ENCOUNTER — APPOINTMENT (OUTPATIENT)
Facility: HOSPITAL | Age: 78
End: 2023-10-13
Payer: MEDICARE

## 2023-10-13 ENCOUNTER — HOSPITAL ENCOUNTER (EMERGENCY)
Facility: HOSPITAL | Age: 78
Discharge: HOME | End: 2023-10-13
Attending: EMERGENCY MEDICINE
Payer: MEDICARE

## 2023-10-13 VITALS
OXYGEN SATURATION: 99 % | DIASTOLIC BLOOD PRESSURE: 73 MMHG | HEART RATE: 79 BPM | TEMPERATURE: 97.5 F | RESPIRATION RATE: 18 BRPM | SYSTOLIC BLOOD PRESSURE: 151 MMHG

## 2023-10-13 DIAGNOSIS — M25.562 ACUTE PAIN OF LEFT KNEE: Primary | ICD-10-CM

## 2023-10-13 DIAGNOSIS — M79.89 LEFT LEG SWELLING: ICD-10-CM

## 2023-10-13 PROCEDURE — 93971 EXTREMITY STUDY: CPT

## 2023-10-13 PROCEDURE — 99284 EMERGENCY DEPT VISIT MOD MDM: CPT

## 2023-10-13 PROCEDURE — 73562 X-RAY EXAM OF KNEE 3: CPT

## 2023-10-13 ASSESSMENT — PAIN DESCRIPTION - ORIENTATION: ORIENTATION: LEFT

## 2023-10-13 ASSESSMENT — PAIN SCALES - GENERAL: PAINLEVEL_OUTOF10: 10

## 2023-10-13 ASSESSMENT — ENCOUNTER SYMPTOMS
SHORTNESS OF BREATH: 0
COLOR CHANGE: 0

## 2023-10-13 ASSESSMENT — PAIN DESCRIPTION - LOCATION: LOCATION: LEG

## 2023-10-13 ASSESSMENT — PAIN - FUNCTIONAL ASSESSMENT: PAIN_FUNCTIONAL_ASSESSMENT: 0-10

## 2023-10-13 NOTE — ED NOTES
Patient left ED in no acute distress, alert and oriented x4. Patient was encouraged to come back if symptoms get worse. Patient was provided with discharge instructions and prescriptions. All questions were answered. Patient left ambulatory.          Mary Leon, [address]  95/35/51 [id number]

## 2023-10-13 NOTE — ED PROVIDER NOTES
[facility] EMERGENCY DEP  EMERGENCY DEPARTMENT ENCOUNTER      Pt Name: Anthony Ash  MRN: [id number]  [address] [facility] [place] 1945  Date of evaluation: 10/13/2023  Provider: Fer Corral PA-C    CHIEF COMPLAINT       Chief Complaint   Patient presents with    Leg Swelling         HISTORY OF PRESENT ILLNESS   (Location/Symptom, Timing/Onset, Context/Setting, Quality, Duration, Modifying Factors, Severity)  Note limiting factors. 66year old female reports to ED with complaints of swelling in left lower leg, most prominently medial knee for past 3 days. Endorses ankle to knee pain. Denies changes in sensation or loss of movement ability of leg/foot. Denies chest pain, shortness of breath, abdominal pain, or history DVT. Review of External Medical Records:     Nursing Notes were reviewed. REVIEW OF SYSTEMS    (2-9 systems for level 4, 10 or more for level 5)     Review of Systems   Constitutional:  Negative for activity change, appetite change, chills and fever. Respiratory:  Negative for shortness of breath. Cardiovascular:  Negative for chest pain. Genitourinary: Negative. Skin:  Negative for color change, pallor and rash. Neurological:  Negative for dizziness, weakness and numbness. Except as noted above the remainder of the review of systems was reviewed and negative.        PAST MEDICAL HISTORY     Past Medical History:   Diagnosis Date    Anemia     Congestive heart failure ([address])     Hypertension     Menopause     Ventral hernia without obstruction or gangrene 04/18/2022         SURGICAL HISTORY       Past Surgical History:   Procedure Laterality Date    OTHER SURGICAL HISTORY  04/13/2022     bone marrow         CURRENT MEDICATIONS       Discharge Medication List as of 10/13/2023  4:22 PM        CONTINUE these medications which have NOT CHANGED    Details   acetaminophen (TYLENOL) 500 MG tablet Take 1,000 mg by mouth 2 times daily as neededHistorical Med      ARIPiprazole

## 2023-10-13 NOTE — ED TRIAGE NOTES
Pt arrives via [facility] from [facility] w/ complaint of LLE swelling x several days.  Pt denies hx of DVT

## 2023-10-13 NOTE — DISCHARGE INSTRUCTIONS
We did not find any concerning results from the tests we ran today. You may continue to take Tylenol as needed for pain. If symptoms worsen, to include an increase in pain, chest pain, shortness of breath, or other concerning symptoms, please report to the nearest emergency department. Thank you for allowing us to provide you with medical care today. We realize that you have many choices for your emergency care needs. We thank you for choosing [facility]. Please choose us in the future for any continued health care needs. The exam and treatment you received in the Emergency Department were for an emergent problem and are not intended as complete care. It is important that you follow up with a doctor, nurse practitioner, or physician's assistant for ongoing care. If your symptoms worsen or you do not improve as expected and you are unable to reach your usual health care provider, you should return to the Emergency Department. We are available 24 hours a day. Please make an appointment with your health care provider(s) for follow up of your Emergency Department visit. Take this sheet with you when you go to your follow-up visit.

## 2023-11-03 RX ORDER — BUMETANIDE 2 MG/1
TABLET ORAL
Qty: 62 TABLET | Refills: 0 | OUTPATIENT
Start: 2023-11-03

## 2023-11-22 ENCOUNTER — TELEPHONE (OUTPATIENT)
Age: 78
End: 2023-11-22

## 2024-01-01 ENCOUNTER — APPOINTMENT (OUTPATIENT)
Facility: HOSPITAL | Age: 79
DRG: 377 | End: 2024-01-01
Payer: MEDICARE

## 2024-01-01 ENCOUNTER — HOSPICE ADMISSION (OUTPATIENT)
Age: 79
End: 2024-01-01
Payer: MEDICARE

## 2024-01-01 ENCOUNTER — HOSPITAL ENCOUNTER (INPATIENT)
Facility: HOSPITAL | Age: 79
LOS: 8 days | Discharge: HOSPICE | DRG: 377 | End: 2024-07-22
Attending: EMERGENCY MEDICINE | Admitting: INTERNAL MEDICINE
Payer: MEDICARE

## 2024-01-01 VITALS
HEIGHT: 65 IN | HEART RATE: 107 BPM | WEIGHT: 144.4 LBS | DIASTOLIC BLOOD PRESSURE: 57 MMHG | TEMPERATURE: 98.2 F | OXYGEN SATURATION: 94 % | BODY MASS INDEX: 24.06 KG/M2 | SYSTOLIC BLOOD PRESSURE: 95 MMHG | RESPIRATION RATE: 19 BRPM

## 2024-01-01 DIAGNOSIS — K92.2 ACUTE GI BLEEDING: Primary | ICD-10-CM

## 2024-01-01 LAB
ABO + RH BLD: NORMAL
ALBUMIN SERPL-MCNC: 2.9 G/DL (ref 3.5–5)
ALBUMIN/GLOB SERPL: 1.1 (ref 1.1–2.2)
ALP SERPL-CCNC: 335 U/L (ref 45–117)
ALT SERPL-CCNC: 26 U/L (ref 12–78)
ANION GAP SERPL CALC-SCNC: 2 MMOL/L (ref 5–15)
ANION GAP SERPL CALC-SCNC: 4 MMOL/L (ref 5–15)
ANION GAP SERPL CALC-SCNC: 5 MMOL/L (ref 5–15)
ANION GAP SERPL CALC-SCNC: 6 MMOL/L (ref 5–15)
ANION GAP SERPL CALC-SCNC: 7 MMOL/L (ref 5–15)
APPEARANCE UR: ABNORMAL
AST SERPL-CCNC: 56 U/L (ref 15–37)
BACTERIA SPEC CULT: ABNORMAL
BACTERIA URNS QL MICRO: ABNORMAL /HPF
BASOPHILS # BLD: 0 K/UL (ref 0–0.1)
BASOPHILS # BLD: 0.3 K/UL (ref 0–0.1)
BASOPHILS NFR BLD: 0 % (ref 0–1)
BASOPHILS NFR BLD: 1 % (ref 0–1)
BILIRUB SERPL-MCNC: 0.6 MG/DL (ref 0.2–1)
BILIRUB UR QL: NEGATIVE
BLASTS NFR BLD MANUAL: 4 %
BLD PROD TYP BPU: NORMAL
BLOOD BANK BLOOD PRODUCT EXPIRATION DATE: NORMAL
BLOOD BANK DISPENSE STATUS: NORMAL
BLOOD BANK ISBT PRODUCT BLOOD TYPE: 5100
BLOOD BANK PRODUCT CODE: NORMAL
BLOOD BANK UNIT TYPE AND RH: NORMAL
BLOOD GROUP ANTIBODIES SERPL: NORMAL
BPU ID: NORMAL
BUN SERPL-MCNC: 41 MG/DL (ref 6–20)
BUN SERPL-MCNC: 42 MG/DL (ref 6–20)
BUN SERPL-MCNC: 43 MG/DL (ref 6–20)
BUN SERPL-MCNC: 43 MG/DL (ref 6–20)
BUN SERPL-MCNC: 48 MG/DL (ref 6–20)
BUN SERPL-MCNC: 48 MG/DL (ref 6–20)
BUN SERPL-MCNC: 49 MG/DL (ref 6–20)
BUN/CREAT SERPL: 21 (ref 12–20)
BUN/CREAT SERPL: 23 (ref 12–20)
BUN/CREAT SERPL: 24 (ref 12–20)
CALCIUM SERPL-MCNC: 7.1 MG/DL (ref 8.5–10.1)
CALCIUM SERPL-MCNC: 7.6 MG/DL (ref 8.5–10.1)
CALCIUM SERPL-MCNC: 7.6 MG/DL (ref 8.5–10.1)
CALCIUM SERPL-MCNC: 7.7 MG/DL (ref 8.5–10.1)
CALCIUM SERPL-MCNC: 7.9 MG/DL (ref 8.5–10.1)
CALCIUM SERPL-MCNC: 7.9 MG/DL (ref 8.5–10.1)
CALCIUM SERPL-MCNC: 8 MG/DL (ref 8.5–10.1)
CC UR VC: ABNORMAL
CHLORIDE SERPL-SCNC: 109 MMOL/L (ref 97–108)
CHLORIDE SERPL-SCNC: 111 MMOL/L (ref 97–108)
CHLORIDE SERPL-SCNC: 113 MMOL/L (ref 97–108)
CHLORIDE SERPL-SCNC: 114 MMOL/L (ref 97–108)
CHLORIDE SERPL-SCNC: 115 MMOL/L (ref 97–108)
CHLORIDE SERPL-SCNC: 115 MMOL/L (ref 97–108)
CHLORIDE SERPL-SCNC: 118 MMOL/L (ref 97–108)
CO2 SERPL-SCNC: 19 MMOL/L (ref 21–32)
CO2 SERPL-SCNC: 20 MMOL/L (ref 21–32)
CO2 SERPL-SCNC: 23 MMOL/L (ref 21–32)
CO2 SERPL-SCNC: 24 MMOL/L (ref 21–32)
CO2 SERPL-SCNC: 27 MMOL/L (ref 21–32)
COLOR UR: ABNORMAL
CREAT SERPL-MCNC: 1.8 MG/DL (ref 0.55–1.02)
CREAT SERPL-MCNC: 1.84 MG/DL (ref 0.55–1.02)
CREAT SERPL-MCNC: 1.88 MG/DL (ref 0.55–1.02)
CREAT SERPL-MCNC: 1.96 MG/DL (ref 0.55–1.02)
CREAT SERPL-MCNC: 2 MG/DL (ref 0.55–1.02)
CREAT SERPL-MCNC: 2.03 MG/DL (ref 0.55–1.02)
CREAT SERPL-MCNC: 2.03 MG/DL (ref 0.55–1.02)
CROSSMATCH RESULT: NORMAL
DIFFERENTIAL METHOD BLD: ABNORMAL
DIFFERENTIAL METHOD BLD: ABNORMAL
EKG ATRIAL RATE: 114 BPM
EKG ATRIAL RATE: 117 BPM
EKG DIAGNOSIS: NORMAL
EKG DIAGNOSIS: NORMAL
EKG P AXIS: 58 DEGREES
EKG P AXIS: 62 DEGREES
EKG P-R INTERVAL: 124 MS
EKG P-R INTERVAL: 136 MS
EKG Q-T INTERVAL: 344 MS
EKG Q-T INTERVAL: 346 MS
EKG QRS DURATION: 72 MS
EKG QRS DURATION: 72 MS
EKG QTC CALCULATION (BAZETT): 476 MS
EKG QTC CALCULATION (BAZETT): 479 MS
EKG R AXIS: -16 DEGREES
EKG R AXIS: -6 DEGREES
EKG T AXIS: 114 DEGREES
EKG T AXIS: 92 DEGREES
EKG VENTRICULAR RATE: 114 BPM
EKG VENTRICULAR RATE: 117 BPM
EOSINOPHIL # BLD: 0 K/UL (ref 0–0.4)
EOSINOPHIL # BLD: 0 K/UL (ref 0–0.4)
EOSINOPHIL NFR BLD: 0 % (ref 0–7)
EOSINOPHIL NFR BLD: 0 % (ref 0–7)
EPITH CASTS URNS QL MICRO: ABNORMAL /LPF
ERYTHROCYTE [DISTWIDTH] IN BLOOD BY AUTOMATED COUNT: 18 % (ref 11.5–14.5)
ERYTHROCYTE [DISTWIDTH] IN BLOOD BY AUTOMATED COUNT: 18.4 % (ref 11.5–14.5)
ERYTHROCYTE [DISTWIDTH] IN BLOOD BY AUTOMATED COUNT: 18.6 % (ref 11.5–14.5)
ERYTHROCYTE [DISTWIDTH] IN BLOOD BY AUTOMATED COUNT: 18.7 % (ref 11.5–14.5)
ERYTHROCYTE [DISTWIDTH] IN BLOOD BY AUTOMATED COUNT: 19.2 % (ref 11.5–14.5)
ERYTHROCYTE [DISTWIDTH] IN BLOOD BY AUTOMATED COUNT: 19.3 % (ref 11.5–14.5)
ERYTHROCYTE [DISTWIDTH] IN BLOOD BY AUTOMATED COUNT: 19.4 % (ref 11.5–14.5)
ERYTHROCYTE [DISTWIDTH] IN BLOOD BY AUTOMATED COUNT: 19.6 % (ref 11.5–14.5)
ERYTHROCYTE [DISTWIDTH] IN BLOOD BY AUTOMATED COUNT: 20.3 % (ref 11.5–14.5)
ERYTHROCYTE [DISTWIDTH] IN BLOOD BY AUTOMATED COUNT: 23.9 % (ref 11.5–14.5)
EST. AVERAGE GLUCOSE BLD GHB EST-MCNC: ABNORMAL MG/DL
FIBRINOGEN PPP-MCNC: 264 MG/DL (ref 200–475)
GLOBULIN SER CALC-MCNC: 2.7 G/DL (ref 2–4)
GLUCOSE BLD STRIP.AUTO-MCNC: 113 MG/DL (ref 65–117)
GLUCOSE BLD STRIP.AUTO-MCNC: 118 MG/DL (ref 65–117)
GLUCOSE BLD STRIP.AUTO-MCNC: 118 MG/DL (ref 65–117)
GLUCOSE BLD STRIP.AUTO-MCNC: 121 MG/DL (ref 65–117)
GLUCOSE BLD STRIP.AUTO-MCNC: 137 MG/DL (ref 65–117)
GLUCOSE BLD STRIP.AUTO-MCNC: 137 MG/DL (ref 65–117)
GLUCOSE BLD STRIP.AUTO-MCNC: 138 MG/DL (ref 65–117)
GLUCOSE BLD STRIP.AUTO-MCNC: 140 MG/DL (ref 65–117)
GLUCOSE BLD STRIP.AUTO-MCNC: 155 MG/DL (ref 65–117)
GLUCOSE BLD STRIP.AUTO-MCNC: 174 MG/DL (ref 65–117)
GLUCOSE BLD STRIP.AUTO-MCNC: 97 MG/DL (ref 65–117)
GLUCOSE SERPL-MCNC: 101 MG/DL (ref 65–100)
GLUCOSE SERPL-MCNC: 107 MG/DL (ref 65–100)
GLUCOSE SERPL-MCNC: 109 MG/DL (ref 65–100)
GLUCOSE SERPL-MCNC: 122 MG/DL (ref 65–100)
GLUCOSE SERPL-MCNC: 122 MG/DL (ref 65–100)
GLUCOSE SERPL-MCNC: 139 MG/DL (ref 65–100)
GLUCOSE SERPL-MCNC: 330 MG/DL (ref 65–100)
GLUCOSE UR STRIP.AUTO-MCNC: NEGATIVE MG/DL
GRAN CASTS URNS QL MICRO: ABNORMAL /LPF
HBA1C MFR BLD: <3.8 % (ref 4–5.6)
HCT VFR BLD AUTO: 17.8 % (ref 35–47)
HCT VFR BLD AUTO: 19 % (ref 35–47)
HCT VFR BLD AUTO: 20.5 % (ref 35–47)
HCT VFR BLD AUTO: 22.5 % (ref 35–47)
HCT VFR BLD AUTO: 22.7 % (ref 35–47)
HCT VFR BLD AUTO: 23 % (ref 35–47)
HCT VFR BLD AUTO: 23.1 % (ref 35–47)
HCT VFR BLD AUTO: 23.2 % (ref 35–47)
HCT VFR BLD AUTO: 24 % (ref 35–47)
HCT VFR BLD AUTO: 24.8 % (ref 35–47)
HGB BLD-MCNC: 5.3 G/DL (ref 11.5–16)
HGB BLD-MCNC: 5.9 G/DL (ref 11.5–16)
HGB BLD-MCNC: 6.6 G/DL (ref 11.5–16)
HGB BLD-MCNC: 6.6 G/DL (ref 11.5–16)
HGB BLD-MCNC: 6.9 G/DL (ref 11.5–16)
HGB BLD-MCNC: 6.9 G/DL (ref 11.5–16)
HGB BLD-MCNC: 7.1 G/DL (ref 11.5–16)
HGB BLD-MCNC: 7.1 G/DL (ref 11.5–16)
HGB BLD-MCNC: 7.3 G/DL (ref 11.5–16)
HGB BLD-MCNC: 7.7 G/DL (ref 11.5–16)
HGB UR QL STRIP: ABNORMAL
HISTORY CHECK: NORMAL
HYALINE CASTS URNS QL MICRO: ABNORMAL /LPF (ref 0–5)
IMM GRANULOCYTES # BLD AUTO: 0 K/UL (ref 0–0.04)
IMM GRANULOCYTES # BLD AUTO: 0 K/UL (ref 0–0.04)
IMM GRANULOCYTES NFR BLD AUTO: 0 % (ref 0–0.5)
IMM GRANULOCYTES NFR BLD AUTO: 0 % (ref 0–0.5)
INR PPP: 1.4 (ref 0.9–1.1)
KETONES UR QL STRIP.AUTO: NEGATIVE MG/DL
LEUKOCYTE ESTERASE UR QL STRIP.AUTO: ABNORMAL
LYMPHOCYTES # BLD: 6.2 K/UL (ref 0.8–3.5)
LYMPHOCYTES # BLD: 7.5 K/UL (ref 0.8–3.5)
LYMPHOCYTES NFR BLD: 21 % (ref 12–49)
LYMPHOCYTES NFR BLD: 21 % (ref 12–49)
MAGNESIUM SERPL-MCNC: 2.3 MG/DL (ref 1.6–2.4)
MAGNESIUM SERPL-MCNC: 2.4 MG/DL (ref 1.6–2.4)
MAGNESIUM SERPL-MCNC: 2.4 MG/DL (ref 1.6–2.4)
MCH RBC QN AUTO: 25 PG (ref 26–34)
MCH RBC QN AUTO: 26.6 PG (ref 26–34)
MCH RBC QN AUTO: 26.8 PG (ref 26–34)
MCH RBC QN AUTO: 26.9 PG (ref 26–34)
MCH RBC QN AUTO: 27.2 PG (ref 26–34)
MCH RBC QN AUTO: 27.2 PG (ref 26–34)
MCH RBC QN AUTO: 27.4 PG (ref 26–34)
MCH RBC QN AUTO: 27.6 PG (ref 26–34)
MCH RBC QN AUTO: 27.7 PG (ref 26–34)
MCH RBC QN AUTO: 28.1 PG (ref 26–34)
MCHC RBC AUTO-ENTMCNC: 28.6 G/DL (ref 30–36.5)
MCHC RBC AUTO-ENTMCNC: 29.7 G/DL (ref 30–36.5)
MCHC RBC AUTO-ENTMCNC: 29.8 G/DL (ref 30–36.5)
MCHC RBC AUTO-ENTMCNC: 30.4 G/DL (ref 30–36.5)
MCHC RBC AUTO-ENTMCNC: 30.7 G/DL (ref 30–36.5)
MCHC RBC AUTO-ENTMCNC: 30.9 G/DL (ref 30–36.5)
MCHC RBC AUTO-ENTMCNC: 31 G/DL (ref 30–36.5)
MCHC RBC AUTO-ENTMCNC: 31.1 G/DL (ref 30–36.5)
MCHC RBC AUTO-ENTMCNC: 31.3 G/DL (ref 30–36.5)
MCHC RBC AUTO-ENTMCNC: 32.2 G/DL (ref 30–36.5)
MCV RBC AUTO: 85.8 FL (ref 80–99)
MCV RBC AUTO: 87.2 FL (ref 80–99)
MCV RBC AUTO: 87.5 FL (ref 80–99)
MCV RBC AUTO: 87.6 FL (ref 80–99)
MCV RBC AUTO: 88.1 FL (ref 80–99)
MCV RBC AUTO: 88.3 FL (ref 80–99)
MCV RBC AUTO: 90.2 FL (ref 80–99)
MCV RBC AUTO: 90.3 FL (ref 80–99)
MCV RBC AUTO: 90.4 FL (ref 80–99)
MCV RBC AUTO: 90.4 FL (ref 80–99)
METAMYELOCYTES NFR BLD MANUAL: 1 %
METAMYELOCYTES NFR BLD MANUAL: 8 %
MONOCYTES # BLD: 2.7 K/UL (ref 0–1)
MONOCYTES # BLD: 3.6 K/UL (ref 0–1)
MONOCYTES NFR BLD: 10 % (ref 5–13)
MONOCYTES NFR BLD: 9 % (ref 5–13)
MYELOCYTES NFR BLD MANUAL: 13 %
MYELOCYTES NFR BLD MANUAL: 8 %
NEUTS BAND NFR BLD MANUAL: 1 %
NEUTS BAND NFR BLD MANUAL: 7 %
NEUTS SEG # BLD: 17.8 K/UL (ref 1.8–8)
NEUTS SEG # BLD: 9.3 K/UL (ref 1.8–8)
NEUTS SEG NFR BLD: 25 % (ref 32–75)
NEUTS SEG NFR BLD: 53 % (ref 32–75)
NITRITE UR QL STRIP.AUTO: POSITIVE
NRBC # BLD: 11.22 K/UL (ref 0–0.01)
NRBC # BLD: 13.13 K/UL (ref 0–0.01)
NRBC # BLD: 13.3 K/UL (ref 0–0.01)
NRBC # BLD: 13.33 K/UL (ref 0–0.01)
NRBC # BLD: 14.2 K/UL (ref 0–0.01)
NRBC # BLD: 15.79 K/UL (ref 0–0.01)
NRBC # BLD: 18.2 K/UL (ref 0–0.01)
NRBC # BLD: 18.58 K/UL (ref 0–0.01)
NRBC # BLD: 4.98 K/UL (ref 0–0.01)
NRBC # BLD: 8.88 K/UL (ref 0–0.01)
NRBC BLD-RTO: 16.8 PER 100 WBC
NRBC BLD-RTO: 24.9 PER 100 WBC
NRBC BLD-RTO: 26.2 PER 100 WBC
NRBC BLD-RTO: 35.8 PER 100 WBC
NRBC BLD-RTO: 35.8 PER 100 WBC
NRBC BLD-RTO: 36.9 PER 100 WBC
NRBC BLD-RTO: 39.5 PER 100 WBC
NRBC BLD-RTO: 48.7 PER 100 WBC
NRBC BLD-RTO: 50.5 PER 100 WBC
NRBC BLD-RTO: 53.5 PER 100 WBC
OTHER CELLS NFR BLD MANUAL: 15
PATH REV BLD -IMP: NORMAL
PH UR STRIP: 5.5 (ref 5–8)
PHOSPHATE SERPL-MCNC: 3.4 MG/DL (ref 2.6–4.7)
PHOSPHATE SERPL-MCNC: 3.5 MG/DL (ref 2.6–4.7)
PHOSPHATE SERPL-MCNC: 4 MG/DL (ref 2.6–4.7)
PLATELET # BLD AUTO: 1156 K/UL (ref 150–400)
PLATELET # BLD AUTO: 480 K/UL (ref 150–400)
PLATELET # BLD AUTO: 485 K/UL (ref 150–400)
PLATELET # BLD AUTO: 531 K/UL (ref 150–400)
PLATELET # BLD AUTO: 545 K/UL (ref 150–400)
PLATELET # BLD AUTO: 608 K/UL (ref 150–400)
PLATELET # BLD AUTO: 650 K/UL (ref 150–400)
PLATELET # BLD AUTO: 662 K/UL (ref 150–400)
PLATELET # BLD AUTO: 708 K/UL (ref 150–400)
PLATELET # BLD AUTO: 902 K/UL (ref 150–400)
PLATELET COMMENT: ABNORMAL
PLATELET COMMENT: ABNORMAL
PMV BLD AUTO: 12.2 FL (ref 8.9–12.9)
PMV BLD AUTO: 12.3 FL (ref 8.9–12.9)
PMV BLD AUTO: 12.4 FL (ref 8.9–12.9)
PMV BLD AUTO: 12.4 FL (ref 8.9–12.9)
PMV BLD AUTO: 12.6 FL (ref 8.9–12.9)
PMV BLD AUTO: 12.6 FL (ref 8.9–12.9)
PMV BLD AUTO: 12.7 FL (ref 8.9–12.9)
PMV BLD AUTO: 12.9 FL (ref 8.9–12.9)
PMV BLD AUTO: 13 FL (ref 8.9–12.9)
POTASSIUM SERPL-SCNC: 5.1 MMOL/L (ref 3.5–5.1)
POTASSIUM SERPL-SCNC: 5.2 MMOL/L (ref 3.5–5.1)
POTASSIUM SERPL-SCNC: 5.2 MMOL/L (ref 3.5–5.1)
POTASSIUM SERPL-SCNC: 5.3 MMOL/L (ref 3.5–5.1)
POTASSIUM SERPL-SCNC: 5.5 MMOL/L (ref 3.5–5.1)
POTASSIUM SERPL-SCNC: 5.7 MMOL/L (ref 3.5–5.1)
POTASSIUM SERPL-SCNC: 6.2 MMOL/L (ref 3.5–5.1)
PROMYELOCYTES NFR BLD MANUAL: 3 %
PROT SERPL-MCNC: 5.6 G/DL (ref 6.4–8.2)
PROT UR STRIP-MCNC: 30 MG/DL
PROTHROMBIN TIME: 14 SEC (ref 9–11.1)
RBC # BLD AUTO: 1.97 M/UL (ref 3.8–5.2)
RBC # BLD AUTO: 2.17 M/UL (ref 3.8–5.2)
RBC # BLD AUTO: 2.35 M/UL (ref 3.8–5.2)
RBC # BLD AUTO: 2.49 M/UL (ref 3.8–5.2)
RBC # BLD AUTO: 2.57 M/UL (ref 3.8–5.2)
RBC # BLD AUTO: 2.57 M/UL (ref 3.8–5.2)
RBC # BLD AUTO: 2.61 M/UL (ref 3.8–5.2)
RBC # BLD AUTO: 2.64 M/UL (ref 3.8–5.2)
RBC # BLD AUTO: 2.66 M/UL (ref 3.8–5.2)
RBC # BLD AUTO: 2.89 M/UL (ref 3.8–5.2)
RBC #/AREA URNS HPF: ABNORMAL /HPF (ref 0–5)
RBC MORPH BLD: ABNORMAL
SERVICE CMNT-IMP: ABNORMAL
SERVICE CMNT-IMP: NORMAL
SERVICE CMNT-IMP: NORMAL
SODIUM SERPL-SCNC: 138 MMOL/L (ref 136–145)
SODIUM SERPL-SCNC: 139 MMOL/L (ref 136–145)
SODIUM SERPL-SCNC: 140 MMOL/L (ref 136–145)
SODIUM SERPL-SCNC: 140 MMOL/L (ref 136–145)
SODIUM SERPL-SCNC: 141 MMOL/L (ref 136–145)
SODIUM SERPL-SCNC: 141 MMOL/L (ref 136–145)
SODIUM SERPL-SCNC: 143 MMOL/L (ref 136–145)
SP GR UR REFRACTOMETRY: 1.02
SPECIMEN EXP DATE BLD: NORMAL
UNIT DIVISION: 0
UNIT ISSUE DATE/TIME: NORMAL
URINE CULTURE IF INDICATED: ABNORMAL
UROBILINOGEN UR QL STRIP.AUTO: 1 EU/DL (ref 0.2–1)
WBC # BLD AUTO: 29.6 K/UL (ref 3.6–11)
WBC # BLD AUTO: 31.3 K/UL (ref 3.6–11)
WBC # BLD AUTO: 33.2 K/UL (ref 3.6–11)
WBC # BLD AUTO: 34 K/UL (ref 3.6–11)
WBC # BLD AUTO: 35.6 K/UL (ref 3.6–11)
WBC # BLD AUTO: 36 K/UL (ref 3.6–11)
WBC # BLD AUTO: 37.2 K/UL (ref 3.6–11)
WBC # BLD AUTO: 38.1 K/UL (ref 3.6–11)
WBC # BLD AUTO: 39.7 K/UL (ref 3.6–11)
WBC # BLD AUTO: 42.9 K/UL (ref 3.6–11)
WBC MORPH BLD: ABNORMAL
WBC MORPH BLD: ABNORMAL
WBC URNS QL MICRO: ABNORMAL /HPF (ref 0–4)
YEAST URNS QL MICRO: PRESENT

## 2024-01-01 PROCEDURE — 0656 HSPC GENERAL INPATIENT

## 2024-01-01 PROCEDURE — 6370000000 HC RX 637 (ALT 250 FOR IP): Performed by: NURSE PRACTITIONER

## 2024-01-01 PROCEDURE — 6360000002 HC RX W HCPCS: Performed by: COLON & RECTAL SURGERY

## 2024-01-01 PROCEDURE — 6370000000 HC RX 637 (ALT 250 FOR IP): Performed by: INTERNAL MEDICINE

## 2024-01-01 PROCEDURE — 36430 TRANSFUSION BLD/BLD COMPNT: CPT

## 2024-01-01 PROCEDURE — 2580000003 HC RX 258: Performed by: COLON & RECTAL SURGERY

## 2024-01-01 PROCEDURE — 36415 COLL VENOUS BLD VENIPUNCTURE: CPT

## 2024-01-01 PROCEDURE — 86901 BLOOD TYPING SEROLOGIC RH(D): CPT

## 2024-01-01 PROCEDURE — 80048 BASIC METABOLIC PNL TOTAL CA: CPT

## 2024-01-01 PROCEDURE — 1100000003 HC PRIVATE W/ TELEMETRY

## 2024-01-01 PROCEDURE — 2000000000 HC ICU R&B

## 2024-01-01 PROCEDURE — 2500000003 HC RX 250 WO HCPCS

## 2024-01-01 PROCEDURE — 85610 PROTHROMBIN TIME: CPT

## 2024-01-01 PROCEDURE — 94760 N-INVAS EAR/PLS OXIMETRY 1: CPT

## 2024-01-01 PROCEDURE — 82962 GLUCOSE BLOOD TEST: CPT

## 2024-01-01 PROCEDURE — 2580000003 HC RX 258: Performed by: INTERNAL MEDICINE

## 2024-01-01 PROCEDURE — 30233N1 TRANSFUSION OF NONAUTOLOGOUS RED BLOOD CELLS INTO PERIPHERAL VEIN, PERCUTANEOUS APPROACH: ICD-10-PCS | Performed by: STUDENT IN AN ORGANIZED HEALTH CARE EDUCATION/TRAINING PROGRAM

## 2024-01-01 PROCEDURE — 83036 HEMOGLOBIN GLYCOSYLATED A1C: CPT

## 2024-01-01 PROCEDURE — 2500000003 HC RX 250 WO HCPCS: Performed by: NURSE PRACTITIONER

## 2024-01-01 PROCEDURE — P9016 RBC LEUKOCYTES REDUCED: HCPCS

## 2024-01-01 PROCEDURE — 85384 FIBRINOGEN ACTIVITY: CPT

## 2024-01-01 PROCEDURE — 97535 SELF CARE MNGMENT TRAINING: CPT | Performed by: OCCUPATIONAL THERAPIST

## 2024-01-01 PROCEDURE — 51798 US URINE CAPACITY MEASURE: CPT

## 2024-01-01 PROCEDURE — 99231 SBSQ HOSP IP/OBS SF/LOW 25: CPT | Performed by: NURSE PRACTITIONER

## 2024-01-01 PROCEDURE — 86900 BLOOD TYPING SEROLOGIC ABO: CPT

## 2024-01-01 PROCEDURE — 85025 COMPLETE CBC W/AUTO DIFF WBC: CPT

## 2024-01-01 PROCEDURE — 6370000000 HC RX 637 (ALT 250 FOR IP): Performed by: STUDENT IN AN ORGANIZED HEALTH CARE EDUCATION/TRAINING PROGRAM

## 2024-01-01 PROCEDURE — 83735 ASSAY OF MAGNESIUM: CPT

## 2024-01-01 PROCEDURE — 86850 RBC ANTIBODY SCREEN: CPT

## 2024-01-01 PROCEDURE — 88184 FLOWCYTOMETRY/ TC 1 MARKER: CPT

## 2024-01-01 PROCEDURE — 97166 OT EVAL MOD COMPLEX 45 MIN: CPT | Performed by: OCCUPATIONAL THERAPIST

## 2024-01-01 PROCEDURE — 2700000000 HC OXYGEN THERAPY PER DAY

## 2024-01-01 PROCEDURE — 85027 COMPLETE CBC AUTOMATED: CPT

## 2024-01-01 PROCEDURE — 80053 COMPREHEN METABOLIC PANEL: CPT

## 2024-01-01 PROCEDURE — 97530 THERAPEUTIC ACTIVITIES: CPT

## 2024-01-01 PROCEDURE — 81001 URINALYSIS AUTO W/SCOPE: CPT

## 2024-01-01 PROCEDURE — 74178 CT ABD&PLV WO CNTR FLWD CNTR: CPT

## 2024-01-01 PROCEDURE — 2580000003 HC RX 258: Performed by: NURSE PRACTITIONER

## 2024-01-01 PROCEDURE — 93005 ELECTROCARDIOGRAM TRACING: CPT | Performed by: INTERNAL MEDICINE

## 2024-01-01 PROCEDURE — 2580000003 HC RX 258

## 2024-01-01 PROCEDURE — 87186 SC STD MICRODIL/AGAR DIL: CPT

## 2024-01-01 PROCEDURE — 6360000002 HC RX W HCPCS: Performed by: INTERNAL MEDICINE

## 2024-01-01 PROCEDURE — 84100 ASSAY OF PHOSPHORUS: CPT

## 2024-01-01 PROCEDURE — 93005 ELECTROCARDIOGRAM TRACING: CPT | Performed by: EMERGENCY MEDICINE

## 2024-01-01 PROCEDURE — 87086 URINE CULTURE/COLONY COUNT: CPT

## 2024-01-01 PROCEDURE — 87088 URINE BACTERIA CULTURE: CPT

## 2024-01-01 PROCEDURE — 97162 PT EVAL MOD COMPLEX 30 MIN: CPT

## 2024-01-01 PROCEDURE — 51701 INSERT BLADDER CATHETER: CPT

## 2024-01-01 PROCEDURE — 86923 COMPATIBILITY TEST ELECTRIC: CPT

## 2024-01-01 PROCEDURE — 6360000004 HC RX CONTRAST MEDICATION: Performed by: EMERGENCY MEDICINE

## 2024-01-01 PROCEDURE — 99222 1ST HOSP IP/OBS MODERATE 55: CPT | Performed by: FAMILY MEDICINE

## 2024-01-01 PROCEDURE — 99285 EMERGENCY DEPT VISIT HI MDM: CPT

## 2024-01-01 PROCEDURE — 6360000002 HC RX W HCPCS

## 2024-01-01 PROCEDURE — 88185 FLOWCYTOMETRY/TC ADD-ON: CPT

## 2024-01-01 PROCEDURE — 6360000002 HC RX W HCPCS: Performed by: NURSE PRACTITIONER

## 2024-01-01 RX ORDER — ACETAMINOPHEN 650 MG/1
650 SUPPOSITORY RECTAL EVERY 6 HOURS PRN
Status: DISCONTINUED | OUTPATIENT
Start: 2024-01-01 | End: 2024-01-01 | Stop reason: HOSPADM

## 2024-01-01 RX ORDER — SODIUM CHLORIDE 9 MG/ML
INJECTION, SOLUTION INTRAVENOUS PRN
Status: DISCONTINUED | OUTPATIENT
Start: 2024-01-01 | End: 2024-01-01

## 2024-01-01 RX ORDER — HYDROMORPHONE HYDROCHLORIDE 1 MG/ML
2 SOLUTION ORAL
Status: DISCONTINUED | OUTPATIENT
Start: 2024-01-01 | End: 2024-01-01

## 2024-01-01 RX ORDER — INSULIN LISPRO 100 [IU]/ML
0-8 INJECTION, SOLUTION INTRAVENOUS; SUBCUTANEOUS
Status: DISCONTINUED | OUTPATIENT
Start: 2024-01-01 | End: 2024-01-01

## 2024-01-01 RX ORDER — DEXTROSE MONOHYDRATE 100 MG/ML
INJECTION, SOLUTION INTRAVENOUS CONTINUOUS PRN
Status: DISCONTINUED | OUTPATIENT
Start: 2024-01-01 | End: 2024-01-01

## 2024-01-01 RX ORDER — DEXTROSE MONOHYDRATE 100 MG/ML
INJECTION, SOLUTION INTRAVENOUS CONTINUOUS PRN
Status: DISCONTINUED | OUTPATIENT
Start: 2024-01-01 | End: 2024-01-01 | Stop reason: HOSPADM

## 2024-01-01 RX ORDER — GLUCAGON 1 MG/ML
1 KIT INJECTION PRN
Status: DISCONTINUED | OUTPATIENT
Start: 2024-01-01 | End: 2024-01-01

## 2024-01-01 RX ORDER — PANTOPRAZOLE SODIUM 40 MG/10ML
40 INJECTION, POWDER, LYOPHILIZED, FOR SOLUTION INTRAVENOUS DAILY
COMMUNITY

## 2024-01-01 RX ORDER — MORPHINE SULFATE 10 MG/5ML
4 SOLUTION ORAL EVERY 4 HOURS PRN
Status: DISCONTINUED | OUTPATIENT
Start: 2024-01-01 | End: 2024-01-01

## 2024-01-01 RX ORDER — MORPHINE SULFATE 2 MG/ML
2 INJECTION, SOLUTION INTRAMUSCULAR; INTRAVENOUS EVERY 4 HOURS PRN
Status: DISCONTINUED | OUTPATIENT
Start: 2024-01-01 | End: 2024-01-01

## 2024-01-01 RX ORDER — PROMETHAZINE HYDROCHLORIDE 12.5 MG/1
12.5 SUPPOSITORY RECTAL EVERY 6 HOURS PRN
COMMUNITY

## 2024-01-01 RX ORDER — HYDROMORPHONE HYDROCHLORIDE 1 MG/ML
SOLUTION ORAL
Status: CANCELLED | OUTPATIENT
Start: 2024-01-01

## 2024-01-01 RX ORDER — MORPHINE SULFATE 10 MG/5ML
5 SOLUTION ORAL EVERY 4 HOURS PRN
Status: DISCONTINUED | OUTPATIENT
Start: 2024-01-01 | End: 2024-01-01

## 2024-01-01 RX ORDER — ARIPIPRAZOLE 20 MG/1
20 TABLET ORAL DAILY
COMMUNITY

## 2024-01-01 RX ORDER — ASPIRIN 81 MG/1
81 TABLET, CHEWABLE ORAL DAILY
COMMUNITY

## 2024-01-01 RX ORDER — POLYETHYLENE GLYCOL 3350 17 G/17G
17 POWDER, FOR SOLUTION ORAL DAILY PRN
COMMUNITY

## 2024-01-01 RX ORDER — CALCITRIOL 0.25 UG/1
0.25 CAPSULE, LIQUID FILLED ORAL DAILY
COMMUNITY

## 2024-01-01 RX ORDER — NOREPINEPHRINE BITARTRATE 0.06 MG/ML
INJECTION, SOLUTION INTRAVENOUS
Status: COMPLETED
Start: 2024-01-01 | End: 2024-01-01

## 2024-01-01 RX ORDER — MORPHINE SULFATE 2 MG/ML
INJECTION, SOLUTION INTRAMUSCULAR; INTRAVENOUS
Status: COMPLETED
Start: 2024-01-01 | End: 2024-01-01

## 2024-01-01 RX ORDER — 0.9 % SODIUM CHLORIDE 0.9 %
500 INTRAVENOUS SOLUTION INTRAVENOUS ONCE
Status: COMPLETED | OUTPATIENT
Start: 2024-01-01 | End: 2024-01-01

## 2024-01-01 RX ORDER — ONDANSETRON HYDROCHLORIDE 8 MG/1
8 TABLET, FILM COATED ORAL EVERY 8 HOURS PRN
COMMUNITY

## 2024-01-01 RX ORDER — MORPHINE SULFATE 20 MG/ML
5 SOLUTION ORAL
Status: DISCONTINUED | OUTPATIENT
Start: 2024-01-01 | End: 2024-01-01

## 2024-01-01 RX ORDER — GABAPENTIN 100 MG/1
100 CAPSULE ORAL DAILY
COMMUNITY

## 2024-01-01 RX ORDER — ALLOPURINOL 100 MG/1
50 TABLET ORAL DAILY
COMMUNITY

## 2024-01-01 RX ORDER — LORAZEPAM 2 MG/ML
1 CONCENTRATE ORAL
Status: DISCONTINUED | OUTPATIENT
Start: 2024-01-01 | End: 2024-01-01 | Stop reason: HOSPADM

## 2024-01-01 RX ORDER — MORPHINE SULFATE 2 MG/ML
2 INJECTION, SOLUTION INTRAMUSCULAR; INTRAVENOUS
Status: DISCONTINUED | OUTPATIENT
Start: 2024-01-01 | End: 2024-01-01

## 2024-01-01 RX ORDER — CALCIUM GLUCONATE 20 MG/ML
1000 INJECTION, SOLUTION INTRAVENOUS ONCE
Status: COMPLETED | OUTPATIENT
Start: 2024-01-01 | End: 2024-01-01

## 2024-01-01 RX ORDER — SENNOSIDES A AND B 8.6 MG/1
1 TABLET, FILM COATED ORAL DAILY
COMMUNITY

## 2024-01-01 RX ORDER — CASTOR OIL AND BALSAM, PERU 788; 87 MG/G; MG/G
OINTMENT TOPICAL 3 TIMES DAILY
Status: DISCONTINUED | OUTPATIENT
Start: 2024-01-01 | End: 2024-01-01 | Stop reason: HOSPADM

## 2024-01-01 RX ORDER — DEXTROSE MONOHYDRATE AND SODIUM CHLORIDE 5; .9 G/100ML; G/100ML
INJECTION, SOLUTION INTRAVENOUS CONTINUOUS
Status: DISCONTINUED | OUTPATIENT
Start: 2024-01-01 | End: 2024-01-01

## 2024-01-01 RX ORDER — NOREPINEPHRINE BITARTRATE 0.06 MG/ML
1-100 INJECTION, SOLUTION INTRAVENOUS CONTINUOUS
Status: DISCONTINUED | OUTPATIENT
Start: 2024-01-01 | End: 2024-01-01

## 2024-01-01 RX ORDER — INSULIN LISPRO 100 [IU]/ML
0-4 INJECTION, SOLUTION INTRAVENOUS; SUBCUTANEOUS NIGHTLY
Status: DISCONTINUED | OUTPATIENT
Start: 2024-01-01 | End: 2024-01-01

## 2024-01-01 RX ORDER — GLUCAGON 1 MG/ML
1 KIT INJECTION PRN
Status: DISCONTINUED | OUTPATIENT
Start: 2024-01-01 | End: 2024-01-01 | Stop reason: HOSPADM

## 2024-01-01 RX ORDER — HYDROMORPHONE HYDROCHLORIDE 1 MG/ML
2 SOLUTION ORAL
Status: DISCONTINUED | OUTPATIENT
Start: 2024-01-01 | End: 2024-01-01 | Stop reason: HOSPADM

## 2024-01-01 RX ORDER — TRAMADOL HYDROCHLORIDE 25 MG/1
25 TABLET, COATED ORAL
COMMUNITY

## 2024-01-01 RX ORDER — ACETAMINOPHEN 325 MG/1
650 TABLET ORAL EVERY 6 HOURS PRN
Status: DISCONTINUED | OUTPATIENT
Start: 2024-01-01 | End: 2024-01-01 | Stop reason: HOSPADM

## 2024-01-01 RX ORDER — RUXOLITINIB 5 MG/1
5 TABLET ORAL 2 TIMES DAILY
COMMUNITY

## 2024-01-01 RX ADMIN — LORAZEPAM 1 MG: 2 SOLUTION, CONCENTRATE ORAL at 10:45

## 2024-01-01 RX ADMIN — Medication: at 09:18

## 2024-01-01 RX ADMIN — Medication 1 AMPULE: at 09:33

## 2024-01-01 RX ADMIN — INSULIN HUMAN 10 UNITS: 100 INJECTION, SOLUTION PARENTERAL at 22:32

## 2024-01-01 RX ADMIN — Medication: at 17:36

## 2024-01-01 RX ADMIN — Medication: at 09:29

## 2024-01-01 RX ADMIN — PANTOPRAZOLE SODIUM 40 MG: 40 INJECTION, POWDER, FOR SOLUTION INTRAVENOUS at 09:29

## 2024-01-01 RX ADMIN — Medication 2 MG: at 21:01

## 2024-01-01 RX ADMIN — COLLAGENASE SANTYL: 250 OINTMENT TOPICAL at 13:02

## 2024-01-01 RX ADMIN — MORPHINE SULFATE 2 MG: 2 INJECTION, SOLUTION INTRAMUSCULAR; INTRAVENOUS at 22:06

## 2024-01-01 RX ADMIN — Medication 2 MG: at 06:32

## 2024-01-01 RX ADMIN — COLLAGENASE SANTYL: 250 OINTMENT TOPICAL at 11:24

## 2024-01-01 RX ADMIN — DEXTROSE AND SODIUM CHLORIDE: 5; 900 INJECTION, SOLUTION INTRAVENOUS at 23:51

## 2024-01-01 RX ADMIN — Medication: at 14:06

## 2024-01-01 RX ADMIN — Medication: at 20:28

## 2024-01-01 RX ADMIN — Medication: at 19:44

## 2024-01-01 RX ADMIN — Medication: at 20:00

## 2024-01-01 RX ADMIN — Medication 2 MG: at 05:13

## 2024-01-01 RX ADMIN — MORPHINE SULFATE 2 MG: 2 INJECTION, SOLUTION INTRAMUSCULAR; INTRAVENOUS at 17:45

## 2024-01-01 RX ADMIN — SODIUM CHLORIDE 500 ML: 9 INJECTION, SOLUTION INTRAVENOUS at 10:08

## 2024-01-01 RX ADMIN — MORPHINE SULFATE 2 MG: 2 INJECTION, SOLUTION INTRAMUSCULAR; INTRAVENOUS at 09:19

## 2024-01-01 RX ADMIN — MORPHINE SULFATE 2 MG: 2 INJECTION, SOLUTION INTRAMUSCULAR; INTRAVENOUS at 21:47

## 2024-01-01 RX ADMIN — DEXTROSE MONOHYDRATE 250 ML: 100 INJECTION, SOLUTION INTRAVENOUS at 22:33

## 2024-01-01 RX ADMIN — PANTOPRAZOLE SODIUM 40 MG: 40 INJECTION, POWDER, FOR SOLUTION INTRAVENOUS at 09:01

## 2024-01-01 RX ADMIN — SODIUM CHLORIDE 1000 MG: 900 INJECTION INTRAVENOUS at 10:34

## 2024-01-01 RX ADMIN — Medication 2 MG: at 15:05

## 2024-01-01 RX ADMIN — Medication: at 14:00

## 2024-01-01 RX ADMIN — Medication: at 08:44

## 2024-01-01 RX ADMIN — Medication: at 19:12

## 2024-01-01 RX ADMIN — LORAZEPAM 1 MG: 2 SOLUTION, CONCENTRATE ORAL at 18:32

## 2024-01-01 RX ADMIN — LORAZEPAM 1 MG: 2 SOLUTION, CONCENTRATE ORAL at 11:25

## 2024-01-01 RX ADMIN — PANTOPRAZOLE SODIUM 40 MG: 40 INJECTION, POWDER, FOR SOLUTION INTRAVENOUS at 09:19

## 2024-01-01 RX ADMIN — MORPHINE SULFATE 2 MG: 2 INJECTION, SOLUTION INTRAMUSCULAR; INTRAVENOUS at 20:39

## 2024-01-01 RX ADMIN — Medication: at 10:46

## 2024-01-01 RX ADMIN — SODIUM CHLORIDE 5 MCG/MIN: 9 INJECTION, SOLUTION INTRAVENOUS at 18:15

## 2024-01-01 RX ADMIN — ACETAMINOPHEN 650 MG: 650 SUPPOSITORY RECTAL at 20:40

## 2024-01-01 RX ADMIN — MORPHINE SULFATE 2 MG: 2 INJECTION, SOLUTION INTRAMUSCULAR; INTRAVENOUS at 12:18

## 2024-01-01 RX ADMIN — DESMOPRESSIN ACETATE 19.64 MCG: 40 INJECTION, SOLUTION INTRAVENOUS; SUBCUTANEOUS at 20:31

## 2024-01-01 RX ADMIN — MORPHINE SULFATE 2 MG: 2 INJECTION, SOLUTION INTRAMUSCULAR; INTRAVENOUS at 02:05

## 2024-01-01 RX ADMIN — MORPHINE SULFATE 2 MG: 2 INJECTION, SOLUTION INTRAMUSCULAR; INTRAVENOUS at 20:06

## 2024-01-01 RX ADMIN — LORAZEPAM 1 MG: 2 SOLUTION, CONCENTRATE ORAL at 01:49

## 2024-01-01 RX ADMIN — SODIUM ZIRCONIUM CYCLOSILICATE 10 G: 5 POWDER, FOR SUSPENSION ORAL at 03:44

## 2024-01-01 RX ADMIN — COLLAGENASE SANTYL: 250 OINTMENT TOPICAL at 09:26

## 2024-01-01 RX ADMIN — Medication: at 13:01

## 2024-01-01 RX ADMIN — Medication: at 21:05

## 2024-01-01 RX ADMIN — ACETAMINOPHEN 650 MG: 325 TABLET ORAL at 16:07

## 2024-01-01 RX ADMIN — Medication 1 AMPULE: at 08:44

## 2024-01-01 RX ADMIN — PANTOPRAZOLE SODIUM 40 MG: 40 INJECTION, POWDER, FOR SOLUTION INTRAVENOUS at 08:44

## 2024-01-01 RX ADMIN — MORPHINE SULFATE 2 MG: 2 INJECTION, SOLUTION INTRAMUSCULAR; INTRAVENOUS at 00:29

## 2024-01-01 RX ADMIN — MORPHINE SULFATE 2 MG: 2 INJECTION, SOLUTION INTRAMUSCULAR; INTRAVENOUS at 17:21

## 2024-01-01 RX ADMIN — Medication: at 10:11

## 2024-01-01 RX ADMIN — Medication: at 16:08

## 2024-01-01 RX ADMIN — Medication 2 MG: at 10:45

## 2024-01-01 RX ADMIN — Medication: at 13:42

## 2024-01-01 RX ADMIN — MORPHINE SULFATE 2 MG: 2 INJECTION, SOLUTION INTRAMUSCULAR; INTRAVENOUS at 19:27

## 2024-01-01 RX ADMIN — DEXTROSE AND SODIUM CHLORIDE: 5; 900 INJECTION, SOLUTION INTRAVENOUS at 14:18

## 2024-01-01 RX ADMIN — Medication: at 20:02

## 2024-01-01 RX ADMIN — LORAZEPAM 1 MG: 2 SOLUTION, CONCENTRATE ORAL at 20:40

## 2024-01-01 RX ADMIN — DEXTROSE AND SODIUM CHLORIDE: 5; 900 INJECTION, SOLUTION INTRAVENOUS at 04:34

## 2024-01-01 RX ADMIN — Medication 1 AMPULE: at 09:00

## 2024-01-01 RX ADMIN — Medication: at 20:41

## 2024-01-01 RX ADMIN — Medication: at 16:28

## 2024-01-01 RX ADMIN — Medication: at 09:26

## 2024-01-01 RX ADMIN — COLLAGENASE SANTYL: 250 OINTMENT TOPICAL at 10:46

## 2024-01-01 RX ADMIN — COLLAGENASE SANTYL: 250 OINTMENT TOPICAL at 22:52

## 2024-01-01 RX ADMIN — CALCIUM GLUCONATE 1000 MG: 20 INJECTION, SOLUTION INTRAVENOUS at 22:27

## 2024-01-01 RX ADMIN — IOPAMIDOL 100 ML: 755 INJECTION, SOLUTION INTRAVENOUS at 16:53

## 2024-01-01 RX ADMIN — LORAZEPAM 1 MG: 2 SOLUTION, CONCENTRATE ORAL at 13:00

## 2024-01-01 RX ADMIN — Medication 1 AMPULE: at 21:47

## 2024-01-01 RX ADMIN — PANTOPRAZOLE SODIUM 40 MG: 40 INJECTION, POWDER, FOR SOLUTION INTRAVENOUS at 10:12

## 2024-01-01 RX ADMIN — MORPHINE SULFATE 5 MG: 100 SOLUTION ORAL at 15:21

## 2024-01-01 RX ADMIN — SODIUM CHLORIDE 1000 MG: 900 INJECTION INTRAVENOUS at 10:07

## 2024-01-01 RX ADMIN — COLLAGENASE SANTYL: 250 OINTMENT TOPICAL at 10:10

## 2024-01-01 RX ADMIN — MORPHINE SULFATE 2 MG: 2 INJECTION, SOLUTION INTRAMUSCULAR; INTRAVENOUS at 09:37

## 2024-01-01 RX ADMIN — Medication: at 21:28

## 2024-01-01 RX ADMIN — PANTOPRAZOLE SODIUM 40 MG: 40 INJECTION, POWDER, FOR SOLUTION INTRAVENOUS at 20:00

## 2024-01-01 RX ADMIN — MORPHINE SULFATE 2 MG: 2 INJECTION, SOLUTION INTRAMUSCULAR; INTRAVENOUS at 03:54

## 2024-01-01 RX ADMIN — MORPHINE SULFATE 2 MG: 2 INJECTION, SOLUTION INTRAMUSCULAR; INTRAVENOUS at 16:30

## 2024-01-01 RX ADMIN — LORAZEPAM 1 MG: 2 SOLUTION, CONCENTRATE ORAL at 05:12

## 2024-01-01 RX ADMIN — DEXTROSE AND SODIUM CHLORIDE: 5; 900 INJECTION, SOLUTION INTRAVENOUS at 18:15

## 2024-01-01 RX ADMIN — NOREPINEPHRINE BITARTRATE 5 MCG/MIN: 0.06 INJECTION, SOLUTION INTRAVENOUS at 18:15

## 2024-01-01 RX ADMIN — LORAZEPAM 1 MG: 2 SOLUTION, CONCENTRATE ORAL at 20:23

## 2024-01-01 RX ADMIN — Medication 2 MG: at 19:04

## 2024-01-01 RX ADMIN — MORPHINE SULFATE 2 MG: 2 INJECTION, SOLUTION INTRAMUSCULAR; INTRAVENOUS at 13:40

## 2024-01-16 ENCOUNTER — APPOINTMENT (OUTPATIENT)
Facility: HOSPITAL | Age: 79
End: 2024-01-16
Payer: MEDICARE

## 2024-01-16 ENCOUNTER — HOSPITAL ENCOUNTER (EMERGENCY)
Facility: HOSPITAL | Age: 79
Discharge: HOME | End: 2024-01-16
Payer: MEDICARE

## 2024-01-16 VITALS
OXYGEN SATURATION: 97 % | SYSTOLIC BLOOD PRESSURE: 136 MMHG | RESPIRATION RATE: 17 BRPM | DIASTOLIC BLOOD PRESSURE: 54 MMHG | HEIGHT: 65 IN | WEIGHT: 130 LBS | HEART RATE: 78 BPM | TEMPERATURE: 96.9 F | BODY MASS INDEX: 21.66 KG/M2

## 2024-01-16 DIAGNOSIS — M25.561 ACUTE PAIN OF RIGHT KNEE: Primary | ICD-10-CM

## 2024-01-16 PROCEDURE — 6370000000 HC RX 637 (ALT 250 FOR IP): Performed by: PHYSICIAN ASSISTANT

## 2024-01-16 PROCEDURE — 73562 X-RAY EXAM OF KNEE 3: CPT

## 2024-01-16 PROCEDURE — 99284 EMERGENCY DEPT VISIT MOD MDM: CPT

## 2024-01-16 PROCEDURE — 93971 EXTREMITY STUDY: CPT

## 2024-01-16 RX ORDER — ACETAMINOPHEN 325 MG/1
650 TABLET ORAL
Status: COMPLETED | OUTPATIENT
Start: 2024-01-16 | End: 2024-01-16

## 2024-01-16 RX ADMIN — ACETAMINOPHEN 650 MG: 325 TABLET ORAL at 19:19

## 2024-01-16 ASSESSMENT — PAIN DESCRIPTION - LOCATION: LOCATION: KNEE

## 2024-01-16 ASSESSMENT — PAIN SCALES - GENERAL: PAINLEVEL_OUTOF10: 10

## 2024-01-16 ASSESSMENT — PAIN - FUNCTIONAL ASSESSMENT: PAIN_FUNCTIONAL_ASSESSMENT: 0-10

## 2024-01-16 ASSESSMENT — PAIN DESCRIPTION - ORIENTATION: ORIENTATION: RIGHT

## 2024-01-16 ASSESSMENT — PAIN DESCRIPTION - PAIN TYPE: TYPE: ACUTE PAIN

## 2024-01-16 ASSESSMENT — PAIN DESCRIPTION - DESCRIPTORS: DESCRIPTORS: SHARP

## 2024-01-16 NOTE — ED NOTES
Pt presents ambulatory to ED complaining of R leg pain, LROm and swelling. Pt is alert and oriented x 4, RR even and unlabored, skin is warm and dry. Assesment completed and pt updated on plan of care.       Emergency Department Nursing Plan of Care       The Nursing Plan of Care is developed from the Nursing assessment and Emergency Department Attending provider initial evaluation.  The plan of care may be reviewed in the “ED Provider note”.    The Plan of Care was developed with the following considerations:   Patient / Family readiness to learn indicated by:verbalized understanding  Persons(s) to be included in education: patient  Barriers to Learning/Limitations:None    Signed     Kimberly Urena RN    1/16/2024   6:57 PM

## 2024-01-16 NOTE — ED PROVIDER NOTES
[facility] EMERGENCY DEPT  EMERGENCY DEPARTMENT ENCOUNTER       Pt Name: Brandy Sauceda  MRN: [id number]  Birthdate 1945  Date of Evaluation: 1/16/2024  Provider: Radha Hidalgo PA-C   PCP: Dalton Perdomo APRN - NP  Note Started: 9:33 PM 1/16/24     CHIEF COMPLAINT       Chief Complaint   Patient presents with    Knee Pain     Pt arrived via EMS for right knee pain x 4-5 days, +difficulty walking and tenderness with palpation, PMHx of anemia. Denies recent injury.         HISTORY OF PRESENT ILLNESS: 1 or more elements      History From: Patient  None     Brandy Sauceda is a 78 y.o. female who presents to the ED today right knee pain.  Present for approximately 4 days.  No trauma.  Tenderness is over the medial aspect of the knee.  No fever.  Has been applying lidocaine patches and states she has taken 1 pain pill but reports that she lives at a facility and is unclear of what the facility gave her.  Comes here for further evaluation     Nursing Notes were all reviewed and agreed with or any disagreements were addressed in the HPI.     REVIEW OF SYSTEMS      Review of Systems     Positives and Pertinent negatives as per HPI.    PAST HISTORY     Past Medical History:  Past Medical History:   Diagnosis Date    Anemia     Congestive heart failure (HCC)     Hypertension     Menopause     Ventral hernia without obstruction or gangrene 04/18/2022       Past Surgical History:  Past Surgical History:   Procedure Laterality Date    OTHER SURGICAL HISTORY  04/13/2022     bone marrow       Family History:  Family History   Problem Relation Age of Onset    Breast Problems Sister     Hypertension Mother     Cancer Sister     Breast Cancer Sister         60's       Social History:  Social History     Tobacco Use    Smoking status: Never    Smokeless tobacco: Never   Substance Use Topics    Alcohol use: Never    Drug use: Not Currently       Allergies:  No Known Allergies    CURRENT MEDICATIONS      Previous Medications

## 2024-01-17 LAB — ECHO BSA: 1.64 M2

## 2024-01-17 NOTE — ED NOTES
[facility] stated they were coming to get patient at 10 PM. This RN called the facility and spoke to the  and made aware the patient was coming back to them.

## 2024-01-17 NOTE — ED NOTES
Patient  given copy of dc instructions and 0 script(s). Patient verbalized understanding of instructions and script (s). Patient given a current medication reconciliation form and verbalized understanding of their medications. Patient verbalized understanding of the importance of discussing medications with his or her physician or clinic they will be following up with. Patient alert and oriented and in no acute distress. Patient discharged home ambulatory.

## 2024-01-17 NOTE — DISCHARGE INSTRUCTIONS
If you develop an increase in pain, fever, redness, any worsening of symptoms you need to return to the emergency department.  Please use your cane or walker while ambulating.  Call orthopedics and schedule a follow-up appointment.

## 2024-02-13 ENCOUNTER — HOSPITAL ENCOUNTER (OUTPATIENT)
Facility: HOSPITAL | Age: 79
Discharge: HOME | End: 2024-02-16
Attending: ORTHOPAEDIC SURGERY
Payer: MEDICARE

## 2024-02-13 DIAGNOSIS — M25.461 KNEE EFFUSION, RIGHT: ICD-10-CM

## 2024-02-13 DIAGNOSIS — M25.561 RIGHT KNEE PAIN, UNSPECIFIED CHRONICITY: ICD-10-CM

## 2024-02-13 DIAGNOSIS — M84.362G STRESS FRACTURE OF LEFT TIBIA WITH DELAYED HEALING, SUBSEQUENT ENCOUNTER: ICD-10-CM

## 2024-02-13 PROCEDURE — 73721 MRI JNT OF LWR EXTRE W/O DYE: CPT

## 2024-04-04 ENCOUNTER — APPOINTMENT (OUTPATIENT)
Facility: HOSPITAL | Age: 79
End: 2024-04-04
Payer: MEDICARE

## 2024-04-04 ENCOUNTER — HOSPITAL ENCOUNTER (EMERGENCY)
Facility: HOSPITAL | Age: 79
Discharge: HOME | End: 2024-04-04
Attending: STUDENT IN AN ORGANIZED HEALTH CARE EDUCATION/TRAINING PROGRAM
Payer: MEDICARE

## 2024-04-04 ENCOUNTER — APPOINTMENT (OUTPATIENT)
Facility: HOSPITAL | Age: 79
End: 2024-04-04
Attending: STUDENT IN AN ORGANIZED HEALTH CARE EDUCATION/TRAINING PROGRAM
Payer: MEDICARE

## 2024-04-04 VITALS
DIASTOLIC BLOOD PRESSURE: 57 MMHG | BODY MASS INDEX: 23.91 KG/M2 | SYSTOLIC BLOOD PRESSURE: 105 MMHG | HEIGHT: 65 IN | WEIGHT: 143.5 LBS | HEART RATE: 73 BPM | RESPIRATION RATE: 17 BRPM | OXYGEN SATURATION: 97 % | TEMPERATURE: 97.8 F

## 2024-04-04 DIAGNOSIS — M79.89 LEG SWELLING: Primary | ICD-10-CM

## 2024-04-04 LAB
ALBUMIN SERPL-MCNC: 3.8 G/DL (ref 3.5–5)
ALBUMIN/GLOB SERPL: 1.4 (ref 1.1–2.2)
ALP SERPL-CCNC: 206 U/L (ref 45–117)
ALT SERPL-CCNC: 30 U/L (ref 12–78)
ANION GAP SERPL CALC-SCNC: 10 MMOL/L (ref 5–15)
APPEARANCE UR: CLEAR
AST SERPL-CCNC: 43 U/L (ref 15–37)
BACTERIA URNS QL MICRO: NEGATIVE /HPF
BASOPHILS # BLD: 0 K/UL (ref 0–0.1)
BASOPHILS NFR BLD: 0 % (ref 0–1)
BILIRUB SERPL-MCNC: 0.3 MG/DL (ref 0.2–1)
BILIRUB UR QL: NEGATIVE
BUN SERPL-MCNC: 43 MG/DL (ref 6–20)
BUN/CREAT SERPL: 22 (ref 12–20)
CALCIUM SERPL-MCNC: 8.7 MG/DL (ref 8.5–10.1)
CHLORIDE SERPL-SCNC: 103 MMOL/L (ref 97–108)
CO2 SERPL-SCNC: 27 MMOL/L (ref 21–32)
COLOR UR: NORMAL
CREAT SERPL-MCNC: 1.97 MG/DL (ref 0.55–1.02)
DIFFERENTIAL METHOD BLD: ABNORMAL
ECHO BSA: 1.73 M2
EOSINOPHIL # BLD: 0.1 K/UL (ref 0–0.4)
EOSINOPHIL NFR BLD: 1 % (ref 0–7)
EPITH CASTS URNS QL MICRO: NORMAL /LPF
ERYTHROCYTE [DISTWIDTH] IN BLOOD BY AUTOMATED COUNT: 18.7 % (ref 11.5–14.5)
GLOBULIN SER CALC-MCNC: 2.8 G/DL (ref 2–4)
GLUCOSE SERPL-MCNC: 76 MG/DL (ref 65–100)
GLUCOSE UR STRIP.AUTO-MCNC: NEGATIVE MG/DL
HCT VFR BLD AUTO: 26.7 % (ref 35–47)
HGB BLD-MCNC: 7.7 G/DL (ref 11.5–16)
HGB UR QL STRIP: NEGATIVE
IMM GRANULOCYTES # BLD AUTO: 2.4 K/UL
IMM GRANULOCYTES NFR BLD AUTO: 17 %
KETONES UR QL STRIP.AUTO: NEGATIVE MG/DL
LEUKOCYTE ESTERASE UR QL STRIP.AUTO: NEGATIVE
LYMPHOCYTES # BLD: 3.1 K/UL (ref 0.8–3.5)
LYMPHOCYTES NFR BLD: 22 % (ref 12–49)
MCH RBC QN AUTO: 26.3 PG (ref 26–34)
MCHC RBC AUTO-ENTMCNC: 28.8 G/DL (ref 30–36.5)
MCV RBC AUTO: 91.1 FL (ref 80–99)
MONOCYTES # BLD: 3.7 K/UL (ref 0–1)
MONOCYTES NFR BLD: 26 % (ref 5–13)
NEUTS SEG # BLD: 5 K/UL (ref 1.8–8)
NEUTS SEG NFR BLD: 34 % (ref 32–75)
NITRITE UR QL STRIP.AUTO: NEGATIVE
NRBC # BLD: 1.43 K/UL (ref 0–0.01)
NRBC BLD-RTO: 10 PER 100 WBC
NT PRO BNP: 7135 PG/ML (ref 0–450)
PH UR STRIP: 6.5 (ref 5–8)
PLATELET # BLD AUTO: 834 K/UL (ref 150–400)
PMV BLD AUTO: 11.7 FL (ref 8.9–12.9)
POTASSIUM SERPL-SCNC: 4.7 MMOL/L (ref 3.5–5.1)
PROT SERPL-MCNC: 6.6 G/DL (ref 6.4–8.2)
PROT UR STRIP-MCNC: NEGATIVE MG/DL
RBC # BLD AUTO: 2.93 M/UL (ref 3.8–5.2)
RBC #/AREA URNS HPF: NORMAL /HPF (ref 0–5)
RBC MORPH BLD: ABNORMAL
SODIUM SERPL-SCNC: 140 MMOL/L (ref 136–145)
SP GR UR REFRACTOMETRY: 1.01
URINE CULTURE IF INDICATED: NORMAL
UROBILINOGEN UR QL STRIP.AUTO: 0.2 EU/DL (ref 0.2–1)
WBC # BLD AUTO: 14.3 K/UL (ref 3.6–11)
WBC URNS QL MICRO: NORMAL /HPF (ref 0–4)

## 2024-04-04 PROCEDURE — 83880 ASSAY OF NATRIURETIC PEPTIDE: CPT

## 2024-04-04 PROCEDURE — 36415 COLL VENOUS BLD VENIPUNCTURE: CPT

## 2024-04-04 PROCEDURE — 99284 EMERGENCY DEPT VISIT MOD MDM: CPT

## 2024-04-04 PROCEDURE — 81001 URINALYSIS AUTO W/SCOPE: CPT

## 2024-04-04 PROCEDURE — 6370000000 HC RX 637 (ALT 250 FOR IP): Performed by: STUDENT IN AN ORGANIZED HEALTH CARE EDUCATION/TRAINING PROGRAM

## 2024-04-04 PROCEDURE — 85025 COMPLETE CBC W/AUTO DIFF WBC: CPT

## 2024-04-04 PROCEDURE — 71045 X-RAY EXAM CHEST 1 VIEW: CPT

## 2024-04-04 PROCEDURE — 97116 GAIT TRAINING THERAPY: CPT | Performed by: PHYSICAL THERAPIST

## 2024-04-04 PROCEDURE — 80053 COMPREHEN METABOLIC PANEL: CPT

## 2024-04-04 PROCEDURE — 97161 PT EVAL LOW COMPLEX 20 MIN: CPT | Performed by: PHYSICAL THERAPIST

## 2024-04-04 PROCEDURE — 93970 EXTREMITY STUDY: CPT

## 2024-04-04 RX ORDER — ACETAMINOPHEN 500 MG
1000 TABLET ORAL
Status: COMPLETED | OUTPATIENT
Start: 2024-04-04 | End: 2024-04-04

## 2024-04-04 RX ORDER — BUMETANIDE 1 MG/1
2 TABLET ORAL ONCE
Status: COMPLETED | OUTPATIENT
Start: 2024-04-04 | End: 2024-04-04

## 2024-04-04 RX ADMIN — BUMETANIDE 2 MG: 1 TABLET ORAL at 12:52

## 2024-04-04 RX ADMIN — ACETAMINOPHEN 1000 MG: 500 TABLET ORAL at 10:13

## 2024-04-04 ASSESSMENT — PAIN SCALES - GENERAL: PAINLEVEL_OUTOF10: 10

## 2024-04-04 ASSESSMENT — PAIN DESCRIPTION - ORIENTATION: ORIENTATION: LEFT;RIGHT

## 2024-04-04 ASSESSMENT — PAIN DESCRIPTION - LOCATION: LOCATION: LEG

## 2024-04-04 ASSESSMENT — PAIN DESCRIPTION - DESCRIPTORS: DESCRIPTORS: PINS AND NEEDLES

## 2024-04-04 ASSESSMENT — PAIN - FUNCTIONAL ASSESSMENT: PAIN_FUNCTIONAL_ASSESSMENT: 0-10

## 2024-04-04 NOTE — DISCHARGE INSTRUCTIONS
Recommend increasing bumex for the next additional 1-2 days based on leg edema. Needs repeat lab work done by heme/onc and nephrology or PCP to evaluate kidney function as well as CBC. Copy of lab work included below.     Thank You!    It was a pleasure taking care of you in our Emergency Department today. We know that when you come to our Emergency Department, you are entrusting us with your health, comfort, and safety. Our physicians and nurses honor that trust, and truly appreciate the opportunity to care for you and your loved ones.   ________________________________________________________________________  I have included a copy of your lab results and/or radiologic studies from today's visit so you can have them easily available at your follow-up visit. We hope you feel better and please do not hesitate to contact the ED if you have any questions at all!    Recent Results (from the past 12 hour(s))   CBC with Auto Differential    Collection Time: 04/04/24 10:46 AM   Result Value Ref Range    WBC 14.3 (H) 3.6 - 11.0 K/uL    RBC 2.93 (L) 3.80 - 5.20 M/uL    Hemoglobin 7.7 (L) 11.5 - 16.0 g/dL    Hematocrit 26.7 (L) 35.0 - 47.0 %    MCV 91.1 80.0 - 99.0 FL    MCH 26.3 26.0 - 34.0 PG    MCHC 28.8 (L) 30.0 - 36.5 g/dL    RDW 18.7 (H) 11.5 - 14.5 %    Platelets 834 (HH) 150 - 400 K/uL    MPV 11.7 8.9 - 12.9 FL    Nucleated RBCs 10.0 (H) 0  WBC    nRBC 1.43 (H) 0.00 - 0.01 K/uL    Neutrophils % 34 32 - 75 %    Lymphocytes % 22 12 - 49 %    Monocytes % 26 (H) 5 - 13 %    Eosinophils % 1 0 - 7 %    Basophils % 0 0 - 1 %    Immature Granulocytes 17 %    Neutrophils Absolute 5.0 1.8 - 8.0 K/UL    Lymphocytes Absolute 3.1 0.8 - 3.5 K/UL    Monocytes Absolute 3.7 (H) 0.0 - 1.0 K/UL    Eosinophils Absolute 0.1 0.0 - 0.4 K/UL    Basophils Absolute 0.0 0.0 - 0.1 K/UL    Absolute Immature Granulocyte 2.4 K/UL    Differential Type MANUAL      RBC Comment ANISOCYTOSIS  1+       CMP    Collection Time: 04/04/24 10:46 AM

## 2024-04-04 NOTE — ED PROVIDER NOTES
[facility] EMERGENCY DEPT  EMERGENCY DEPARTMENT ENCOUNTER       Pt Name: Brandy Sauceda  MRN: [id number]  Birthdate 1945  Date of evaluation: 4/4/2024  Provider: Paddy Villasenor DO   PCP: Dalton Perdomo APRN - NP  Note Started: 10:49 PM 4/4/24     CHIEF COMPLAINT       Chief Complaint   Patient presents with    Leg Swelling        HISTORY OF PRESENT ILLNESS: 1 or more elements      History From: Patient and EMS  None     Brandy Sauceda is a 78 y.o. female who presents with chief complaint of bilateral lower extremity swelling and pain ongoing for the past several weeks EMS, patient's facility was giving her Toradol but recently giving it to her.  She has a history of CHF and CKD she reports pain to her bilateral lower extremities.  She denies any shortness of breath or chest pain. Per med review she is on bumex.     Nursing Notes were all reviewed and agreed with or any disagreements were addressed in the HPI.       PAST HISTORY     Past Medical History:  Past Medical History:   Diagnosis Date    Anemia     Congestive heart failure (HCC)     Hypertension     Menopause     Ventral hernia without obstruction or gangrene 04/18/2022         Past Surgical History:  Past Surgical History:   Procedure Laterality Date    OTHER SURGICAL HISTORY  04/13/2022     bone marrow       Family History:  Family History   Problem Relation Age of Onset    Breast Problems Sister     Hypertension Mother     Cancer Sister     Breast Cancer Sister         60's       Social History:  Social History     Tobacco Use    Smoking status: Never    Smokeless tobacco: Never   Substance Use Topics    Alcohol use: Never    Drug use: Not Currently       Allergies:  No Known Allergies    CURRENT MEDICATIONS      Discharge Medication List as of 4/4/2024  3:43 PM        CONTINUE these medications which have NOT CHANGED    Details   acetaminophen (TYLENOL) 500 MG tablet Take 2 tablets by mouth 2 times daily as neededHistorical Med      ARIPiprazole

## 2024-04-04 NOTE — ED TRIAGE NOTES
Pt presents to ED via EMS from [facility]. Per EMS pt c/o bilateral lower ext edema and pain x2 weeks. Pt reports having a complex medical hx.

## 2024-04-04 NOTE — PROGRESS NOTES
PT Brief Note    Chart reviewed and appreciated, case discussed with RN and . Patient reporting 2 weeks Hx of decline in ambulatory ability. At previous baseline patient was able to ambulate independently with single point cane. Currently patient reports being limited to mobility in room using rollator. Patient's LE strength WNL, LE ROM WNL except BL ankle. Patient is most limited by BLE edema and foot pain with weight bearing. Patient's mobility impairments will likely improve with improved management of LE edema. PT recommends patient return to [facility] when medically stable and follow up with PCP to address edema. Patient may benefit from PT upon discharge to assist patient in returning to baseline level of function. Full evaluation to follow.     Jose Juan Zamora, PT, DPT  
                                                                                                                                                                            [facility] AM-PAC®      Basic Mobility Inpatient Short Form (6-Clicks) Version 2    How much help is needed turning from your back to your side while in a flat bed without using bedrails?: None  How much help is needed moving from lying on your back to sitting on the side of a flat bed without using bedrails?: None  How much help is needed moving to and from a bed to a chair?: A Little  How much help is needed standing up from a chair using your arms?: A Little  How much help is needed walking in hospital room?: None  How much help is needed climbing 3-5 steps with a railing?: A Lot    AM-PAC Inpatient Mobility Raw Score : 20  -PAC Inpatient T-Scale Score : 47.67     Cutoff score ?171,2,3 had higher odds of discharging home with home health or need of SNF/IPR.    1. Ly Mayfield, Alexa Lorenz, Bg Zuniga, Lindy Ortega, Tray Goodman, Jair Mayfield.  Validity of the -PAC “6-Clicks” Inpatient Daily Activity and Basic Mobility Short Forms. Physical Therapy Mar 2014, 94 3) 379-391; DOI: 10.2522/ptj.48279870  2. Rahul MATHEW, Hilary J, Liban J, Messi J. Association of AM-PAC \"6-Clicks\" Basic Mobility and Daily Activity Scores With Discharge Destination. Phys Ther. 2021 Apr 4;101(4):jwof451. doi: 10.1093/ptj/knaj360. PMID: 31586836.  3. Rajan TRUONG, Edgard D, Roshni S, Juancho K, Shell S. Activity Measure for Post-Acute Care \"6-Clicks\" Basic Mobility Scores Predict Discharge Destination After Acute Care Hospitalization in Select Patient Groups: A Retrospective, Observational Study. Arch Rehabil Res Clin Transl. 2022 Jul 16;4(3):199806. doi: 10.1016/j.arrct.2022.824614. PMID: 16566483; PMCID: HKC7007514.  4. Chaparro WAKEFIELD, Yamile S, Davidson W, Karey P. AM-PAC Short Forms Manual 4.0. Revised 2/2020.

## 2024-04-04 NOTE — CARE COORDINATION
CM received consult from ED to assist pt w/ follow up appts. CM reviewing chart; will meet w/ pt shortly.      CM met w/ pt at bedside. She states that she lives at [facility]. She has a PCP (Dr. Perdomo) who sees her at the facility. Pt is unsure of her last appt w/ him. CM asked if she could call to request a hospital follow up visit, pt stated yes.     CM asked if pt had any other questions or concerns. Pt stated she is in a lot of pain when she walks and used to be able to walk herself to the cafeteria at her [facility]. She estimates this has been more serious in the last two weeks. Pt uses a rollator/walker. CM asked RN team to request a PT eval, in case there is a need for PT follow up.     Per conversation w/ PT (Jose Juan), would benefit from a few PT sessions to return to baseline level of functioning and provide education on safe rollator usage.     CM called [facility] and spoke w/ , Bernie. Requested PCP follow up in the next 7 days and an order for PT to provide follow up sessions so she can return to baseline level of functioning as well as rollator education. Bernie asked if pt has any new medications, that they be sent to [facility]. JAH confirmed this was the pharmacy on file for pt.     Josselin Morales, LAKEISHA, JAH

## 2024-04-04 NOTE — ED NOTES
Jamal from the Lab called- Patient has critical lab value: Platelets 834.  Reported to Charge nurse

## 2024-04-04 NOTE — ED NOTES
Pt presents to ED complaining of bilateral foot pain with leg swelling x 2 weeks. Pt denies falls or injuries. Pt reports taking tylenol without relief. Pt reports difficulty ambulating due to symptoms. Pitting edema noted bilaterally with discoloring. Pt is alert and oriented x 4, RR even and unlabored, skin is warm and dry. Pt appears in NAD at this time. Assessment completed and pt updated on plan of care.  Call bell in reach.     The Nursing Plan of Care is developed from the Nursing assessment and Emergency Department Attending provider initial evaluation.  The plan of care may be reviewed in the “ED Provider note”.    The Plan of Care was developed with the following considerations:  Patient / Family readiness to learn indicated by:verbalized understanding  Persons(s) to be included in education: patient  Barriers to Learning/Limitations:None    Signed    ESSIE ZEPEDA RN    4/4/2024   10:40 AM

## 2024-07-14 PROBLEM — K92.2 GI BLEEDING: Status: ACTIVE | Noted: 2024-01-01

## 2024-07-14 NOTE — ED NOTES
Pt oxygen dropped to 88% on RA at rest. Placed pt on 2L Nc at this time, and informed Adamaris JONES.

## 2024-07-14 NOTE — H&P
[facility]    ICU TEAM H & P Note    Name: Brandy Sauceda   : 1945   MRN: [id number]   Date: 2024        Subjective:     Reason for ICU Admission: GI bleeding     78 y.o. female with PMHx significant for HFrEF 50-55%, myeloproliferative disorder, CKD stage 3, chronic lymphedema, chronic anemia (per chart review) who presents to the ED via EMS with complaints of bleeding per rectum     Past Medical History:      has a past medical history of Anemia, Congestive heart failure (HCC), Hypertension, Menopause, Myeloproliferative disorder (HCC), and Ventral hernia without obstruction or gangrene.    Past Surgical History:      has a past surgical history that includes other surgical history (2022).    Home Medications:     Prior to Admission medications    Medication Sig Start Date End Date Taking? Authorizing Provider   acetaminophen (TYLENOL) 500 MG tablet Take 2 tablets by mouth 2 times daily as needed    Automatic Reconciliation, Ar   ARIPiprazole (ABILIFY) 30 MG tablet Take 1 tablet by mouth daily    Automatic Reconciliation, Ar   Aspirin (VAZALORE) 81 MG CAPS Take by mouth daily    Automatic Reconciliation, Ar   bumetanide (BUMEX) 2 MG tablet Take 1 tablet by mouth 2 times daily    Automatic Reconciliation, Ar   carvedilol (COREG) 3.125 MG tablet Take 1 tablet by mouth 2 times daily (with meals) 22   Automatic Reconciliation, Ar   Cholecalciferol 50 MCG (2000 UT) TABS Take 1 tablet by mouth daily    Automatic Reconciliation, Ar   docusate (COLACE, DULCOLAX) 100 MG CAPS Take 100 mg by mouth 2 times daily    Automatic Reconciliation, Ar   ferrous sulfate (IRON 325) 325 (65 Fe) MG tablet Take by mouth every morning (before breakfast)    Automatic Reconciliation, Ar   ipratropium (ATROVENT) 0.03 % nasal spray 1 spray by Nasal route 3 times daily as needed    Automatic Reconciliation, Ar   loperamide (IMODIUM) 2 MG capsule Take 2 capsules by mouth as needed    Automatic Reconciliation,

## 2024-07-14 NOTE — ED NOTES
Spoke with daughter, daughter states Pt went to [facility] on Tuesday to receive 1 unit of blood due to blood count being somewhere in the sixes. Pt. Has hx of leukemia, pt. Currently taking Jackafy 110mg, current oncologist states that it may no longer be working.   Hx of renal kidney failure and CHF, pt has hx of SOB when receiving blood in the past. States fluid pill was helpful at the time but no longer seems to be working.

## 2024-07-14 NOTE — ED PROVIDER NOTES
[facility]ICAL [facility]ARE  EMERGENCY DEPARTMENT ENCOUNTER       Pt Name: Brandy Sauceda  MRN: [id number]  Birthdate 1945  Date of evaluation: 7/14/2024  Provider: Meghan Bailon MD   PCP: Dalton Perdomo APRN - NP  Note Started: 4:15 PM EDT 7/14/24     CHIEF COMPLAINT       Chief Complaint   Patient presents with    Rectal Bleeding     Pt BIBEMS from [facility] for rectal bleeding starting today when patient was assisted up to bathroom, patient has reports rectal pain on arrival. Patient oriented x 4 on arrival to ED/         HISTORY OF PRESENT ILLNESS: 1 or more elements      History From: patient, ems, History limited by: none     Brandy Sauecda is a 78 y.o. female who presents with a cc of rectal bleeding       Please See MDM for Additional Details of the HPI/PMH  Nursing Notes were all reviewed and agreed with or any disagreements were addressed in the HPI.     REVIEW OF SYSTEMS        Positives and Pertinent negatives as per HPI.    PAST HISTORY     Past Medical History:  Past Medical History:   Diagnosis Date    Anemia     Congestive heart failure (HCC)     Hypertension     Menopause     Myeloproliferative disorder (HCC)     Ventral hernia without obstruction or gangrene 04/18/2022       Past Surgical History:  Past Surgical History:   Procedure Laterality Date    OTHER SURGICAL HISTORY  04/13/2022     bone marrow       Family History:  Family History   Problem Relation Age of Onset    Breast Problems Sister     Hypertension Mother     Cancer Sister     Breast Cancer Sister         60's       Social History:  Social History     Tobacco Use    Smoking status: Never    Smokeless tobacco: Never   Substance Use Topics    Alcohol use: Never    Drug use: Not Currently       Allergies:  No Known Allergies    CURRENT MEDICATIONS      Current Discharge Medication List        CONTINUE these medications which have NOT CHANGED    Details   acetaminophen (TYLENOL) 500 MG tablet Take 2 tablets by mouth 2

## 2024-07-14 NOTE — CONSENT
Informed Consent for Blood Component Transfusion Note    I have discussed with the patient the rationale for blood component transfusion; its benefits in treating or preventing fatigue, organ damage, or death; and its risk which includes mild transfusion reactions, rare risk of blood borne infection, or more serious but rare reactions. I have discussed the alternatives to transfusion, including the risk and consequences of not receiving transfusion. The patient had an opportunity to ask questions and had agreed to proceed with transfusion of blood components.    Electronically signed by Meghan Bailon MD on 7/14/24 at 2:55 PM EDT

## 2024-07-15 NOTE — CONSULTS
[facility]              [address] [place] [address]                              CONSULTATION      PATIENT NAME: BRANDY BARLOW              : 1945  MED REC NO: [id number]                       ROOM: 2504  ACCOUNT NO: [id number]                       ADMIT DATE: 2024  PROVIDER: Boby Francis MD    DATE OF SERVICE:  2024    ATTENDING PHYSICIAN:  MALIKA ISBELL    REASON FOR CONSULT:  Anorectal bleeding.    HISTORY OF PRESENT ILLNESS:  This is a 78-year-old female with a history of myeloproliferative disorder.  She presents acutely to the emergency room with blood per rectum.  A CT scan with IV contrast was obtained.  This seemed to indicate an active source of bleeding at the anorectal area.  Therefore, I was consulted for further management.    Brandy is in the ICU during my history.  She is actively bleeding, and no extensive history was taken.    PAST MEDICAL HISTORY:  Includes chronic anemia, CHF, hypertension, myeloproliferative disorder, chronic renal disease, chronic lymphedema, ventral hernia.    PAST SURGICAL HISTORY:  No surgical history on file.    FAMILY HISTORY:  Notable for breast cancer in her sister.    SOCIAL HISTORY:  She has children.  Her daughter is coming up from [place].  She has grandchildren as well as 5 great-grandchildren.  Negative tobacco.    ALLERGIES:  NKDA.      MEDICATIONS:  At home include Tylenol, Abilify, aspirin, Bumex, Coreg, cholecalciferol, Colace, iron, Atrovent, loperamide, magnesium oxide.    REVIEW OF SYSTEMS:  Notable for ongoing bleeding.    PHYSICAL EXAM:  GENERAL: She is awake and alert.  VITAL SIGNS: Temp 98.5, pulse 117, blood pressure 130/59, O2 sats 100%, respirations are 26.  PERIANAL: Active ongoing bleeding noted.    LABS:  Reviewed.  She has a white blood cell count of 35.6, hemoglobin 6.6.  Please note that back in April, she had a hemoglobin of 7.7, platelets are 1156.  Sodium 138,

## 2024-07-15 NOTE — INTERDISCIPLINARY ROUNDS
Critical care interdisciplinary rounds today.  Following members present: Case Management, , Nursing, Nutrition, Pharmacy, and Physician. CL diet starting today.  Plan of care discussed.  See clinical pathway for plan of care and interventions and desired outcomes.

## 2024-07-15 NOTE — CONSULTS
The patient was seen to be in a face-to-face encounter today.   I reviewed the patient's medical history, the findings on physical examination, the patient's diagnoses, and treatment plan as documented in the note.  I concur with the treatment plan as documented.  Additional suggestions noted.    78F w/ anorectal bleeding that appears to be fairly distal and may involve the anal verge. I do not think this would be amenable to endoscopic intervention given the location, so would defer to colorectal surgery regarding further/definitive management.     I discussed this with her and her daughter today at the bedside personally.     We will be available if needed or to discuss with CRS as well if desired.    ROBBIE Wakefield D.O.  [facility]      GI CONSULTATION NOTE  Radha Skaggs, NP  [phone] NP in-hospital cell phone M-F until 4:30  After 5pm or on weekends, please call  for physician on call    NAME: Brandy Sauceda   :  1945   MRN:  [id number]   Attending: Dr. Amador  Primary GI: Dr. Hale  Date/Time:  7/15/2024 11:58 AM  Assessment:   Rectal bleeding  Acute blood loss anemia  Acute onset rectal bleeding  CTAP w wo:  Acute GI bleeding in the rectum, which shows diffuse wall thickening suggesting possible inflammation. Correlate with physical examination. 2. Bibasilar infiltrate or atelectasis with small pleural effusions, left greater than right. 3. Hepatosplenomegaly. 4. Diffuse sclerotic bone disease consistent with neoplasm, which may be related to the clinical history of myeloproliferative disorder.  Hgb 6.6 on admission with down drift to 5.3 (baseline roughly 7.0-8.0) - s/p 3 units PRBCs    Plan:   Unfortunately, given the site of patient's rectal bleeding, our interventions are limited and unlikely to be of use as she is noted to be bleeding at the anal verge   Appreciate CRS input  Supportive measures per primary team. Monitor for further S&S of GI

## 2024-07-15 NOTE — WOUND CARE
Wound care consult for Stage 4 pressure injury present on admission to the sacrum\".  Chart reviewed, including photos. Wound care nurse unable to see patient today but was able to write the wound care orders based on nursing assessment with the photos taken on admission.   Wet to dry dressing until we see the patient tomorrow:   Tiffani Palmer RN, BSN, CWON

## 2024-07-16 NOTE — CARE COORDINATION
Care Management Initial Assessment       RUR: 18%  Readmission? No  1st IM letter given? No--Emailed registration to deliver.   1st [facility] letter given: No--N/A         07/16/24 4909   Service Assessment   Patient Orientation Alert and Oriented;Person;Place;Situation;Self   Cognition Alert   History Provided By Child/Family  (Patient's daughter is in the room with her.)   Primary Caregiver Other (Comment)  (Staff at the facility.)   Support Systems Children;Other (Comment)  (Patient has a daughter and a legal guardian.)   Patient's Healthcare Decision Maker is: Legal Next of Kin   PCP Verified by CM Yes   Last Visit to PCP   (Three weeks ago.)   Prior Functional Level Assistance with the following:;Bathing;Dressing   Current Functional Level Assistance with the following:;Bathing;Dressing;Toileting;Mobility   Can patient return to prior living arrangement Other (see comment)  (Per daughter states patient will be going to another facility.)   Family able to assist with home care needs: Other (comment)  (Patient will be going to a nursing facility.)   Would you like for me to discuss the discharge plan with any other family members/significant others, and if so, who? Yes  (Daughter)   Financial Resources Medicare [facility]  (Patient was at [facility] prior)   Social/Functional History   Lives With Other (comment)  (facility.)   Type of Home Facility   Home Equipment Wheelchair - Manual   Active  No   Patient's  Info Family   Discharge Planning   Type of Residence Other (Comment)  (Nursing facility)   Current Services Prior To Admission Skilled Nursing Facility   Patient expects to be discharged to: Other (comment)  (Nursing facility.)           Confirmed demographics and pcp information with the patient's daughter who is in the room with the patiet. Patient was at [facility] assisted living and then became ill and [facility] she was

## 2024-07-16 NOTE — WOUND CARE
Wound Care consult for the Sacrum wound present on admission. Chart reviewed with the photos as well. T  Assessment: The wound is covered with slough and needs to be debrided. It does not look infected and is not largely necrotic so this can be done with the Santyl Collagenase ointment. This is an unstageable pressure injury present on admission.     Unstageable pressure injury POA:       Wound care today was done by nursing but I can see her again tomorrow. Will start the Santyl today with the next wound care.   Plan: Keep patient off of the mid sacrum with significant turns. Float the heels.   Santyl ordered for the wound.   Tiffani Palmer, RN, BSN, CWON

## 2024-07-17 NOTE — CARE COORDINATION
Per daughter would like for patient to go to [facility] when medically stable for discharge. Referral sent to them via careport. Patient transferred to room 3203. Handoff given to unit CM to follow up.        Jill Esparza  RN BSN CRM        [phone]

## 2024-07-17 NOTE — CONSULTS
[facility]              [address] [place] [address]                              CONSULTATION      PATIENT NAME: ALINA BARLOW              : 1945  MED REC NO: [id number]                       ROOM: 3203  ACCOUNT NO: [id number]                       ADMIT DATE: 2024  PROVIDER: Adele Gong III, MD    DATE OF SERVICE:  2024    ATTENDING PHYSICIAN:  Shelia Villarreal MD    REASON FOR CONSULT:  Myeloproliferative disorder.    REFERRING PROVIDER:  Dr. Villasenor.    HISTORY OF PRESENT ILLNESS:  The patient is a 78-year-old  female, who is a patient of one of my partners, Dr. Villasenor.  Dr. Villasenor has been treating her for myeloproliferative disorder for quite some time.  Most recently, she has been on Jakafi, she was on that since .  Dr. Villasenor saw her last week and talked with her about the fact that he did not think that the Jakafi was working.  She was still requiring transfusions.  He was recommending that they move towards hospice and was taking her off the Jakafi.  The patient was admitted now with rectal bleeds, but then had required blood transfusion.  She is here this morning with her daughter.  She is tired, but otherwise really no complaints.    PAST MEDICAL HISTORY:  Significant for myeloproliferative disorder as above, CHF, hypertension.    ALLERGIES:  NO KNOWN DRUG ALLERGIES.      CURRENT MEDICATIONS:  She is on Venelex, ceftriaxone, Protonix.    REVIEW OF SYSTEMS:  Twelve point systems done and negative except for as above.    PHYSICAL EXAMINATION:  VITAL SIGNS:  Temperature 98.4, respirations 18, pulse 109, blood pressure 107/63, saturating 94% on room air.  GENERAL:  In no acute distress.  RESPIRATORY:  No distress.  ABDOMEN:  Soft, nontender.  PSYCH:  Answers questions appropriately.    LABORATORY VALUES:  Sodium 141, potassium 5.5, BUN 43, creatinine 1.88, total bilirubin 0.6, AST 56, ALT 26, alkaline phosphatase

## 2024-07-17 NOTE — CARE COORDINATION
Transition of Care Plan:    RUR: 18%  Prior Level of Functioning: needed assistance  Disposition: SNF  If SNF or IPR: Date FOC offered: [facility], but daughter states they wish to switch to [facility]  Date FOC received: referral sent 7/17/24  Accepting facility: [facility]  Date authorization started with reference number: not started  Date authorization received and expires:   Follow up appointments: NA  DME needed: NA  Transportation at discharge: BLS  IM/IMM Medicare/ letter given: yes  Is patient a [place] and connected with VA? na   If yes, was [place] transfer form completed and VA notified?   Caregiver Contact: patient will be permanently in facility. Guardian oversight provided by [employer]. Contact is Kerry Johnson, at [phone]  Discharge Caregiver contacted prior to discharge?   Terri Sauceda   Mobile: [phone] daughter     Care Conference needed? no  Barriers to discharge:  placement    Chart reviewed. Noted that patient was at [facility] and then became ill and went to [facility] where she was discharged to [facility] care. She became ill [facility] and was sent to [facility].     JAH spoke with daughter, Terri Sauceda. Prefers that patient goes to [facility], after this hospital stay. Also states patient may end up as LTC.     JAH spoke with Swati Johnson, [phone] of [place], that are the appointed guardians of the patient. Agreement with plan to go to [facility] if accepted, for additional rehab days. Okay to go back to [facility] if not accepted. Patient cannot return to assisted living. Will need to go to LTC after SNF days have been completed.     iVcente Foster RN, liaison for [facility] informed that patient would not come back if accepted by [facility].     JAH spoke with [facility], [phone], after patient

## 2024-07-18 NOTE — WOUND CARE
Wound care follow up for the Sacrum wound that is about one month old. Pt. Has a GI bleed and her prognosis is poor / guarded.  She no longer wants significant treatment needed for prolonging her life (like transfusions). She does have pain in the local area of her anus / rectum pain. We can work on some local skin treatment with Tucks pads and some other pain meds as needed. Nursing is looking into getting the TUCKs from Labor and delivery unit.   The Sacrum pressure injury is pink but has some slough and rolled edges. Minimal drainage.       Treatment with the Collagenase Santyl for at least the next week should continue and then we can change the wound care to moist wound care.  Sacrum off loading is already occurring per nursing using pillows and repositioning.     To use the Tucks: After each BM or in between for pain in the anus:  Cleanse the skin with soap and water. Apply the TUCKs moist pads to the anus and may apply it like a dressing as well as needed.   Recommend palliative care consult for communication with the legal Guardian regarding patient wishes. She is already DNR.    Tiffani Palmer, RN, BSN, CWON

## 2024-07-18 NOTE — CARE COORDINATION
CM spoke with Dr Villarreal. Explained that patient will need to be in a LTC bed to receive hospice care, Physician does not feel she is a candidate for inpatient hospice. The plan is to have therapy evaluate patient for skilled services at the [facility], that accepted her.     If she is not able to fit SNF criteria, a LTC bed would need to be arranged. We would then work with a hospice that is contracted with that facility. They would need to obtain their evaluation to see if she fits criteria.     Either way, she will need placement. According to the Guardian, she cannot go back to her assisted living facility.  Communication needs to go through guardian:[employer] law offices. Contact is Kerry Johnson, at [phone] CM called Guardian's office and spoke with Kerry. States she is  for [place], that is the  in that office. Requested updated legal documents to be sent to the Case Management office, to scan into the chart.     Guardian's office reiterated that they are legal guardian for all patient decision-making. Patient is not competent and daughter does not have legal rights for decisions, as that is why guardian appointed.    CM checked on LTC beds, if needed. [facility] (accepted for SNF bed) does not have a LTC bed. [facility] now stating they will not accept patient back, as daughter called them and the negative dialogue they had with her. CM will need to explore other options, if patient not leaving to go to a skilled bed.     8150 CM spoke with Dr Villarreal. Would like hospice eval to proceed, Palliative not going to evaluate, as now physician has spoken to guardian and agreeing with hospice. Okayed CM to call [facility] to eval.    CM called and spoke with Melina Birch [phone], [facility] RN. She reviewed patient's chart. She stated that patient does not look like a GIP patient. She would most likely qualify for hospice, once she

## 2024-07-19 NOTE — CARE COORDINATION
Transition of Care Plan:     RUR: 18%  Prior Level of Functioning: needed assistance  Disposition: SNF  If SNF or IPR: Date FOC offered: had been in [facility], but daughter states they wish to switch to [facility]  Date FOC received: referral sent 7/17/24  Accepting facility: [facility]  Date authorization started with reference number: not started  Date authorization received and expires:   Follow up appointments: NA  DME needed: NA  Transportation at discharge: BLS  IM/IMM Medicare/ letter given: yes  Is patient a  and connected with VA? na              If yes, was  transfer form completed and VA notified?   Caregiver Contact: patient will be permanently in facility. Guardian oversight provided by [employer] [facility] offices. Contact is walter Downey [phone]  Discharge Caregiver contacted prior to discharge?   Terri Sauceda    Mobile: [phone] daughter      Care Conference needed? no  Barriers to discharge:  placement           Initial Note: Chart Reviewed: Pt pending placement for d/c. CM left message with walter Downey [phone] to discuss accepting facilities for LTC placement. CM will continue to follow.     JACOBO Montemayor,  [phone]

## 2024-07-20 NOTE — CARE COORDINATION
Transition of Care Plan:     RUR: 18%  Prior Level of Functioning: needed assistance  Disposition: SNF  If SNF or IPR: Date FOC offered: had been in [facility], but daughter states they wish to switch to [facility]  Date FOC received: referral sent 7/17/24  Accepting facility: [facility]  Date authorization started with reference number: not started  Date authorization received and expires:   Follow up appointments: NA  DME needed: NA  Transportation at discharge: BLS  IM/IMM Medicare/ letter given: yes  Is patient a [place] and connected with VA? na              If yes, was  transfer form completed and VA notified?   Caregiver Contact: patient will be permanently in facility. Guardian oversight provided by [employer] offices. Contact is Kerry Johnson at [phone]  Discharge Caregiver contacted prior to discharge?   Terri Sauceda    Mobile: [phone] daughter      Care Conference needed? no  Barriers to discharge:  LTC bed, hospice setup    Weekend CM reviewed chart. Plan for d/c to LTC bed at nursing facility with hospice care services. Prior CM spoke with  Hospice Liaison on 7/18 who advised that pt did not qualify for [facility] and LTC facility bed would need to be confirmed first, then ask facility which hospice agencies they contract with. Per review, guardian expressed interest in [facility] & [facility]. Referral sent to [facility] in [facility] accepted, but no other referrals listed. CM sent referrals to both [facility] via CC link (and [facility] for [facility]) requesting bed availability and list of contracted hospice agencies. Pending at this time.     Vanessa Varela LCSW  [facility] Care Manager  [phone]

## 2024-07-21 NOTE — CARE COORDINATION
Referral placed to  Hospice for GIP Hospice evaluation via CC. Pending at this time.     Vanessa Varela LCSW  [facility] Care Manager  [phone]

## 2024-07-22 PROBLEM — K92.2 GI BLEED: Status: ACTIVE | Noted: 2024-01-01

## 2024-07-22 PROBLEM — Z51.5 HOSPICE CARE: Status: ACTIVE | Noted: 2024-01-01

## 2024-07-22 PROBLEM — R11.0 NAUSEA: Status: ACTIVE | Noted: 2024-01-01

## 2024-07-22 PROBLEM — R45.89 ANXIETY ABOUT DYING: Status: ACTIVE | Noted: 2024-01-01

## 2024-07-22 PROBLEM — R52 GENERALIZED PAIN: Status: ACTIVE | Noted: 2024-01-01

## 2024-07-22 NOTE — PROGRESS NOTES
Hospitalist Progress Note    NAME:   Brandy Sauceda   : 1945   MRN: [id number]     Date/Time: 2024 5:47 PM  Patient PCP: Dalton Perdomo APRN - [facility]    Estimated discharge date: 2-3 days  Barriers: pending LTC placement with hospice      Assessment / Plan:        Rectal Bleed - Hypotensive   Acute on chronic anemia of acute blood loss along with myeloproliferative disorder  CT scan  Colonrectal consulted -Gelfoam was applied with some pressure.  Initially bleeding was only.  GI consult consult consulted no intervention given the site  Transfer from ICU  Hgb 7.3  Received 5 unit PRBC since admission  Hematology was also consulted no further treatment for patient's current condition  Patient's frequency of transfusion has been increasing with low blood pressure  Patient daughter and legal guardian agreed on comfort measures only and hospice placement  Currently patient comfort care only  Awaiting LTC placement with hospice  Will add dilaudid 4 mg SL q2 PRN for pain.    Stage 4 sacral wound  Wound team consulted.  Appreciate recs  -Patient is in significant distress due to pain   -Pallia    CKD  Chronic lymphedema    Patient's legal guardian Marcus at [phone]     Code Status: DNR       Subjective:     Chief Complaint / Reason for Physician Visit  No acute complaints patient comfortable    Objective:           VITALS:   Last 24hrs VS reviewed since prior progress note. Most recent are:  Patient Vitals for the past 24 hrs:   BP Temp Temp src Pulse Resp SpO2   24 1551 -- -- -- -- 22 --   24 1245 (!) 107/56 -- Oral (!) 114 22 100 %   24 0424 -- -- -- -- 18 --   24 0302 (!) 80/51 98.1 °F (36.7 °C) Oral (!) 114 18 98 %   24 0030 -- 100.2 °F (37.9 °C) -- -- -- --   24 -- -- -- --  --   24 (!) 106/57 (!) 102.4 °F (39.1 °C) Axillary (!) 123 23 --         No intake or output data in the 24 hours ending 24 899       I had a face to face 
      Hospitalist Progress Note    NAME:   Brandy Sauceda   : 1945   MRN: [id number]     Date/Time: 2024 5:55 PM  Patient PCP: Dalton Perdomo APRN - NP    Estimated discharge date: 2-3 days  Barriers: Home hospice      Assessment / Plan:        Rectal Bleed - Hypotensive   Acute on chronic anemia of acute blood loss along with myeloproliferative disorder  CT scan  Colonrectal consulted -Gelfoam was applied with some pressure.  Initially bleeding was only.  GI consult consult consulted no intervention given the site  Transfer from ICU  Hgb 7.3  Received 5 unit PRBC since admission  Hematology was also consulted no further treatment for patient's current condition  Patient's frequency of transfusion has been increasing with low blood pressure  Patient daughter and legal guardian agreed on comfort measures only and hospice placement  Currently patient comfort care only  Awaiting home hospice  Will add dilaudid 4 mg SL q2 hr PRN for pain.    Stage 4 sacral wound  Wound team consulted.  Appreciate recs  -Patient is in significant distress due to pain   -Palliative care    CKD  Chronic lymphedema    Patient's legal guardian Marcus at [phone]     Code Status: DNR       Subjective:     10/10 back pain. Otherwise no acute complaints patient comfortable    Objective:           VITALS:   Last 24hrs VS reviewed since prior progress note. Most recent are:  Patient Vitals for the past 24 hrs:   BP Temp Temp src Pulse Resp SpO2   24 1115 -- -- -- -- 20 --   24 1030 -- 98.8 °F (37.1 °C) Axillary -- -- --   24 0830 (!) 88/49 (!) 100.6 °F (38.1 °C) Axillary (!) 114 24 94 %   24 0543 -- -- -- -- 17 --   24 102/60 98.6 °F (37 °C) Oral (!) 115 19 93 %   24 1934 -- -- -- -- 19 --           Intake/Output Summary (Last 24 hours) at 2024 1755  Last data filed at 2024 0418  Gross per 24 hour   Intake --   Output 400 ml   Net -400 ml          I had a face to face 
      Hospitalist Progress Note    NAME:   Brandy Sauceda   : 1945   MRN: [id number]     Date/Time: 2024 3:55 PM  Patient PCP: Dalton Perdomo APRN - NP    Estimated discharge date:   Barriers: pending LTC placement with hospice      Assessment / Plan:        Rectal Bleed - Hypotensive   Acute on chronic anemia of acute blood loss along with myeloproliferative disorder  CT scan  Colonrectal consulted -Gelfoam was applied with some pressure.  Initially bleeding was only.  GI consult consult consulted no intervention given the site  Transfer from ICU  Hgb 7.3  Received 5 unit PRBC since admission    Hematology was also consulted no further treatment for patient's current condition  Patient's frequency of transfusion has been increasing with low blood pressure  Patient daughter and legal guardian agreed on comfort measures only and hospice placement    Currently patient comfort care only  Awaiting LTC placement with hospice    Stage 4 sacral wound  Wound team consulted.  Appreciate recs      CKD  Chronic lymphedema    Patient's legal guardian Marcus at [phone]     Code Status: DNR       Subjective:     Chief Complaint / Reason for Physician Visit  No acute complaints patient comfortable    Objective:           VITALS:   Last 24hrs VS reviewed since prior progress note. Most recent are:  Patient Vitals for the past 24 hrs:   BP Temp Temp src Pulse Resp SpO2   24 1528 (!) 101/50 -- Oral (!) 115 20 94 %   24 0949 -- -- -- -- 15 --   24 0818 121/62 98.8 °F (37.1 °C) Oral (!) 118 15 95 %   24 0400 -- -- -- (!) 120 -- --   24 0236 (!) 105/52 98.4 °F (36.9 °C) Oral (!) 108 -- --   24 0148 (!) 103/50 98.2 °F (36.8 °C) Oral (!) 115 15 --   246 -- -- -- 99 16 --   24 2150 (!) 105/53 -- -- (!) 102 -- 93 %   24 1946 (!) 101/50 98.4 °F (36.9 °C) Oral (!) 106 16 91 %           Intake/Output Summary (Last 24 hours) at 2024 0113  Last data filed at 
      Hospitalist Progress Note    NAME:   Brandy Sauceda   : 1945   MRN: [id number]     Date/Time: 2024 2:59 PM  Patient PCP: Dalton Perdomo APRN - [facility]    Estimated discharge date:   Barriers: GI clearance       Assessment / Plan:  Rectal Bleed - admission hgb 5.3- received 2 units   Hypotensive   Anemia   CT scan  Colonrectal consulted   Gel-Foam was applied within the anal canal digitally.  Pressure was held with the Gel-Foam in place for approximately 10 minutes.    2. Packing was placed using 2-inch Kerlix.  3.   Should she require repeat packing, we could entertain exam under anesthesia with packing if she consents to that.  Hopefully, this will resolve with more conservative measures.  Pt continued to bleed- required additional units   General surgery consulted- conservative measures   GI consulted - no interventions given site of rectal bleeding, interventions limited- bleeding at anal verge-  Transfuse for hgb <7  Serial H/H   Cl liquid diet started   Bleeding subsided with conservative measures   Transfer from ICU  Hgb 7.3        Stage 4 sacral wound  Wound team consulted     CHF  Monitor volume     HTN  Vitals per routine     Myeloproliferative disorder       CKD  Dose medications per  GFR    Chronic lymph        Medical Decision Making:   I personally reviewed labs:y  I personally reviewed imaging:y  I personally reviewed EKG:y  Toxic drug monitoring: none  Discussed case with: nurse        Code Status: DNR   DVT Prophylaxis: SCD   GI Prophylaxis: Protonix     Subjective:     Chief Complaint / Reason for Physician Visit  \"Hi, Im doing fine, my bottom hurts that's all\".  Discussed with RN events overnight.       Objective:   78 y.o. female with PMHx significant for HFrEF 50-55%, myeloproliferative disorder, CKD stage 3, chronic lymphedema, chronic anemia (per chart review) who presents to the ED via EMS from SNF with complaints of bleeding per rectum . Pt was admitted to ICU for blood 
      Hospitalist Progress Note    NAME:   Brandy Sauceda   : 1945   MRN: [id number]     Date/Time: 2024 2:15 PM  Patient PCP: Dalton Perdomo APRN - NP    Estimated discharge date:   Barriers: Hospice consult and place      Assessment / Plan:  Rectal Bleed - Hypotensive   Acute on chronic anemia of acute blood loss along with myeloproliferative disorder  CT scan  Colonrectal consulted   Gel-Foam was applied within the anal canal digitally.  Pressure was held with the Gel-Foam in place for approximately 10 minutes.    2. Packing was placed using 2-inch Kerlix.  3.      GI consulted - no interventions given site of rectal bleeding,   No further bleeding noted.  Colorectal signed off  Transfer from ICU  Hgb 7.3    Status post 5 unit PRBC since admission    Stage 4 sacral wound  Wound team consulted.  Appreciate recs    CHF  Monitor volume     HTN  Vitals per routine     Myeloproliferative disorder   -Patient with history of MDS with multiple transfusions in the past.  Sees Dr. Villasenor as outpatient  Hematology consulted  Discussed with daughter and patient no frequency of transfusion has been increasing  Per hematology no further treatment  Patient and daughter agreeable for hospice  As per discussion on       CKD  Dose medications per  GFR    Chronic lymph      -Goals of care discussion  -Discussed with case management in the morning-patient SNF bed in [place] but they do not have a LTC bed.   recommending PT OT evaluation SNF placement and transitioning to LTC with hospice from there.  Also stated patient has a legal guardian who is the decision maker    -In the morning patient had some rectal bleeding with some hypotension with a MAP of 60s.  Gave some IV fluid bolus with improvement in blood pressure.  Lab work done showing hemoglobin of 7.7.  When we were drawing the lab patient stated \" I thought we were stopping all of these\".  Asked the patient whether she wants to stop 
      Hospitalist Progress Note    NAME:   Brandy Sauceda   : 1945   MRN: [id number]     Date/Time: 2024 4:14 PM  Patient PCP: Dalton Perdomo APRN - [facility]    Estimated discharge date:   Barriers: Hospice consult and place      Assessment / Plan:  Rectal Bleed - Hypotensive   Acute on chronic anemia of acute blood loss along with myeloproliferative disorder  CT scan  Colonrectal consulted   Gel-Foam was applied within the anal canal digitally.  Pressure was held with the Gel-Foam in place for approximately 10 minutes.    2. Packing was placed using 2-inch Kerlix.  3.      GI consulted - no interventions given site of rectal bleeding,   No further bleeding noted.  Colorectal signed off  Transfer from ICU  Hgb 7.3    Status post 4 unit PRBC since admission    Stage 4 sacral wound  Wound team consulted.  Appreciate recs    CHF  Monitor volume     HTN  Vitals per routine     Myeloproliferative disorder   -Patient with history of MDS with multiple transfusions in the past.  Sees Dr. Villasenor as outpatient  Hematology consulted  Discussed with daughter and patient no frequency of transfusion has been increasing  Per hematology no further treatment  Patient and daughter agreeable for hospice  Awaiting hospice consult      CKD  Dose medications per  GFR    Chronic lymph    Plan will be to return back to [place] with hospice    Medical Decision Making:   I personally reviewed labs:y  I personally reviewed imaging:y  I personally reviewed EKG:y  Toxic drug monitoring: none  Discussed case with: nurse  Patient, rn, IDR,       Code Status: DNR   DVT Prophylaxis: SCD   GI Prophylaxis: Protonix     Subjective:     Chief Complaint / Reason for Physician Visit  Seen with daughter at bedside patient agreeable for hospice.  Advance diet to regular diet.  Will discuss with patient and daughter regarding discontinuing all the labs\".  Discussed with RN events overnight.       Objective:           VITALS: 
  Physician Progress Note      PATIENT:               ALINA BARLOW  CSN #:                  [id number]  :                       1945  ADMIT DATE:       2024 2:44 PM  DISCH DATE:  RESPONDING  PROVIDER #:        Jax Lan MD          QUERY TEXT:    Pt admitted with rectal bleeding. Pt noted to have hgb 6, hr 116-122, bp   91/59. If possible, please document in the progress notes and discharge   summary if you are evaluating and/or treating any of the following:    The medical record reflects the following:  Risk Factors:hx MDS  Clinical Indicators: hgb 5-- 6, hr 116-122, bp 91/59  Treatment: cta, IR cx, colorectal surg cx,  low-dose norepinephrine. 3u prbc  Thanks, Izzy Johnson RN/CDI [phone]  Options provided:  -- Hemorrhagic Shock  -- Hypovolemic Shock  -- Hypovolemia without Shock  -- Hypotension without Shock  -- Other - I will add my own diagnosis  -- Disagree - Not applicable / Not valid  -- Disagree - Clinically unable to determine / Unknown  -- Refer to Clinical Documentation Reviewer    PROVIDER RESPONSE TEXT:    This patient is in hemorrhagic shock.    Query created by: Izzy Johnson on 2024 12:38 PM      Electronically signed by:  Jax Lan MD 2024 3:11 PM          
  [facility]    ICU TEAM Progress Note    Name: Brandy Sauceda   : 1945   MRN: [id number]   Date: 2024        Subjective:     Reason for ICU Admission: GI bleeding     78 y.o. female with PMHx significant for HFrEF 50-55%, myeloproliferative disorder, CKD stage 3, chronic lymphedema, chronic anemia (per chart review) who presents to the ED via EMS with complaints of bleeding per rectum     7/15: still bleeding per rectum   Received 3 units PRBC overnight  : hemodynamically stable  No more bleeding observed    Past Medical History:      has a past medical history of Anemia, Congestive heart failure (HCC), Hypertension, Menopause, Myeloproliferative disorder (HCC), and Ventral hernia without obstruction or gangrene.    Past Surgical History:      has a past surgical history that includes other surgical history (2022).    Home Medications:     Prior to Admission medications    Medication Sig Start Date End Date Taking? Authorizing Provider   acetaminophen (TYLENOL) 500 MG tablet Take 2 tablets by mouth 2 times daily as needed    Automatic Reconciliation, Ar   ARIPiprazole (ABILIFY) 30 MG tablet Take 1 tablet by mouth daily    Automatic Reconciliation, Ar   Aspirin (VAZALORE) 81 MG CAPS Take by mouth daily    Automatic Reconciliation, Ar   bumetanide (BUMEX) 2 MG tablet Take 1 tablet by mouth 2 times daily    Automatic Reconciliation, Ar   carvedilol (COREG) 3.125 MG tablet Take 1 tablet by mouth 2 times daily (with meals) 22   Automatic Reconciliation, Ar   Cholecalciferol 50 MCG (2000 UT) TABS Take 1 tablet by mouth daily    Automatic Reconciliation, Ar   docusate (COLACE, DULCOLAX) 100 MG CAPS Take 100 mg by mouth 2 times daily    Automatic Reconciliation, Ar   ferrous sulfate (IRON 325) 325 (65 Fe) MG tablet Take by mouth every morning (before breakfast)    Automatic Reconciliation, Ar   ipratropium (ATROVENT) 0.03 % nasal spray 1 spray by Nasal route 3 times daily as needed    
  [facility]    ICU TEAM Progress Note    Name: Brandy Sauceda   : 1945   MRN: [id number]   Date: 7/15/2024        Subjective:     Reason for ICU Admission: GI bleeding     78 y.o. female with PMHx significant for HFrEF 50-55%, myeloproliferative disorder, CKD stage 3, chronic lymphedema, chronic anemia (per chart review) who presents to the ED via EMS with complaints of bleeding per rectum     7/15: still bleeding per rectum   Received 3 units PRBC overnight    Past Medical History:      has a past medical history of Anemia, Congestive heart failure (HCC), Hypertension, Menopause, Myeloproliferative disorder (HCC), and Ventral hernia without obstruction or gangrene.    Past Surgical History:      has a past surgical history that includes other surgical history (2022).    Home Medications:     Prior to Admission medications    Medication Sig Start Date End Date Taking? Authorizing Provider   acetaminophen (TYLENOL) 500 MG tablet Take 2 tablets by mouth 2 times daily as needed    Automatic Reconciliation, Ar   ARIPiprazole (ABILIFY) 30 MG tablet Take 1 tablet by mouth daily    Automatic Reconciliation, Ar   Aspirin (VAZALORE) 81 MG CAPS Take by mouth daily    Automatic Reconciliation, Ar   bumetanide (BUMEX) 2 MG tablet Take 1 tablet by mouth 2 times daily    Automatic Reconciliation, Ar   carvedilol (COREG) 3.125 MG tablet Take 1 tablet by mouth 2 times daily (with meals) 22   Automatic Reconciliation, Ar   Cholecalciferol 50 MCG (2000 UT) TABS Take 1 tablet by mouth daily    Automatic Reconciliation, Ar   docusate (COLACE, DULCOLAX) 100 MG CAPS Take 100 mg by mouth 2 times daily    Automatic Reconciliation, Ar   ferrous sulfate (IRON 325) 325 (65 Fe) MG tablet Take by mouth every morning (before breakfast)    Automatic Reconciliation, Ar   ipratropium (ATROVENT) 0.03 % nasal spray 1 spray by Nasal route 3 times daily as needed    Automatic Reconciliation, Ar   loperamide (IMODIUM) 2 MG 
 attended interdisciplinary rounds. Doctor and staff discussed patient's plan of care.     Narinder Reillylain Intern  [facility]  [facility] [phone]  
.Bedside, Verbal, Recorded, and Written shift change report given to Raisa rn (oncoming nurse) by Daisy sun (offgoing nurse). Report included the following information Nurse Handoff Report, ED SBAR, Adult Overview, Intake/Output, MAR, Recent Results, Cardiac Rhythm sinus to sinus tachy , Neuro Assessment, and Event Log.        Pt arrived from ED 18:18 pm   Alert and oriented x4 , vitals check .  Pt is on RA , complaining of SOB , o2 check 94% on RA .   Start 2L o2 for pt feel comfortable . No complain of SOB after NC start .  Give rt side position comfortable position to pt .   Complain of pain 10 out of 9 , morphine is given end of shift .  Call bell within reach .  Family at bedside .      
.End of Shift Note    Bedside shift change report given to KAL Kline (oncoming nurse) by Daisy Dougherty RN (offgoing nurse).  Report included the following information SBAR, Intake/Output, MAR, Recent Results, and Cardiac Rhythm sinus    Shift worked:  7a-7p     Shift summary and any significant changes:     BP @ 0840 99/41 (MAP 58) rechecked 105/47 (MAP 64) Bolus given   PT/OT eval   WC visit- new orders given including TUCKS pad for [place]/sacrum area  Lab draw  HB 7.7    COMFORT CARE INITIATED      Concerns for physician to address:  Hospice consult still pending      Zone phone for oncoming shift:   [phone]       Activity:     Number times ambulated in hallways past shift: 0  Number of times OOB to chair past shift: 0    Cardiac:   Cardiac Monitoring: Yes           Access:  Current line(s): PIV     Genitourinary:   Urinary status: anuric and external catheter    Respiratory:      Chronic home O2 use?: NO  Incentive spirometer at bedside: YES       GI:     Current diet:  DIET ONE TIME MESSAGE;  ADULT ORAL NUTRITION SUPPLEMENT; Breakfast, Lunch, Dinner; Clear Liquid Oral Supplement  ADULT DIET; Regular  Passing flatus: YES  Tolerating current diet: YES       Pain Management:   Patient states pain is manageable on current regimen: YES    Skin:     Interventions: turn team, specialty bed, float heels, foam dressing, and PT/OT consult    Patient Safety:  Fall Score:    Interventions: bed/chair alarm and gripper socks       Length of Stay:  Expected LOS: 6  Actual LOS: 4      Daisy Dougherty RN                           
.End of Shift Note    Bedside shift change report given to marco sun (oncoming nurse) by Daisy Dougherty RN (offgoing nurse).  Report included the following information SBAR, ED Summary, Intake/Output, MAR, Recent Results, Cardiac Rhythm sinus to sinus tachy , Quality Measures, and Dual Neuro Assessment    Shift worked:  7a-7p     Shift summary and any significant changes:     Pain control  PRN Ativan is given x2  PRN morphin is given x2   Wound care is done   Qx2 tuning is done   Modified pt's diet      Concerns for physician to address:  Pending hospice consult      Zone phone for oncoming shift:   [phone]       Activity:     Number times ambulated in hallways past shift: 0  Number of times OOB to chair past shift: 0    Cardiac:   Cardiac Monitoring: Yes           Access:  Current line(s): PIV     Genitourinary:   Urinary status: voiding and external catheter    Respiratory:      Chronic home O2 use?: NO as per needed   Incentive spirometer at bedside: NO       GI:     Current diet:  DIET ONE TIME MESSAGE;  ADULT ORAL NUTRITION SUPPLEMENT; Breakfast, Lunch, Dinner; Clear Liquid Oral Supplement  ADULT DIET; Easy to Chew  Passing flatus: YES  Tolerating current diet: NO, modified diet        Pain Management:   Patient states pain is manageable on current regimen: YES    Skin:     Interventions: turn team, specialty bed, float heels, foam dressing, PT/OT consult, and nutritional support    Patient Safety:  Fall Score:    Interventions: bed/chair alarm       Length of Stay:  Expected LOS: 8  Actual LOS: 5      Daisy Dougherty RN                            
0715-Bedside and Verbal shift change report given to KAL Mendez (oncoming nurse) by KAL Bang (offgoing nurse). Report included the following information Nurse Handoff Report, Index, Intake/Output, MAR, Recent Results, Cardiac Rhythm Sinus Tach, and Neuro Assessment.      0800-Patient bathed with CHG.     0900-Per Colorectal Nancy NP at bedside, patient to be started on clear liquid diet this morning.      0950-During IDR, Dr. Amador notified of Hgb of 6.6.  Verbalized to recheck again at 1500 (no need to transfuse at this time).    1200-Per GI, defer further rectal bleeding to GI.    1325-Levo off.    1415-Levo restarted.    1550-Dr. Lawrence (Colorectal) aware that Hgb is 5.9  Ordered 1 unit PRBCs with a Hgb check after.  Patient to be NPO at midnight incase she needs to go to the OR tomorrow.    1915-Blood transfusing still infusing and was passed onto Erin Davalos RN to finish.  Currently infusing the saline to flush the rest of the blood in.    
0715: Bedside and Verbal shift change report given to Mp URIAS RN by Erin URIAS RN. Report included the following information Nurse Handoff Report, ED Encounter Summary, ED SBAR, Adult Overview, Intake/Output, MAR, Recent Results, Med Rec Status, and Cardiac Rhythm SR .   0740: MD at bedside, verbal order to stop running fluids at this, titrate down on levo. Keep systolic goal > 80.  0800: Shift assessment complete, patient is alert and oriented in bed at this time. Patient is wheelchair bound at baseline. Patient is able to move all extremities and c/o pain in her coccyx area d/t unstageable wound. Skin assessed with KAL Khan and patient turned at this time. Patient is voiding via purewick. No BM overnight, blood has subsided. See flowsheets for details.   0930: Levo titrated off at this time, verbal orders from MD to keep systolic > 80.   0937: Morphine 2mg given at this time for 8/10 pain  0956: IDR at this time, plan to transfer patient to floor.   1000: Pain reassessed, no changes but patient is satisfied.  1152: HGB stable at this time, per MD no longer trending.   1200: Reassessment complete, no acute changes to note.   1340: Morphine 2mg given at this time for 8/10 pain.   1400: Pain reassessed, unchanged but patient is satisfied.   1600: Reassessment complete, no acute changes to note.  1733: Transfer report called at this time.     TRANSFER - OUT REPORT:    Verbal report given to Mp URIAS RN on Brandy Sauceda  being transferred to [facility] for change in patient condition (progression of care)       Report consisted of patient’s Situation, Background, Assessment and   Recommendations(SBAR).     Information from the following report(s) Nurse Handoff Report, ED Encounter Summary, ED SBAR, Adult Overview, Intake/Output, MAR, Recent Results, and Cardiac Rhythm SR  was reviewed with the receiving nurse.    Opportunity for questions and clarification was provided.      Patient transported with:   
1432: Verbal report given to KAL Woodward on Brandy Sauceda  being transferred to [facility] for routine progression of patient care       Report consisted of patient's Situation, Background, Assessment and   Recommendations(SBAR).     Information from the following report(s) Nurse Handoff Report, Intake/Output, MAR, and Recent Results was reviewed with the receiving nurse.    Opportunity for questions and clarification was provided.      1517: [facility] arrived to transport patient to [facility]. Discharge instructions given to [facility] to bring to [facility]. Patient belonging sent to facility with patient. Patient medicated with PRN pain medication prior to discharge. No complaints of SOB at time of discharge.   
1752 Received patient from ED RN Ginny, per RN GI already aware of consult. 1 unit of blood already started from ED, no pink form handed by ED RN. Dr. Amador aware of admission, she called IR about CT result and now DNR, pressure injury stage 4 noted.    1815 Per charge nurse colorectal consult already done    1805 MD made aware of MAP 62    1815 Levophed started at 5 mcg/min    1849 Colorectal surgery IRASEMA Bethea at bedside, informed about profuse bleeding    1900 Bedside and Verbal shift change report given to Janye RN (oncoming nurse) by Sara RN (offgoing nurse). Report included the following information Nurse Handoff Report, Intake/Output, MAR, Cardiac Rhythm Sinus tachycardia, and Quality Measures.                 
1900- Report received from KAL Mendez.  Blood currently infusing.    1940- Transfusion completed without incidence.     2007- Patient assessed.  See flowsheet.    2147- Morphine given for 9/10 coccyx pain.     2204- Labs drawn and sent.    2300- Lab results reviewed.  Hemoglobin 7.1.    0000- Reassessed.  No changes.    0400- Reassessed.  Labs drawn and sent.    0700- Report given to the oncoming shift.        
1900: Bedside and Verbal shift change report given to KAL Bang (oncoming nurse) by KAL Ruiz (offgoing nurse). Report included the following information Nurse Handoff Report. Ongoing blood transfusion. Dr. Francis at bedside.     1927: Morphine 2mg IV PRN given for pain as ordered by Dr. Francis.    1950: Blood transfusion done, no blood transfusion reaction noted.    1955: Blood drawn for Fibrinogen.    2000: Shift assessment done, see flowsheet for details.    2010: Moderate bleeding noted on pads, seen by Dr. Francis. As per him to monitor the bleeding, change pads.     2110: Noted moderate bleeding on pads with big blood clot, as per Siva, ICU NP keep the clot.     2124: Blood drawn for CBC, and BMP.    2227, Potassium 6.2, Calcium Gluconate 1gm in NS 50ml, Regular Insulin 10units IV and D10 250ml IV given.    2228: Hgb 5.3, Hct 17.8, noted by Siva, ICU NP with an order to transfuse 2units PRBC.    2248: Blood transfusion consent was secured. Initial vital signs taken. Verified 1st unit PRBC with KAL Sabillon. Started blood transfusion according to protocol.    0000: Reassessment done, no change to previous assessment. Moderate bleeding on pads with another big blood clot, rectal pack was out, notified Siva, ICU NP.    0035: Blood transfusion completed, no blood transfusion reaction noted.    0046: Verified and double checked 2nd unit PRBC with KAL Aldana. Started blood transfusion according to protocol.    0115: Temp 100.3, notified Siva, ICU NP; he said to keep monitor temperature. Ice packs applied.    0230: More blood clots from rectum, notified Siva, ICU NP with an order to keep monitor.    0245: Blood transfusion completed, no blood transfusion reaction noted.    0346: Blood drawn for CBC, BMP, Mg and Phos.    0400: Reassessment done, no change to previous assessment.    0530: Moderate bleeding on pads with blood clots, perineal care done. Siva, ICU NP aware.    0700: Bedside and Verbal shift change 
3104 Patient wanted daughter to be called and asked if she was coming today, Daughter called by this RN, said is not coming today, patient given phone to talk with the daughter.  
CRS Note    Appropriate response to blood yesterday with stable hbg this am. No rectal bleeding yesterday or overnight and had a BM yesterday. Continues to complain of tailbone pain but no rectal pain.    Vitals reviewed    Abd soft    Labs reviewed, hbg stable    A/P:  78 y.o. F with rectal bleeding, improving with conservative management, no bleeding noted in 24h  - no plans for OR today  - CLD, hopefully can advance tomorrow if hbg remains stable    Tatum Hart MD   [facility]  [phone]    
CRS Note    No further rectal bleeding    Vitals reviewed    Abd soft    Labs reviewed, hbg essentially stable 6.9 from 7.3 from 6.9     A/P:  78 y.o. F with rectal bleeding, improving with conservative management, no bleeding noted in 48h    - no further bleeding noted  - advance diet as tolerated  - CRS to sign off, please re consult if bleeding recurs    Tatum Hatr MD   [facility]  [phone]    
Chart reviewed for PCP hospital follow up. Patient's current YAYA is 7/22/24. Patient is currently recommended for SNF. Pending patient discharge.  
Comprehensive Nutrition Assessment    Type and Reason for Visit:  Initial, Wound    Nutrition Recommendations/Plan:   Advance diet as medically able per CRS  RD to add Ensure Clear TID  Please document % meals and supplements consumed in flowsheet I/O's under intake      Malnutrition Assessment:  Malnutrition Status:  No malnutrition (07/16/24 1508)        Nutrition Assessment:  Pt admitted with GI bleed.  PMH:CHF, HTN, CKD stage 3, lymphedema.  Chart reviewed for PI, case discussed during CCU rounds.  MST negative for previous nutritional risk factors.  Pt sitting up in bed awake and alert, just finished lunch tray (100% broth, 75% ensure clear).  She denies any poor appetite or wt loss and is eager for diet advancement.  CRS following, notes indicate likely diet advancement tomorrow.  Wounds noted, have added PO supplements to provide additional protein to aid in wound healing.   Patient Vitals for the past 120 hrs:   PO Meals Eaten (%)   07/16/24 1200 51 - 75%   07/15/24 1813 51 - 75%   07/15/24 1016 76 - 100%     Wt Readings from Last 10 Encounters:   07/14/24 65.5 kg (144 lb 6.4 oz)   04/04/24 65.1 kg (143 lb 8 oz)   01/16/24 59 kg (130 lb)   10/10/23 63.5 kg (140 lb)   04/28/23 59.9 kg (132 lb)   12/09/22 60.8 kg (134 lb)   09/30/22 64 kg (141 lb)   09/21/22 66.1 kg (145 lb 11.6 oz)   09/08/22 62.5 kg (137 lb 12.8 oz)   08/03/22 62.7 kg (138 lb 3.2 oz)            Nutrition Related Findings:    Meds: prptonix.    Edema: trace-BLE.    BM: 7/15   Wound Type: Unstageable, Stage IV (unclear whether stage 4 or unstageable, awaiting WOCN note)       Current Nutrition Intake & Therapies:    Average Meal Intake: 51-75%  Average Supplements Intake: 51-75%  ADULT DIET; Clear Liquid  DIET ONE TIME MESSAGE;  ADULT ORAL NUTRITION SUPPLEMENT; Breakfast, Lunch, Dinner; Clear Liquid Oral Supplement    Anthropometric Measures:  Height: 165.1 cm (5' 5\")  Ideal Body Weight (IBW): 125 lbs (57 kg)       Current Body Weight: 65.5 kg 
Critical care interdisciplinary rounds today.  Following members present: Case Management, , Clinical Lead, Diabetes Team, Nursing, Nutrition, Pharmacy, and Physician. Family invited to participate.  Plan of care discussed.  See clinical pathway for plan of care and interventions and desired outcomes.   
End of Shift Note    Bedside shift change report given to Daisy BOWDEN  (oncoming nurse) by Raisa Khalil RN (offgoing nurse).  Report included the following information SBAR, Intake/Output, MAR, and Cardiac Rhythm Sinus tachy (110-120's)    At handoff report pt is awake in bed.    Shift worked:  Night     Shift summary and any significant changes:     -Pain contro done   -PRN ativan given x1   -Wound care and dressing done   -Turning done   -No labs ordered     Concerns for physician to address:  -Pending hospice consult     Zone phone for oncoming shift:   [phone]       Activity:     Number times ambulated in hallways past shift: 0  Number of times OOB to chair past shift: 0    Cardiac:   Cardiac Monitoring: Yes           Access:  Current line(s): PIV     Genitourinary:   Urinary status: voiding, incontinent, and external catheter    Respiratory:      Chronic home O2 use?: NO  Incentive spirometer at bedside: NO       GI:     Current diet:  DIET ONE TIME MESSAGE;  ADULT ORAL NUTRITION SUPPLEMENT; Breakfast, Lunch, Dinner; Clear Liquid Oral Supplement  ADULT DIET; Regular  Passing flatus: YES  Tolerating current diet: YES       Pain Management:   Patient states pain is manageable on current regimen: YES    Skin:     Interventions: turn team, float heels, foam dressing, and internal/external urinary devices    Patient Safety:  Fall Score:    Interventions: bed/chair alarm and gripper socks       Length of Stay:  Expected LOS: 6  Actual LOS: 5      Raisa Khalil RN                           
End of Shift Note    Bedside shift change report given to Daisy BOWDEN (oncoming nurse) by Raisa Khalil RN (offgoing nurse).  Report included the following information SBAR, Intake/Output, MAR, Recent Results, and Cardiac Rhythm sinus tachy 100-120's, to NSR    At handoff report pt is sleeping in bed easily arousable.     Shift worked:  Night      Shift summary and any significant changes:     -Pt slept most of the night   -RN requested for PRN tylenol just in case pt develops fever.    -wound care and dressing done   -Q2 turns done   -Labs drawn   -Latest K+ is 5.2, MD Villarreal made aware. Day shift nurse to follow up further order.      Concerns for physician to address:   Hospice consult pending      Zone phone for oncoming shift:    [phone]        Activity:     Number times ambulated in hallways past shift: 0  Number of times OOB to chair past shift: 0    Cardiac:   Cardiac Monitoring: Yes           Access:  Current line(s): PIV     Genitourinary:   Urinary status: incontinent and external catheter    Respiratory:      Chronic home O2 use?: NO  Incentive spirometer at bedside: NO       GI:     Current diet:  DIET ONE TIME MESSAGE;  ADULT ORAL NUTRITION SUPPLEMENT; Breakfast, Lunch, Dinner; Clear Liquid Oral Supplement  ADULT DIET; Regular  Passing flatus: YES  Tolerating current diet: YES       Pain Management:   Patient states pain is manageable on current regimen: YES    Skin:     Interventions: turn team, float heels, foam dressing, and internal/external urinary devices    Patient Safety:  Fall Score:    Interventions: bed/chair alarm and gripper socks       Length of Stay:  Expected LOS: 6  Actual LOS: 4      Raisa Khalil RN                           
End of Shift Note    Bedside shift change report given to KAL Modi (oncoming nurse) by RAUL FORREST RN (offgoing nurse).  Report included the following information SBAR REPORTS LIST: SBAR, Kardex, Intake/Output, MAR, Recent Results, and Cardiac Rhythm sinus tachy    Shift worked:  1900-0730     Shift summary and any significant changes:     Patient remain on comfort are,She had fever and tylenol suppository was given. Temperature is trending down.Bp is trending down. Morphine was given for respiratory distress. Q2 turns done as ordered. LBM 7/19/2024     Concerns for physician to address:  Patient's condition continues to deteriorate. Patient was kept comfortable.     Zone phone for oncoming shift:   [phone]       Activity:  Activity: bedrest  Cardiac:   Cardiac Monitoring: YES / NO: Yes        Access:  Current line(s): IV ACCESS: - Peripheral IV - site  left hand, insertion date: 7/14/2024    Genitourinary:   Urinary status: Urinary status: external catheter    Respiratory:   oxygen delivery: 2 liters/min via nasal cannula  Chronic home O2 use?: YES / NO: No  Incentive spirometer at bedside: YES / NO: No      GI:  Current diet:  DIET: easy to chew  Passing flatus: YES / NO: Yes  Tolerating current diet: YES / NO: No      Pain Management:   Patient states pain is manageable on current regimen: YES / NO: Yes    Skin:    Interventions: AMMY SCALE: 15    Patient Safety:  Fall Score: FALL RISK ASSESSMENT: Not at risk for falls.  Interventions: Fall Interventions Provided: Implemented/recommended use of non-skid footwear, Implemented/recommended use of fall risk identification flag to all team members, Implemented/recommended assistive devices and encouraged their use, Implemented/recommended resources for alarm system (personal alarm, bed alarm, call bell, etc.) , Implemented/recommended environmental changes (remove hazards, lower bed, improve lighting, etc.), and Implemented/recommended increased 
End of Shift Note    Bedside shift change report given to MAXX Modi (oncoming nurse) by RAUL FORREST RN (offgoing nurse).  Report included the following information SBAR REPORTS LIST: SBAR, Kardex, Intake/Output, and MAR    Shift worked:  0715-1637     Shift summary and any significant changes:     Patient remains on comfort care measures. Daughter at bedside. Patient is alert .Slept most of the night. Refused pain medicine most of the night. Took sips of water over night.     Concerns for physician to address:  Awaits LTC placement by Hospice     Zone phone for oncoming shift:          Activity:  Activity: bedrest    Cardiac:   Cardiac Monitoring: YES / NO: No        Access:  Current line(s): IV ACCESS: - None    Genitourinary:   Urinary status: Urinary status: External catheter    Respiratory:   oxygen delivery: 2 liters/min via nasal cannula  Chronic home O2 use?: YES / NO: No  Incentive spirometer at bedside: YES / NO: No      GI:  Current diet:  DIET: easy to chew  Passing flatus: YES / NO: Yes  Tolerating current diet: YES / NO: No      Pain Management:   Patient states pain is manageable on current regimen: YES / NO: Yes    Skin:    Interventions: AMMY SCALE: 15    Patient Safety:  Fall Score: FALL RISK ASSESSMENT: Not at risk for falls.  Interventions: Fall Interventions Provided: Implemented/recommended use of non-skid footwear, Implemented/recommended use of fall risk identification flag to all team members, Implemented/recommended assistive devices and encouraged their use, Implemented/recommended resources for alarm system (personal alarm, bed alarm, call bell, etc.) , Implemented/recommended environmental changes (remove hazards, lower bed, improve lighting, etc.), and Implemented/recommended increased supervision/assistance      Length of Stay:  Expected LOS: 8  Actual LOS: 7      RAUL FORREST RN                            
End of Shift Note    Bedside shift change report given to Sophia BOWDEN (oncoming nurse) by Raisa Khalil RN (offgoing nurse).  Report included the following information SBAR, Intake/Output, MAR, Recent Results, and Cardiac Rhythm Sinus tachy (100-110's)     At handoff report pt is sleeping in bed easily arousable.     -Q2H turns done  -Heels floated  -Wound care & dressing done  -TSB done (with low grade fever)   -S/P 1unit PRBC 7/17, pt tolerated, no adverse reaction.  -Dayshift nurse to WOF: urinary retention  -for REPEAT BMP 7/17 @ 8AM      Shift worked:  Night      Shift summary and any significant changes:    0000H Pt stated she couldn't pee. Upon assessment, bladder is slightly distended. Bladder scan shwed ~360c. RN tried to on faucet, to help in stimulating pt to urinate. Pt still can't void and she verbalized discomfort.     0011H RN informed NP [place]    0024H NP ordered to straight cath pt and send UA with culture. She also placed an order for sugar checks and Hba1c. RN  confirmed orders to provider.     0043H Straight cath done. UO: 400cc, cloudy with sediments and malodorous.     Urine specimen & AM labs sent to lab.   ---------  0217H RN informed NP [place] reg pt Hgb 6.9, K+ 5.5. Pt has no overt bleeding.     Provider ordered for BT 1u PRBC, Lokelma, H/H post BT, rpt BMP in AM.     0650H RN called pt daughter (Ms. Murray) and gave update reg pt condition.        Concerns for physician to address:  -Low Hgb, tachycardia, soft BP's      Zone phone for oncoming shift:   [phone]        Activity:     Number times ambulated in hallways past shift: 0  Number of times OOB to chair past shift: 0    Cardiac:   Cardiac Monitoring: Yes           Access:  Current line(s): PIV     Genitourinary:   Urinary status: incontinent, external catheter, and straight cath  (S/p straight cath x1)     Respiratory:      Chronic home O2 use?: NO  Incentive spirometer at bedside: NO       GI:     Current diet:  ADULT DIET; 
GI  Consult received. Active rectal bleeding. Reviewed CTA with IR on call. Location of bleeding is favored to be at anal verge. I have discussed with ICU physician that for active bleeding at anal verge, I would advise consulting colorectal surgery for intervention.    Stephen Kaur MD    
Goals of care discussion    -I was paged stating patient's blood pressure is dropping  -I went to see the patient again as per patient's request I called daughter and put her on speaker.  Discussed with patient and daughter .  Asked patient whether she wants me to continue checking hemoglobin and transfusing her if needed.  Patient and daughter understand patient do not have a definite treatment and the patient stated she is tired of blood draws and no more blood draws.  I asked them whether they want to give any IV fluids if the blood pressure drops they stated no.    Discussed regarding comfort measures only and stopping all the blood draws and giving fluids and medications, keep pain her comfortable with pain medications and regular diet.  Both patient and daughter agreeable regarding the same.  Since patient has the legal guardian I called Filiberto again at [phone], who agreed on patient and daughter decision regarding comfort measures only.    Discussed with case management.  Changing patient to comfort measures only  Awaiting hospice consult    20 minutes spent face-to-face discussion with the patient   
Hematology Oncology Progress Note    Follow up for: Myeloproliferative disorder    Chart notes reviewed since last visit.    Case discussed with following: .    Patient complains of the following: No complaints    Additional concerns noted by the staff:     Patient Vitals for the past 24 hrs:   BP Temp Temp src Pulse Resp SpO2   07/18/24 0848 (!) 105/47 -- -- (!) 104 -- --   07/18/24 0840 (!) 99/41 98.8 °F (37.1 °C) Oral (!) 102 20 94 %   07/18/24 0300 (!) 109/56 98.6 °F (37 °C) Oral 99 15 95 %   07/18/24 0033 (!) 103/46 98.2 °F (36.8 °C) Oral (!) 101 16 98 %   07/17/24 2205 (!) 108/50 98.6 °F (37 °C) -- 98 16 --   07/17/24 2130 (!) 109/53 99 °F (37.2 °C) -- (!) 106 16 --   07/17/24 2030 (!) 111/52 99.3 °F (37.4 °C) -- (!) 111 15 95 %   07/17/24 1915 (!) 107/52 99.9 °F (37.7 °C) Oral (!) 125 16 92 %   07/17/24 1427 (!) 111/51 99.7 °F (37.6 °C) Oral (!) 111 28 92 %       Gen NAD        Labs:  Recent Results (from the past 24 hour(s))   POCT Glucose    Collection Time: 07/17/24 12:09 PM   Result Value Ref Range    POC Glucose 118 (H) 65 - 117 mg/dL    Performed by: Dian Brooks PCT    Basic Metabolic Panel    Collection Time: 07/17/24 12:17 PM   Result Value Ref Range    Sodium 140 136 - 145 mmol/L    Potassium 5.2 (H) 3.5 - 5.1 mmol/L    Chloride 115 (H) 97 - 108 mmol/L    CO2 20 (L) 21 - 32 mmol/L    Anion Gap 5 5 - 15 mmol/L    Glucose 109 (H) 65 - 100 mg/dL    BUN 43 (H) 6 - 20 MG/DL    Creatinine 1.84 (H) 0.55 - 1.02 MG/DL    BUN/Creatinine Ratio 23 (H) 12 - 20      Est, Glom Filt Rate 28 (L) >60 ml/min/1.73m2    Calcium 7.9 (L) 8.5 - 10.1 MG/DL   POCT Glucose    Collection Time: 07/17/24  4:56 PM   Result Value Ref Range    POC Glucose 113 65 - 117 mg/dL    Performed by: Dian Brooks PCT    POCT Glucose    Collection Time: 07/17/24  7:56 PM   Result Value Ref Range    POC Glucose 174 (H) 65 - 117 mg/dL    Performed by: Conner Winchester PCT    Basic Metabolic Panel    Collection Time: 
Nursing contacted Nocturnist/cross cover provider via non-urgent messaging system [employer] and notified patient bladder scan 360ml, unable to void. No other concerns reported. No acute distress reported. No other information provided by nurse. VSS. Ordered straight cath x1, u/a w/ reflex cx during straight cath- results pending, and was flagged by epic for hyperglycemia- ordered ssi, accuchecks achs, avoid long acting insulin at this time to avoid hypoglycemia, and hypoglycemia protocol, A1c in am. Will defer further evaluation/management to the day shift primary attending care team. Patient denies any further complaints or concerns. Nursing to notify Hospitalist for further/continued concerns. Will remain available overnight for further concerns if nursing/patient needs. Please note, there are RRT systems in this hospital in place that if nursing has acute or critical patient condition change or concern, this is to help facilitate and notify that patient needs immediate bedside evaluation by a provider.     Update  Nurse reported k 5.5, hgb 6.9, no active bleeding reported. 1 unit prbcs with transfusion per protocol ordered, and lokelma x1, with repeat bmp for am. No other concerns reported. Vss. NAD reported.    Non-billable note.       
Nursing contacted Nocturnist/cross cover provider via non-urgent messaging system [employer] and notified patient tachycardic 115, blood pressure 80/50, patient is comfort measures, appears uncomfortable, can have either morphine or Ativan.  I reviewed chart and confirmed patient is listed as comfort measures, all comfort measures orders are already in place.  I discussed with the nurse comfort measures, hospice, transitioning of life, end-of-life care, I discussed with nurse at this time should try Ativan as per order to make the patient more comfortable as this is already been discussed with her family to help ease her transition. As now care is focused on dignity and comfort vs curative as pt is comfort measures. I discussed with nurse all her questions and concerns.  I informed her that she may also give the morphine if needed for patient's transitioning.  Patient has been running hypotensive, appears extensive conversation has been held with the daughter noticed that her wish to have comfort measures in place to hospice if patient survives hospitalization.  It is expected when patient is placed on comfort measures and hospice at the blood pressure will continue to trend downwards as patient is more nearing end-of-life.  I would not hold medication to make patient more uncomfortable during this transition.  Due to hypotension as per as was already discussed with the family, and is an expected finding.  At this time nurses aware of the vital signs or every shift.  Nurse will notify me if patient needs to be pronounced.  Will defer further evaluation/management to the day shift primary attending care team. Patient denies any further complaints or concerns. Nursing to notify Hospitalist for further/continued concerns. Will remain available overnight for further concerns if nursing/patient needs. Please note, there are RRT systems in this hospital in place that if nursing has acute or critical patient condition 
Nursing contacted Nocturnist/cross cover provider via non-urgent messaging system [employer] and notified patient T 99.9, asking for prn tylenol for pain and possibly fever if develops. No other concerns reported. No acute distress reported. No other information provided by nurse. VSS. Ordered tylenol po or supp prn for pain fever per requested. Will defer further evaluation/management to the day shift primary attending care team. Patient denies any further complaints or concerns. Nursing to notify Hospitalist for further/continued concerns. Will remain available overnight for further concerns if nursing/patient needs. Please note, there are RRT systems in this hospital in place that if nursing has acute or critical patient condition change or concern, this is to help facilitate and notify that patient needs immediate bedside evaluation by a provider.     Non-billable note.       
Nutrition: Chart reviewed for follow up. Patient has transitioned to comfort measures only with plans for discharge to [facility]. RD will sign off but remains available by consult if needs arise. Thanks.  Windy Burton RD  Ext [phone]     
Per family no more transfusions because pt is going to hospice  
Pharmacy Medication Reconciliation       Allergy Update: Patient has no known allergies.    Recommendations/Findings:   The following amendments were made to the patient's active medication list on file at [facility]:   1) Additions:   Allopurinol  Calcitriol  Gabapentin  Jakafi  Ondansetron  Promethazine  Protonix  Miralax  Senna  Multivitamin  Santyl ointment  Tramadol     2) Deletions:   Carvedilol  Docusate     3) Changes:   Abilify dose changed from 30 mg daily to 20 mg daily  Bumex dose changed from 1 mg BID to 1 mg daily    Pertinent Findings:   Clarified PTA med list with medication list from [facility]. PTA medication list was corrected to the following:     Prior to Admission Medications   Prescriptions Informant   ARIPiprazole (ABILIFY) 20 MG tablet Transfer [facility]EMS   Sig: Take 1 tablet by mouth daily   MULTIPLE MINERALS-VITAMINS PO Transfer [facility]/EMS   Sig: Take 1 tablet by mouth daily   acetaminophen (TYLENOL) 500 MG tablet Transfer [facility]EMS   Sig: Take 2 tablets by mouth 2 times daily as needed for Pain   allopurinol (ZYLOPRIM) 100 MG tablet Transfer [facility]/EMS   Sig: Take 0.5 tablets by mouth daily Indications: gout   aspirin 81 MG chewable tablet Transfer [facility]/EMS   Sig: Take 1 tablet by mouth daily   bumetanide (BUMEX) 2 MG tablet Transfer [facility]/EMS   Sig: Take 1 tablet by mouth daily   calcitRIOL (ROCALTROL) 0.25 MCG capsule Transfer [facility]/EMS   Sig: Take 1 capsule by mouth daily   collagenase 250 UNIT/GM ointment Transfer [facility]/EMS   Sig: Apply topically daily Apply  to sacrum topically every evening shift for wound care   ferrous sulfate (IRON 325) 325 (65 Fe) MG tablet Transfer [facility]/EMS   Sig: Take 1 tablet by mouth every morning (before breakfast)   gabapentin (NEURONTIN) 100 MG capsule Transfer [facility]/EMS   Sig: Take 1 capsule by mouth daily. Max Daily Amount: 100 mg   ipratropium (ATROVENT) 0.03 % nasal spray Transfer Papers/EMS   Si sprays by Nasal route 3 times 
Physical Therapy  Referral received, chart reviewed and informed of updates from [facility]. Family is interested in hospice care and meeting is scheduled for today. Recommended therapy await evaluations until POC determined. Will continue to follow.   Filiberto Rodriguez, PT, DPT    
[facility]  Good Help to Those in Need  [phone]     Patient Name: Brandy Sauceda  YOB: 1945  Age: 79 y.o.    [facility] RN Note:  Hospice consult received, reviewing chart. Will follow up with Unit Nurse and Care Manager to discuss plan of care, patient status and discharge disposition within the hour.     Call made to pt's dtr Terri. She is returning home to [place] and is aware and agrees to hospice consult. Her email for information materials: [email or web address]. Will call her again early afternoon. Patient is appropriate for inpatient care and will offer the Hospice House if daughter agrees.    Call to pt's guardian Filiberto Geoak ([phone]) who has taken over pt's guardianship since [facility] [place] [facility]. She is aware of hospice consult and wishes to defer to pt's daughter on decision. Her email: [email or web address]. She is available to sign documents & DDNR when needed.    13:35: Attempted to reach daughter, left message to return call. We will follow up tomorrow.    Thank you for the opportunity to be of service to this patient.       Lindy Groves RN, [facility]  Hospice Nurse Liaison  [phone] Mobile  [phone] Office     
Spiritual Care Assessment/Progress Note  [facility]    Name: Brandy Sauceda MRN: [id number]    Age: 78 y.o.     Sex: female   Language: English     Date: 7/18/2024            Total Time Calculated: 15 min              Spiritual Assessment begun in [facility] 3 MED TELE  Service Provided For: Patient  Referral/Consult From: Rounding  Encounter Overview/Reason: Initial Encounter    Spiritual beliefs:      [x] Involved in a donald tradition/spiritual practice: Buddhist     [x] Supported by a donald community: [facility]     [] Claims no spiritual orientation:      [] Seeking spiritual identity:           [] Adheres to an individual form of spirituality:      [] Not able to assess:                Identified resources for coping and support system:   Support System: Children, Family members       [] Prayer                  [] Devotional reading               [] Music                  [] Guided Imagery     [] Pet visits                                        [] Other: (COMMENT)     Specific area/focus of visit   Encounter:    Crisis:    Spiritual/Emotional needs: Type: Spiritual Support  Ritual, Rites and Sacraments:    Grief, Loss, and Adjustments:    Ethics/Mediation:    Behavioral Health:    Palliative Care:    Advance Care Planning:      Plan/Referrals: Other (Comment) (Please contact Spiritual Care for further consults.)    Narrative:  visited pt on rounds in Med Tele Unit.  Patient was present during the visit.  Patient shared about her illness and her fears for the future.  Patient indicated that she was being made comfortable by the doctor. Pt became tearful and stated that \"if God wants me, I'll be OK.\" Patient spoke about visits by her daughter, who lives in [place] and her niece, who lives locally. Her donald community also has been visiting.  offered active listening and affirmations.  Advised of  availability.  Please contact Spiritual Care for any 
Spiritual Care Partner Volunteer visited patient at [facility] in [facility] 3 MED TELE on 7/18/2024   Documented by:  Jessica Salcido, MPS, BCC, Staff   [facility]     Paging Service [phone])      
Surgery NP Progress Note    Brandy Sauceda  [id number]  female  78 y.o.  1945    Admitted for Principal Problem:    GI bleeding  Resolved Problems:    * No resolved hospital problems. *    Pt seen with Dr. Hart    Assessment:     Patient with bleeding from anus, controlled at this time     Plan/Recommendations/Medical Decision Making:     - Mobilize with nursing and OOB to chair   - CLEAR LIQUID DIET   - Pain management- Continue current pain control methods.   - Monitor labs and for active anal bleeding. Notify provider if recurrent bleeding    Subjective:     Patient with sacral wound pain but not anal pain.     Objective:     Blood pressure (!) 116/58, pulse (!) 109, temperature 99.3 °F (37.4 °C), temperature source Oral, resp. rate (!) 31, height 1.651 m (5' 5\"), weight 65.5 kg (144 lb 6.4 oz), SpO2 95 %.    Temp (24hrs), Av.8 °F (37.1 °C), Min:97.3 °F (36.3 °C), Max:100.3 °F (37.9 °C)      Pt resting in bed NAD   SCDs for mechanical DVT proph while in bed   Small ooze from the anal area.       Body mass index is 24.03 kg/m².     Reference: BMI greater than 30 is classified as obesity and greater than 40 is classified as morbid obesity.       HARDEEP Lopez - NP   MSN, APRN, FNP-C, CWOCN-AP, RNAS-C    07/15/24     
[facility]  Good Help to Those in Need  [phone]    Nursing Note   Patient Name: Brandy Sauceda  YOB: 1945  Age: 79 y.o.    [facility] RN Note:      Received call from [employer] [facility] requesting update on patient.  Informed that our Medical Director, Dr Shashi Davila, has approved GIP admission at the [facility].  Shared that this writer has spoken with Terri/daughter by phone with this information and that she is in agreement with plan.      Called Cecilia Johnson/ for family with law firm.  She informed that consents/DDNR will need to be faxed or emailed to Filiberto Ermias/legal guardian.  Once Filiberto has reviewed and returned consents/DDNR, this writer will contact [employer] to arrange transport to [facility].    CJ De LeonN, RN, [facility]  Hospice Liaison  [phone] (mobile)  [phone] (main [facility] number)  Available on [employer]    1215:  Hospice consents and DDNR have been signed and returned.  Dr Brewster has been notified and has signed DDNR which is on the patient's chart.  Call placed to [employer] [facility] and voicemail left requesting transport for 2pm.  Alyce RN will call report to [facility].  Terri/daughter has been updated.    1300:  Spoke with [employer] and requested transport for 2pm.  She will contact transport company and update.    
Comment ANISOCYTOSIS  2+        RBC Comment POLYCHROMASIA  PRESENT        WBC Comment REACTIVE LYMPHS     POCT Glucose    Collection Time: 07/18/24 11:17 AM   Result Value Ref Range    POC Glucose 140 (H) 65 - 117 mg/dL    Performed by: Dian Brooks PCT        Assessment and Plan:   Myeloproliferative disorder  -no further tx would be helpful at this point  -Patient now on comfort care  -Hospice to see today    Will sign off.  Please call if we can be of any assistance

## 2024-07-22 NOTE — PLAN OF CARE
Problem: Occupational Therapy - Adult  Goal: By Discharge: Performs self-care activities at highest level of function for planned discharge setting.  See evaluation for individualized goals.  Description: FUNCTIONAL STATUS PRIOR TO ADMISSION:    Receives Help From:  (staff at facility), ADL Assistance: Needs assistance,  ,  ,  ,  ,  ,  ,  ,  , Active : No.  Pt reported that she was living  in [place] and independent in adls in June, then was hospitalized and to [facility]/rehab.  There staff assisted her with all adls and mobility.      HOME SUPPORT: pt reports family members are supportive.  Her only remaining sibling was present this date.      Occupational Therapy Goals:  Initiated 7/18/2024  1.  Patient will perform grooming, seated EOB,  with Set-up within 7 day(s).  2.  Patient will perform upper body sponge  bathing with Minimal Assist within 7 day(s).  3.  Patient will perform upper body dressing with Minimal Assist within 7 day(s).  4.  Patient will perform toilet transfers with Maximal Assist  within 7 day(s).  5.  Patient will perform all aspects of toileting with Moderate Assist within 7 day(s).  7/18/2024 1620 by Nan Barrientos, OTR/L  Outcome: Not Progressing     Problem: Occupational Therapy - Adult  Goal: By Discharge: Performs self-care activities at highest level of function for planned discharge setting.  See evaluation for individualized goals.  Description: FUNCTIONAL STATUS PRIOR TO ADMISSION:    Receives Help From:  (staff at facility), ADL Assistance: Needs assistance,  ,  ,  ,  ,  ,  ,  ,  , Active : No.  Pt reported that she was living  in AL and independent in adls in June, then was hospitalized and to [facility]/rehab.  There staff assisted her with all adls and mobility.      HOME SUPPORT: pt reports family members are supportive.  Her only remaining sibling was present this date.      Occupational Therapy Goals:  Initiated 7/18/2024  1.  Patient will perform grooming, seated 
  Problem: Occupational Therapy - Adult  Goal: By Discharge: Performs self-care activities at highest level of function for planned discharge setting.  See evaluation for individualized goals.  Description: FUNCTIONAL STATUS PRIOR TO ADMISSION:    Receives Help From:  (staff at facility), ADL Assistance: Needs assistance,  ,  ,  ,  ,  ,  ,  ,  , Active : No.  Pt reported that she was living  in AL and independent in adls in June, then was hospitalized and to [facility]/rehab.  There staff assisted her with all adls and mobility.      HOME SUPPORT: pt reports family members are supportive.  Her only remaining sibling was present this date.      Occupational Therapy Goals:  Initiated 7/18/2024  1.  Patient will perform grooming, seated EOB,  with Set-up within 7 day(s).  2.  Patient will perform upper body sponge  bathing with Minimal Assist within 7 day(s).  3.  Patient will perform upper body dressing with Minimal Assist within 7 day(s).  4.  Patient will perform toilet transfers with Maximal Assist  within 7 day(s).  5.  Patient will perform all aspects of toileting with Moderate Assist within 7 day(s).  Outcome: Not Progressing     OCCUPATIONAL THERAPY EVALUATION    Patient: Brandy Sauceda (78 y.o. female)  Date: 7/18/2024  Primary Diagnosis: GI bleeding [K92.2]         Precautions: Fall Risk, Bed Alarm                  ASSESSMENT :  The patient is limited by decreased functional mobility, independence in ADLs, ROM, strength, sensation, activity tolerance, endurance, coordination, balance, posture, fine-motor control, increased pain levels.  Pt demonstrates frailty and significant generalized weakness.   Pt participated in OT Evaluation and performed mobility with max A X2   She attempted standing with max A X2 but could not coordinate taking steps to chair--she is frail appearing and generally weak. (Pt  c/o 8/10 pain post mobility and was returned to bed. )She reports that in June she was independent in 
  Problem: Skin/Tissue Integrity  Goal: Absence of new skin breakdown  Description: 1.  Monitor for areas of redness and/or skin breakdown  2.  Assess vascular access sites hourly  3.  Every 4-6 hours minimum:  Change oxygen saturation probe site  4.  Every 4-6 hours:  If on nasal continuous positive airway pressure, respiratory therapy assess nares and determine need for appliance change or resting period.  7/17/2024 1501 by Sophia Pond RN  Outcome: Progressing  7/17/2024 0136 by Raisa Khalil RN  Outcome: Progressing     Problem: Discharge Planning  Goal: Discharge to home or other facility with appropriate resources  7/17/2024 1501 by Sophia Pond RN  Outcome: Progressing  7/17/2024 0136 by Raisa Khalil RN  Outcome: Progressing     Problem: Pain  Goal: Verbalizes/displays adequate comfort level or baseline comfort level  7/17/2024 1501 by Sophia Pond RN  Outcome: Progressing  7/17/2024 0136 by Raisa Khalil RN  Outcome: Progressing     Problem: ABCDS Injury Assessment  Goal: Absence of physical injury  7/17/2024 1501 by Sophia Pond RN  Outcome: Progressing  7/17/2024 0136 by Raisa Khalil RN  Outcome: Progressing     Problem: Safety - Adult  Goal: Free from fall injury  7/17/2024 1501 by Sophia Pond RN  Outcome: Progressing  7/17/2024 0136 by Raisa Khalil RN  Outcome: Progressing     Problem: Chronic Conditions and Co-morbidities  Goal: Patient's chronic conditions and co-morbidity symptoms are monitored and maintained or improved  7/17/2024 1501 by Sophia Pond RN  Outcome: Progressing  7/17/2024 0136 by Raisa Khalil RN  Outcome: Progressing     
  Problem: Skin/Tissue Integrity  Goal: Absence of new skin breakdown  Description: 1.  Monitor for areas of redness and/or skin breakdown  2.  Assess vascular access sites hourly  3.  Every 4-6 hours minimum:  Change oxygen saturation probe site  4.  Every 4-6 hours:  If on nasal continuous positive airway pressure, respiratory therapy assess nares and determine need for appliance change or resting period.  7/17/2024 2157 by Raisa Khalil RN  Outcome: Progressing  7/17/2024 2157 by Raisa Khalil RN  Outcome: Progressing  7/17/2024 1501 by Sophia Pond RN  Outcome: Progressing     Problem: Discharge Planning  Goal: Discharge to home or other facility with appropriate resources  7/17/2024 2157 by Raisa Khalil RN  Outcome: Progressing  7/17/2024 2157 by Raisa Khalil RN  Outcome: Progressing  7/17/2024 1501 by Sophia Pond RN  Outcome: Progressing     Problem: Pain  Goal: Verbalizes/displays adequate comfort level or baseline comfort level  7/17/2024 2157 by Raisa Khalil RN  Outcome: Progressing  7/17/2024 2157 by Raisa Khalil RN  Outcome: Progressing  7/17/2024 1501 by Sophia Pond RN  Outcome: Progressing     Problem: ABCDS Injury Assessment  Goal: Absence of physical injury  7/17/2024 2157 by Raisa Khalil RN  Outcome: Progressing  7/17/2024 2157 by Raisa Khalil RN  Outcome: Progressing  7/17/2024 1501 by Sophia Pond RN  Outcome: Progressing     Problem: Safety - Adult  Goal: Free from fall injury  7/17/2024 2157 by Raisa Khalil RN  Outcome: Progressing  7/17/2024 2157 by Raisa Khalil RN  Outcome: Progressing  7/17/2024 1501 by Sophia Pond RN  Outcome: Progressing     Problem: Chronic Conditions and Co-morbidities  Goal: Patient's chronic conditions and co-morbidity symptoms are monitored and maintained or improved  7/17/2024 2157 by Raisa Khalil RN  Outcome: Progressing  7/17/2024 2157 by Raisa Khalil, RN  Outcome: 
  Problem: Skin/Tissue Integrity  Goal: Absence of new skin breakdown  Description: 1.  Monitor for areas of redness and/or skin breakdown  2.  Assess vascular access sites hourly  3.  Every 4-6 hours minimum:  Change oxygen saturation probe site  4.  Every 4-6 hours:  If on nasal continuous positive airway pressure, respiratory therapy assess nares and determine need for appliance change or resting period.  7/19/2024 1135 by Daisy Dougherty RN  Outcome: Progressing  7/18/2024 2240 by Raisa Khalil RN  Outcome: Progressing     Problem: Discharge Planning  Goal: Discharge to home or other facility with appropriate resources  7/19/2024 1135 by Daisy Dougherty RN  Outcome: Progressing  7/18/2024 2240 by Raisa Khalil RN  Outcome: Progressing     Problem: Pain  Goal: Verbalizes/displays adequate comfort level or baseline comfort level  7/19/2024 1135 by Daisy Dougherty RN  Outcome: Progressing  7/18/2024 2240 by Raisa Khalil RN  Outcome: Progressing     Problem: ABCDS Injury Assessment  Goal: Absence of physical injury  7/19/2024 1135 by Daisy Dougherty RN  Outcome: Progressing  7/18/2024 2240 by Raisa Khalil RN  Outcome: Progressing     Problem: Safety - Adult  Goal: Free from fall injury  7/19/2024 1135 by Daisy Dougherty RN  Outcome: Progressing  7/18/2024 2240 by Raias Khalil RN  Outcome: Progressing     Problem: Chronic Conditions and Co-morbidities  Goal: Patient's chronic conditions and co-morbidity symptoms are monitored and maintained or improved  7/19/2024 1135 by Daisy Dougherty RN  Outcome: Progressing  7/18/2024 2240 by Raisa Khalil RN  Outcome: Progressing     
  Problem: Skin/Tissue Integrity  Goal: Absence of new skin breakdown  Description: 1.  Monitor for areas of redness and/or skin breakdown  2.  Assess vascular access sites hourly  3.  Every 4-6 hours minimum:  Change oxygen saturation probe site  4.  Every 4-6 hours:  If on nasal continuous positive airway pressure, respiratory therapy assess nares and determine need for appliance change or resting period.  7/19/2024 2206 by Anita Brenner RN  Outcome: Progressing  7/19/2024 1135 by Daisy Dougherty RN  Outcome: Progressing     Problem: Discharge Planning  Goal: Discharge to home or other facility with appropriate resources  7/19/2024 2206 by Anita Brenner RN  Outcome: Progressing  7/19/2024 1135 by Daisy Dougherty RN  Outcome: Progressing     Problem: Pain  Goal: Verbalizes/displays adequate comfort level or baseline comfort level  7/19/2024 2206 by Anita Brenner RN  Outcome: Progressing  7/19/2024 1135 by Daisy Dougherty RN  Outcome: Progressing     Problem: ABCDS Injury Assessment  Goal: Absence of physical injury  7/19/2024 2206 by Anita Brenner RN  Outcome: Progressing  7/19/2024 1135 by Daisy Dougherty RN  Outcome: Progressing     Problem: Safety - Adult  Goal: Free from fall injury  7/19/2024 2206 by Anita Brenner RN  Outcome: Progressing  7/19/2024 1135 by Daisy Dougherty RN  Outcome: Progressing     Problem: Chronic Conditions and Co-morbidities  Goal: Patient's chronic conditions and co-morbidity symptoms are monitored and maintained or improved  7/19/2024 2206 by Anita Brenner RN  Outcome: Progressing  7/19/2024 1135 by Daisy Dougherty RN  Outcome: Progressing     
  Problem: Skin/Tissue Integrity  Goal: Absence of new skin breakdown  Description: 1.  Monitor for areas of redness and/or skin breakdown  2.  Assess vascular access sites hourly  3.  Every 4-6 hours minimum:  Change oxygen saturation probe site  4.  Every 4-6 hours:  If on nasal continuous positive airway pressure, respiratory therapy assess nares and determine need for appliance change or resting period.  7/20/2024 2120 by Anita Brenner RN  Outcome: Progressing  7/20/2024 1826 by Ly Dobbs LPN  Outcome: Progressing     Problem: Discharge Planning  Goal: Discharge to home or other facility with appropriate resources  7/20/2024 2120 by Anita Brenner RN  Outcome: Progressing  7/20/2024 1826 by Ly Dobbs LPN  Outcome: Progressing     Problem: Pain  Goal: Verbalizes/displays adequate comfort level or baseline comfort level  7/20/2024 2120 by Anita Brenner RN  Outcome: Progressing  7/20/2024 1826 by Ly Dobbs LPN  Outcome: Progressing     Problem: ABCDS Injury Assessment  Goal: Absence of physical injury  7/20/2024 2120 by Anita Brenner RN  Outcome: Progressing  7/20/2024 1826 by Ly Dobbs LPN  Outcome: Progressing     Problem: Safety - Adult  Goal: Free from fall injury  7/20/2024 2120 by Anita Brenner RN  Outcome: Progressing  7/20/2024 1826 by Ly Dobbs LPN  Outcome: Progressing     Problem: Chronic Conditions and Co-morbidities  Goal: Patient's chronic conditions and co-morbidity symptoms are monitored and maintained or improved  Outcome: Progressing     
  Problem: Skin/Tissue Integrity  Goal: Absence of new skin breakdown  Description: 1.  Monitor for areas of redness and/or skin breakdown  2.  Assess vascular access sites hourly  3.  Every 4-6 hours minimum:  Change oxygen saturation probe site  4.  Every 4-6 hours:  If on nasal continuous positive airway pressure, respiratory therapy assess nares and determine need for appliance change or resting period.  Outcome: Progressing     Problem: Discharge Planning  Goal: Discharge to home or other facility with appropriate resources  Outcome: Progressing     Problem: Pain  Goal: Verbalizes/displays adequate comfort level or baseline comfort level  Outcome: Progressing     Problem: ABCDS Injury Assessment  Goal: Absence of physical injury  Outcome: Progressing     Problem: Safety - Adult  Goal: Free from fall injury  Outcome: Progressing     Problem: Chronic Conditions and Co-morbidities  Goal: Patient's chronic conditions and co-morbidity symptoms are monitored and maintained or improved  Outcome: Progressing     
  Problem: Skin/Tissue Integrity  Goal: Absence of new skin breakdown  Description: 1.  Monitor for areas of redness and/or skin breakdown  2.  Assess vascular access sites hourly  3.  Every 4-6 hours minimum:  Change oxygen saturation probe site  4.  Every 4-6 hours:  If on nasal continuous positive airway pressure, respiratory therapy assess nares and determine need for appliance change or resting period.  Outcome: Progressing     Problem: Discharge Planning  Goal: Discharge to home or other facility with appropriate resources  Outcome: Progressing  Flowsheets (Taken 7/15/2024 0815)  Discharge to home or other facility with appropriate resources: Identify barriers to discharge with patient and caregiver     Problem: Pain  Goal: Verbalizes/displays adequate comfort level or baseline comfort level  Outcome: Progressing  Flowsheets (Taken 7/15/2024 0800)  Verbalizes/displays adequate comfort level or baseline comfort level: Encourage patient to monitor pain and request assistance     Problem: ABCDS Injury Assessment  Goal: Absence of physical injury  Outcome: Progressing     Problem: Safety - Adult  Goal: Free from fall injury  Outcome: Progressing     Problem: Chronic Conditions and Co-morbidities  Goal: Patient's chronic conditions and co-morbidity symptoms are monitored and maintained or improved  Outcome: Progressing  Flowsheets (Taken 7/15/2024 0815)  Care Plan - Patient's Chronic Conditions and Co-Morbidity Symptoms are Monitored and Maintained or Improved: Monitor and assess patient's chronic conditions and comorbid symptoms for stability, deterioration, or improvement     
 Walking in hospital room? [x]  1 []  2 []  3  []  4   6.  Climbing 3-5 steps with a railing? [x]  1 []  2 []  3  []  4     Raw Score:                             Cutoff score ?171,2,3 had higher odds of discharging home with home health or need of SNF/IPR.    1. Ly Mayfield, Alexa Lorenz, Bg Zuniga, Lindy Ortega, Tray Goodman, Jair Mayfield.  Validity of the AM-PAC “6-Clicks” Inpatient Daily Activity and Basic Mobility Short Forms. Physical Therapy Mar 2014, 94 3) 379-391; DOI: 10.2522/ptj.[id number]  2. Rahul MATHEW, Hilary TRUONG, Liban TRUONG, Messi TRUONG. Association of AM-PAC \"6-Clicks\" Basic Mobility and Daily Activity Scores With Discharge Destination. Phys Ther. 2021 4;101(4):wrid705. doi: 10.1093/ptj/rzbd832. PMID: 00997632.  3. Rajan TRUONG, Edgard D, Roshni S, Juancho K, Shell S. Activity Measure for Post-Acute Care \"6-Clicks\" Basic Mobility Scores Predict Discharge Destination After Acute Care Hospitalization in Select Patient Groups: A Retrospective, Observational Study. Arch Rehabil Res Clin Transl. 2022 16;4(3):174171. doi: 10.1016/j.arrct..685329. PMID: 98645120; PMCID: [id number].  4. Chaparro WAKEFIELD, Yamile S, Davidson W, Karey P. AM-PAC Short Forms Manual 4.0. Revised 2020.                                                                                                                                                                                                                              Pain Ratin/10   Pain Intervention(s):   nursing notified and repositioning    Activity Tolerance:   Fair , Poor, requires frequent rest breaks, observed shortness of breath on exertion, and tachycardia (sustaining 120's)     After treatment:   Patient left in no apparent distress in bed, Call bell within reach, Bed/ chair alarm activated, Caregiver / family present, Side rails x3, and Patient offloaded in partial left side lying for pressure relief    COMMUNICATION/EDUCATION:   The

## 2024-07-22 NOTE — DISCHARGE INSTRUCTIONS
HOSPITALIST DISCHARGE INSTRUCTIONS    It is important that you take the medication exactly as they are prescribed.   Keep your medication in the bottles provided by the pharmacist and keep a list of the medication names, dosages, and times to be taken in your wallet.   Do not take other medications without consulting your doctor.     What to do at Home  Recommended diet:   DIET ONE TIME MESSAGE;  ADULT ORAL NUTRITION SUPPLEMENT; Breakfast, Lunch, Dinner; Clear Liquid Oral Supplement  ADULT DIET; Full Liquid    Recommended activity:   activity as tolerated    If you have questions regarding the hospital related prescriptions or hospital related issues please call [facility] office at . You can always direct your questions to your primary care doctor if you are unable to reach your hospital physician; your PCP works as an extension of your hospital doctor just like your hospital doctor is an extension of your PCP for your time at the hospital ([facility])    If you experience any of the following symptoms then please call your primary care physician or return to the emergency room if you cannot get hold of your doctor:    Fever, chills, nausea, vomiting, or persistent diarrhea  Worsening weakness or new problems with your speech or balance  Dark stools or visible blood in your stools  New Leg swelling or shortness of breath as these could be signs of a clot    Additional Instructions:    Please follow up with your PCP in one week.   Bring these papers with you to your follow up appointments. The papers will help your doctors be sure to continue the care plan from the hospital.        Information obtained by :  I understand that if any problems occur once I am at home I am to contact my physician.    I understand and acknowledge receipt of the instructions indicated above.

## 2024-07-22 NOTE — DISCHARGE SUMMARY
Culture Escherichia coli       Susceptibility        Escherichia coli      BACTERIAL SUSCEPTIBILITY PANEL DEACON      amikacin <=2 ug/mL Sensitive      ampicillin >=32 ug/mL Resistant      ampicillin-sulbactam 16 ug/mL Intermediate      ceFAZolin 8 ug/mL Sensitive      cefepime <=1 ug/mL Sensitive      cefOXitin 16 ug/mL Intermediate      cefTAZidime <=1 ug/mL Sensitive      cefTRIAXone <=1 ug/mL Sensitive      ciprofloxacin <=0.25 ug/mL Sensitive      gentamicin <=1 ug/mL Sensitive      levofloxacin <=0.12 ug/mL Sensitive      meropenem <=0.25 ug/mL Sensitive      nitrofurantoin <=16 ug/mL Sensitive      piperacillin-tazobactam <=4 ug/mL Sensitive      tobramycin <=1 ug/mL Sensitive      trimethoprim-sulfamethoxazole <=20 ug/mL Sensitive                                       Discharge Medications:     Medication List        CHANGE how you take these medications      ARIPiprazole 20 MG tablet  Commonly known as: ABILIFY  What changed: Another medication with the same name was removed. Continue taking this medication, and follow the directions you see here.     aspirin 81 MG chewable tablet  What changed: Another medication with the same name was removed. Continue taking this medication, and follow the directions you see here.     vitamin D 25 MCG (1000 UT) Tabs tablet  Commonly known as: CHOLECALCIFEROL  What changed: Another medication with the same name was removed. Continue taking this medication, and follow the directions you see here.            CONTINUE taking these medications      acetaminophen 500 MG tablet  Commonly known as: TYLENOL     allopurinol 100 MG tablet  Commonly known as: ZYLOPRIM     bumetanide 2 MG tablet  Commonly known as: BUMEX     calcitRIOL 0.25 MCG capsule  Commonly known as: ROCALTROL     collagenase 250 UNIT/GM ointment     ferrous sulfate 325 (65 Fe) MG tablet  Commonly known as: IRON 325     gabapentin 100 MG capsule  Commonly known as: NEURONTIN     ipratropium 0.03 % nasal

## (undated) DEVICE — PRESSURE MONITORING SET: Brand: TRUWAVE

## (undated) DEVICE — SPECIAL PROCEDURE DRAPE 32" X 34": Brand: SPECIAL PROCEDURE DRAPE

## (undated) DEVICE — PACK PROCEDURE SURG HRT CATH

## (undated) DEVICE — PROVE COVER: Brand: UNBRANDED

## (undated) DEVICE — KIT MED IMAG CNTRST AGNT W/ IOPAMIDOL REUSE

## (undated) DEVICE — KIT HND CTRL 3 W STPCOCK ROT END 54IN PREM HI PRSS TBNG AT

## (undated) DEVICE — ANGIOGRAPHY KIT

## (undated) DEVICE — CATHETER ART THERMODILUTION 6 FRX110 CM 4 LUMEN SWAN

## (undated) DEVICE — CO-SET DELIVERY SYSTEM FOR 123 ROOM TEMPATURE INJECTATE: Brand: CO-SET+

## (undated) DEVICE — GLIDESHEATH SLENDER STAINLESS STEEL KIT: Brand: GLIDESHEATH SLENDER

## (undated) DEVICE — HOLDER SHRP TEMP SH STP DISP -- ADVANTAGE

## (undated) DEVICE — KIT MFLD ISOLATN NACL CNTRST PRT TBNG SPIK W/ PRSS TRNSDUC